# Patient Record
Sex: FEMALE | Race: WHITE | Employment: OTHER | URBAN - METROPOLITAN AREA
[De-identification: names, ages, dates, MRNs, and addresses within clinical notes are randomized per-mention and may not be internally consistent; named-entity substitution may affect disease eponyms.]

---

## 2017-01-09 ENCOUNTER — HOSPITAL ENCOUNTER (OUTPATIENT)
Dept: PHYSICAL THERAPY | Age: 77
Discharge: HOME | End: 2017-01-09
Admitting: ORTHOPAEDIC SURGERY

## 2017-01-09 ENCOUNTER — TELEPHONE (OUTPATIENT)
Dept: ENDOCRINOLOGY | Age: 77
End: 2017-01-09

## 2017-01-11 DIAGNOSIS — F32.89 DEPRESSIVE DISORDER, NOT ELSEWHERE CLASSIFIED: ICD-10-CM

## 2017-01-11 DIAGNOSIS — M51.26 LUMBAR HERNIATED DISC: ICD-10-CM

## 2017-01-11 RX ORDER — DULOXETIN HYDROCHLORIDE 60 MG/1
60 CAPSULE, DELAYED RELEASE ORAL DAILY
Qty: 90 CAPSULE | Refills: 2 | Status: SHIPPED | OUTPATIENT
Start: 2017-01-11 | End: 2017-11-29 | Stop reason: SDUPTHER

## 2017-03-28 ENCOUNTER — OFFICE VISIT (OUTPATIENT)
Dept: ORTHOPEDIC SURGERY | Age: 77
End: 2017-03-28

## 2017-03-28 VITALS
SYSTOLIC BLOOD PRESSURE: 139 MMHG | DIASTOLIC BLOOD PRESSURE: 82 MMHG | WEIGHT: 175 LBS | HEIGHT: 60 IN | HEART RATE: 86 BPM | BODY MASS INDEX: 34.36 KG/M2

## 2017-03-28 DIAGNOSIS — M17.32 POST-TRAUMATIC OSTEOARTHRITIS OF LEFT KNEE: Primary | ICD-10-CM

## 2017-03-28 DIAGNOSIS — M25.562 LEFT KNEE PAIN, UNSPECIFIED CHRONICITY: ICD-10-CM

## 2017-03-28 PROCEDURE — 1123F ACP DISCUSS/DSCN MKR DOCD: CPT | Performed by: ORTHOPAEDIC SURGERY

## 2017-03-28 PROCEDURE — 1090F PRES/ABSN URINE INCON ASSESS: CPT | Performed by: ORTHOPAEDIC SURGERY

## 2017-03-28 PROCEDURE — 4040F PNEUMOC VAC/ADMIN/RCVD: CPT | Performed by: ORTHOPAEDIC SURGERY

## 2017-03-28 PROCEDURE — 73564 X-RAY EXAM KNEE 4 OR MORE: CPT | Performed by: ORTHOPAEDIC SURGERY

## 2017-03-28 PROCEDURE — 99214 OFFICE O/P EST MOD 30 MIN: CPT | Performed by: ORTHOPAEDIC SURGERY

## 2017-03-28 PROCEDURE — 1036F TOBACCO NON-USER: CPT | Performed by: ORTHOPAEDIC SURGERY

## 2017-03-28 PROCEDURE — 20610 DRAIN/INJ JOINT/BURSA W/O US: CPT | Performed by: ORTHOPAEDIC SURGERY

## 2017-03-28 PROCEDURE — G8427 DOCREV CUR MEDS BY ELIG CLIN: HCPCS | Performed by: ORTHOPAEDIC SURGERY

## 2017-03-28 PROCEDURE — G8400 PT W/DXA NO RESULTS DOC: HCPCS | Performed by: ORTHOPAEDIC SURGERY

## 2017-03-28 PROCEDURE — G8417 CALC BMI ABV UP PARAM F/U: HCPCS | Performed by: ORTHOPAEDIC SURGERY

## 2017-03-28 PROCEDURE — G8484 FLU IMMUNIZE NO ADMIN: HCPCS | Performed by: ORTHOPAEDIC SURGERY

## 2017-03-28 ASSESSMENT — ENCOUNTER SYMPTOMS: BACK PAIN: 1

## 2017-04-24 ENCOUNTER — PATIENT MESSAGE (OUTPATIENT)
Dept: FAMILY MEDICINE CLINIC | Age: 77
End: 2017-04-24

## 2017-04-25 ENCOUNTER — TELEPHONE (OUTPATIENT)
Dept: FAMILY MEDICINE CLINIC | Age: 77
End: 2017-04-25

## 2017-04-25 ENCOUNTER — OFFICE VISIT (OUTPATIENT)
Dept: ORTHOPEDIC SURGERY | Age: 77
End: 2017-04-25

## 2017-04-25 VITALS
HEART RATE: 82 BPM | BODY MASS INDEX: 34.36 KG/M2 | SYSTOLIC BLOOD PRESSURE: 118 MMHG | DIASTOLIC BLOOD PRESSURE: 78 MMHG | WEIGHT: 175 LBS | HEIGHT: 60 IN

## 2017-04-25 DIAGNOSIS — M12.811 ROTATOR CUFF ARTHROPATHY, RIGHT: Primary | ICD-10-CM

## 2017-04-25 DIAGNOSIS — R73.02 GLUCOSE INTOLERANCE (IMPAIRED GLUCOSE TOLERANCE): ICD-10-CM

## 2017-04-25 DIAGNOSIS — I10 BENIGN ESSENTIAL HYPERTENSION: Primary | ICD-10-CM

## 2017-04-25 DIAGNOSIS — M17.0 PRIMARY OSTEOARTHRITIS OF BOTH KNEES: ICD-10-CM

## 2017-04-25 DIAGNOSIS — E78.01 ESSENTIAL FAMILIAL HYPERCHOLESTEROLEMIA: ICD-10-CM

## 2017-04-25 PROCEDURE — 1036F TOBACCO NON-USER: CPT | Performed by: ORTHOPAEDIC SURGERY

## 2017-04-25 PROCEDURE — 4040F PNEUMOC VAC/ADMIN/RCVD: CPT | Performed by: ORTHOPAEDIC SURGERY

## 2017-04-25 PROCEDURE — G8427 DOCREV CUR MEDS BY ELIG CLIN: HCPCS | Performed by: ORTHOPAEDIC SURGERY

## 2017-04-25 PROCEDURE — 1123F ACP DISCUSS/DSCN MKR DOCD: CPT | Performed by: ORTHOPAEDIC SURGERY

## 2017-04-25 PROCEDURE — 1090F PRES/ABSN URINE INCON ASSESS: CPT | Performed by: ORTHOPAEDIC SURGERY

## 2017-04-25 PROCEDURE — G8400 PT W/DXA NO RESULTS DOC: HCPCS | Performed by: ORTHOPAEDIC SURGERY

## 2017-04-25 PROCEDURE — G8417 CALC BMI ABV UP PARAM F/U: HCPCS | Performed by: ORTHOPAEDIC SURGERY

## 2017-04-25 PROCEDURE — 99213 OFFICE O/P EST LOW 20 MIN: CPT | Performed by: ORTHOPAEDIC SURGERY

## 2017-04-25 ASSESSMENT — ENCOUNTER SYMPTOMS: BACK PAIN: 1

## 2017-05-03 ENCOUNTER — OFFICE VISIT (OUTPATIENT)
Dept: FAMILY MEDICINE CLINIC | Age: 77
End: 2017-05-03

## 2017-05-03 VITALS
HEIGHT: 59 IN | WEIGHT: 171 LBS | DIASTOLIC BLOOD PRESSURE: 84 MMHG | OXYGEN SATURATION: 98 % | SYSTOLIC BLOOD PRESSURE: 133 MMHG | RESPIRATION RATE: 16 BRPM | TEMPERATURE: 97.8 F | BODY MASS INDEX: 34.47 KG/M2 | HEART RATE: 96 BPM

## 2017-05-03 DIAGNOSIS — E78.01 ESSENTIAL FAMILIAL HYPERCHOLESTEROLEMIA: ICD-10-CM

## 2017-05-03 DIAGNOSIS — F32.89 DEPRESSIVE DISORDER, NOT ELSEWHERE CLASSIFIED: ICD-10-CM

## 2017-05-03 DIAGNOSIS — I10 BENIGN ESSENTIAL HYPERTENSION: Primary | ICD-10-CM

## 2017-05-03 DIAGNOSIS — M48.061 LUMBAR STENOSIS: ICD-10-CM

## 2017-05-03 PROCEDURE — 99214 OFFICE O/P EST MOD 30 MIN: CPT | Performed by: FAMILY MEDICINE

## 2017-05-03 PROCEDURE — 1036F TOBACCO NON-USER: CPT | Performed by: FAMILY MEDICINE

## 2017-05-03 PROCEDURE — 1123F ACP DISCUSS/DSCN MKR DOCD: CPT | Performed by: FAMILY MEDICINE

## 2017-05-03 PROCEDURE — G8427 DOCREV CUR MEDS BY ELIG CLIN: HCPCS | Performed by: FAMILY MEDICINE

## 2017-05-03 PROCEDURE — G8417 CALC BMI ABV UP PARAM F/U: HCPCS | Performed by: FAMILY MEDICINE

## 2017-05-03 PROCEDURE — 1090F PRES/ABSN URINE INCON ASSESS: CPT | Performed by: FAMILY MEDICINE

## 2017-05-03 PROCEDURE — 4040F PNEUMOC VAC/ADMIN/RCVD: CPT | Performed by: FAMILY MEDICINE

## 2017-05-03 PROCEDURE — G8400 PT W/DXA NO RESULTS DOC: HCPCS | Performed by: FAMILY MEDICINE

## 2017-05-03 PROCEDURE — G0444 DEPRESSION SCREEN ANNUAL: HCPCS | Performed by: FAMILY MEDICINE

## 2017-05-03 RX ORDER — BUPROPION HYDROCHLORIDE 150 MG/1
150 TABLET ORAL EVERY MORNING
Qty: 90 TABLET | Refills: 3 | Status: SHIPPED | OUTPATIENT
Start: 2017-05-03 | End: 2017-05-19

## 2017-05-03 RX ORDER — BUPROPION HYDROCHLORIDE 300 MG/1
300 TABLET ORAL EVERY MORNING
Qty: 90 TABLET | Refills: 3 | Status: SHIPPED | OUTPATIENT
Start: 2017-05-03 | End: 2018-08-27 | Stop reason: SDUPTHER

## 2017-05-03 RX ORDER — TRIAMTERENE AND HYDROCHLOROTHIAZIDE 37.5; 25 MG/1; MG/1
1-2 TABLET ORAL DAILY
COMMUNITY
End: 2017-09-12 | Stop reason: SDUPTHER

## 2017-05-03 RX ORDER — AMLODIPINE BESYLATE 10 MG/1
10 TABLET ORAL DAILY
Qty: 90 TABLET | Refills: 3 | Status: SHIPPED | OUTPATIENT
Start: 2017-05-03 | End: 2017-08-02 | Stop reason: SDUPTHER

## 2017-05-03 RX ORDER — LOSARTAN POTASSIUM 50 MG/1
50 TABLET ORAL DAILY
Qty: 90 TABLET | Refills: 3 | Status: SHIPPED | OUTPATIENT
Start: 2017-05-03 | End: 2017-07-17 | Stop reason: SDUPTHER

## 2017-05-03 ASSESSMENT — PATIENT HEALTH QUESTIONNAIRE - PHQ9
SUM OF ALL RESPONSES TO PHQ9 QUESTIONS 1 & 2: 2
2. FEELING DOWN, DEPRESSED OR HOPELESS: 2
8. MOVING OR SPEAKING SO SLOWLY THAT OTHER PEOPLE COULD HAVE NOTICED. OR THE OPPOSITE, BEING SO FIGETY OR RESTLESS THAT YOU HAVE BEEN MOVING AROUND A LOT MORE THAN USUAL: 0
SUM OF ALL RESPONSES TO PHQ QUESTIONS 1-9: 4
7. TROUBLE CONCENTRATING ON THINGS, SUCH AS READING THE NEWSPAPER OR WATCHING TELEVISION: 0
9. THOUGHTS THAT YOU WOULD BE BETTER OFF DEAD, OR OF HURTING YOURSELF: 0
5. POOR APPETITE OR OVEREATING: 0
10. IF YOU CHECKED OFF ANY PROBLEMS, HOW DIFFICULT HAVE THESE PROBLEMS MADE IT FOR YOU TO DO YOUR WORK, TAKE CARE OF THINGS AT HOME, OR GET ALONG WITH OTHER PEOPLE: 1
1. LITTLE INTEREST OR PLEASURE IN DOING THINGS: 0
3. TROUBLE FALLING OR STAYING ASLEEP: 1
4. FEELING TIRED OR HAVING LITTLE ENERGY: 1
6. FEELING BAD ABOUT YOURSELF - OR THAT YOU ARE A FAILURE OR HAVE LET YOURSELF OR YOUR FAMILY DOWN: 0

## 2017-05-11 ENCOUNTER — OFFICE VISIT (OUTPATIENT)
Dept: ORTHOPEDIC SURGERY | Age: 77
End: 2017-05-11

## 2017-05-11 VITALS
BODY MASS INDEX: 33.38 KG/M2 | SYSTOLIC BLOOD PRESSURE: 127 MMHG | DIASTOLIC BLOOD PRESSURE: 76 MMHG | HEIGHT: 60 IN | WEIGHT: 170 LBS

## 2017-05-11 DIAGNOSIS — G57.01 PIRIFORMIS SYNDROME, RIGHT: ICD-10-CM

## 2017-05-11 DIAGNOSIS — M53.3 SACROILIAC JOINT DYSFUNCTION OF RIGHT SIDE: Primary | ICD-10-CM

## 2017-05-11 DIAGNOSIS — M54.50 PAIN OF LUMBAR SPINE: ICD-10-CM

## 2017-05-11 DIAGNOSIS — M47.816 SPONDYLOSIS OF LUMBAR REGION WITHOUT MYELOPATHY OR RADICULOPATHY: ICD-10-CM

## 2017-05-11 PROCEDURE — 99214 OFFICE O/P EST MOD 30 MIN: CPT | Performed by: PHYSICAL MEDICINE & REHABILITATION

## 2017-05-11 PROCEDURE — G8417 CALC BMI ABV UP PARAM F/U: HCPCS | Performed by: PHYSICAL MEDICINE & REHABILITATION

## 2017-05-11 PROCEDURE — 1036F TOBACCO NON-USER: CPT | Performed by: PHYSICAL MEDICINE & REHABILITATION

## 2017-05-11 PROCEDURE — 1090F PRES/ABSN URINE INCON ASSESS: CPT | Performed by: PHYSICAL MEDICINE & REHABILITATION

## 2017-05-11 PROCEDURE — 4040F PNEUMOC VAC/ADMIN/RCVD: CPT | Performed by: PHYSICAL MEDICINE & REHABILITATION

## 2017-05-11 PROCEDURE — G8427 DOCREV CUR MEDS BY ELIG CLIN: HCPCS | Performed by: PHYSICAL MEDICINE & REHABILITATION

## 2017-05-11 PROCEDURE — 72100 X-RAY EXAM L-S SPINE 2/3 VWS: CPT | Performed by: PHYSICAL MEDICINE & REHABILITATION

## 2017-07-05 RX ORDER — ROSUVASTATIN CALCIUM 20 MG/1
20 TABLET, COATED ORAL NIGHTLY
Qty: 90 TABLET | Refills: 0 | Status: SHIPPED | OUTPATIENT
Start: 2017-07-05 | End: 2017-10-23 | Stop reason: SDUPTHER

## 2017-07-17 ENCOUNTER — OFFICE VISIT (OUTPATIENT)
Dept: FAMILY MEDICINE CLINIC | Age: 77
End: 2017-07-17

## 2017-07-17 ENCOUNTER — PATIENT MESSAGE (OUTPATIENT)
Dept: FAMILY MEDICINE CLINIC | Age: 77
End: 2017-07-17

## 2017-07-17 VITALS
TEMPERATURE: 97.8 F | OXYGEN SATURATION: 98 % | SYSTOLIC BLOOD PRESSURE: 128 MMHG | HEIGHT: 59 IN | BODY MASS INDEX: 34.27 KG/M2 | DIASTOLIC BLOOD PRESSURE: 68 MMHG | HEART RATE: 68 BPM | RESPIRATION RATE: 16 BRPM | WEIGHT: 170 LBS

## 2017-07-17 DIAGNOSIS — R61 EXCESSIVE SWEATING: ICD-10-CM

## 2017-07-17 DIAGNOSIS — Z15.89 HETEROZYGOUS MTHFR MUTATION C677T: ICD-10-CM

## 2017-07-17 DIAGNOSIS — I10 BENIGN ESSENTIAL HYPERTENSION: ICD-10-CM

## 2017-07-17 DIAGNOSIS — R73.02 GLUCOSE INTOLERANCE (IMPAIRED GLUCOSE TOLERANCE): ICD-10-CM

## 2017-07-17 DIAGNOSIS — Z01.818 PREOP EXAMINATION: Primary | ICD-10-CM

## 2017-07-17 DIAGNOSIS — H26.9 CATARACT: ICD-10-CM

## 2017-07-17 DIAGNOSIS — R00.2 PALPITATION: ICD-10-CM

## 2017-07-17 DIAGNOSIS — R00.2 PALPITATIONS: Primary | ICD-10-CM

## 2017-07-17 LAB
A/G RATIO: 1.9 (ref 1.1–2.2)
ALBUMIN SERPL-MCNC: 4.9 G/DL (ref 3.4–5)
ALP BLD-CCNC: 63 U/L (ref 40–129)
ALT SERPL-CCNC: 23 U/L (ref 10–40)
ANION GAP SERPL CALCULATED.3IONS-SCNC: 14 MMOL/L (ref 3–16)
AST SERPL-CCNC: 22 U/L (ref 15–37)
BASOPHILS ABSOLUTE: 0.1 K/UL (ref 0–0.2)
BASOPHILS RELATIVE PERCENT: 0.5 %
BILIRUB SERPL-MCNC: 0.3 MG/DL (ref 0–1)
BUN BLDV-MCNC: 30 MG/DL (ref 7–20)
CALCIUM SERPL-MCNC: 10.5 MG/DL (ref 8.3–10.6)
CHLORIDE BLD-SCNC: 100 MMOL/L (ref 99–110)
CO2: 29 MMOL/L (ref 21–32)
CREAT SERPL-MCNC: 0.8 MG/DL (ref 0.6–1.2)
EOSINOPHILS ABSOLUTE: 0.2 K/UL (ref 0–0.6)
EOSINOPHILS RELATIVE PERCENT: 1.6 %
GFR AFRICAN AMERICAN: >60
GFR NON-AFRICAN AMERICAN: >60
GLOBULIN: 2.6 G/DL
GLUCOSE BLD-MCNC: 96 MG/DL (ref 70–99)
HCT VFR BLD CALC: 40.9 % (ref 36–48)
HEMOGLOBIN: 13.2 G/DL (ref 12–16)
LYMPHOCYTES ABSOLUTE: 1.7 K/UL (ref 1–5.1)
LYMPHOCYTES RELATIVE PERCENT: 14 %
MCH RBC QN AUTO: 29.4 PG (ref 26–34)
MCHC RBC AUTO-ENTMCNC: 32.2 G/DL (ref 31–36)
MCV RBC AUTO: 91 FL (ref 80–100)
MONOCYTES ABSOLUTE: 1.1 K/UL (ref 0–1.3)
MONOCYTES RELATIVE PERCENT: 8.9 %
NEUTROPHILS ABSOLUTE: 9.2 K/UL (ref 1.7–7.7)
NEUTROPHILS RELATIVE PERCENT: 75 %
PDW BLD-RTO: 14 % (ref 12.4–15.4)
PLATELET # BLD: 269 K/UL (ref 135–450)
PMV BLD AUTO: 10.5 FL (ref 5–10.5)
POTASSIUM SERPL-SCNC: 4.5 MMOL/L (ref 3.5–5.1)
RBC # BLD: 4.5 M/UL (ref 4–5.2)
SODIUM BLD-SCNC: 143 MMOL/L (ref 136–145)
TOTAL PROTEIN: 7.5 G/DL (ref 6.4–8.2)
WBC # BLD: 12.2 K/UL (ref 4–11)

## 2017-07-17 PROCEDURE — 1036F TOBACCO NON-USER: CPT | Performed by: FAMILY MEDICINE

## 2017-07-17 PROCEDURE — 93000 ELECTROCARDIOGRAM COMPLETE: CPT | Performed by: FAMILY MEDICINE

## 2017-07-17 PROCEDURE — G8427 DOCREV CUR MEDS BY ELIG CLIN: HCPCS | Performed by: FAMILY MEDICINE

## 2017-07-17 PROCEDURE — 1090F PRES/ABSN URINE INCON ASSESS: CPT | Performed by: FAMILY MEDICINE

## 2017-07-17 PROCEDURE — G8417 CALC BMI ABV UP PARAM F/U: HCPCS | Performed by: FAMILY MEDICINE

## 2017-07-17 PROCEDURE — G8400 PT W/DXA NO RESULTS DOC: HCPCS | Performed by: FAMILY MEDICINE

## 2017-07-17 PROCEDURE — 99215 OFFICE O/P EST HI 40 MIN: CPT | Performed by: FAMILY MEDICINE

## 2017-07-17 PROCEDURE — 1123F ACP DISCUSS/DSCN MKR DOCD: CPT | Performed by: FAMILY MEDICINE

## 2017-07-17 PROCEDURE — 4040F PNEUMOC VAC/ADMIN/RCVD: CPT | Performed by: FAMILY MEDICINE

## 2017-07-17 RX ORDER — LOSARTAN POTASSIUM 50 MG/1
50 TABLET ORAL DAILY
Qty: 90 TABLET | Refills: 3 | Status: SHIPPED | OUTPATIENT
Start: 2017-07-17 | End: 2017-11-08

## 2017-07-20 ENCOUNTER — TELEPHONE (OUTPATIENT)
Dept: FAMILY MEDICINE CLINIC | Age: 77
End: 2017-07-20

## 2017-07-20 DIAGNOSIS — R61 SWEATING INCREASE: Primary | ICD-10-CM

## 2017-07-20 DIAGNOSIS — D72.829 LEUKOCYTOSIS, UNSPECIFIED TYPE: ICD-10-CM

## 2017-07-24 ENCOUNTER — HOSPITAL ENCOUNTER (OUTPATIENT)
Dept: NON INVASIVE DIAGNOSTICS | Age: 77
Discharge: HOME | End: 2017-07-24
Attending: FAMILY MEDICINE | Admitting: FAMILY MEDICINE

## 2017-07-25 ENCOUNTER — TELEPHONE (OUTPATIENT)
Dept: CARDIOLOGY CLINIC | Age: 77
End: 2017-07-25

## 2017-07-25 LAB
ACQUISITION DURATION: NORMAL S
AVERAGE HEART RATE: 77 BPM
EKG DIAGNOSIS: NORMAL
FASTEST SUPRAVENTRICULAR RATE: 82 BPM
HOLTER MAX HEART RATE: 126 BPM
HOOKUP DATE: NORMAL
HOOKUP TIME: NORMAL
LONGEST SUPRAVENTRICULAR RATE: 82 BPM
Lab: NORMAL
MAX HEART RATE TIME/DATE: NORMAL
MIN HEART RATE TIME/DATE: NORMAL
MIN HEART RATE: 59 BPM
NUMBER OF FASTEST SUPRAVENTRICULAR BEATS: 3
NUMBER OF LONGEST SUPRAVENTRICULAR BEATS: 3
NUMBER OF QRS COMPLEXES: NORMAL
NUMBER OF SUPRAVENTRICULAR BEATS IN RUNS: 3
NUMBER OF SUPRAVENTRICULAR COUPLETS: 3
NUMBER OF SUPRAVENTRICULAR ECTOPICS: 66
NUMBER OF SUPRAVENTRICULAR ISOLATED BEATS: 57
NUMBER OF SUPRAVENTRICULAR RUNS: 1
NUMBER OF VENTRICULAR BEATS IN RUNS: 0
NUMBER OF VENTRICULAR BIGEMINAL CYCLES: 0
NUMBER OF VENTRICULAR COUPLETS: 0
NUMBER OF VENTRICULAR ECTOPICS: 110
NUMBER OF VENTRICULAR ISOLATED BEATS: 110
NUMBER OF VENTRICULAR RUNS: 0

## 2017-07-27 ENCOUNTER — HOSPITAL ENCOUNTER (OUTPATIENT)
Dept: OTHER | Age: 77
Discharge: HOME | End: 2017-07-27
Attending: FAMILY MEDICINE | Admitting: FAMILY MEDICINE

## 2017-07-27 DIAGNOSIS — R61 SWEATING INCREASE: ICD-10-CM

## 2017-07-27 DIAGNOSIS — D72.829 LEUKOCYTOSIS, UNSPECIFIED TYPE: ICD-10-CM

## 2017-08-02 DIAGNOSIS — I10 BENIGN ESSENTIAL HYPERTENSION: ICD-10-CM

## 2017-08-02 RX ORDER — AMLODIPINE BESYLATE 10 MG/1
10 TABLET ORAL DAILY
Qty: 90 TABLET | Refills: 3 | Status: SHIPPED | OUTPATIENT
Start: 2017-08-02 | End: 2018-07-27 | Stop reason: SDUPTHER

## 2017-09-08 ENCOUNTER — HOSPITAL ENCOUNTER (OUTPATIENT)
Dept: MAMMOGRAPHY | Age: 77
Discharge: HOME | End: 2017-09-08
Attending: OBSTETRICS & GYNECOLOGY | Admitting: OBSTETRICS & GYNECOLOGY

## 2017-09-08 DIAGNOSIS — Z12.31 VISIT FOR SCREENING MAMMOGRAM: ICD-10-CM

## 2017-10-04 ENCOUNTER — OFFICE VISIT (OUTPATIENT)
Dept: ORTHOPEDIC SURGERY | Age: 77
End: 2017-10-04

## 2017-10-04 VITALS
RESPIRATION RATE: 16 BRPM | HEIGHT: 59 IN | WEIGHT: 170 LBS | SYSTOLIC BLOOD PRESSURE: 136 MMHG | DIASTOLIC BLOOD PRESSURE: 79 MMHG | BODY MASS INDEX: 34.27 KG/M2 | HEART RATE: 94 BPM

## 2017-10-04 DIAGNOSIS — M19.042 PRIMARY OSTEOARTHRITIS OF LEFT HAND: Primary | ICD-10-CM

## 2017-10-04 DIAGNOSIS — M79.645 THUMB PAIN, LEFT: ICD-10-CM

## 2017-10-04 PROCEDURE — G8427 DOCREV CUR MEDS BY ELIG CLIN: HCPCS | Performed by: ORTHOPAEDIC SURGERY

## 2017-10-04 PROCEDURE — G8484 FLU IMMUNIZE NO ADMIN: HCPCS | Performed by: ORTHOPAEDIC SURGERY

## 2017-10-04 PROCEDURE — 99214 OFFICE O/P EST MOD 30 MIN: CPT | Performed by: ORTHOPAEDIC SURGERY

## 2017-10-04 PROCEDURE — G8417 CALC BMI ABV UP PARAM F/U: HCPCS | Performed by: ORTHOPAEDIC SURGERY

## 2017-10-04 PROCEDURE — 1090F PRES/ABSN URINE INCON ASSESS: CPT | Performed by: ORTHOPAEDIC SURGERY

## 2017-10-04 PROCEDURE — 1036F TOBACCO NON-USER: CPT | Performed by: ORTHOPAEDIC SURGERY

## 2017-10-04 PROCEDURE — 4040F PNEUMOC VAC/ADMIN/RCVD: CPT | Performed by: ORTHOPAEDIC SURGERY

## 2017-10-04 PROCEDURE — 73140 X-RAY EXAM OF FINGER(S): CPT | Performed by: ORTHOPAEDIC SURGERY

## 2017-10-04 NOTE — Clinical Note
Dear  Selene Lawrence MD,  Thank you very much for your referral or Ms. Lucy Garnica to me for evaluation and treatment of her Hand & Wrist condition. I appreciate your confidence in me and thank you for allowing me the opportunity to care for your patients. If I can be of any further assistance to you on this or any other patient, please do not hesitate to contact me. Sincerely,  Td Myles.  Portillo Catherine MD

## 2017-10-04 NOTE — MR AVS SNAPSHOT
becoming more physically active will help lower your risk of developing or worsening diseases associated with obesity. Learn more at: [employer]             Medications and Orders      Your Current Medications Are              triamterene-hydrochlorothiazide (MAXZIDE-25) 37.5-25 MG per tablet Take 1-2 tablets by mouth daily    amLODIPine (NORVASC) 10 MG tablet Take 1 tablet by mouth daily    Omeprazole-Sodium Bicarbonate (ZEGERID PO) Take by mouth    losartan (COZAAR) 50 MG tablet Take 1 tablet by mouth daily    rosuvastatin (CRESTOR) 20 MG tablet Take 1 tablet by mouth nightly    FOLIC ACID PO Take by mouth    CYANOCOBALAMIN PO Take by mouth    Pyridoxine HCl (B-6 PO) Take by mouth    buPROPion (WELLBUTRIN XL) 300 MG extended release tablet Take 1 tablet by mouth every morning    DULoxetine (CYMBALTA) 60 MG extended release capsule Take 1 capsule by mouth daily Due for follow up appt. ibuprofen (ADVIL;MOTRIN) 800 MG tablet Take 1 tablet by mouth 2 times daily as needed for Pain    Probiotic Product (PROBIOTIC DAILY PO) Take  by mouth. Alpha-D-Galactosidase (BEANO PO) Take  by mouth. L-Arginine POWD Take 4.5 g by mouth 2 times daily. Allergies              Ace Inhibitors     Taztia Xt [Diltiazem Hcl]       We Ordered/Performed the following           OTS Breg CMC Thumb Guard     Comments:    Patient was prescribed a Breg CMC Thumb Guard Brace. The left thumb will require stabilization / immobilization from this semi-rigid / rigid orthosis to improve their function. The orthosis will assist in protecting the affected area, provide functional support and facilitate healing. The patient was educated and fit by a healthcare professional with expert knowledge and specialization in brace application while under the direct supervision of the physician.   Verbal and written instructions for the use of and application of this item were provided. They were instructed to contact the office immediately should the brace result in increased pain, decreased sensation, increased swelling or worsening of the condition.     XR FINGER LEFT (MIN 2 VIEWS)     Comments:    2V Lt Thumb    Room 4         Result Summary for XR FINGER LEFT (MIN 2 VIEWS)      Result Information     Status          Final result (Exam End: 10/4/2017  2:30 PM)           10/4/2017  2:30 PM      Narrative & Impression           Radiology result is complete; follow up with provider / physician office for radiology results                       Additional Information        Basic Information     Date Of Birth Sex Race Ethnicity Preferred Language    1940 Female White Non-/Non  English      Problem List as of 10/4/2017  Date Reviewed: 7/17/2017                Glucose intolerance (impaired glucose tolerance)    Acute hearing loss of right ear    Trochanteric bursitis of right hip    Lumbar spondylosis    Lumbar stenosis    Renal angiolipoma    AVN (avascular necrosis of bone) (HCC)    Elevated lipoprotein(a)    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 ([facility] [address]    Gene mutation    IBS (irritable bowel syndrome)    Depressive disorder, not elsewhere classified    Osteopenia    Cervical stenosis of spine    Benign essential hypertension    Colon polyps    Essential familial hypercholesterolemia      Immunizations as of 10/4/2017     Name Date    Influenza Vaccine, unspecified formulation 11/2/2015    Influenza Whole 9/13/2010    Influenza, High Dose 10/19/2016, 9/24/2012    Pneumococcal 13-valent Conjugate ([id number]) 7/12/2016    Pneumococcal Polysaccharide ([id number]) 10/21/2008    Td 5/22/2009    Tdap (Boostrix, Adacel) 9/19/2015    Zoster 8/26/2007      Preventive Care        Date Due    Yearly Flu Vaccine (1) 9/1/2017    Cholesterol Screening 4/27/2022    Tetanus Combination Vaccine (2 - Td) 9/19/2025            [facility]

## 2017-10-04 NOTE — PROGRESS NOTES
not similarly effected bilaterally. The base of the hand & wrist are not tender to palpation bilaterally  Muscular strength is clinically appropriate bilaterally. Radiographic Evaluation:  Radiographs are reviewed  today (2 views of the left Thumb). They demonstrate no evidence of an acute fracture. There is marked osteoarthritis of the MCP Joint with marked joint narrowing and osteophyte formation, & 0 degrees of angular deformity. There is not evidence of other injury or bony fracture. 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 Plan:    I have discussed with Ms. Marcus Garza the various treatment options for treatment of digital arthritis of the MCP Joint. We discussed the conservative options of symptomatic treatment, use of NSAIDS, & activity adjustments. We also touched on the use of occasional immobilization as needed for symptomatic control. I also described further more advanced treatments including the intermittent use of either injected or oral steroids and surgical treatment options. She has elected to proceed conservativly, voicing an understanding of the other options available to her. I have explained the complications, limitations, expectations, alternatives, & risks of her chosen treatment. I also explained the likelihood of permanent joint enlargement and limited motion. She understood our discussion and was comfortable with her decision; she was provided with appropriate expectations. I have asked her to schedule a follow-up appointment with me at any time in the future she feels the need for further treatment for her symptoms. She is welcome to call for an appointment sooner if she has any additional concerns or questions.

## 2017-10-23 ENCOUNTER — OFFICE VISIT (OUTPATIENT)
Dept: FAMILY MEDICINE CLINIC | Age: 77
End: 2017-10-23

## 2017-10-23 VITALS
SYSTOLIC BLOOD PRESSURE: 128 MMHG | TEMPERATURE: 97.9 F | HEART RATE: 88 BPM | OXYGEN SATURATION: 96 % | RESPIRATION RATE: 16 BRPM | DIASTOLIC BLOOD PRESSURE: 71 MMHG

## 2017-10-23 DIAGNOSIS — J20.9 ACUTE BRONCHITIS, UNSPECIFIED ORGANISM: Primary | ICD-10-CM

## 2017-10-23 PROCEDURE — G8417 CALC BMI ABV UP PARAM F/U: HCPCS | Performed by: FAMILY MEDICINE

## 2017-10-23 PROCEDURE — 99213 OFFICE O/P EST LOW 20 MIN: CPT | Performed by: FAMILY MEDICINE

## 2017-10-23 PROCEDURE — 1090F PRES/ABSN URINE INCON ASSESS: CPT | Performed by: FAMILY MEDICINE

## 2017-10-23 PROCEDURE — G8484 FLU IMMUNIZE NO ADMIN: HCPCS | Performed by: FAMILY MEDICINE

## 2017-10-23 PROCEDURE — 1123F ACP DISCUSS/DSCN MKR DOCD: CPT | Performed by: FAMILY MEDICINE

## 2017-10-23 PROCEDURE — G8427 DOCREV CUR MEDS BY ELIG CLIN: HCPCS | Performed by: FAMILY MEDICINE

## 2017-10-23 PROCEDURE — G8400 PT W/DXA NO RESULTS DOC: HCPCS | Performed by: FAMILY MEDICINE

## 2017-10-23 PROCEDURE — 4040F PNEUMOC VAC/ADMIN/RCVD: CPT | Performed by: FAMILY MEDICINE

## 2017-10-23 PROCEDURE — 1036F TOBACCO NON-USER: CPT | Performed by: FAMILY MEDICINE

## 2017-10-23 RX ORDER — AZITHROMYCIN 250 MG/1
TABLET, FILM COATED ORAL
Qty: 1 PACKET | Refills: 0 | Status: SHIPPED | OUTPATIENT
Start: 2017-10-23 | End: 2017-10-31 | Stop reason: ALTCHOICE

## 2017-10-23 RX ORDER — ROSUVASTATIN CALCIUM 20 MG/1
20 TABLET, COATED ORAL NIGHTLY
Qty: 90 TABLET | Refills: 1 | Status: SHIPPED | OUTPATIENT
Start: 2017-10-23 | End: 2018-07-27 | Stop reason: SDUPTHER

## 2017-10-23 NOTE — PROGRESS NOTES
Subjective:      Patient ID: Ange Barrios is a 68 y.o. female. HPI  Orville Singer is here for evaluation for cough. Orville Singer complains of a cough x one week. She is coughing up green to brown phlegm. Mild nasal congestion with clear nasal discharge. No ST, ear pain, sinus pressure. No nausea, vomiting, diarrhea, fever. Mild malaise. Rx: Sudafed does not help. Delsym did not helps    Outpatient Prescriptions Marked as Taking for the 10/23/17 encounter (Office Visit) with James Ariza MD   Medication Sig Dispense Refill    triamterene-hydrochlorothiazide (MAXZIDE-25) 37.5-25 MG per tablet Take 1-2 tablets by mouth daily 180 tablet 1    amLODIPine (NORVASC) 10 MG tablet Take 1 tablet by mouth daily 90 tablet 3    Omeprazole-Sodium Bicarbonate (ZEGERID PO) Take by mouth      losartan (COZAAR) 50 MG tablet Take 1 tablet by mouth daily 90 tablet 3    rosuvastatin (CRESTOR) 20 MG tablet Take 1 tablet by mouth nightly 90 tablet 0    FOLIC ACID PO Take by mouth      CYANOCOBALAMIN PO Take by mouth      Pyridoxine HCl (B-6 PO) Take by mouth      buPROPion (WELLBUTRIN XL) 300 MG extended release tablet Take 1 tablet by mouth every morning 90 tablet 3    DULoxetine (CYMBALTA) 60 MG extended release capsule Take 1 capsule by mouth daily Due for follow up appt. 90 capsule 2    ibuprofen (ADVIL;MOTRIN) 800 MG tablet Take 1 tablet by mouth 2 times daily as needed for Pain 60 tablet 5    Probiotic Product (PROBIOTIC DAILY PO) Take  by mouth.  Alpha-D-Galactosidase (BEANO PO) Take  by mouth.  L-Arginine POWD Take 4.5 g by mouth 2 times daily.         Patient Active Problem List   Diagnosis    Depressive disorder, not elsewhere classified    Osteopenia    Cervical stenosis of spine    Benign essential hypertension    Colon polyps    Essential familial hypercholesterolemia    IBS (irritable bowel syndrome)    AVN (avascular necrosis of bone) (HCC)    Elevated lipoprotein(a)    Heterozygous

## 2017-10-30 ENCOUNTER — PATIENT MESSAGE (OUTPATIENT)
Dept: FAMILY MEDICINE CLINIC | Age: 77
End: 2017-10-30

## 2017-10-30 RX ORDER — CEFUROXIME AXETIL 500 MG/1
500 TABLET ORAL 2 TIMES DAILY
Qty: 20 TABLET | Refills: 0 | Status: SHIPPED | OUTPATIENT
Start: 2017-10-30 | End: 2017-10-31 | Stop reason: ALTCHOICE

## 2017-10-31 ENCOUNTER — OFFICE VISIT (OUTPATIENT)
Dept: ENDOCRINOLOGY | Age: 77
End: 2017-10-31

## 2017-10-31 VITALS
HEART RATE: 97 BPM | HEIGHT: 60 IN | TEMPERATURE: 97.8 F | BODY MASS INDEX: 33.85 KG/M2 | SYSTOLIC BLOOD PRESSURE: 126 MMHG | RESPIRATION RATE: 18 BRPM | OXYGEN SATURATION: 93 % | DIASTOLIC BLOOD PRESSURE: 86 MMHG | WEIGHT: 172.4 LBS

## 2017-10-31 DIAGNOSIS — M85.80 OSTEOPENIA, UNSPECIFIED LOCATION: ICD-10-CM

## 2017-10-31 DIAGNOSIS — E78.01 ESSENTIAL FAMILIAL HYPERCHOLESTEROLEMIA: ICD-10-CM

## 2017-10-31 DIAGNOSIS — M87.00 AVN (AVASCULAR NECROSIS OF BONE) (HCC): Primary | ICD-10-CM

## 2017-10-31 PROCEDURE — G8400 PT W/DXA NO RESULTS DOC: HCPCS | Performed by: INTERNAL MEDICINE

## 2017-10-31 PROCEDURE — 1123F ACP DISCUSS/DSCN MKR DOCD: CPT | Performed by: INTERNAL MEDICINE

## 2017-10-31 PROCEDURE — G8417 CALC BMI ABV UP PARAM F/U: HCPCS | Performed by: INTERNAL MEDICINE

## 2017-10-31 PROCEDURE — 1036F TOBACCO NON-USER: CPT | Performed by: INTERNAL MEDICINE

## 2017-10-31 PROCEDURE — G8484 FLU IMMUNIZE NO ADMIN: HCPCS | Performed by: INTERNAL MEDICINE

## 2017-10-31 PROCEDURE — 4040F PNEUMOC VAC/ADMIN/RCVD: CPT | Performed by: INTERNAL MEDICINE

## 2017-10-31 PROCEDURE — 99214 OFFICE O/P EST MOD 30 MIN: CPT | Performed by: INTERNAL MEDICINE

## 2017-10-31 PROCEDURE — G8427 DOCREV CUR MEDS BY ELIG CLIN: HCPCS | Performed by: INTERNAL MEDICINE

## 2017-10-31 PROCEDURE — 1090F PRES/ABSN URINE INCON ASSESS: CPT | Performed by: INTERNAL MEDICINE

## 2017-10-31 ASSESSMENT — ENCOUNTER SYMPTOMS
DOUBLE VISION: 0
PHOTOPHOBIA: 0
BACK PAIN: 0
ORTHOPNEA: 0
HEMOPTYSIS: 0
COUGH: 0
BLURRED VISION: 0

## 2017-10-31 NOTE — PROGRESS NOTES
136/79        Past Medical History:   Diagnosis Date    Acute esophagitis     h/o    Benign essential hypertension     Cervical stenosis of spine     Colon polyps     Depressive disorder, not elsewhere classified     Essential familial hypercholesterolemia     Fatigue     Fracture of cuboid, right ankle, closed 5/2013    Lumbar herniated disc     Lumbar radiculopathy 6/9/2015    Osteopenia      Past Surgical History:   Procedure Laterality Date    APPENDECTOMY      BREAST SURGERY      reduction    HYSTERECTOMY      JOINT REPLACEMENT Left 4/2014    hip    KNEE SURGERY      left    LAMINECTOMY      decompressive more than 2 lumbar segments    TONSILLECTOMY       Family History   Problem Relation Age of Onset    Other Mother      lung disease    Heart Disease Father      History   Smoking Status    Never Smoker   Smokeless Tobacco    Never Used      History   Alcohol Use No       HPI    Mirlande aGvino is a 68 y.o. female who is here for management of AVN. PCP  Isaak Cloud MD     Previously saw Dr. Devin Perez and Dayami Bishop. Patient has a PMH of Pre-DM, HTN, HLP, AVN    AVN: Was originally diagnosed in 2014, because of left groin pain, ddx with AVN of left hip, s/p left hip replacement April 2014. Currently patient is on L-Arginine 4.5 grams BID     Hyperlipdemia. Patient knows about this problem since \"for the past 10 years. \" Patient is currently on crestor 20 mg daily. Hypercalcemia: calcium has been high to high normal.   No History of kidney stones:  History of BMD evaluation: Osteopenia on DEXA scan in 2011. No History of fractures:    Ca: 500 mg BID    Supplemental Vitamin D: Dose not known. Review of Systems   Constitutional: Positive for malaise/fatigue. Negative for chills, diaphoresis, fever and weight loss. Eyes: Negative for blurred vision, double vision and photophobia. Respiratory: Negative for cough and hemoptysis.     Cardiovascular: Negative for chest

## 2017-11-02 ENCOUNTER — OFFICE VISIT (OUTPATIENT)
Dept: FAMILY MEDICINE CLINIC | Age: 77
End: 2017-11-02

## 2017-11-02 VITALS
RESPIRATION RATE: 16 BRPM | DIASTOLIC BLOOD PRESSURE: 79 MMHG | TEMPERATURE: 97.2 F | OXYGEN SATURATION: 92 % | SYSTOLIC BLOOD PRESSURE: 147 MMHG | HEART RATE: 85 BPM

## 2017-11-02 DIAGNOSIS — J40 BRONCHITIS: Primary | ICD-10-CM

## 2017-11-02 PROCEDURE — 1036F TOBACCO NON-USER: CPT | Performed by: FAMILY MEDICINE

## 2017-11-02 PROCEDURE — 1123F ACP DISCUSS/DSCN MKR DOCD: CPT | Performed by: FAMILY MEDICINE

## 2017-11-02 PROCEDURE — G8400 PT W/DXA NO RESULTS DOC: HCPCS | Performed by: FAMILY MEDICINE

## 2017-11-02 PROCEDURE — 99213 OFFICE O/P EST LOW 20 MIN: CPT | Performed by: FAMILY MEDICINE

## 2017-11-02 PROCEDURE — G8427 DOCREV CUR MEDS BY ELIG CLIN: HCPCS | Performed by: FAMILY MEDICINE

## 2017-11-02 PROCEDURE — 1090F PRES/ABSN URINE INCON ASSESS: CPT | Performed by: FAMILY MEDICINE

## 2017-11-02 PROCEDURE — G8484 FLU IMMUNIZE NO ADMIN: HCPCS | Performed by: FAMILY MEDICINE

## 2017-11-02 PROCEDURE — G8417 CALC BMI ABV UP PARAM F/U: HCPCS | Performed by: FAMILY MEDICINE

## 2017-11-02 PROCEDURE — 4040F PNEUMOC VAC/ADMIN/RCVD: CPT | Performed by: FAMILY MEDICINE

## 2017-11-02 RX ORDER — AMOXICILLIN AND CLAVULANATE POTASSIUM 875; 125 MG/1; MG/1
1 TABLET, FILM COATED ORAL 2 TIMES DAILY
Qty: 20 TABLET | Refills: 0 | Status: SHIPPED | OUTPATIENT
Start: 2017-11-02 | End: 2017-11-12

## 2017-11-02 RX ORDER — GUAIFENESIN AND CODEINE PHOSPHATE 100; 10 MG/5ML; MG/5ML
5-10 SOLUTION ORAL 4 TIMES DAILY PRN
Qty: 118 ML | Refills: 0 | Status: SHIPPED | OUTPATIENT
Start: 2017-11-02 | End: 2017-11-09

## 2017-11-02 NOTE — PROGRESS NOTES
Subjective:      Patient ID: Alicia Leon is a 68 y.o. female. HPI  Jasper Winchester is here for evaluation for cough. She was treated on 10/23 for bronchitis and was treated with Zithromycin. The cough improved and the chest congestion resolved but continues to cough. The cough is productive brown mucous a few times a day. No hemoptysis. .  No fever, chest pain, dyspnea, ST, sinus congestion, PND, malaise. No nausea, vomiting, diarrhea   RX; Delsym is not helping. Outpatient Prescriptions Marked as Taking for the 11/2/17 encounter (Office Visit) with Vin Gaines MD   Medication Sig Dispense Refill    rosuvastatin (CRESTOR) 20 MG tablet Take 1 tablet by mouth nightly 90 tablet 1    triamterene-hydrochlorothiazide (MAXZIDE-25) 37.5-25 MG per tablet Take 1-2 tablets by mouth daily 180 tablet 1    amLODIPine (NORVASC) 10 MG tablet Take 1 tablet by mouth daily 90 tablet 3    Omeprazole-Sodium Bicarbonate (ZEGERID PO) Take by mouth      losartan (COZAAR) 50 MG tablet Take 1 tablet by mouth daily 90 tablet 3    FOLIC ACID PO Take by mouth      CYANOCOBALAMIN PO Take by mouth      Pyridoxine HCl (B-6 PO) Take by mouth      buPROPion (WELLBUTRIN XL) 300 MG extended release tablet Take 1 tablet by mouth every morning 90 tablet 3    DULoxetine (CYMBALTA) 60 MG extended release capsule Take 1 capsule by mouth daily Due for follow up appt. 90 capsule 2    ibuprofen (ADVIL;MOTRIN) 800 MG tablet Take 1 tablet by mouth 2 times daily as needed for Pain 60 tablet 5    Probiotic Product (PROBIOTIC DAILY PO) Take  by mouth.  Alpha-D-Galactosidase (BEANO PO) Take  by mouth.  L-Arginine POWD Take 4.5 g by mouth 2 times daily.         Patient Active Problem List   Diagnosis    Depressive disorder, not elsewhere classified    Osteopenia    Cervical stenosis of spine    Benign essential hypertension    Colon polyps    Essential familial hypercholesterolemia    IBS (irritable bowel syndrome)    AVN

## 2017-11-08 ENCOUNTER — PATIENT MESSAGE (OUTPATIENT)
Dept: FAMILY MEDICINE CLINIC | Age: 77
End: 2017-11-08

## 2017-11-08 RX ORDER — BISOPROLOL FUMARATE 5 MG/1
5 TABLET ORAL DAILY
Qty: 30 TABLET | Refills: 5 | Status: SHIPPED | OUTPATIENT
Start: 2017-11-08 | End: 2017-12-14 | Stop reason: SDUPTHER

## 2017-11-08 NOTE — TELEPHONE ENCOUNTER
From: [employer]  To: Dee Munson MD  Sent: 11/8/2017 2:40 PM EST  Subject: Non-Urgent Medical Question    Dr. Shannan Carter, my cough is no better and maybe a bit worse. I went to Dr. Jordan Christopher today. He suggested I stop Losartin for a month and he feels the cough will diminish. Is there any other blood pressure medicine you want to replace the Losartin? I already take Amlodopine and Triamterene. Please let me know.   Lety Pérez

## 2017-11-29 DIAGNOSIS — M51.26 LUMBAR HERNIATED DISC: ICD-10-CM

## 2017-11-29 RX ORDER — DULOXETIN HYDROCHLORIDE 60 MG/1
60 CAPSULE, DELAYED RELEASE ORAL DAILY
Qty: 90 CAPSULE | Refills: 2 | Status: SHIPPED | OUTPATIENT
Start: 2017-11-29 | End: 2018-07-27 | Stop reason: SDUPTHER

## 2017-12-14 ENCOUNTER — OFFICE VISIT (OUTPATIENT)
Dept: FAMILY MEDICINE CLINIC | Age: 77
End: 2017-12-14

## 2017-12-14 VITALS — SYSTOLIC BLOOD PRESSURE: 132 MMHG | OXYGEN SATURATION: 100 % | DIASTOLIC BLOOD PRESSURE: 78 MMHG | HEART RATE: 57 BPM

## 2017-12-14 DIAGNOSIS — I10 BENIGN ESSENTIAL HYPERTENSION: Primary | ICD-10-CM

## 2017-12-14 DIAGNOSIS — F33.42 RECURRENT MAJOR DEPRESSIVE DISORDER, IN FULL REMISSION (HCC): ICD-10-CM

## 2017-12-14 DIAGNOSIS — E78.01 ESSENTIAL FAMILIAL HYPERCHOLESTEROLEMIA: ICD-10-CM

## 2017-12-14 DIAGNOSIS — E28.39 ESTROGEN DEFICIENCY: ICD-10-CM

## 2017-12-14 DIAGNOSIS — F33.42 RECURRENT MAJOR DEPRESSIVE EPISODES, IN FULL REMISSION (HCC): ICD-10-CM

## 2017-12-14 DIAGNOSIS — M87.00 AVN (AVASCULAR NECROSIS OF BONE) (HCC): ICD-10-CM

## 2017-12-14 PROCEDURE — 99214 OFFICE O/P EST MOD 30 MIN: CPT | Performed by: FAMILY MEDICINE

## 2017-12-14 PROCEDURE — G8400 PT W/DXA NO RESULTS DOC: HCPCS | Performed by: FAMILY MEDICINE

## 2017-12-14 PROCEDURE — 4040F PNEUMOC VAC/ADMIN/RCVD: CPT | Performed by: FAMILY MEDICINE

## 2017-12-14 PROCEDURE — G8427 DOCREV CUR MEDS BY ELIG CLIN: HCPCS | Performed by: FAMILY MEDICINE

## 2017-12-14 PROCEDURE — 1090F PRES/ABSN URINE INCON ASSESS: CPT | Performed by: FAMILY MEDICINE

## 2017-12-14 PROCEDURE — G8417 CALC BMI ABV UP PARAM F/U: HCPCS | Performed by: FAMILY MEDICINE

## 2017-12-14 PROCEDURE — 1036F TOBACCO NON-USER: CPT | Performed by: FAMILY MEDICINE

## 2017-12-14 PROCEDURE — G8484 FLU IMMUNIZE NO ADMIN: HCPCS | Performed by: FAMILY MEDICINE

## 2017-12-14 PROCEDURE — 1123F ACP DISCUSS/DSCN MKR DOCD: CPT | Performed by: FAMILY MEDICINE

## 2017-12-14 RX ORDER — BISOPROLOL FUMARATE 5 MG/1
5 TABLET ORAL DAILY
Qty: 90 TABLET | Refills: 1 | Status: SHIPPED | OUTPATIENT
Start: 2017-12-14 | End: 2018-05-30 | Stop reason: SDUPTHER

## 2017-12-14 RX ORDER — IBUPROFEN 800 MG/1
800 TABLET ORAL 2 TIMES DAILY PRN
Qty: 180 TABLET | Refills: 5 | Status: SHIPPED | OUTPATIENT
Start: 2017-12-14 | End: 2020-06-08

## 2017-12-14 NOTE — PROGRESS NOTES
Subjective:      Patient ID: Inocente Jack is a 68 y.o. female. HPI  Brad Leonard is here for follow up on HTN, hyperlipidemia, cough, pain and depression. The cough has resolved with stopping Losartan. Pain is manageable with current regimens. She takes NSAIDS when the hips become unbearable, eg if at the mall. Effective. She thinks Cymbalta helps. Has felt frustrated at times but not depressed. Treatment Adherence:   Medication compliance:  compliant most of the time  Diet compliance:  compliant most of the time  Weight trend: stable  Current exercise: no regular exercise and does do some exercise in her chair at home. Does do her back exercise. Barriers: back pain and knee pain  Had Synvisc in the left knee; is going to have right as well.  helps     Hypertension:  Home blood pressure monitoring: No. Patient denies chest pain, blurred vision, peripheral edema,  palpitations and dry cough. Mild BRITO is stable. Antihypertensive medication side effects: no medication side effects noted. Use of agents associated with hypertension: NSAIDS. Sodium (mmol/L)   Date Value   07/17/2017 143    BUN (mg/dL)   Date Value   07/17/2017 30 (H)    Glucose (mg/dL)   Date Value   07/17/2017 96   02/06/2012 93      Potassium (mmol/L)   Date Value   07/17/2017 4.5    CREATININE (mg/dL)   Date Value   07/17/2017 0.8         Hyperlipidemia:  No new myalgias or GI upset on rosuvastatin (Crestor).      Lab Results   Component Value Date    CHOL 182 04/27/2017    CHOL 119 04/27/2017    TRIG 135 04/27/2017    HDL 63 04/27/2017    LDLCALC 92 04/27/2017    LDLDIRECT 118 (H) 10/25/2016     Lab Results   Component Value Date    ALT 23 07/17/2017    AST 22 07/17/2017           Outpatient Prescriptions Marked as Taking for the 12/14/17 encounter (Office Visit) with Checo Brunner MD   Medication Sig Dispense Refill    DULoxetine (CYMBALTA) 60 MG extended release capsule Take 1 capsule

## 2018-03-22 RX ORDER — TRIAMTERENE AND HYDROCHLOROTHIAZIDE 37.5; 25 MG/1; MG/1
TABLET ORAL
Qty: 180 TABLET | Refills: 1 | Status: SHIPPED | OUTPATIENT
Start: 2018-03-22 | End: 2019-01-11 | Stop reason: SINTOL

## 2018-04-02 ENCOUNTER — HOSPITAL ENCOUNTER (OUTPATIENT)
Dept: GENERAL RADIOLOGY | Age: 78
Discharge: HOME | End: 2018-04-02
Attending: OBSTETRICS & GYNECOLOGY | Admitting: OBSTETRICS & GYNECOLOGY

## 2018-04-02 DIAGNOSIS — E28.39 OTHER PRIMARY OVARIAN FAILURE: ICD-10-CM

## 2018-04-02 DIAGNOSIS — E28.39 ESTROGEN DEFICIENCY: ICD-10-CM

## 2018-04-20 ENCOUNTER — TELEPHONE (OUTPATIENT)
Dept: FAMILY MEDICINE CLINIC | Age: 78
End: 2018-04-20

## 2018-05-02 ENCOUNTER — HOSPITAL ENCOUNTER (OUTPATIENT)
Dept: MRI IMAGING | Age: 78
Discharge: HOME | End: 2018-05-02
Attending: NURSE PRACTITIONER | Admitting: NURSE PRACTITIONER

## 2018-05-02 DIAGNOSIS — M54.16 RADICULOPATHY OF LUMBAR REGION: ICD-10-CM

## 2018-05-02 DIAGNOSIS — M54.16 LUMBAR RADICULOPATHY: ICD-10-CM

## 2018-05-30 DIAGNOSIS — I10 BENIGN ESSENTIAL HYPERTENSION: ICD-10-CM

## 2018-05-30 RX ORDER — BISOPROLOL FUMARATE 5 MG/1
5 TABLET ORAL DAILY
Qty: 90 TABLET | Refills: 1 | Status: SHIPPED | OUTPATIENT
Start: 2018-05-30 | End: 2018-06-05 | Stop reason: SDUPTHER

## 2018-06-05 ENCOUNTER — OFFICE VISIT (OUTPATIENT)
Dept: FAMILY MEDICINE CLINIC | Age: 78
End: 2018-06-05

## 2018-06-05 VITALS
SYSTOLIC BLOOD PRESSURE: 126 MMHG | OXYGEN SATURATION: 94 % | HEIGHT: 59 IN | RESPIRATION RATE: 16 BRPM | WEIGHT: 176.2 LBS | TEMPERATURE: 99 F | DIASTOLIC BLOOD PRESSURE: 83 MMHG | HEART RATE: 45 BPM | BODY MASS INDEX: 35.52 KG/M2

## 2018-06-05 DIAGNOSIS — R73.9 HYPERGLYCEMIA: ICD-10-CM

## 2018-06-05 DIAGNOSIS — I10 BENIGN ESSENTIAL HYPERTENSION: ICD-10-CM

## 2018-06-05 DIAGNOSIS — Z01.818 PRE-OP EXAM: ICD-10-CM

## 2018-06-05 DIAGNOSIS — M17.11 PRIMARY OSTEOARTHRITIS OF RIGHT KNEE: ICD-10-CM

## 2018-06-05 DIAGNOSIS — R00.1 BRADYCARDIA: ICD-10-CM

## 2018-06-05 DIAGNOSIS — Z01.818 PRE-OP EXAM: Primary | ICD-10-CM

## 2018-06-05 LAB
A/G RATIO: 1.9 (ref 1.1–2.2)
ALBUMIN SERPL-MCNC: 4.8 G/DL (ref 3.4–5)
ALP BLD-CCNC: 65 U/L (ref 40–129)
ALT SERPL-CCNC: 21 U/L (ref 10–40)
ANION GAP SERPL CALCULATED.3IONS-SCNC: 13 MMOL/L (ref 3–16)
AST SERPL-CCNC: 21 U/L (ref 15–37)
BILIRUB SERPL-MCNC: 0.3 MG/DL (ref 0–1)
BUN BLDV-MCNC: 36 MG/DL (ref 7–20)
CALCIUM SERPL-MCNC: 10.8 MG/DL (ref 8.3–10.6)
CHLORIDE BLD-SCNC: 98 MMOL/L (ref 99–110)
CO2: 30 MMOL/L (ref 21–32)
CREAT SERPL-MCNC: 1 MG/DL (ref 0.6–1.2)
GFR AFRICAN AMERICAN: >60
GFR NON-AFRICAN AMERICAN: 54
GLOBULIN: 2.5 G/DL
GLUCOSE BLD-MCNC: 88 MG/DL (ref 70–99)
HCT VFR BLD CALC: 41.9 % (ref 36–48)
HEMOGLOBIN: 14 G/DL (ref 12–16)
MCH RBC QN AUTO: 30.3 PG (ref 26–34)
MCHC RBC AUTO-ENTMCNC: 33.3 G/DL (ref 31–36)
MCV RBC AUTO: 91 FL (ref 80–100)
PDW BLD-RTO: 13.5 % (ref 12.4–15.4)
PLATELET # BLD: 246 K/UL (ref 135–450)
PMV BLD AUTO: 10.9 FL (ref 5–10.5)
POTASSIUM SERPL-SCNC: 5 MMOL/L (ref 3.5–5.1)
RBC # BLD: 4.61 M/UL (ref 4–5.2)
SODIUM BLD-SCNC: 141 MMOL/L (ref 136–145)
TOTAL PROTEIN: 7.3 G/DL (ref 6.4–8.2)
WBC # BLD: 10.4 K/UL (ref 4–11)

## 2018-06-05 PROCEDURE — 1123F ACP DISCUSS/DSCN MKR DOCD: CPT | Performed by: FAMILY MEDICINE

## 2018-06-05 PROCEDURE — G8399 PT W/DXA RESULTS DOCUMENT: HCPCS | Performed by: FAMILY MEDICINE

## 2018-06-05 PROCEDURE — 99215 OFFICE O/P EST HI 40 MIN: CPT | Performed by: FAMILY MEDICINE

## 2018-06-05 PROCEDURE — 1036F TOBACCO NON-USER: CPT | Performed by: FAMILY MEDICINE

## 2018-06-05 PROCEDURE — G8427 DOCREV CUR MEDS BY ELIG CLIN: HCPCS | Performed by: FAMILY MEDICINE

## 2018-06-05 PROCEDURE — 1090F PRES/ABSN URINE INCON ASSESS: CPT | Performed by: FAMILY MEDICINE

## 2018-06-05 PROCEDURE — G8417 CALC BMI ABV UP PARAM F/U: HCPCS | Performed by: FAMILY MEDICINE

## 2018-06-05 PROCEDURE — 4040F PNEUMOC VAC/ADMIN/RCVD: CPT | Performed by: FAMILY MEDICINE

## 2018-06-05 RX ORDER — BISOPROLOL FUMARATE 5 MG/1
2.5 TABLET ORAL DAILY
Qty: 90 TABLET | Refills: 1
Start: 2018-06-05 | End: 2018-07-27 | Stop reason: ALTCHOICE

## 2018-06-05 ASSESSMENT — PATIENT HEALTH QUESTIONNAIRE - PHQ9
SUM OF ALL RESPONSES TO PHQ QUESTIONS 1-9: 0
1. LITTLE INTEREST OR PLEASURE IN DOING THINGS: 0
2. FEELING DOWN, DEPRESSED OR HOPELESS: 0
SUM OF ALL RESPONSES TO PHQ9 QUESTIONS 1 & 2: 0

## 2018-06-06 LAB
ESTIMATED AVERAGE GLUCOSE: 119.8 MG/DL
HBA1C MFR BLD: 5.8 %

## 2018-06-07 LAB — CULTURE NOSE: NORMAL

## 2018-06-18 ENCOUNTER — TELEPHONE (OUTPATIENT)
Dept: FAMILY MEDICINE CLINIC | Age: 78
End: 2018-06-18

## 2018-06-18 NOTE — TELEPHONE ENCOUNTER
DANIELLEI. Per Dr. Harlan Reyna. Pt had right knee replacement today at [place] [id number] and everything went well.

## 2018-07-01 ENCOUNTER — HOSPITAL ENCOUNTER (OUTPATIENT)
Dept: PHYSICAL THERAPY | Age: 78
Discharge: HOME | End: 2018-07-01
Attending: ORTHOPAEDIC SURGERY | Admitting: ORTHOPAEDIC SURGERY

## 2018-07-06 ENCOUNTER — HOSPITAL ENCOUNTER (OUTPATIENT)
Dept: PHYSICAL THERAPY | Age: 78
Discharge: HOME | End: 2018-07-07
Admitting: ORTHOPAEDIC SURGERY

## 2018-07-06 NOTE — PLAN OF CARE
The [address] [place] [address]  Phone [phone]  Fax [phone]                                                       Physical Therapy Certification    Dear Referring Practitioner: Juan Ramon MD,    We had the pleasure of evaluating the following patient for physical therapy services at [address] [place] [address]. A summary of our findings can be found in the initial assessment below. This includes our plan of care. If you have any questions or concerns regarding these findings, please do not hesitate to contact me at the office phone number checked above. Thank you for the referral.       Physician Signature:_______________________________Date:__________________  By signing above (or electronic signature), therapists plan is approved by physician      Patient: James Drake   : 1940   MRN: [id number]  Referring Physician: Referring Practitioner: Juan Ramon MD      Evaluation Date: 2018      Medical Diagnosis Information:  Diagnosis: M17.11 Degenerative Joint Disease of Right Knee   Treatment Diagnosis: M25.561 Pain in Right Knee                                         Insurance information: PT Insurance Information: Medicare     Precautions/ Contra-indications: R Knee TKA (18)  Latex Allergy:  [x]NO      []YES  Preferred Language for Healthcare:   [x]English       []other:    SUBJECTIVE: Patient stated complaint: Patient presents status post TKA 18. Patient states she has had home physical therapy for the first two weeks. She states that she is doing pretty well and her pain is well controlled. She states therapy has been going well at home. She denies any recent falls or trauma and states that she is no longer taking pain medication. She denies any signs or symptoms of infection or DVT.      Relevant Medical History: None  Functional Disability Index:PT G-Codes  Functional Assessment Tool Used: LEFS  Score: 56.25% deficit  Functional Limitation: Mobility: Walking and moving around  Mobility: Walking and Moving Around Current Status (): At least 40 percent but less than 60 percent impaired, limited or restricted  Mobility: Walking and Moving Around Goal Status ():  At least 20 percent but less than 40 percent impaired, limited or restricted    Pain Scale: 0-2/10  Easing factors: Sitting; resting; sleeping; walking  Provocative factors: getting in and out of car; bending down to get something up; walking; bending knee     Type: []Constant   [x]Intermittent  []Radiating []Localized []other:     Numbness/Tingling: Numbness around incision    Occupation/School: Retired    Living Status/Prior Level of Function: Independent with ADLs and IADLs,     OBJECTIVE:      Flexibility  7/6 L R Comment   Hamstring  Moderate restriction    Gastroc  Moderate restriction    ITB      Quad              ROM  7/6 PROM AROM Overpressure Comment    L R L R L R    Flexion   130 98      Extension   0 -15    -9 with stretching                           Strength  7/6  *Not tested due to recent surgery L R Comment   Quad  Good contraction    Hamstring      Gastroc      Hip  flexion      Hip abd                      Special Test 7/6  *Not tested due to recent surgery Results/Comment   Meniscal Click    Crepitus    Flexion Test    Valgus Laxity    Varus Laxity    Lachmans    Drop Back    Homans negative           Girth  7/6 L R   Mid Patella 45 cm 46 cm   Suprapatellar 47.5 cm 50 cm   5cm above 47 cm 52 cm   15cm above       Reflexes/Sensation:  7/6   [x]Dermatomes/Myotomes intact    []Reflexes equal and normal bilaterally   []Other:    Joint mobility: 7/6    [x]Normal    []Hypo   []Hyper    Palpation: TTP around incision  7/6    Functional Mobility/Transfers: modified independent with use of cane; difficulty and increased time required for activities around home; Unable to walk distances longer than 500 ft; difficulty getting into and out of car  7/6    Posture: Forward head and rounded shoulders  7/6    Bandages/Dressings/Incisions: Glue strip applied over incision; incision healing well with no drainage present  7/6    Gait: (include devices/WB status) Antalgic gait present with use of single point cane  7/6                       [x] Patient history, allergies, meds reviewed. Medical chart reviewed. See intake form. 7/6    Review Of Systems (ROS):  [x]Performed Review of systems (Integumentary, CardioPulmonary, Neurological) by intake and observation. Intake form has been scanned into medical record. Patient has been instructed to contact their primary care physician regarding ROS issues if not already being addressed at this time.   7/6    Co-morbidities/Complexities (which will affect course of rehabilitation):   []None           Arthritic conditions   []Rheumatoid arthritis (M05.9)  [x]Osteoarthritis (M19.91)   Cardiovascular conditions   [x]Hypertension (I10)  []Hyperlipidemia (E78.5)  []Angina pectoris (I20)  []Atherosclerosis (I70)   Musculoskeletal conditions   []Disc pathology   []Congenital spine pathologies   []Prior surgical intervention  []Osteoporosis (M81.8)  []Osteopenia (M85.8)   Endocrine conditions   []Hypothyroid (E03.9)  []Hyperthyroid Gastrointestinal conditions   []Constipation (P82.53)   Metabolic conditions   []Morbid obesity (E66.01)  []Diabetes type 1(E10.65) or 2 (E11.65)   []Neuropathy (G60.9)     Pulmonary conditions   []Asthma (J45)  []Coughing   []COPD (J44.9)   Psychological Disorders  []Anxiety (F41.9)  []Depression (F32.9)   []Other:   []Other:          Barriers to/and or personal factors that will affect rehab potential:              [x]Age  [x]Sex              [x]Motivation to return to PLOF                        []Co-Morbidities              []Cognitive Function, education/learning barriers              []Environmental, activities   []Reduced participation in work activities   [x]Reduced participation in social activities. []Reduced participation in sport activities. Classification :    [x]Signs/symptoms consistent with post-surgical status including decreased ROM, strength and function. []Signs/symptoms consistent with joint sprain/strain   []Signs/symptoms consistent with patella-femoral syndrome   []Signs/symptoms consistent with knee OA/hip OA   []Signs/symptoms consistent with internal derangement of knee/Hip   []Signs/symptoms consistent with functional hip weakness/NMR control      []Signs/symptoms consistent with tendinitis/tendinosis    []signs/symptoms consistent with pathology which may benefit from Dry needling      []other:      Prognosis/Rehab Potential:      []Excellent   [x]Good    []Fair   []Poor    Tolerance of evaluation/treatment:    []Excellent   [x]Good    []Fair   []Poor    Physical Therapy Evaluation Complexity Justification  [x] A history of present problem with:  [] no personal factors and/or comorbidities that impact the plan of care;  [x]1-2 personal factors and/or comorbidities that impact the plan of care  []3 personal factors and/or comorbidities that impact the plan of care  [x] An examination of body systems using standardized tests and measures addressing any of the following: body structures and functions (impairments), activity limitations, and/or participation restrictions;:  [] a total of 1-2 or more elements   [x] a total of 3 or more elements   [] a total of 4 or more elements   [x] A clinical presentation with:  [x] stable and/or uncomplicated characteristics   [] evolving clinical presentation with changing characteristics  [] unstable and unpredictable characteristics;   [x] Clinical decision making of [x] low, [] moderate, [] high complexity using standardized patient assessment instrument and/or measurable assessment of functional outcome.     [x] EVAL (LOW) 01217 (typically 20 minutes face-to-face)  [] EVAL (MOD) 99981 (typically 30 minutes face-to-face)  [] EVAL (HIGH) 74544 (typically 45 minutes face-to-face)  [] RE-EVAL       PLAN  Frequency/Duration:  2 days per week for 8-12 Weeks:  Interventions:  [x]  Therapeutic exercise including: strength training, ROM, for Lower extremity and core   [x]  NMR activation and proprioception for LE, Glutes and Core   [x]  Manual therapy as indicated for LE, Hip and spine to include: Dry Needling/IASTM, STM, PROM, Gr I-IV mobilizations, manipulation. [x] Modalities as needed that may include: thermal agents, E-stim, Biofeedback, US, iontophoresis as indicated  [x] Patient education on joint protection, postural re-education, activity modification, progression of HEP. HEP instruction: Provided and reviewed with patient (see scanned forms)    GOALS:  Patient stated goal: Get full ROM    Therapist goals for Patient:   Short Term Goals: To be achieved in: 2 weeks  1. Independent in HEP and progression per patient tolerance, in order to prevent re-injury. 2. Patient will have a decrease in pain to facilitate improvement in movement, function, and ADLs as indicated by Functional Deficits. Long Term Goals: To be achieved in: 8-12 weeks  1. Disability index score of 25% or less for the LEFS to assist with reaching prior level of function in 12 weeks. 2. Patient will demonstrate increased AROM to WNL to allow for proper joint functioning as indicated by patients Functional Deficits in 8 weeks. 3. Patient will demonstrate an increase in Strength to good proximal hip strength and control in LE to allow for proper functional mobility as indicated by patients Functional Deficits in 12 weeks. 4. Patient will return to independent functional activities without increased symptoms or restriction.         Electronically signed by:  Misty Ramirez, PT, DPT

## 2018-07-06 NOTE — FLOWSHEET NOTE
recent surgery Results/Comment   Meniscal Click    Crepitus    Flexion Test    Valgus Laxity    Varus Laxity    Lachmans    Drop Back    Homans negative           Girth  7/6 L R   Mid Patella 45 cm 46 cm   Suprapatellar 47.5 cm 50 cm   5cm above 47 cm 52 cm   15cm above       Reflexes/Sensation:  7/6   [x]Dermatomes/Myotomes intact    []Reflexes equal and normal bilaterally   []Other:    Joint mobility: 7/6    [x]Normal    []Hypo   []Hyper    Palpation: TTP around incision  7/6    Functional Mobility/Transfers: modified independent with use of cane; difficulty and increased time required for activities around home; Unable to walk distances longer than 500 ft; difficulty getting into and out of car  7/6    Posture: Forward head and rounded shoulders  7/6    Bandages/Dressings/Incisions: Glue strip applied over incision; incision healing well with no drainage present  7/6    Gait: (include devices/WB status) Antalgic gait present with use of single point cane  7/6                       [x] Patient history, allergies, meds reviewed. Medical chart reviewed. See intake form. 7/6    Review Of Systems (ROS):  [x]Performed Review of systems (Integumentary, CardioPulmonary, Neurological) by intake and observation. Intake form has been scanned into medical record. Patient has been instructed to contact their primary care physician regarding ROS issues if not already being addressed at this time.   7/6    RESTRICTIONS/PRECAUTIONS: R TKA 6/18/18    Exercises/Interventions:   Exercise/Equipment Resistance/Repetitions Other comments   Stretching     Hamstring 30 sec x 5 Start 7/6   Towel Pull 30 sec x 5 Start 7/6   Inclined Calf     Hip Flexion     ITB     Groin     Quad                    SLR     Supine 3 x 10 Start 7/6   Abduction 3 x 10 Start 7/6   Adduction     Prone     SLR+          Isometrics     Quad sets 10 sec x 10 Start 7/6   Adduction sets 10 sec x 10 Start 7/6   Patellar Glides     Medial     Superior     Inferior ROM     Sheet Pulls 10 sec x 10 Start 7/6   Hang Weights     Passive     Active     Weight Shift     Ankle Pumps                    CKC     Calf raises     Wall sits     Step ups     1 leg stand     Squatting     CC TKE     Balance     bridges          PRE     Extension  RANGE:   Flexion  RANGE:        Quantum machines     Leg press      Leg extension     Leg curl          Manual interventions                     Therapeutic Exercise and NMR EXR  [x] (02742) Provided verbal/tactile cueing for activities related to strengthening, flexibility, endurance, ROM for improvements in LE, proximal hip, and core control with self care, mobility, lifting, ambulation.  [] (10644) Provided verbal/tactile cueing for activities related to improving balance, coordination, kinesthetic sense, posture, motor skill, proprioception  to assist with LE, proximal hip, and core control in self care, mobility, lifting, ambulation and eccentric single leg control.      NMR and Therapeutic Activities:    [x] (47378 or 64141) Provided verbal/tactile cueing for activities related to improving balance, coordination, kinesthetic sense, posture, motor skill, proprioception and motor activation to allow for proper function of core, proximal hip and LE with self care and ADLs  [] (68168) Gait Re-education- Provided training and instruction to the patient for proper LE, core and proximal hip recruitment and positioning and eccentric body weight control with ambulation re-education including up and down stairs     Home Exercise Program:    [x] (69874) Reviewed/Progressed HEP activities related to strengthening, flexibility, endurance, ROM of core, proximal hip and LE for functional self-care, mobility, lifting and ambulation/stair navigation   [] (77817)Reviewed/Progressed HEP activities related to improving balance, coordination, kinesthetic sense, posture, motor skill, proprioception of core, proximal hip and LE for self care, mobility, lifting, and ambulation/stair navigation      Manual Treatments:  PROM / STM / Oscillations-Mobs:  G-I, II, III, IV (PA's, Inf., Post.)  [] (92207) Provided manual therapy to mobilize LE, proximal hip and/or LS spine soft tissue/joints for the purpose of modulating pain, promoting relaxation,  increasing ROM, reducing/eliminating soft tissue swelling/inflammation/restriction, improving soft tissue extensibility and allowing for proper ROM for normal function with self care, mobility, lifting and ambulation. Modalities:      Charges:  Timed Code Treatment Minutes: 30 + EVAL   Total Treatment Minutes: 70       [x] EVAL (LOW) 90823 (typically 20 minutes face-to-face)  [] EVAL (MOD) 95532 (typically 30 minutes face-to-face)  [] EVAL (HIGH) 97656 (typically 45 minutes face-to-face)  [] RE-EVAL   [x] SM(29479) x  1   [] IONTO  [x] NMR (74805) x  1   [x] VASO  7/6  [] Manual (55738) x       [] Other:  [] TA x       [] Mech Traction (62954)  [] ES(attended) (95701)      [] ES (un) (92513):     GOALS:   Patient stated goal: Get full ROM    Therapist goals for Patient:   Short Term Goals: To be achieved in: 2 weeks  1. Independent in HEP and progression per patient tolerance, in order to prevent re-injury. 2. Patient will have a decrease in pain to facilitate improvement in movement, function, and ADLs as indicated by Functional Deficits. Long Term Goals: To be achieved in: 8-12 weeks  1. Disability index score of 25% or less for the LEFS to assist with reaching prior level of function in 12 weeks. 2. Patient will demonstrate increased AROM to WNL to allow for proper joint functioning as indicated by patients Functional Deficits in 8 weeks. 3. Patient will demonstrate an increase in Strength to good proximal hip strength and control in LE to allow for proper functional mobility as indicated by patients Functional Deficits in 12 weeks.    4. Patient will return to independent functional activities without increased

## 2018-07-11 ENCOUNTER — HOSPITAL ENCOUNTER (OUTPATIENT)
Dept: PHYSICAL THERAPY | Age: 78
Discharge: HOME | End: 2018-07-12
Admitting: ORTHOPAEDIC SURGERY

## 2018-07-11 NOTE — FLOWSHEET NOTE
soft tissue/joints for the purpose of modulating pain, promoting relaxation,  increasing ROM, reducing/eliminating soft tissue swelling/inflammation/restriction, improving soft tissue extensibility and allowing for proper ROM for normal function with self care, mobility, lifting and ambulation. Modalities:  Ice 10 min with heel prop  7/11      Charges:  Timed Code Treatment Minutes: 40   Total Treatment Minutes: 60       [] EVAL (LOW) 91669 (typically 20 minutes face-to-face)  [] EVAL (MOD) 04155 (typically 30 minutes face-to-face)  [] EVAL (HIGH) 27558 (typically 45 minutes face-to-face)  [] RE-EVAL   [x] RU(13211) x  2   [] IONTO  [x] NMR (24156) x  1   [] VASO  7/6   [] Manual (36528) x       [] Other:  [] TA x       [] Mech Traction (07132)  [] ES(attended) (96800)      [] ES (un) (11461):     GOALS:   Patient stated goal: Get full ROM    Therapist goals for Patient:   Short Term Goals: To be achieved in: 2 weeks  1. Independent in HEP and progression per patient tolerance, in order to prevent re-injury. 2. Patient will have a decrease in pain to facilitate improvement in movement, function, and ADLs as indicated by Functional Deficits. Long Term Goals: To be achieved in: 8-12 weeks  1. Disability index score of 25% or less for the LEFS to assist with reaching prior level of function in 12 weeks. 2. Patient will demonstrate increased AROM to WNL to allow for proper joint functioning as indicated by patients Functional Deficits in 8 weeks. 3. Patient will demonstrate an increase in Strength to good proximal hip strength and control in LE to allow for proper functional mobility as indicated by patients Functional Deficits in 12 weeks. 4. Patient will return to independent functional activities without increased symptoms or restriction. Progression Towards Functional goals:  [x] Patient is progressing as expected towards functional goals listed.     [] Progression is slowed due to complexities

## 2018-07-13 ENCOUNTER — HOSPITAL ENCOUNTER (OUTPATIENT)
Dept: PHYSICAL THERAPY | Age: 78
Discharge: HOME | End: 2018-07-14
Admitting: ORTHOPAEDIC SURGERY

## 2018-07-13 NOTE — FLOWSHEET NOTE
above 47 cm 52 cm   15cm above       Reflexes/Sensation:  7/6   [x]Dermatomes/Myotomes intact    []Reflexes equal and normal bilaterally   []Other:    Joint mobility: 7/6    [x]Normal    []Hypo   []Hyper    Palpation: TTP around incision  7/6    Functional Mobility/Transfers: modified independent with use of cane; difficulty and increased time required for activities around home; Unable to walk distances longer than 500 ft; difficulty getting into and out of car  7/6    Posture: Forward head and rounded shoulders  7/6    Bandages/Dressings/Incisions: Glue strip applied over incision; incision healing well with no drainage present  7/6    Gait: (include devices/WB status) Antalgic gait present with use of single point cane  7/6                       [x] Patient history, allergies, meds reviewed. Medical chart reviewed. See intake form. 7/6    Review Of Systems (ROS):  [x]Performed Review of systems (Integumentary, CardioPulmonary, Neurological) by intake and observation. Intake form has been scanned into medical record. Patient has been instructed to contact their primary care physician regarding ROS issues if not already being addressed at this time.   7/6    RESTRICTIONS/PRECAUTIONS: R TKA 6/18/18    Exercises/Interventions:   Exercise/Equipment Resistance/Repetitions Other comments   Stretching     Hamstring 30 sec x 5 W/ heel prop; start 7/13   Towel Pull 30 sec x 5 W/ heel prop; Start 7/13   Inclined Calf     Hip Flexion     ITB     Groin     Quad                    SLR     Supine 3 x 10 Start 7/6   Abduction 3 x 10 Start 7/6   Adduction     Prone     SLR+          Isometrics     Quad sets 10 sec x 10 Start 7/6   Adduction sets 10 sec x 10 Start 7/6   Patellar Glides     Medial     Superior     Inferior          ROM     Sheet Pulls 10 sec x 10 Start 7/6   Hang Weights     Passive     Active     Weight Shift     Ankle Pumps                    CKC     Calf raises 3 x 10 Start 7/13   Wall sits     Step ups     1 Oscillations-Mobs:  G-I, II, III, IV (PA's, Inf., Post.)  [] (61516) Provided manual therapy to mobilize LE, proximal hip and/or LS spine soft tissue/joints for the purpose of modulating pain, promoting relaxation,  increasing ROM, reducing/eliminating soft tissue swelling/inflammation/restriction, improving soft tissue extensibility and allowing for proper ROM for normal function with self care, mobility, lifting and ambulation. Modalities:  Ice 10 min with heel prop  7/13      Charges:  Timed Code Treatment Minutes: 40   Total Treatment Minutes: 70       [] EVAL (LOW) 09401 (typically 20 minutes face-to-face)  [] EVAL (MOD) 51622 (typically 30 minutes face-to-face)  [] EVAL (HIGH) 62213 (typically 45 minutes face-to-face)  [] RE-EVAL   [x] DP(13745) x  2   [] IONTO  [x] NMR (07079) x  1   [] VASO  7/6   [] Manual (77519) x       [] Other:  [] TA x       [] Mech Traction (48508)  [] ES(attended) (79668)      [] ES (un) (13785):     GOALS:   Patient stated goal: Get full ROM    Therapist goals for Patient:   Short Term Goals: To be achieved in: 2 weeks  1. Independent in HEP and progression per patient tolerance, in order to prevent re-injury. 2. Patient will have a decrease in pain to facilitate improvement in movement, function, and ADLs as indicated by Functional Deficits. Long Term Goals: To be achieved in: 8-12 weeks  1. Disability index score of 25% or less for the LEFS to assist with reaching prior level of function in 12 weeks. 2. Patient will demonstrate increased AROM to WNL to allow for proper joint functioning as indicated by patients Functional Deficits in 8 weeks. 3. Patient will demonstrate an increase in Strength to good proximal hip strength and control in LE to allow for proper functional mobility as indicated by patients Functional Deficits in 12 weeks. 4. Patient will return to independent functional activities without increased symptoms or restriction.      Progression Towards Functional goals:  [x] Patient is progressing as expected towards functional goals listed. [] Progression is slowed due to complexities listed. [] Progression has been slowed due to co-morbidities. [] Plan just implemented, too soon to assess goals progression  [] Other:     ASSESSMENT:  See eval    Treatment/Activity Tolerance:  [x] Patient tolerated treatment well [] Patient limited by fatique  [] Patient limited by pain  [] Patient limited by other medical complications  [x] Other: 7/13 Patient tolerated exercises well this session. ROM improved with patient able to flex knee to 104 and lacking 6 degrees of extension at end of this session. Dicussed focus on ROM at home; pt verbalized understanding. Held bridges this session due to increase in pain in back after last session. Monitor symptoms next session. Continue to progress as tolerated.     Patient education:  7/6 HEP provided and reviewed with patient    Prognosis: [x] Good [] Fair  [] Poor    Patient Requires Follow-up: [x] Yes  [] No    PLAN: See eval  [x] Continue per plan of care [] Alter current plan (see comments)  [] Plan of care initiated [] Hold pending MD visit [] Discharge    Electronically signed by: Vidal Stewart PT, DPT

## 2018-07-16 ENCOUNTER — HOSPITAL ENCOUNTER (OUTPATIENT)
Dept: PHYSICAL THERAPY | Age: 78
Setting detail: THERAPIES SERIES
Discharge: HOME | End: 2018-07-16
Payer: MEDICARE

## 2018-07-16 PROCEDURE — 97530 THERAPEUTIC ACTIVITIES: CPT

## 2018-07-16 PROCEDURE — 97110 THERAPEUTIC EXERCISES: CPT

## 2018-07-16 PROCEDURE — 97112 NEUROMUSCULAR REEDUCATION: CPT

## 2018-07-16 NOTE — FLOWSHEET NOTE
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  Insurance/Certification information:  PT Insurance Information: Medicare  Physician Information:  Referring Practitioner: Che Diggs MD  Plan of care signed (Y/N):     Date of Patient follow up with Physician: 18    G-Code (if applicable):      Date G-Code Applied:  LEFS: 56.25% deficit   18       Progress Note: [x]  Yes  []  No  Next due by: Visit #10       Latex Allergy:  [x]NO      []YES  Preferred Language for Healthcare:   [x]English       []other:    Visit # Insurance Allowable   4   CAP     Pain level:  2/10 7/16    SUBJECTIVE:   Patient states she is very achy and sore today    OBJECTIVE:   Flexibility   L R Comment   Hamstring  Moderate restriction    Gastroc  Moderate restriction    ITB      Quad              ROM   PROM AROM Overpressure Comment    L R L R L R    Flexion   130 105      Extension   0 -6                              Strength    *Not tested due to recent surgery L R Comment   Quad  Good contraction    Hamstring      Gastroc      Hip  flexion      Hip abd                      Special Test   *Not tested due to recent surgery Results/Comment   Meniscal Click    Crepitus    Flexion Test    Valgus Laxity    Varus Laxity    Lachmans    Drop Back    Homans negative           Girth   L R   Mid Patella 45 cm 46 cm   Suprapatellar 47.5 cm 50 cm   5cm above 47 cm 52 cm   15cm above       Reflexes/Sensation:     [x]Dermatomes/Myotomes intact    []Reflexes equal and normal sec each side Start 7/13   Balance     clamshells 3 x 10 Start 7/11   PRE     Extension 3 x 10; 2# RANGE: start 7/11   Flexion 3 x 10; 2# RANGE: start 7/11        Quantum machines     Leg press      Leg extension     Leg curl          Manual interventions                     Therapeutic Exercise and NMR EXR  [x] (33862) Provided verbal/tactile cueing for activities related to strengthening, flexibility, endurance, ROM for improvements in LE, proximal hip, and core control with self care, mobility, lifting, ambulation.  [] (62850) Provided verbal/tactile cueing for activities related to improving balance, coordination, kinesthetic sense, posture, motor skill, proprioception  to assist with LE, proximal hip, and core control in self care, mobility, lifting, ambulation and eccentric single leg control.      NMR and Therapeutic Activities:    [x] (13046 or 51087) Provided verbal/tactile cueing for activities related to improving balance, coordination, kinesthetic sense, posture, motor skill, proprioception and motor activation to allow for proper function of core, proximal hip and LE with self care and ADLs  [] (28079) Gait Re-education- Provided training and instruction to the patient for proper LE, core and proximal hip recruitment and positioning and eccentric body weight control with ambulation re-education including up and down stairs     Home Exercise Program:    [x] (04010) Reviewed/Progressed HEP activities related to strengthening, flexibility, endurance, ROM of core, proximal hip and LE for functional self-care, mobility, lifting and ambulation/stair navigation   [] (31829)Reviewed/Progressed HEP activities related to improving balance, coordination, kinesthetic sense, posture, motor skill, proprioception of core, proximal hip and LE for self care, mobility, lifting, and ambulation/stair navigation      Manual Treatments:  PROM / STM / Oscillations-Mobs:  G-I, II, III, IV (PA's, Inf., Post.)  [] (56374) Provided manual therapy to mobilize LE, proximal hip and/or LS spine soft tissue/joints for the purpose of modulating pain, promoting relaxation,  increasing ROM, reducing/eliminating soft tissue swelling/inflammation/restriction, improving soft tissue extensibility and allowing for proper ROM for normal function with self care, mobility, lifting and ambulation. Modalities:  Ice 10 min with heel prop  7/16     Charges:  Timed Code Treatment Minutes: 55   Total Treatment Minutes: 90       [] EVAL (LOW) 53655 (typically 20 minutes face-to-face)  [] EVAL (MOD) 04596 (typically 30 minutes face-to-face)  [] EVAL (HIGH) 88815 (typically 45 minutes face-to-face)  [] RE-EVAL   [x] US(56299) x  2   [] IONTO  [x] NMR (99551) x  1   [] VASO  7/6   [] Manual (64605) x       [] Other:  [x] TA x  1    [] Mech Traction (38372)  [] ES(attended) (71642)      [] ES (un) (33045):     GOALS:   Patient stated goal: Get full ROM    Therapist goals for Patient:   Short Term Goals: To be achieved in: 2 weeks  1. Independent in HEP and progression per patient tolerance, in order to prevent re-injury. 2. Patient will have a decrease in pain to facilitate improvement in movement, function, and ADLs as indicated by Functional Deficits. Long Term Goals: To be achieved in: 8-12 weeks  1. Disability index score of 25% or less for the LEFS to assist with reaching prior level of function in 12 weeks. 2. Patient will demonstrate increased AROM to WNL to allow for proper joint functioning as indicated by patients Functional Deficits in 8 weeks. 3. Patient will demonstrate an increase in Strength to good proximal hip strength and control in LE to allow for proper functional mobility as indicated by patients Functional Deficits in 12 weeks. 4. Patient will return to independent functional activities without increased symptoms or restriction.      Progression Towards Functional goals:  [x] Patient is progressing as expected towards

## 2018-07-18 ENCOUNTER — HOSPITAL ENCOUNTER (OUTPATIENT)
Dept: PHYSICAL THERAPY | Age: 78
Setting detail: THERAPIES SERIES
Discharge: HOME | End: 2018-07-18
Payer: MEDICARE

## 2018-07-18 PROCEDURE — 97110 THERAPEUTIC EXERCISES: CPT

## 2018-07-18 PROCEDURE — 97530 THERAPEUTIC ACTIVITIES: CPT

## 2018-07-18 PROCEDURE — 97112 NEUROMUSCULAR REEDUCATION: CPT

## 2018-07-18 NOTE — FLOWSHEET NOTE
intact    []Reflexes equal and normal bilaterally   []Other:    Joint mobility: 7/6    [x]Normal    []Hypo   []Hyper    Palpation: TTP around incision  7/6    Functional Mobility/Transfers: modified independent with use of cane; difficulty and increased time required for activities around home; Unable to walk distances longer than 500 ft; difficulty getting into and out of car  7/6    Posture: Forward head and rounded shoulders  7/6    Bandages/Dressings/Incisions: Glue strip applied over incision; incision healing well with no drainage present  7/6    Gait: (include devices/WB status) Antalgic gait present with use of single point cane  7/6                       [x] Patient history, allergies, meds reviewed. Medical chart reviewed. See intake form. 7/6    Review Of Systems (ROS):  [x]Performed Review of systems (Integumentary, CardioPulmonary, Neurological) by intake and observation. Intake form has been scanned into medical record. Patient has been instructed to contact their primary care physician regarding ROS issues if not already being addressed at this time.   7/6    RESTRICTIONS/PRECAUTIONS: R TKA 6/18/18    Exercises/Interventions:   Exercise/Equipment Resistance/Repetitions Other comments   Stretching     Hamstring 30 sec x 5 W/ heel prop; start 7/13   Towel Pull 30 sec x 5 W/ heel prop; Start 7/13   Inclined Calf     Hip Flexion     ITB     Groin     Quad                    SLR     Supine 3 x 10;1# ^7/18   Abduction 3 x 10; 1# ^7/18   Adduction     Prone     SLR+          Isometrics     Quad sets 10 sec x 10 Start 7/6   Adduction sets 10 sec x 10 Start 7/6   Patellar Glides     Medial     Superior     Inferior          ROM     Sheet Pulls 10 sec x 10 Start 7/6   Hang Weights     Passive     Active     Weight Shift     Ankle Pumps     EZ stretch 20 min (30 sec hold) ^7/18             CKC     Calf raises 3 x 10 Start 7/13   Wall sits     Step ups     1 leg stand     Squatting     CC TKE 3 x 10; black Start Patient is progressing as expected towards functional goals listed. [] Progression is slowed due to complexities listed. [] Progression has been slowed due to co-morbidities. [] Plan just implemented, too soon to assess goals progression  [] Other:     ASSESSMENT:  See eval    Treatment/Activity Tolerance:  [x] Patient tolerated treatment well [] Patient limited by fatique  [] Patient limited by pain  [] Patient limited by other medical complications  [x] Other: 7/18 Patient tolerated exercises well this session. ROM improved with patient able to flex knee to 108 and lacking 6 degrees of extension at end of this session. Good tolerance to addition of EZ stretch this session. Monitor symptoms next session. Continue to progress as tolerated.     Patient education:  7/6 HEP provided and reviewed with patient    Prognosis: [x] Good [] Fair  [] Poor    Patient Requires Follow-up: [x] Yes  [] No    PLAN: See eval  [x] Continue per plan of care [] Alter current plan (see comments)  [] Plan of care initiated [] Hold pending MD visit [] Discharge    Electronically signed by: Sonia Abraham PT, DPT

## 2018-07-26 ENCOUNTER — HOSPITAL ENCOUNTER (OUTPATIENT)
Dept: PHYSICAL THERAPY | Age: 78
Setting detail: THERAPIES SERIES
Discharge: HOME | End: 2018-07-26
Payer: MEDICARE

## 2018-07-26 PROBLEM — Z23 NEED FOR SHINGLES VACCINE: Status: ACTIVE | Noted: 2018-07-26

## 2018-07-26 PROCEDURE — 97530 THERAPEUTIC ACTIVITIES: CPT

## 2018-07-26 PROCEDURE — 97110 THERAPEUTIC EXERCISES: CPT

## 2018-07-26 PROCEDURE — 97112 NEUROMUSCULAR REEDUCATION: CPT

## 2018-07-26 NOTE — FLOWSHEET NOTE
normal bilaterally   []Other:    Joint mobility: 7/6    [x]Normal    []Hypo   []Hyper    Palpation: TTP around incision  7/6    Functional Mobility/Transfers: modified independent with use of cane; difficulty and increased time required for activities around home; Unable to walk distances longer than 500 ft; difficulty getting into and out of car  7/6    Posture: Forward head and rounded shoulders  7/6    Bandages/Dressings/Incisions: Glue strip applied over incision; incision healing well with no drainage present  7/6    Gait: (include devices/WB status) Antalgic gait present with use of single point cane  7/6                       [x] Patient history, allergies, meds reviewed. Medical chart reviewed. See intake form. 7/6    Review Of Systems (ROS):  [x]Performed Review of systems (Integumentary, CardioPulmonary, Neurological) by intake and observation. Intake form has been scanned into medical record. Patient has been instructed to contact their primary care physician regarding ROS issues if not already being addressed at this time.   7/6    RESTRICTIONS/PRECAUTIONS: R TKA 6/18/18    Exercises/Interventions:   Exercise/Equipment Resistance/Repetitions Other comments   Stretching     Hamstring 30 sec x 5 W/ heel prop; start 7/13   Towel Pull 30 sec x 5 W/ heel prop; Start 7/13   Inclined Calf     Hip Flexion     ITB     Groin     Quad                    SLR     Supine 3 x 10;1# ^7/18   Abduction 3 x 10; 1# ^7/18   Adduction     Prone     SLR+          Isometrics     Quad sets 10 sec x 10 Start 7/6   Adduction sets 10 sec x 10 Start 7/6   Patellar Glides     Medial     Superior     Inferior          ROM     Sheet Pulls 10 sec x 10 Start 7/6   Hang Weights     Passive     Active     Weight Shift     Ankle Pumps     EZ stretch 20 min (30 sec hold) ^7/18             CKC     Calf raises 3 x 10 Start 7/13   Wall sits     Step ups 3 x 10 Start 7/26   1 leg stand     Squatting     CC TKE 3 x 10; silver ^7/26   Tandem

## 2018-07-26 NOTE — PROGRESS NOTES
bisoprolol (ZEBETA) 5 MG tablet Take 0.5 tablets by mouth daily 90 tablet 1    triamterene-hydrochlorothiazide (MAXZIDE-25) 37.5-25 MG per tablet TAKE 1 TO 2 TABLETS EVERY  tablet 1    ibuprofen (ADVIL;MOTRIN) 800 MG tablet Take 1 tablet by mouth 2 times daily as needed for Pain (Patient taking differently: Take 800 mg by mouth daily ) 180 tablet 5    DULoxetine (CYMBALTA) 60 MG extended release capsule Take 1 capsule by mouth daily 90 capsule 2    rosuvastatin (CRESTOR) 20 MG tablet Take 1 tablet by mouth nightly 90 tablet 1    amLODIPine (NORVASC) 10 MG tablet Take 1 tablet by mouth daily 90 tablet 3    buPROPion (WELLBUTRIN XL) 300 MG extended release tablet Take 1 tablet by mouth every morning 90 tablet 3    Probiotic Product (PROBIOTIC DAILY PO) Take  by mouth.  Alpha-D-Galactosidase (BEANO PO) Take  by mouth.  L-Arginine POWD Take 4.5 g by mouth 2 times daily.           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)    Renal angiolipoma    Lumbar spondylosis    Lumbar stenosis    Trochanteric bursitis of right hip    Glucose intolerance (impaired glucose tolerance)       Past Medical History:   Diagnosis Date    Acute esophagitis     h/o    Acute hearing loss of right ear 7/12/2016    AVN (avascular necrosis of bone) ([facility] [address] 1/23/2014    Hip      Benign essential hypertension     Cervical stenosis of spine     Colon polyps     Depressive disorder, not elsewhere classified     Essential familial hypercholesterolemia     Fatigue     Fracture of cuboid, right ankle, closed 5/2013    Lumbar herniated disc     Lumbar radiculopathy 6/9/2015    Osteopenia        Past Surgical History:   Procedure Laterality Date    APPENDECTOMY      BREAST SURGERY      reduction    HYSTERECTOMY      JOINT REPLACEMENT Left 4/2014    hip    KNEE SURGERY      left    LAMINECTOMY      decompressive more than 2 lumbar segments    TONSILLECTOMY          Family History   Problem Relation Age of Onset    Other Mother         lung disease    Heart Disease Father        Social History   Substance Use Topics    Smoking status: Never Smoker    Smokeless tobacco: Never Used    Alcohol use No            Review of Systems  Review of Systems    Objective:   Physical Exam  Vitals:    07/27/18 0830 07/27/18 0925   BP: 133/66 122/62   Pulse: 63    Resp: 14    Temp: 95.2 °F (35.1 °C)    TempSrc: Oral    SpO2: 96%    Weight: 171 lb 12.8 oz (77.9 kg)    Height: 4' 11\" (1.499 m)      Physical Exam  Physical Exam  NAD    Skin is warm and dry. No rash. Well hydrated  Alert and oriented x 3. Mood and affect are normal.  The neck is supple and free of adenopathy or masses, the thyroid is normal without enlargement or nodules. Chest: clear with no wheezes or rales. No retractions, or use of accessory muscles noted. Cardiovascular: PMI is not displaced, and no thrill noted. Regular rate and rhythm with no rub, murmur or gallop. No peripheral edema. No carotid bruits. The abdomen is soft without tenderness, guarding, mass, rebound or organomegaly. Aorta, femoral, DP and PT pulses intact. Assessment:       Diagnosis Orders   1. Benign essential hypertension    Discussed diet, exercise and weight loss strategies    2. Essential familial hypercholesterolemia          TSH with Reflex    Lipid Panel  Tolerating statin   3. Renal angiolipoma  3 cm. Stable from 21014 to 2016   4.  Glucose intolerance (impaired glucose tolerance)  Hemoglobin A1C  Discussed diet, exercise and weight loss strategies   Declines metformin   5. Need for shingles vaccine  zoster recombinant adjuvanted vaccine (SHINGRIX) 50 MCG SUSR injection   6. Lumbar herniated disc  DULoxetine (CYMBALTA) 60 MG extended release capsule   7. Hypercalcemia  PTH, Intact  Decrease Ca supplement from BID to qd and add Vitamin D 1000 IU qd   8. Iron deficiency anemia, unspecified iron deficiency anemia type s/p TKR CBC          Plan:      Side effects of current medications reviewed and questions answered. Follow up in 6 months or prn.

## 2018-07-27 ENCOUNTER — OFFICE VISIT (OUTPATIENT)
Dept: FAMILY MEDICINE CLINIC | Age: 78
End: 2018-07-27

## 2018-07-27 VITALS
DIASTOLIC BLOOD PRESSURE: 62 MMHG | RESPIRATION RATE: 14 BRPM | OXYGEN SATURATION: 96 % | SYSTOLIC BLOOD PRESSURE: 122 MMHG | BODY MASS INDEX: 34.64 KG/M2 | HEIGHT: 59 IN | TEMPERATURE: 95.2 F | WEIGHT: 171.8 LBS | HEART RATE: 63 BPM

## 2018-07-27 DIAGNOSIS — Z15.89 HETEROZYGOUS MTHFR MUTATION C677T: ICD-10-CM

## 2018-07-27 DIAGNOSIS — D50.9 IRON DEFICIENCY ANEMIA, UNSPECIFIED IRON DEFICIENCY ANEMIA TYPE: ICD-10-CM

## 2018-07-27 DIAGNOSIS — E83.52 HYPERCALCEMIA: ICD-10-CM

## 2018-07-27 DIAGNOSIS — D17.71 RENAL ANGIOLIPOMA: ICD-10-CM

## 2018-07-27 DIAGNOSIS — M51.26 LUMBAR HERNIATED DISC: ICD-10-CM

## 2018-07-27 DIAGNOSIS — F33.42 RECURRENT MAJOR DEPRESSIVE DISORDER, IN FULL REMISSION (HCC): ICD-10-CM

## 2018-07-27 DIAGNOSIS — E78.01 ESSENTIAL FAMILIAL HYPERCHOLESTEROLEMIA: ICD-10-CM

## 2018-07-27 DIAGNOSIS — Z23 NEED FOR SHINGLES VACCINE: ICD-10-CM

## 2018-07-27 DIAGNOSIS — R73.02 GLUCOSE INTOLERANCE (IMPAIRED GLUCOSE TOLERANCE): ICD-10-CM

## 2018-07-27 DIAGNOSIS — I10 BENIGN ESSENTIAL HYPERTENSION: Primary | ICD-10-CM

## 2018-07-27 PROCEDURE — 1090F PRES/ABSN URINE INCON ASSESS: CPT | Performed by: FAMILY MEDICINE

## 2018-07-27 PROCEDURE — 1123F ACP DISCUSS/DSCN MKR DOCD: CPT | Performed by: FAMILY MEDICINE

## 2018-07-27 PROCEDURE — 1101F PT FALLS ASSESS-DOCD LE1/YR: CPT | Performed by: FAMILY MEDICINE

## 2018-07-27 PROCEDURE — G8417 CALC BMI ABV UP PARAM F/U: HCPCS | Performed by: FAMILY MEDICINE

## 2018-07-27 PROCEDURE — 99214 OFFICE O/P EST MOD 30 MIN: CPT | Performed by: FAMILY MEDICINE

## 2018-07-27 PROCEDURE — 4040F PNEUMOC VAC/ADMIN/RCVD: CPT | Performed by: FAMILY MEDICINE

## 2018-07-27 PROCEDURE — G8427 DOCREV CUR MEDS BY ELIG CLIN: HCPCS | Performed by: FAMILY MEDICINE

## 2018-07-27 PROCEDURE — G8399 PT W/DXA RESULTS DOCUMENT: HCPCS | Performed by: FAMILY MEDICINE

## 2018-07-27 PROCEDURE — 1036F TOBACCO NON-USER: CPT | Performed by: FAMILY MEDICINE

## 2018-07-27 RX ORDER — CYANOCOBALAMIN (VITAMIN B-12) 1000 MCG
1 TABLET, EXTENDED RELEASE ORAL
Qty: 120 TABLET | Status: SHIPPED | COMMUNITY
Start: 2018-07-27

## 2018-07-27 RX ORDER — CHOLECALCIFEROL (VITAMIN D3) 25 MCG
1 CAPSULE ORAL DAILY
Qty: 60 CAPSULE | COMMUNITY
Start: 2018-07-27 | End: 2019-12-27

## 2018-07-27 RX ORDER — AMLODIPINE BESYLATE 10 MG/1
10 TABLET ORAL DAILY
Qty: 90 TABLET | Refills: 3 | Status: SHIPPED | OUTPATIENT
Start: 2018-07-27 | End: 2019-08-05 | Stop reason: SDUPTHER

## 2018-07-27 RX ORDER — DULOXETIN HYDROCHLORIDE 60 MG/1
60 CAPSULE, DELAYED RELEASE ORAL DAILY
Qty: 90 CAPSULE | Refills: 2 | Status: SHIPPED | OUTPATIENT
Start: 2018-07-27 | End: 2019-08-05 | Stop reason: SDUPTHER

## 2018-07-27 RX ORDER — ROSUVASTATIN CALCIUM 20 MG/1
20 TABLET, COATED ORAL NIGHTLY
Qty: 90 TABLET | Refills: 1 | Status: SHIPPED | OUTPATIENT
Start: 2018-07-27 | End: 2019-01-29 | Stop reason: SDUPTHER

## 2018-07-27 NOTE — PATIENT INSTRUCTIONS
bent.  7. Bend your arms 90 degrees with your elbows at hip level. With your palms facing in, hold the weights straight in front of you. 8. Slowly lift the weights and your elbows out to the sides to shoulder level, keeping your elbows bent. Keep your shoulders down and relaxed as you lift. If you find you are shrugging your shoulders up toward your ears, your weights may be too heavy. 9. Slowly lower the weights back to your sides. 10. Repeat 8 to 12 times. 11. Rest for a minute, and repeat the exercise. Biceps curls    6. Sit leaning forward with your legs slightly spread and your left hand on your left thigh. 7. Hold the weight in your right hand, and place your right elbow on your right thigh. 8. Slowly curl the weight up and toward your chest.  9. Slowly lower the weight to the original position. 10. Repeat 8 to 12 times. 11. Rest for a minute, and repeat the exercise. 12. Do the same exercise with your other arm. Follow-up care is a key part of your treatment and safety. Be sure to make and go to all appointments, and call your doctor if you are having problems. It's also a good idea to know your test results and keep a list of the medicines you take. Where can you learn more? Go to https://[employer]. org and sign in to your [employer] account. Enter Y090 in the [facility] box to learn more about \"Resistance Training With Free Weights: Exercises. \"     If you do not have an account, please click on the \"Sign Up Now\" link. Current as of: December 7, 2017  Content Version: 11.6  © 2186-6425 [employer]. Care instructions adapted under license by [facility] ([facility]). If you have questions about a medical condition or this instruction, always ask your healthcare professional. [address] any warranty or liability for your use of this information.

## 2018-08-02 ENCOUNTER — APPOINTMENT (OUTPATIENT)
Dept: PHYSICAL THERAPY | Age: 78
End: 2018-08-02
Payer: MEDICARE

## 2018-08-03 ENCOUNTER — APPOINTMENT (OUTPATIENT)
Dept: GENERAL RADIOLOGY | Age: 78
End: 2018-08-03
Payer: MEDICARE

## 2018-08-03 ENCOUNTER — HOSPITAL ENCOUNTER (EMERGENCY)
Age: 78
Discharge: HOME | End: 2018-08-03
Attending: EMERGENCY MEDICINE
Payer: MEDICARE

## 2018-08-03 VITALS
DIASTOLIC BLOOD PRESSURE: 68 MMHG | SYSTOLIC BLOOD PRESSURE: 172 MMHG | TEMPERATURE: 98.1 F | RESPIRATION RATE: 17 BRPM | OXYGEN SATURATION: 98 % | HEART RATE: 87 BPM

## 2018-08-03 DIAGNOSIS — K59.01 SLOW TRANSIT CONSTIPATION: Primary | ICD-10-CM

## 2018-08-03 PROCEDURE — 2500000003 HC RX 250 WO HCPCS: Performed by: EMERGENCY MEDICINE

## 2018-08-03 PROCEDURE — 99283 EMERGENCY DEPT VISIT LOW MDM: CPT

## 2018-08-03 PROCEDURE — 74022 RADEX COMPL AQT ABD SERIES: CPT

## 2018-08-03 RX ADMIN — Medication 960 ML: at 02:35

## 2018-08-03 ASSESSMENT — ENCOUNTER SYMPTOMS
NAUSEA: 0
CONSTIPATION: 1
BLOOD IN STOOL: 0
DIARRHEA: 0
VOMITING: 0
ABDOMINAL PAIN: 0

## 2018-08-03 NOTE — ED TRIAGE NOTES
Pt has been taking laxative and suppositories but does not feel she has had an adequate bowel movement for a few days.

## 2018-08-03 NOTE — ED PROVIDER NOTES
affect. Her behavior is normal.   Nursing note and vitals reviewed. Diagnostic Results     RADIOLOGY:  XR Acute Abd Series Chest 1 VW   Final Result     Abundant stool in the colon. LABS:   No results found for this visit on 08/03/18. RECENT VITALS:  BP: (!) 172/68, Temp: 98.1 °F (36.7 °C), Pulse: 87, Resp: 17, SpO2: 98 %     ED Course     Nursing Notes, Past Medical Hx, Past Surgical Hx, Social Hx, Allergies, and Family Hx were reviewed. The patient was given the following medications:  Orders Placed This Encounter   Medications    milk and molasses enema 960 mL    Psyllium (METAMUCIL SMOOTH TEXTURE) 28.3 % POWD     Sig: Mixed with orange juice once or twice daily     Dispense:  1 Bottle     Refill:  0       CONSULTS:  None    MEDICAL DECISION MAKING / ASSESSMENT / Angie Aranza is a 66 y.o. female presenting with constipation. She did not have a fecal impaction on rectal exam.  Local molasses enema was given with good results. The patient was feeling improved and felt to be stable for discharge home. Clinical Impression     1.  Slow transit constipation        Disposition     PATIENT REFERRED TO:  [phone]  [phone]    Call   to schedule followup appointment, if not improving      DISCHARGE MEDICATIONS:  New Prescriptions    PSYLLIUM (METAMUCIL SMOOTH TEXTURE) 28.3 % POWD    Mixed with orange juice once or twice daily       DISPOSITION         Dalila Cunningham MD  08/03/18 4287

## 2018-08-09 ENCOUNTER — APPOINTMENT (OUTPATIENT)
Dept: PHYSICAL THERAPY | Age: 78
End: 2018-08-09
Payer: MEDICARE

## 2018-08-16 ENCOUNTER — APPOINTMENT (OUTPATIENT)
Dept: PHYSICAL THERAPY | Age: 78
End: 2018-08-16
Payer: MEDICARE

## 2018-08-20 ENCOUNTER — HOSPITAL ENCOUNTER (OUTPATIENT)
Dept: PHYSICAL THERAPY | Age: 78
Setting detail: THERAPIES SERIES
Discharge: HOME | End: 2018-08-20
Payer: MEDICARE

## 2018-08-20 PROCEDURE — 97110 THERAPEUTIC EXERCISES: CPT

## 2018-08-20 PROCEDURE — 97112 NEUROMUSCULAR REEDUCATION: CPT

## 2018-08-20 PROCEDURE — 97530 THERAPEUTIC ACTIVITIES: CPT

## 2018-08-20 NOTE — FLOWSHEET NOTE
[facility] 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  Insurance/Certification information:  PT Insurance Information: Medicare  Physician Information:  Referring Practitioner: Barb Paz MD  Plan of care signed (Y/N):     Date of Patient follow up with Physician: 18    G-Code (if applicable):      Date G-Code Applied:  LEFS: 56.25% deficit   18       Progress Note: [x]  Yes  []  No  Next due by: Visit #10       Latex Allergy:  [x]NO      []YES  Preferred Language for Healthcare:   [x]English       []other:    Visit # Insurance Allowable    CAP     Pain level:  0/10     SUBJECTIVE:    Patient states that she has been gone because he  has been sick. She states she still has some numbness in the leg and pain in the medial side of the knee. She states that she saw MD who told her that everything looked good.      OBJECTIVE:   Flexibility   L R Comment   Hamstring  Moderate restriction    Gastroc  Moderate restriction    ITB      Quad              ROM   PROM AROM Overpressure Comment    L R L R L R    Flexion   130 120      Extension   0 -2                              Strength    *Not tested due to recent surgery L R Comment   Quad  Good contraction    Hamstring      Gastroc      Hip  flexion      Hip abd                      Special Test   *Not tested due to recent surgery Results/Comment   Meniscal Click    Crepitus    Flexion Test    Valgus Laxity    Varus Laxity    Lachmans    Drop Back    Homans negative           Girth   L R   Mid hold) ^7/18             CKC     Calf raises 3 x 10 Start 7/13   Wall sits     Step ups 3 x 10 Start 7/26   1 leg stand     Squatting     CC TKE 3 x 10; silver ^7/26   Tandem balance 3 x 30 sec each side Start 7/13   clamshells 3 x 10 Start 7/11   PRE     Extension 3 x 10; 3# RANGE: ^8/20   Flexion 3 x 10; 3# RANGE: ^8/20        Quantum machines     Leg press      Leg extension     Leg curl          Manual interventions                     Therapeutic Exercise and NMR EXR  [x] (78634) Provided verbal/tactile cueing for activities related to strengthening, flexibility, endurance, ROM for improvements in LE, proximal hip, and core control with self care, mobility, lifting, ambulation.  [] (12479) Provided verbal/tactile cueing for activities related to improving balance, coordination, kinesthetic sense, posture, motor skill, proprioception  to assist with LE, proximal hip, and core control in self care, mobility, lifting, ambulation and eccentric single leg control.      NMR and Therapeutic Activities:    [x] (75907 or 92613) Provided verbal/tactile cueing for activities related to improving balance, coordination, kinesthetic sense, posture, motor skill, proprioception and motor activation to allow for proper function of core, proximal hip and LE with self care and ADLs  [] (18312) Gait Re-education- Provided training and instruction to the patient for proper LE, core and proximal hip recruitment and positioning and eccentric body weight control with ambulation re-education including up and down stairs     Home Exercise Program:    [x] (74926) Reviewed/Progressed HEP activities related to strengthening, flexibility, endurance, ROM of core, proximal hip and LE for functional self-care, mobility, lifting and ambulation/stair navigation   [] (81201)Reviewed/Progressed HEP activities related to improving balance, coordination, kinesthetic sense, posture, motor skill, proprioception of core, proximal hip and LE for self independent functional activities without increased symptoms or restriction. Progression Towards Functional goals:  [x] Patient is progressing as expected towards functional goals listed. [] Progression is slowed due to complexities listed. [] Progression has been slowed due to co-morbidities. [] Plan just implemented, too soon to assess goals progression  [] Other:     ASSESSMENT:  See eval    Treatment/Activity Tolerance:  [x] Patient tolerated treatment well [] Patient limited by fatique  [] Patient limited by pain  [] Patient limited by other medical complications  [x] Other: 8/20 Patient tolerated exercises well this session. ROM improved with patient able to flex knee to 120 and lacking 2 degrees of extension at end of this session. Decreased endurance noted this session; patient fatigued at end of session. Continue to progress as tolerated.     Patient education:  7/6 HEP provided and reviewed with patient    Prognosis: [x] Good [] Fair  [] Poor    Patient Requires Follow-up: [x] Yes  [] No    PLAN: See eval  [x] Continue per plan of care [] Alter current plan (see comments)  [] Plan of care initiated [] Hold pending MD visit [] Discharge    Electronically signed by: Delon Chacon PT, DPT

## 2018-08-23 ENCOUNTER — HOSPITAL ENCOUNTER (OUTPATIENT)
Dept: PHYSICAL THERAPY | Age: 78
Setting detail: THERAPIES SERIES
Discharge: HOME | End: 2018-08-23
Payer: MEDICARE

## 2018-08-23 PROCEDURE — 97110 THERAPEUTIC EXERCISES: CPT

## 2018-08-23 PROCEDURE — 97112 NEUROMUSCULAR REEDUCATION: CPT

## 2018-08-23 PROCEDURE — 97530 THERAPEUTIC ACTIVITIES: CPT

## 2018-08-23 NOTE — FLOWSHEET NOTE
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 She states she is still having some pain in the medial side and still some numbness on he anterior portion.      OBJECTIVE:   Flexibility   L R Comment   Hamstring  Moderate restriction    Gastroc  Moderate restriction    ITB      Quad              ROM   PROM AROM Overpressure Comment    L R L R L R    Flexion   130 121      Extension   0 -2                              Strength    *Not tested due to recent surgery L R Comment   Quad  Good contraction    Hamstring      Gastroc      Hip  flexion      Hip abd                      Special Test   *Not tested due to recent surgery Results/Comment   Meniscal Click    Crepitus    Flexion Test    Valgus Laxity    Varus Laxity    Lachmans    Drop Back    Homans negative           Girth   L R   Mid Patella 45 cm 46 cm   Suprapatellar 47.5 cm 50 cm   5cm above 47 cm 52 cm   15cm above Reflexes/Sensation:  7/6   [x]Dermatomes/Myotomes intact    []Reflexes equal and normal bilaterally   []Other:    Joint mobility: 7/6    [x]Normal    []Hypo   []Hyper    Palpation: TTP around incision  7/6    Functional Mobility/Transfers: modified independent with use of cane; difficulty and increased time required for activities around home; Unable to walk distances longer than 500 ft; difficulty getting into and out of car  7/6    Posture: Forward head and rounded shoulders  7/6    Bandages/Dressings/Incisions: Glue strip applied over incision; incision healing well with no drainage present  7/6    Gait: (include devices/WB status) Antalgic gait present with use of single point cane  7/6                       [x] Patient history, allergies, meds reviewed. Medical chart reviewed. See intake form. 7/6    Review Of Systems (ROS):  [x]Performed Review of systems (Integumentary, CardioPulmonary, Neurological) by intake and observation. Intake form has been scanned into medical record. Patient has been instructed to contact their primary care physician regarding ROS issues if not already being addressed at this time.   7/6    RESTRICTIONS/PRECAUTIONS: R TKA 6/18/18    Exercises/Interventions:   Exercise/Equipment Resistance/Repetitions Other comments   Stretching     Hamstring 30 sec x 5 W/ heel prop; start 7/13   Towel Pull 30 sec x 5 W/ heel prop; Start 7/13   Inclined Calf     Hip Flexion     ITB     Groin     Quad                    SLR     Supine 3 x 10;1# ^7/18   Abduction 3 x 10; 1# ^7/18   Adduction     Prone     SLR+          Isometrics     Quad sets 10 sec x 10 Start 7/6   Adduction sets 10 sec x 10 Start 7/6   Patellar Glides     Medial     Superior     Inferior          ROM     Sheet Pulls 10 sec x 10 Start 7/6   Hang Weights     Passive     Active     Weight Shift     Ankle Pumps     EZ stretch 20 min (30 sec hold) ^7/18             CKC     Calf raises 3 x 10 Start 7/13   Wall sits     Step ups 3 x 10

## 2018-08-27 ENCOUNTER — HOSPITAL ENCOUNTER (OUTPATIENT)
Dept: PHYSICAL THERAPY | Age: 78
Setting detail: THERAPIES SERIES
Discharge: HOME | End: 2018-08-27
Payer: MEDICARE

## 2018-08-27 ENCOUNTER — PATIENT MESSAGE (OUTPATIENT)
Dept: FAMILY MEDICINE CLINIC | Age: 78
End: 2018-08-27

## 2018-08-27 DIAGNOSIS — F32.89 DEPRESSIVE DISORDER, NOT ELSEWHERE CLASSIFIED: ICD-10-CM

## 2018-08-27 DIAGNOSIS — F32.A DEPRESSIVE DISORDER: ICD-10-CM

## 2018-08-27 PROCEDURE — 97530 THERAPEUTIC ACTIVITIES: CPT

## 2018-08-27 PROCEDURE — 97112 NEUROMUSCULAR REEDUCATION: CPT

## 2018-08-27 PROCEDURE — 97110 THERAPEUTIC EXERCISES: CPT

## 2018-08-27 RX ORDER — BUPROPION HYDROCHLORIDE 300 MG/1
300 TABLET ORAL EVERY MORNING
Qty: 90 TABLET | Refills: 3 | Status: SHIPPED | OUTPATIENT
Start: 2018-08-27 | End: 2019-09-18 | Stop reason: SDUPTHER

## 2018-08-27 RX ORDER — BUPROPION HYDROCHLORIDE 300 MG/1
300 TABLET ORAL EVERY MORNING
Qty: 90 TABLET | Refills: 3 | Status: CANCELLED | OUTPATIENT
Start: 2018-08-27 | End: 2019-08-27

## 2018-08-27 NOTE — FLOWSHEET NOTE
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 She has no new complaints today.      OBJECTIVE:   Flexibility   L R Comment   Hamstring  Moderate restriction    Gastroc  Moderate restriction    ITB      Quad              ROM   PROM AROM Overpressure Comment    L R L R L R    Flexion   130 121      Extension   0 -2                              Strength    *Not tested due to recent surgery L R Comment   Quad  Good contraction    Hamstring      Gastroc      Hip  flexion      Hip abd                      Special Test   *Not tested due to recent surgery Results/Comment   Meniscal Click    Crepitus    Flexion Test    Valgus Laxity    Varus Laxity    Lachmans    Drop Back    Homans negative           Girth   L R   Mid Patella 45 cm 46 cm   Suprapatellar 47.5 cm 50 cm   5cm above 47 cm 52 cm   15cm above       Reflexes/Sensation:     [x]Dermatomes/Myotomes intact    []Reflexes equal and normal bilaterally   []Other:    Joint mobility: 7/6    [x]Normal    []Hypo   []Hyper    Palpation: TTP around incision  7/6    Functional Mobility/Transfers: modified independent with use of cane; difficulty and increased time required for activities around home; Unable to walk distances longer than 500 ft; difficulty getting into and out of car  7/6    Posture: Forward head and rounded shoulders  7/6    Bandages/Dressings/Incisions:  incision well healed  8/27    Gait: (include devices/WB status) Gait WNL  8/27                       [x] Patient history, allergies, meds reviewed. Medical chart reviewed. See intake form. 7/6    Review Of Systems (ROS):  [x]Performed Review of systems (Integumentary, CardioPulmonary, Neurological) by intake and observation. Intake form has been scanned into medical record. Patient has been instructed to contact their primary care physician regarding ROS issues if not already being addressed at this time.   7/6    RESTRICTIONS/PRECAUTIONS: R TKA 6/18/18    Exercises/Interventions:   Exercise/Equipment Resistance/Repetitions Other comments   Stretching     Hamstring 30 sec x 5 W/ heel prop; start 7/13   Towel Pull 30 sec x 5 W/ heel prop; Start 7/13   Inclined Calf     Hip Flexion     ITB     Groin     Quad                    SLR     Supine 3 x 10;1.5# ^8/27   Abduction 3 x 10; 1.5# ^8/27   Adduction     Prone     SLR+          Isometrics     Quad sets 10 sec x 10 Start 7/6   Adduction sets 10 sec x 10 Start 7/6   Patellar Glides     Medial     Superior     Inferior          ROM     Sheet Pulls 10 sec x 10 Start 7/6   Hang Weights     Passive     Active     Weight Shift     Ankle Pumps     EZ stretch 20 min (30 sec hold) ^7/18             CKC     Calf raises 3 x 10 Start 7/13   Wall sits     Step ups 3 x 10 Start 7/26   1 leg stand     Squatting     Tandem balance 3 x 30 sec each side Start 7/13   clamshells 3 x 10 Start 7/11   PRE     Extension 3 x 10; 5# RANGE: ^8/27 co-morbidities. [] Plan just implemented, too soon to assess goals progression  [] Other:     ASSESSMENT:  See eval    Treatment/Activity Tolerance:  [x] Patient tolerated treatment well [] Patient limited by fatique  [] Patient limited by pain  [] Patient limited by other medical complications  [x] Other: 8/27 Good tolerance to exercises this session. ROM progressing well from 0 to 120. Discussed continuing for next month with strength training program 1x/weel. Patient verbalized understanding. Continue to progress as tolerated.      Patient education:  7/6 HEP provided and reviewed with patient    Prognosis: [x] Good [] Fair  [] Poor    Patient Requires Follow-up: [x] Yes  [] No    PLAN: See eval  [x] Continue per plan of care [] Alter current plan (see comments)  [] Plan of care initiated [] Hold pending MD visit [] Discharge    Electronically signed by: Julia Canela PT, DPT

## 2018-08-30 ENCOUNTER — APPOINTMENT (OUTPATIENT)
Dept: PHYSICAL THERAPY | Age: 78
End: 2018-08-30
Payer: MEDICARE

## 2018-09-07 ENCOUNTER — OFFICE VISIT (OUTPATIENT)
Dept: FAMILY MEDICINE CLINIC | Age: 78
End: 2018-09-07

## 2018-09-07 VITALS
WEIGHT: 167 LBS | DIASTOLIC BLOOD PRESSURE: 71 MMHG | RESPIRATION RATE: 12 BRPM | BODY MASS INDEX: 33.73 KG/M2 | SYSTOLIC BLOOD PRESSURE: 112 MMHG | OXYGEN SATURATION: 94 % | HEART RATE: 78 BPM | TEMPERATURE: 98 F

## 2018-09-07 DIAGNOSIS — S16.1XXA STRAIN OF NECK MUSCLE, INITIAL ENCOUNTER: Primary | ICD-10-CM

## 2018-09-07 PROCEDURE — G8427 DOCREV CUR MEDS BY ELIG CLIN: HCPCS | Performed by: FAMILY MEDICINE

## 2018-09-07 PROCEDURE — 1090F PRES/ABSN URINE INCON ASSESS: CPT | Performed by: FAMILY MEDICINE

## 2018-09-07 PROCEDURE — G8417 CALC BMI ABV UP PARAM F/U: HCPCS | Performed by: FAMILY MEDICINE

## 2018-09-07 PROCEDURE — 1123F ACP DISCUSS/DSCN MKR DOCD: CPT | Performed by: FAMILY MEDICINE

## 2018-09-07 PROCEDURE — 99213 OFFICE O/P EST LOW 20 MIN: CPT | Performed by: FAMILY MEDICINE

## 2018-09-07 PROCEDURE — 1036F TOBACCO NON-USER: CPT | Performed by: FAMILY MEDICINE

## 2018-09-07 PROCEDURE — 1101F PT FALLS ASSESS-DOCD LE1/YR: CPT | Performed by: FAMILY MEDICINE

## 2018-09-07 PROCEDURE — G8399 PT W/DXA RESULTS DOCUMENT: HCPCS | Performed by: FAMILY MEDICINE

## 2018-09-07 PROCEDURE — 4040F PNEUMOC VAC/ADMIN/RCVD: CPT | Performed by: FAMILY MEDICINE

## 2018-09-07 RX ORDER — METHYLPREDNISOLONE 4 MG/1
TABLET ORAL
Qty: 1 KIT | Refills: 0 | Status: SHIPPED | OUTPATIENT
Start: 2018-09-07 | End: 2019-01-09 | Stop reason: ALTCHOICE

## 2018-09-07 RX ORDER — BACLOFEN 10 MG/1
10 TABLET ORAL 3 TIMES DAILY
Qty: 90 TABLET | Refills: 2 | Status: SHIPPED | OUTPATIENT
Start: 2018-09-07 | End: 2019-12-27

## 2018-09-07 NOTE — PROGRESS NOTES
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)    Renal angiolipoma    Lumbar spondylosis    Lumbar stenosis    Trochanteric bursitis of right hip    Glucose intolerance (impaired glucose tolerance)    Need for shingles vaccine    Hypercalcemia       Past Medical History:   Diagnosis Date    Acute esophagitis     h/o    Acute hearing loss of right ear 7/12/2016    AVN [facility]avascular necrosis of bone) ([facility] 1/23/2014    Hip      Benign essential hypertension     Cervical stenosis of spine     Colon polyps     Depressive disorder, not elsewhere classified     Essential familial hypercholesterolemia     Fatigue     Fracture of cuboid, right ankle, closed 5/2013    Lumbar herniated disc     Lumbar radiculopathy 6/9/2015    Osteopenia        Past Surgical History:   Procedure Laterality Date    APPENDECTOMY      BREAST SURGERY      reduction    HYSTERECTOMY      JOINT REPLACEMENT Left 4/2014    hip    KNEE SURGERY      left    LAMINECTOMY      decompressive more than 2 lumbar segments    TONSILLECTOMY          Family History   Problem Relation Age of Onset    Other Mother         lung disease    Heart Disease Father        Social History   Substance Use Topics    Smoking status: Never Smoker    Smokeless tobacco: Never Used    Alcohol use No            Review of Systems  Review of Systems    Objective:   Physical Exam  Vitals:    09/07/18 1640   BP: 112/71   Pulse: 78   Resp: 12   Temp: 98 °F (36.7 °C)   TempSrc: Oral   SpO2: 94%   Weight: 167 lb (75.8 kg)       Physical Exam   Constitutional: She is oriented to person, place, and time. She appears well-developed and well-nourished. No distress. Neck: Trachea normal. Muscular tenderness present. No spinous process tenderness present. Decreased range of motion present. No edema present.  No thyroid

## 2018-09-11 ENCOUNTER — HOSPITAL ENCOUNTER (OUTPATIENT)
Dept: MAMMOGRAPHY | Age: 78
Discharge: HOME | End: 2018-09-11
Payer: MEDICARE

## 2018-09-11 DIAGNOSIS — Z12.31 VISIT FOR SCREENING MAMMOGRAM: ICD-10-CM

## 2018-09-11 PROCEDURE — 77067 SCR MAMMO BI INCL CAD: CPT

## 2018-09-12 ENCOUNTER — PATIENT MESSAGE (OUTPATIENT)
Dept: FAMILY MEDICINE CLINIC | Age: 78
End: 2018-09-12

## 2018-09-12 DIAGNOSIS — M54.2 CERVICALGIA: Primary | ICD-10-CM

## 2018-09-24 ENCOUNTER — HOSPITAL ENCOUNTER (OUTPATIENT)
Age: 78
Discharge: HOME | End: 2018-09-24
Payer: MEDICARE

## 2018-09-24 ENCOUNTER — HOSPITAL ENCOUNTER (OUTPATIENT)
Dept: GENERAL RADIOLOGY | Age: 78
Discharge: HOME | End: 2018-09-24
Payer: MEDICARE

## 2018-09-24 DIAGNOSIS — M54.2 CERVICALGIA: ICD-10-CM

## 2018-09-24 PROCEDURE — 72040 X-RAY EXAM NECK SPINE 2-3 VW: CPT

## 2019-01-09 DIAGNOSIS — E83.52 HYPERCALCEMIA: Primary | ICD-10-CM

## 2019-01-09 DIAGNOSIS — R73.02 GLUCOSE INTOLERANCE (IMPAIRED GLUCOSE TOLERANCE): ICD-10-CM

## 2019-01-09 DIAGNOSIS — D50.9 IRON DEFICIENCY ANEMIA, UNSPECIFIED IRON DEFICIENCY ANEMIA TYPE: ICD-10-CM

## 2019-01-09 DIAGNOSIS — E83.52 HYPERCALCEMIA: ICD-10-CM

## 2019-01-09 DIAGNOSIS — E78.01 ESSENTIAL FAMILIAL HYPERCHOLESTEROLEMIA: ICD-10-CM

## 2019-01-09 LAB
A/G RATIO: 1.7 (ref 1.1–2.2)
ALBUMIN SERPL-MCNC: 4.8 G/DL (ref 3.4–5)
ALP BLD-CCNC: 78 U/L (ref 40–129)
ALT SERPL-CCNC: 22 U/L (ref 10–40)
ANION GAP SERPL CALCULATED.3IONS-SCNC: 16 MMOL/L (ref 3–16)
AST SERPL-CCNC: 20 U/L (ref 15–37)
BILIRUB SERPL-MCNC: 0.4 MG/DL (ref 0–1)
BUN BLDV-MCNC: 31 MG/DL (ref 7–20)
CALCIUM SERPL-MCNC: 11.2 MG/DL (ref 8.3–10.6)
CHLORIDE BLD-SCNC: 99 MMOL/L (ref 99–110)
CHOLESTEROL, TOTAL: 164 MG/DL (ref 0–199)
CO2: 32 MMOL/L (ref 21–32)
CREAT SERPL-MCNC: 0.9 MG/DL (ref 0.6–1.2)
GFR AFRICAN AMERICAN: >60
GFR NON-AFRICAN AMERICAN: >60
GLOBULIN: 2.8 G/DL
GLUCOSE BLD-MCNC: 83 MG/DL (ref 70–99)
HCT VFR BLD CALC: 42.9 % (ref 36–48)
HDLC SERPL-MCNC: 61 MG/DL (ref 40–60)
HEMOGLOBIN: 13.8 G/DL (ref 12–16)
LDL CHOLESTEROL CALCULATED: 83 MG/DL
MCH RBC QN AUTO: 28.3 PG (ref 26–34)
MCHC RBC AUTO-ENTMCNC: 32.2 G/DL (ref 31–36)
MCV RBC AUTO: 88 FL (ref 80–100)
PARATHYROID HORMONE INTACT: 43.6 PG/ML (ref 14–72)
PDW BLD-RTO: 14.6 % (ref 12.4–15.4)
PLATELET # BLD: 253 K/UL (ref 135–450)
PMV BLD AUTO: 10.1 FL (ref 5–10.5)
POTASSIUM SERPL-SCNC: 4.5 MMOL/L (ref 3.5–5.1)
RBC # BLD: 4.87 M/UL (ref 4–5.2)
SODIUM BLD-SCNC: 147 MMOL/L (ref 136–145)
TOTAL PROTEIN: 7.6 G/DL (ref 6.4–8.2)
TRIGL SERPL-MCNC: 99 MG/DL (ref 0–150)
TSH REFLEX: 3.37 UIU/ML (ref 0.27–4.2)
VLDLC SERPL CALC-MCNC: 20 MG/DL
WBC # BLD: 9.1 K/UL (ref 4–11)

## 2019-01-10 DIAGNOSIS — E83.52 HYPERCALCEMIA: ICD-10-CM

## 2019-01-10 LAB
ESTIMATED AVERAGE GLUCOSE: 131.2 MG/DL
HBA1C MFR BLD: 6.2 %
VITAMIN D 25-HYDROXY: 50.5 NG/ML

## 2019-01-11 DIAGNOSIS — E83.52 HYPERCALCEMIA: ICD-10-CM

## 2019-01-11 DIAGNOSIS — I10 ESSENTIAL HYPERTENSION: Primary | ICD-10-CM

## 2019-01-11 RX ORDER — FUROSEMIDE 20 MG/1
20 TABLET ORAL DAILY
Qty: 90 TABLET | Refills: 1 | Status: SHIPPED | OUTPATIENT
Start: 2019-01-11 | End: 2019-07-22 | Stop reason: SDUPTHER

## 2019-01-16 ENCOUNTER — OFFICE VISIT (OUTPATIENT)
Dept: PSYCHOLOGY | Age: 79
End: 2019-01-16
Payer: MEDICARE

## 2019-01-16 DIAGNOSIS — F43.23 ADJUSTMENT DISORDER WITH MIXED ANXIETY AND DEPRESSED MOOD: Primary | ICD-10-CM

## 2019-01-16 PROCEDURE — 90791 PSYCH DIAGNOSTIC EVALUATION: CPT | Performed by: PSYCHOLOGIST

## 2019-01-16 ASSESSMENT — PATIENT HEALTH QUESTIONNAIRE - PHQ9
8. MOVING OR SPEAKING SO SLOWLY THAT OTHER PEOPLE COULD HAVE NOTICED. OR THE OPPOSITE, BEING SO FIGETY OR RESTLESS THAT YOU HAVE BEEN MOVING AROUND A LOT MORE THAN USUAL: 0
9. THOUGHTS THAT YOU WOULD BE BETTER OFF DEAD, OR OF HURTING YOURSELF: 0
SUM OF ALL RESPONSES TO PHQ QUESTIONS 1-9: 3
4. FEELING TIRED OR HAVING LITTLE ENERGY: 1
6. FEELING BAD ABOUT YOURSELF - OR THAT YOU ARE A FAILURE OR HAVE LET YOURSELF OR YOUR FAMILY DOWN: 0
3. TROUBLE FALLING OR STAYING ASLEEP: 0
7. TROUBLE CONCENTRATING ON THINGS, SUCH AS READING THE NEWSPAPER OR WATCHING TELEVISION: 0
2. FEELING DOWN, DEPRESSED OR HOPELESS: 1
SUM OF ALL RESPONSES TO PHQ QUESTIONS 1-9: 3
5. POOR APPETITE OR OVEREATING: 0
SUM OF ALL RESPONSES TO PHQ9 QUESTIONS 1 & 2: 2
1. LITTLE INTEREST OR PLEASURE IN DOING THINGS: 1

## 2019-01-16 ASSESSMENT — ANXIETY QUESTIONNAIRES
3. WORRYING TOO MUCH ABOUT DIFFERENT THINGS: 1-SEVERAL DAYS
7. FEELING AFRAID AS IF SOMETHING AWFUL MIGHT HAPPEN: 0-NOT AT ALL SURE
4. TROUBLE RELAXING: 1-SEVERAL DAYS
GAD7 TOTAL SCORE: 4
5. BEING SO RESTLESS THAT IT IS HARD TO SIT STILL: 0-NOT AT ALL SURE
1. FEELING NERVOUS, ANXIOUS, OR ON EDGE: 1-SEVERAL DAYS
2. NOT BEING ABLE TO STOP OR CONTROL WORRYING: 0-NOT AT ALL SURE
6. BECOMING EASILY ANNOYED OR IRRITABLE: 1-SEVERAL DAYS

## 2019-01-20 ENCOUNTER — PATIENT MESSAGE (OUTPATIENT)
Dept: FAMILY MEDICINE CLINIC | Age: 79
End: 2019-01-20

## 2019-01-21 RX ORDER — BENZONATATE 200 MG/1
200 CAPSULE ORAL 3 TIMES DAILY PRN
Qty: 30 CAPSULE | Refills: 1 | Status: SHIPPED | OUTPATIENT
Start: 2019-01-21 | End: 2019-01-31

## 2019-01-24 ENCOUNTER — PATIENT MESSAGE (OUTPATIENT)
Dept: FAMILY MEDICINE CLINIC | Age: 79
End: 2019-01-24

## 2019-01-25 ENCOUNTER — OFFICE VISIT (OUTPATIENT)
Dept: FAMILY MEDICINE CLINIC | Age: 79
End: 2019-01-25
Payer: MEDICARE

## 2019-01-25 VITALS
HEART RATE: 85 BPM | TEMPERATURE: 96.5 F | SYSTOLIC BLOOD PRESSURE: 139 MMHG | DIASTOLIC BLOOD PRESSURE: 87 MMHG | RESPIRATION RATE: 12 BRPM | OXYGEN SATURATION: 98 %

## 2019-01-25 DIAGNOSIS — J06.9 VIRAL URI: Primary | ICD-10-CM

## 2019-01-25 PROCEDURE — G8484 FLU IMMUNIZE NO ADMIN: HCPCS | Performed by: FAMILY MEDICINE

## 2019-01-25 PROCEDURE — 99213 OFFICE O/P EST LOW 20 MIN: CPT | Performed by: FAMILY MEDICINE

## 2019-01-25 PROCEDURE — 1036F TOBACCO NON-USER: CPT | Performed by: FAMILY MEDICINE

## 2019-01-25 PROCEDURE — G8427 DOCREV CUR MEDS BY ELIG CLIN: HCPCS | Performed by: FAMILY MEDICINE

## 2019-01-25 PROCEDURE — 4040F PNEUMOC VAC/ADMIN/RCVD: CPT | Performed by: FAMILY MEDICINE

## 2019-01-25 PROCEDURE — 1101F PT FALLS ASSESS-DOCD LE1/YR: CPT | Performed by: FAMILY MEDICINE

## 2019-01-25 PROCEDURE — G8417 CALC BMI ABV UP PARAM F/U: HCPCS | Performed by: FAMILY MEDICINE

## 2019-01-25 PROCEDURE — G8399 PT W/DXA RESULTS DOCUMENT: HCPCS | Performed by: FAMILY MEDICINE

## 2019-01-25 PROCEDURE — 1123F ACP DISCUSS/DSCN MKR DOCD: CPT | Performed by: FAMILY MEDICINE

## 2019-01-25 PROCEDURE — 1090F PRES/ABSN URINE INCON ASSESS: CPT | Performed by: FAMILY MEDICINE

## 2019-01-25 RX ORDER — GUAIFENESIN AND CODEINE PHOSPHATE 100; 10 MG/5ML; MG/5ML
5-10 SOLUTION ORAL 4 TIMES DAILY PRN
Qty: 118 ML | Refills: 0 | Status: SHIPPED | OUTPATIENT
Start: 2019-01-25 | End: 2019-02-01

## 2019-01-25 RX ORDER — PANTOPRAZOLE SODIUM 40 MG/1
40 TABLET, DELAYED RELEASE ORAL DAILY
Qty: 90 TABLET | Refills: 3 | Status: SHIPPED | OUTPATIENT
Start: 2019-01-25 | End: 2019-12-27 | Stop reason: SDUPTHER

## 2019-01-25 RX ORDER — PANTOPRAZOLE SODIUM 40 MG/1
40 GRANULE, DELAYED RELEASE ORAL
COMMUNITY
End: 2019-01-25

## 2019-01-25 RX ORDER — AZITHROMYCIN 250 MG/1
TABLET, FILM COATED ORAL
Qty: 1 PACKET | Refills: 0 | Status: SHIPPED | OUTPATIENT
Start: 2019-01-25 | End: 2019-02-04

## 2019-01-29 DIAGNOSIS — E78.01 ESSENTIAL FAMILIAL HYPERCHOLESTEROLEMIA: ICD-10-CM

## 2019-01-29 RX ORDER — ROSUVASTATIN CALCIUM 20 MG/1
TABLET, COATED ORAL
Qty: 90 TABLET | Refills: 1 | Status: SHIPPED | OUTPATIENT
Start: 2019-01-29 | End: 2019-10-30 | Stop reason: SDUPTHER

## 2019-01-30 ENCOUNTER — OFFICE VISIT (OUTPATIENT)
Dept: PSYCHOLOGY | Age: 79
End: 2019-01-30
Payer: COMMERCIAL

## 2019-01-30 DIAGNOSIS — F43.23 ADJUSTMENT DISORDER WITH MIXED ANXIETY AND DEPRESSED MOOD: Primary | ICD-10-CM

## 2019-01-30 PROCEDURE — 90832 PSYTX W PT 30 MINUTES: CPT | Performed by: PSYCHOLOGIST

## 2019-01-30 ASSESSMENT — PATIENT HEALTH QUESTIONNAIRE - PHQ9
8. MOVING OR SPEAKING SO SLOWLY THAT OTHER PEOPLE COULD HAVE NOTICED. OR THE OPPOSITE, BEING SO FIGETY OR RESTLESS THAT YOU HAVE BEEN MOVING AROUND A LOT MORE THAN USUAL: 0
2. FEELING DOWN, DEPRESSED OR HOPELESS: 1
SUM OF ALL RESPONSES TO PHQ QUESTIONS 1-9: 4
SUM OF ALL RESPONSES TO PHQ QUESTIONS 1-9: 4
7. TROUBLE CONCENTRATING ON THINGS, SUCH AS READING THE NEWSPAPER OR WATCHING TELEVISION: 0
SUM OF ALL RESPONSES TO PHQ9 QUESTIONS 1 & 2: 2
4. FEELING TIRED OR HAVING LITTLE ENERGY: 1
6. FEELING BAD ABOUT YOURSELF - OR THAT YOU ARE A FAILURE OR HAVE LET YOURSELF OR YOUR FAMILY DOWN: 0
1. LITTLE INTEREST OR PLEASURE IN DOING THINGS: 1
9. THOUGHTS THAT YOU WOULD BE BETTER OFF DEAD, OR OF HURTING YOURSELF: 0
5. POOR APPETITE OR OVEREATING: 0
3. TROUBLE FALLING OR STAYING ASLEEP: 1

## 2019-01-30 ASSESSMENT — ANXIETY QUESTIONNAIRES
6. BECOMING EASILY ANNOYED OR IRRITABLE: 2-OVER HALF THE DAYS
5. BEING SO RESTLESS THAT IT IS HARD TO SIT STILL: 0-NOT AT ALL SURE
4. TROUBLE RELAXING: 0-NOT AT ALL SURE
2. NOT BEING ABLE TO STOP OR CONTROL WORRYING: 0-NOT AT ALL SURE
1. FEELING NERVOUS, ANXIOUS, OR ON EDGE: 1-SEVERAL DAYS
7. FEELING AFRAID AS IF SOMETHING AWFUL MIGHT HAPPEN: 1-SEVERAL DAYS
GAD7 TOTAL SCORE: 5
3. WORRYING TOO MUCH ABOUT DIFFERENT THINGS: 1-SEVERAL DAYS

## 2019-04-04 ENCOUNTER — OFFICE VISIT (OUTPATIENT)
Dept: PSYCHOLOGY | Age: 79
End: 2019-04-04
Payer: MEDICARE

## 2019-04-04 DIAGNOSIS — F43.23 ADJUSTMENT DISORDER WITH MIXED ANXIETY AND DEPRESSED MOOD: Primary | ICD-10-CM

## 2019-04-04 PROCEDURE — 90832 PSYTX W PT 30 MINUTES: CPT | Performed by: PSYCHOLOGIST

## 2019-04-04 ASSESSMENT — ANXIETY QUESTIONNAIRES
GAD7 TOTAL SCORE: 4
4. TROUBLE RELAXING: 0-NOT AT ALL SURE
7. FEELING AFRAID AS IF SOMETHING AWFUL MIGHT HAPPEN: 1-SEVERAL DAYS
3. WORRYING TOO MUCH ABOUT DIFFERENT THINGS: 0-NOT AT ALL SURE
2. NOT BEING ABLE TO STOP OR CONTROL WORRYING: 1-SEVERAL DAYS
1. FEELING NERVOUS, ANXIOUS, OR ON EDGE: 0-NOT AT ALL SURE
6. BECOMING EASILY ANNOYED OR IRRITABLE: 2-OVER HALF THE DAYS
5. BEING SO RESTLESS THAT IT IS HARD TO SIT STILL: 0-NOT AT ALL SURE

## 2019-04-04 ASSESSMENT — PATIENT HEALTH QUESTIONNAIRE - PHQ9
4. FEELING TIRED OR HAVING LITTLE ENERGY: 0
5. POOR APPETITE OR OVEREATING: 1
7. TROUBLE CONCENTRATING ON THINGS, SUCH AS READING THE NEWSPAPER OR WATCHING TELEVISION: 0
1. LITTLE INTEREST OR PLEASURE IN DOING THINGS: 1
2. FEELING DOWN, DEPRESSED OR HOPELESS: 1
SUM OF ALL RESPONSES TO PHQ9 QUESTIONS 1 & 2: 2
3. TROUBLE FALLING OR STAYING ASLEEP: 0
SUM OF ALL RESPONSES TO PHQ QUESTIONS 1-9: 3
9. THOUGHTS THAT YOU WOULD BE BETTER OFF DEAD, OR OF HURTING YOURSELF: 0
6. FEELING BAD ABOUT YOURSELF - OR THAT YOU ARE A FAILURE OR HAVE LET YOURSELF OR YOUR FAMILY DOWN: 0
8. MOVING OR SPEAKING SO SLOWLY THAT OTHER PEOPLE COULD HAVE NOTICED. OR THE OPPOSITE, BEING SO FIGETY OR RESTLESS THAT YOU HAVE BEEN MOVING AROUND A LOT MORE THAN USUAL: 0
SUM OF ALL RESPONSES TO PHQ QUESTIONS 1-9: 3

## 2019-04-04 NOTE — Clinical Note
AMANDEEP she has decided to transition to mental health treatment in the community. She can always return to see me at any time.

## 2019-04-04 NOTE — PROGRESS NOTES
Behavioral Health Consultation  Chely Jones Psy.D. Psychologist  4/4/2019  1:28 PM      Time spent with Patient: 30 minutes  This is patient's third [facility] appointment. Reason for Consult:  stress  Referring Provider: MD Darius Yip 150  [address] [facility], [address] [place] [address]      S:  Pt reported that she's doing well. Enjoyed her recent trip to [place]. Spent time with close friends. 's hearing aid went out when they got there, which affected him throughout the trip. Pt has a couple local close friends, sees them every week or two. She has friends from [facility] but has never felt like she could fully break into those social circles. Frustrated when she is around new people who do not actively engage with her. Ended a relationship with a close friend who made her angry. Dislikes the aging process. The unknown makes her feel scared. Having trouble getting herself to feel motivated to take care of daily tasks. Does not feel that 30 minute visits are long enough. Would prefer to switch back to traditional mental health treatment.       O:  MSE:  Appearance: good hygiene and appropriate attire  Attitude: cooperative, friendly and mild distress  Consciousness: alert  Orientation: oriented to person, place, time, general circumstance  Memory: recent and remote memory intact  Attention/Concentration: intact during session  Psychomotor Activity: normal  Eye Contact: normal  Speech: normal rate and volume, well-articulated  Mood: mildly dyshporic and anxious   Affect: congruent with thought content and mood  Perception: within normal limits  Thought Content: within normal limits   Thought Process: logical, goal-directed, coherent  Insight: Improving  Judgment: intact  Morbid ideation: no  Suicide Assessment: no suicidal ideation      History:    Medications:   Current Outpatient Medications   Medication Sig Dispense Refill    rosuvastatin (CRESTOR) 20 MG tablet TAKE 1 TABLET EVERY NIGHT 90 tablet 1    methylcellulose (CITRUCEL) oral powder Take 2 g by mouth daily Take by mouth daily.  pantoprazole (PROTONIX) 40 MG tablet Take 1 tablet by mouth daily 90 tablet 3    furosemide (LASIX) 20 MG tablet Take 1 tablet by mouth daily 90 tablet 1    baclofen (LIORESAL) 10 MG tablet Take 1 tablet by mouth 3 times daily 90 tablet 2    buPROPion (WELLBUTRIN XL) 300 MG extended release tablet Take 1 tablet by mouth every morning 90 tablet 3    Psyllium (METAMUCIL SMOOTH TEXTURE) 28.3 % POWD Mixed with orange juice once or twice daily 1 Bottle 0    amLODIPine (NORVASC) 10 MG tablet Take 1 tablet by mouth daily 90 tablet 3    DULoxetine (CYMBALTA) 60 MG extended release capsule Take 1 capsule by mouth daily 90 capsule 2    calcium citrate-vitamin D (CALCIUM CITRATE + D3) 315-250 MG-UNIT TABS per tablet Take 1 tablet by mouth daily (with breakfast) 120 tablet     Cholecalciferol (VITAMIN D-3) 1000 units CAPS Take 1 capsule by mouth daily 60 capsule     ibuprofen (ADVIL;MOTRIN) 800 MG tablet Take 1 tablet by mouth 2 times daily as needed for Pain (Patient taking differently: Take 800 mg by mouth daily ) 180 tablet 5    Omeprazole-Sodium Bicarbonate (ZEGERID PO) Take by mouth      Probiotic Product (PROBIOTIC DAILY PO) Take  by mouth.  Alpha-D-Galactosidase (BEANO PO) Take  by mouth.  L-Arginine POWD Take 4.5 g by mouth 2 times daily. No current facility-administered medications for this visit.         Social History:   Social History     Socioeconomic History    Marital status:      Spouse name: Not on file    Number of children: Not on file    Years of education: Not on file    Highest education level: Not on file   Occupational History    Not on file   Social Needs    Financial resource strain: Not on file    Food insecurity:     Worry: Not on file     Inability: Not on file    Transportation needs:     Medical: Not on file     Non-medical: Not on file Tobacco Use    Smoking status: Never Smoker    Smokeless tobacco: Never Used   Substance and Sexual Activity    Alcohol use: No    Drug use: No    Sexual activity: Not on file   Lifestyle    Physical activity:     Days per week: Not on file     Minutes per session: Not on file    Stress: Not on file   Relationships    Social connections:     Talks on phone: Not on file     Gets together: Not on file     Attends Gnosticist service: Not on file     Active member of club or organization: Not on file     Attends meetings of clubs or organizations: Not on file     Relationship status: Not on file    Intimate partner violence:     Fear of current or ex partner: Not on file     Emotionally abused: Not on file     Physically abused: Not on file     Forced sexual activity: Not on file   Other Topics Concern    Not on file   Social History Narrative    Not on file       TOBACCO:   reports that she has never smoked. She has never used smokeless tobacco.  ETOH:   reports that she does not drink alcohol. Family History:   Family History   Problem Relation Age of Onset    Other Mother         lung disease    Heart Disease Father        A:  Patient's distress remains present. Discussed her social situation and her sense of feeling disconnected from the community here. Explored her discomfort with the aging process. Engaged in behavioral goal setting to help her start her day earlier so she will feel more motivated to accomplish tasks. No safety concerns at this time. Validated patient's desire to transition to traditional mental health treatment. Provided her with resources for therapists in the community. She is aware that she can return to see this writer at any time. DEBBIE 7 SCORE 4/4/2019 1/30/2019 1/16/2019   DEBBIE-7 Total Score 4 5 4     Interpretation of DEBBIE-7 score: 5-9 = mild anxiety, 10-14 = moderate anxiety, 15+ = severe anxiety.  Recommend referral to behavioral health for scores 10 or greater. PHQ Scores 4/4/2019 1/30/2019 1/16/2019 6/5/2018 5/3/2017 4/16/2014   PHQ2 Score 2 2 2 0 2 0   PHQ9 Score 3 4 3 0 4 0     Interpretation of Total Score Depression Severity: 1-4 = Minimal depression, 5-9 = Mild depression, 10-14 = Moderate depression, 15-19 = Moderately severe depression, 20-27 = Severe depression    Diagnosis:    1. 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 Provided patient with therapy resources in the community. Pt Behavioral Change Plan:  Pt set the following goals:  1. Consider contacting [facility] ([email or web address]), [facility] ([email or web address]) and/or Joey Spence, who is a geriatric care manager, at [phone]  2. Consider contacting [facility] for assistance with downsizing ([employer]/)  3. Contact [facility] to schedule an assessment to learn about additional resources for support in your home  4. Start a hug-a-day routine  5.  Aim to get out of the house earlier in the day for appointments, errands, meetings, a workout, etc to jump start your day    Pt has chosen to transition to mental health treatment in the community. She is aware that she can return for follow-up with this writer at any time.

## 2019-04-30 PROBLEM — Z23 NEED FOR SHINGLES VACCINE: Status: RESOLVED | Noted: 2018-07-26 | Resolved: 2019-04-30

## 2019-05-01 ENCOUNTER — OFFICE VISIT (OUTPATIENT)
Dept: FAMILY MEDICINE CLINIC | Age: 79
End: 2019-05-01
Payer: MEDICARE

## 2019-05-01 ENCOUNTER — TELEPHONE (OUTPATIENT)
Dept: FAMILY MEDICINE CLINIC | Age: 79
End: 2019-05-01

## 2019-05-01 VITALS
RESPIRATION RATE: 12 BRPM | HEIGHT: 59 IN | BODY MASS INDEX: 33.18 KG/M2 | SYSTOLIC BLOOD PRESSURE: 124 MMHG | WEIGHT: 164.6 LBS | OXYGEN SATURATION: 97 % | TEMPERATURE: 96.2 F | HEART RATE: 69 BPM | DIASTOLIC BLOOD PRESSURE: 67 MMHG

## 2019-05-01 DIAGNOSIS — E78.01 ESSENTIAL FAMILIAL HYPERCHOLESTEROLEMIA: ICD-10-CM

## 2019-05-01 DIAGNOSIS — R73.02 GLUCOSE INTOLERANCE (IMPAIRED GLUCOSE TOLERANCE): ICD-10-CM

## 2019-05-01 DIAGNOSIS — I10 BENIGN ESSENTIAL HYPERTENSION: ICD-10-CM

## 2019-05-01 DIAGNOSIS — F43.22 ADJUSTMENT DISORDER WITH ANXIETY: ICD-10-CM

## 2019-05-01 DIAGNOSIS — M17.12 PRIMARY OSTEOARTHRITIS OF LEFT KNEE: ICD-10-CM

## 2019-05-01 DIAGNOSIS — Z01.818 PREOP EXAMINATION: Primary | ICD-10-CM

## 2019-05-01 PROCEDURE — G8427 DOCREV CUR MEDS BY ELIG CLIN: HCPCS | Performed by: FAMILY MEDICINE

## 2019-05-01 PROCEDURE — 1036F TOBACCO NON-USER: CPT | Performed by: FAMILY MEDICINE

## 2019-05-01 PROCEDURE — G8417 CALC BMI ABV UP PARAM F/U: HCPCS | Performed by: FAMILY MEDICINE

## 2019-05-01 PROCEDURE — G8399 PT W/DXA RESULTS DOCUMENT: HCPCS | Performed by: FAMILY MEDICINE

## 2019-05-01 PROCEDURE — 1090F PRES/ABSN URINE INCON ASSESS: CPT | Performed by: FAMILY MEDICINE

## 2019-05-01 PROCEDURE — 1123F ACP DISCUSS/DSCN MKR DOCD: CPT | Performed by: FAMILY MEDICINE

## 2019-05-01 PROCEDURE — 99215 OFFICE O/P EST HI 40 MIN: CPT | Performed by: FAMILY MEDICINE

## 2019-05-01 PROCEDURE — 4040F PNEUMOC VAC/ADMIN/RCVD: CPT | Performed by: FAMILY MEDICINE

## 2019-05-01 RX ORDER — LORAZEPAM 0.5 MG/1
0.5 TABLET ORAL EVERY 8 HOURS PRN
Qty: 10 TABLET | Refills: 0 | Status: SHIPPED | OUTPATIENT
Start: 2019-05-01 | End: 2019-05-31

## 2019-05-01 NOTE — PROGRESS NOTES
Preoperative Consultation      Angelita Smallkeeper  YOB: 1940    Date of Service:  5/1/2019    There were no vitals filed for this visit. Wt Readings from Last 2 Encounters:   05/01/19 164 lb 9.6 oz (74.7 kg)   09/07/18 167 lb (75.8 kg)     BP Readings from Last 3 Encounters:   05/01/19 124/67   01/25/19 139/87   09/07/18 112/71        Chief Complaint   Patient presents with    Pre-op Exam     patient is having left knee replacement on 5-7-2019 at [facility] by Dr. Emmanuelle Hutchinson. Fax # [phone]  No EKG needed. Allergies   Allergen Reactions    Ace Inhibitors     Losartan Other (See Comments)     cough    [place] Likes [Diltiazem Hcl]      Outpatient Medications Marked as Taking for the 5/1/19 encounter (Office Visit) with Naye Bolanos MD   Medication Sig Dispense Refill    Mirabegron (MYRBETRIQ PO) Take by mouth      rosuvastatin (CRESTOR) 20 MG tablet TAKE 1 TABLET EVERY NIGHT 90 tablet 1    methylcellulose (CITRUCEL) oral powder Take 2 g by mouth daily Take by mouth daily.  pantoprazole (PROTONIX) 40 MG tablet Take 1 tablet by mouth daily 90 tablet 3    furosemide (LASIX) 20 MG tablet Take 1 tablet by mouth daily 90 tablet 1    buPROPion (WELLBUTRIN XL) 300 MG extended release tablet Take 1 tablet by mouth every morning 90 tablet 3    amLODIPine (NORVASC) 10 MG tablet Take 1 tablet by mouth daily 90 tablet 3    DULoxetine (CYMBALTA) 60 MG extended release capsule Take 1 capsule by mouth daily 90 capsule 2    calcium citrate-vitamin D (CALCIUM CITRATE + D3) 315-250 MG-UNIT TABS per tablet Take 1 tablet by mouth daily (with breakfast) 120 tablet     Cholecalciferol (VITAMIN D-3) 1000 units CAPS Take 1 capsule by mouth daily 60 capsule     Probiotic Product (PROBIOTIC DAILY PO) Take  by mouth.  Alpha-D-Galactosidase (BEANO PO) Take  by mouth.          This patient presents to the office today for a preoperative consultation at the request of surgeon, Dr. Nicole Huang, who plans on performing left total knee replacement on May 7 at the [facility]    Planned anesthesia: General   Known anesthesia problems: None   Bleeding risk: No recent or remote history of abnormal bleeding  Personal or FH of DVT/PE: No    Patient objection to receiving blood products: No    Patient Active Problem List   Diagnosis    Recurrent major depressive disorder, in full remission ([address]    Osteopenia    Cervical stenosis of spine    Benign essential hypertension    Colon polyps    Essential familial hypercholesterolemia    IBS (irritable bowel syndrome)    Elevated lipoprotein(a)    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 ([address]    Renal angiolipoma    Lumbar spondylosis    Lumbar stenosis    Trochanteric bursitis of right hip    Glucose intolerance (impaired glucose tolerance)    Hypercalcemia       Past Medical History:   Diagnosis Date    Acute esophagitis     h/o    Acute hearing loss of right ear 7/12/2016    AVN (avascular necrosis of bone) ([facility] 1/23/2014    Hip      Benign essential hypertension     Cervical stenosis of spine     Colon polyps     Depressive disorder, not elsewhere classified     Essential familial hypercholesterolemia     Fatigue     Fracture of cuboid, right ankle, closed 5/2013    Lumbar herniated disc     Lumbar radiculopathy 6/9/2015    Osteopenia      Past Surgical History:   Procedure Laterality Date    APPENDECTOMY      BREAST SURGERY      reduction    HYSTERECTOMY      JOINT REPLACEMENT Left 4/2014    hip    KNEE SURGERY      left    LAMINECTOMY      decompressive more than 2 lumbar segments    TONSILLECTOMY       Family History   Problem Relation Age of Onset    Other Mother         lung disease    Heart Disease Father      Social History     Socioeconomic History    Marital status:      Spouse name: Not on file    Number of children: Not on file    Years of education: Not on file    Highest education level: Not on file Occupational History    Not on file   Social Needs    Financial resource strain: Not on file    Food insecurity:     Worry: Not on file     Inability: Not on file    Transportation needs:     Medical: Not on file     Non-medical: Not on file   Tobacco Use    Smoking status: Never Smoker    Smokeless tobacco: Never Used   Substance and Sexual Activity    Alcohol use: No    Drug use: No    Sexual activity: Not on file   Lifestyle    Physical activity:     Days per week: Not on file     Minutes per session: Not on file    Stress: Not on file   Relationships    Social connections:     Talks on phone: Not on file     Gets together: Not on file     Attends Pentecostal service: Not on file     Active member of club or organization: Not on file     Attends meetings of clubs or organizations: Not on file     Relationship status: Not on file    Intimate partner violence:     Fear of current or ex partner: Not on file     Emotionally abused: Not on file     Physically abused: Not on file     Forced sexual activity: Not on file   Other Topics Concern    Not on file   Social History Narrative    Not on file       Review of Systems  A comprehensive review of systems was negative except for: stable mild pedal edema   Anxiety with  with recent health issues. Intermittent. No trouble sleeping. No panic attacks. Physical Exam   Constitutional: She is oriented to person, place, and time. She appears well-developed and well-nourished. No distress. HENT:   Head: Normocephalic and atraumatic. Mouth/Throat: Uvula is midline, oropharynx is clear and moist and mucous membranes are normal.   Eyes: Conjunctivae and EOM are normal. Pupils are equal, round, and reactive to light. Neck: Trachea normal and normal range of motion. Neck supple. No JVD present. Carotid bruit is not present. No mass and no thyromegaly present.    Cardiovascular: Normal rate, regular rhythm, normal heart sounds and intact distal pulses. Exam reveals no gallop and no friction rub. No murmur heard. Pulmonary/Chest: Effort normal and breath sounds normal. No respiratory distress. She has no wheezes. She has no rales. Abdominal: Soft. Normal aorta and bowel sounds are normal. She exhibits no distension and no mass. There is no hepatosplenomegaly. No tenderness. Musculoskeletal: She exhibits trace edema and no tenderness. Neurological: She is alert and oriented to person, place, and time. She has normal strength. No cranial nerve deficit Coordination and gait normal.   Skin: Skin is warm and dry. No rash noted. No erythema. Psychiatric: She has a normal mood and affect. Her behavior is normal.     EKG Interpretation:  12/20/18  Result Narrative                                [address]                                                                                                                                                                    Test Date:    2018-12-20 13:28:38  Giovanni Cockayne WHITMAN            Department:   [facility]  Patient ID:   [id number]                 XJXX:           Kayla Richmond Technician:     JEFF:          [id number]               Requested By: Moses Nicolas.   Order Number: [id number]                [facility] MD:   Maximilian Brunson MD                                   Measurements  Neerajjanet Mcarthur  HZYH:         34                       P:            33  GH:           174                      NTV:          -65  QRSD:         108                      T:            92  QJ:           789                                      QTc:          433                                                                 Interpretive Statements  Sinus arrhythmia  incomplete rbbb  lvh by voltage  Electronically Signed On 12- 13:39:15 EST by Maximilian Brunson MD   Other Result Information   Interface, Incoming Cardiant Results - 2018  1:39 PM [facility]                                               Test Date:    2018 13:28:38  Pat Name:     Mary Jane Real            Department:   [facility]  Patient ID:   [id number]                 Room:           Gender:       Female                   Technician:     :                         Requested By: Katiana Foote Order Number: [id number]                Reading MD:   Kami Woo MD                                   Measurements  Intervals                              Axis            Rate:         73                       P:            33  MS:           138                      QRS:          -56  QRSD:         108                      T:            92  QT:           393                                      QTc:          433                                                                 Interpretive Statements  Sinus arrhythmia  incomplete rbbb  lvh by voltage          Lab Review   LABS 19  Reviewed on Care Everywhere - normal BMP, CBC, MRSA swab  Lab Results   Component Value Date     2019    K 4.5 2019    CL 99 2019    CO2 32 2019    BUN 31 2019    CREATININE 0.9 2019    GLUCOSE 83 2019    GLUCOSE 93 2012    CALCIUM 11.2 2019     Lab Results   Component Value Date    WBC 9.1 2019    HGB 13.8 2019    HCT 42.9 2019    MCV 88.0 2019     2019     Lab Results   Component Value Date    CHOL 164 2019    TRIG 99 2019    HDL 61 2019    HDL 58 2011    LDLDIRECT 118 10/25/2016      Lab Results   Component Value Date    LABA1C 6.2 2019     Lab Results   Component Value Date    .2 2019           Assessment:       78 y.o. patient with planned surgery as above.     Known risk factors for perioperative complications: Hypertension  Current medications which may produce withdrawal symptoms if withheld perioperatively: none      Plan:     1. Preoperative workup as follows: ECG, hemoglobin, hematocrit, electrolytes, creatinine  2. Change in medication regimen before surgery: Take Protonix, Amlodipine and Cymbalta on morning of surgery with sip of water, and hold all other medications until after surgery  3. Prophylaxis for cardiac events with perioperative beta-blockers: Not indicated  ACC/AHA indications for pre-operative beta-blocker use:    · Vascular surgery with history of postitive stress test  · Intermediate or high risk surgery with history of CAD   · Intermediate or high risk surgery with multiple clinical predictors of CAD- 2 of the following: history of compensated or prior heart failure, history of cerebrovascular disease, DM, or renal insufficiency    Routine administration of higher-dose, long-acting metoprolol in beta-blocker-naïve patients on the day of surgery, and in the absence of dose titration is associated with an overall increase in mortality. Beta-blockers should be started days to weeks prior to surgery and titrated to pulse < 70.  4. Deep vein thrombosis prophylaxis: regimen to be chosen by surgical team  5.  No contraindications to planned surgery

## 2019-05-21 ENCOUNTER — NURSE TRIAGE (OUTPATIENT)
Dept: OTHER | Facility: CLINIC | Age: 79
End: 2019-05-21

## 2019-05-21 NOTE — TELEPHONE ENCOUNTER
Reason for Disposition   Major surgery in the past month    Protocols used: BREATHING DIFFICULTY-ADULT-OH    Caller states that she has felt sob for the past 3 days. She had knee replacement surgery 2 weeks ago. She currently is walking into her surgeons office for scheduled appointment. Informed caller to discuss issues with surgeon. Provided recommendation that she should follow up in the ED.

## 2019-05-29 ENCOUNTER — HOSPITAL ENCOUNTER (OUTPATIENT)
Dept: PHYSICAL THERAPY | Age: 79
Setting detail: THERAPIES SERIES
Discharge: HOME | End: 2019-05-29
Payer: MEDICARE

## 2019-05-29 PROCEDURE — 97110 THERAPEUTIC EXERCISES: CPT

## 2019-05-29 PROCEDURE — 97162 PT EVAL MOD COMPLEX 30 MIN: CPT

## 2019-05-29 PROCEDURE — 97112 NEUROMUSCULAR REEDUCATION: CPT

## 2019-05-29 NOTE — FLOWSHEET NOTE
Leg press      Leg extension     Leg curl          Manual interventions                     Therapeutic Exercise and NMR EXR  [x] (22725) Provided verbal/tactile cueing for activities related to strengthening, flexibility, endurance, ROM for improvements in LE, proximal hip, and core control with self care, mobility, lifting, ambulation. [x] (83426) Provided verbal/tactile cueing for activities related to improving balance, coordination, kinesthetic sense, posture, motor skill, proprioception  to assist with LE, proximal hip, and core control in self care, mobility, lifting, ambulation and eccentric single leg control.      NMR and Therapeutic Activities:    [] (97634 or 48178) Provided verbal/tactile cueing for activities related to improving balance, coordination, kinesthetic sense, posture, motor skill, proprioception and motor activation to allow for proper function of core, proximal hip and LE with self care and ADLs  [] (63611) Gait Re-education- Provided training and instruction to the patient for proper LE, core and proximal hip recruitment and positioning and eccentric body weight control with ambulation re-education including up and down stairs     Home Exercise Program:    [x] (91721) Reviewed/Progressed HEP activities related to strengthening, flexibility, endurance, ROM of core, proximal hip and LE for functional self-care, mobility, lifting and ambulation/stair navigation   [] (88156)Reviewed/Progressed HEP activities related to improving balance, coordination, kinesthetic sense, posture, motor skill, proprioception of core, proximal hip and LE for self care, mobility, lifting, and ambulation/stair navigation      Manual Treatments:  PROM / STM / Oscillations-Mobs:  G-I, II, III, IV (PA's, Inf., Post.)  [] (49471) Provided manual therapy to mobilize LE, proximal hip and/or LS spine soft tissue/joints for the purpose of modulating pain, promoting relaxation,  increasing ROM, reducing/eliminating soft tissue swelling/inflammation/restriction, improving soft tissue extensibility and allowing for proper ROM for normal function with self care, mobility, lifting and ambulation. Modalities:   CP  12 min  5/29    Charges:  Timed Code Treatment Minutes: 30 + EVAL   Total Treatment Minutes: 70       [] EVAL (LOW) 47903 (typically 20 minutes face-to-face)  [x] EVAL (MOD) 44385 (typically 30 minutes face-to-face)  [] EVAL (HIGH) 72478 (typically 45 minutes face-to-face)  [] RE-EVAL   [x] UC(62564) x  1   [] IONTO  [x] NMR (32499) x  1   [] VASO  [] Manual (87228) x       [] Other:  [] TA x       [] Mech Traction (23976)  [] ES(attended) (43382)      [] ES (un) (23049):     GOALS:  Patient stated goal: Reduce pain; Gain full mobility    Therapist goals for Patient:   Short Term Goals: To be achieved in: 2 weeks  1. Independent in HEP and progression per patient tolerance, in order to prevent re-injury. 2. Patient will have a decrease in pain to facilitate improvement in movement, function, and ADLs as indicated by Functional Deficits. Long Term Goals: To be achieved in: 8 weeks  1. Disability index score of 15% or less for the LEFS to assist with reaching prior level of function. 2. Patient will demonstrate increased AROM to [facility] to allow for proper joint functioning as indicated by patients Functional Deficits. 3. Patient will demonstrate an increase in Strength to good proximal hip strength and control in LE to allow for proper functional mobility as indicated by patients Functional Deficits. 4. Patient will return to Independent functional activities without increased symptoms or restriction. Progression Towards Functional goals:  [] Patient is progressing as expected towards functional goals listed. [] Progression is slowed due to complexities listed. [] Progression has been slowed due to co-morbidities.   [x] Plan just implemented, too soon to assess goals progression  [] Other:     ASSESSMENT: See eval    Treatment/Activity Tolerance:  [x] Patient tolerated treatment well [] Patient limited by fatique  [] Patient limited by pain  [] Patient limited by other medical complications  [] Other:     Patient education:  5/29 HEP provided and reviewed with patient    Prognosis: [x] Good [] Fair  [] Poor     Patient Requires Follow-up: [x] Yes  [] No    PLAN: 1-2x week for 8 weeks (5/29/19-7/29/19)  [] Continue per plan of care [] Alter current plan (see comments)  [x] Plan of care initiated [] Hold pending MD visit [] Discharge    Electronically signed by: Deja Mosquera PT, DPT    *If patient does not return for further follow ups after this date. Please consider this as the patients discharge from physical therapy.

## 2019-05-29 NOTE — PLAN OF CARE
The [address] [place] [address]  Phone [phone]  Fax [phone]                                                       Physical Therapy Certification    Dear Referring Practitioner: Florence Michele MD,    We had the pleasure of evaluating the following patient for physical therapy services at [address] [place] [address]. A summary of our findings can be found in the initial assessment below. This includes our plan of care. If you have any questions or concerns regarding these findings, please do not hesitate to contact me at the office phone number checked above. Thank you for the referral.       Physician Signature:_______________________________Date:__________________  By signing above (or electronic signature), therapists plan is approved by physician      Patient: Irving Soliman   : 1940   MRN: [id number]  Referring Physician: Referring Practitioner: Florence Michele MD      Evaluation Date: 2019      Medical Diagnosis Information:  Diagnosis: M17.12 (ICD-10-CM) - Unilateral primary osteoarthritis, left knee   Treatment Diagnosis: M25.562 Pain in Left Knee                                         Insurance information: PT Insurance Information: Medicare     Precautions/ Contra-indications: L TKA 19  Latex Allergy:  [x]NO      []YES  Preferred Language for Healthcare:   [x]English       []other:    SUBJECTIVE: Patient stated complaint: Patient is a 78year old female who presents to the clinic status-post L TKA 19. Patient states that she is doing pretty well. She states that her pain is well controlled and she hasn't been taking pain medication for the past three weeks. She states she had home PT for two weeks following the surgery.       Relevant Medical History:R TKA  Functional Disability Index: WOMAC: 39.6% deficit    Pain Scale: 1-3/10  Easing factors: Resting; icing; medication  Provocative factors: Standing on leg; ascending/descending stairs    Type: [x]Constant   []Intermittent  []Radiating []Localized []other:     Numbness/Tingling: Denies    Occupation/School: Retired    Living Status/Prior Level of Function: Independent with ADLs and IADLs    OBJECTIVE:      Flexibility  5/29 L R Comment   Hamstring Moderate restriction     Gastroc Moderate restriction     ITB      Quad              ROM  5/29 PROM AROM Overpressure Comment    L R L R L R    Flexion   95 125      Extension   -10 0                              Strength  5/29  *Not tested due to recent surgery L R Comment   Quad Good contraction     Hamstring      Gastroc      Hip  flexion      Hip abd                      Special Test  5/29  *Not tested due to recent surgery Results/Comment   Meniscal Click    Crepitus    Flexion Test    Valgus Laxity    Varus Laxity    Lachmans    Drop Back    Homans negative           Girth  5/29 L R   Mid Patella 45 cm 39.5 cm   Suprapatellar 47.5 cm 42 cm   5cm above 48 cm 45 cm   15cm above       Reflexes/Sensation: 5/29   [x]Dermatomes/Myotomes intact    []Reflexes equal and normal bilaterally   []Other:    Joint mobility: 5/29 *Not tested due to recent surgery   []Normal    []Hypo   []Hyper    Palpation: TTP over incisions  5/29     Functional Mobility/Transfers: Modified independent with use of unilateral cane; unable to stand for longer than 15 minutes without an increase in pain; pain with ascending/descending stairs; unable to walk farther than 200 feet secondary to recent surgery  5/29    Posture: Forward head and rounded shoulders  5/29    Bandages/Dressings/Incisions: Incisions healing well  5/29    Gait: (include devices/WB status) Slight antalgic gait present with use of unilateral cane  5/29                       [x] Patient history, allergies, meds reviewed. Medical chart reviewed. See intake form.  5/29    Review Of Systems (ROS):  [x]Performed Review of systems (Integumentary, CardioPulmonary, Neurological) by intake and observation. Intake form has been scanned into medical record. Patient has been instructed to contact their primary care physician regarding ROS issues if not already being addressed at this time. 5/29    Co-morbidities/Complexities (which will affect course of rehabilitation):   []None           Arthritic conditions   []Rheumatoid arthritis (M05.9)  []Osteoarthritis (M19.91)   Cardiovascular conditions   [x]Hypertension (I10)  []Hyperlipidemia (E78.5)  []Angina pectoris (I20)  []Atherosclerosis (I70)   Musculoskeletal conditions   []Disc pathology   []Congenital spine pathologies   [x]Prior surgical intervention  []Osteoporosis (M81.8)  []Osteopenia (M85.8)   Endocrine conditions   []Hypothyroid (E03.9)  []Hyperthyroid Gastrointestinal conditions   []Constipation (Z08.13)   Metabolic conditions   []Morbid obesity (E66.01)  []Diabetes type 1(E10.65) or 2 (E11.65)   []Neuropathy (G60.9)     Pulmonary conditions   []Asthma (J45)  []Coughing   []COPD (J44.9)   Psychological Disorders  [x]Anxiety (F41.9)  [x]Depression (F32.9)   []Other:   []Other:          Barriers to/and or personal factors that will affect rehab potential:              [x]Age  [x]Sex              [x]Motivation                      []Co-Morbidities              []Cognitive Function, education/learning barriers              []Environmental, home barriers              []profession/work barriers  [x]past PT experience  []other:  Justification:    Falls Risk Assessment (30 days):   [x] Falls Risk assessed and no intervention required.   [] Falls Risk assessed and Patient requires intervention due to being higher risk   TUG score (>12s at risk):     [] Falls education provided, including       G-Codes:   WOMAC: 39.6% deficit    ASSESSMENT:   Functional Impairments:     []Noted lumbar/proximal hip/LE hypomobility   [x]Decreased LE functional ROM   [x]Decreased core/proximal hip strength and neuromuscular control   [x]Decreased LE functional strength   [x]Reduced balance/proprioceptive control   []other:      Functional Activity Limitations (from functional questionnaire and intake)   [x]Reduced ability to tolerate prolonged functional positions   [x]Reduced ability or difficulty with changes of positions or transfers between positions   [x]Reduced ability to maintain good posture and demonstrate good body mechanics with sitting, bending, and lifting   [x]Reduced ability to sleep   [x] Reduced ability or tolerance with driving and/or computer work   [x]Reduced ability to perform lifting, carrying tasks   [x]Reduced ability to squat   [x]Reduced ability to forward bend   [x]Reduced ability to ambulate prolonged functional periods/distances/surfaces   [x]Reduced ability to ascend/descend stairs   [x]Reduced ability to run, hop or jump   []other:     Participation Restrictions   [x]Reduced participation in self care activities   [x]Reduced participation in home management activities   [x]Reduced participation in work activities   [x]Reduced participation in social activities. [x]Reduced participation in sport activities. Classification :    [x]Signs/symptoms consistent with post-surgical status including decreased ROM, strength and function.    []Signs/symptoms consistent with joint sprain/strain   []Signs/symptoms consistent with patella-femoral syndrome   []Signs/symptoms consistent with knee OA/hip OA   []Signs/symptoms consistent with internal derangement of knee/Hip   []Signs/symptoms consistent with functional hip weakness/NMR control      []Signs/symptoms consistent with tendinitis/tendinosis    []signs/symptoms consistent with pathology which may benefit from Dry needling      []other:      Prognosis/Rehab Potential:      []Excellent   [x]Good    []Fair   []Poor    Tolerance of evaluation/treatment: []Excellent   [x]Good    []Fair   []Poor    Physical Therapy Evaluation Complexity Justification  [x] A history of present problem with:  [] no personal factors and/or comorbidities that impact the plan of care;  [x]1-2 personal factors and/or comorbidities that impact the plan of care  []3 personal factors and/or comorbidities that impact the plan of care  [x] An examination of body systems using standardized tests and measures addressing any of the following: body structures and functions (impairments), activity limitations, and/or participation restrictions;:  [] a total of 1-2 or more elements   [] a total of 3 or more elements   [] a total of 4 or more elements   [x] A clinical presentation with:  [] stable and/or uncomplicated characteristics   [x] evolving clinical presentation with changing characteristics  [] unstable and unpredictable characteristics;   [x] Clinical decision making of [] low, [x] moderate, [] high complexity using standardized patient assessment instrument and/or measurable assessment of functional outcome. [x] EVAL (LOW) 78213 (typically 20 minutes face-to-face)  [] EVAL (MOD) 60046 (typically 30 minutes face-to-face)  [] EVAL (HIGH) 88875 (typically 45 minutes face-to-face)  [] RE-EVAL       PLAN  Frequency/Duration: 1-2 days per week for 8 Weeks:  Interventions:  [x]  Therapeutic exercise including: strength training, ROM, for Lower extremity and core   [x]  NMR activation and proprioception for LE, Glutes and Core   [x]  Manual therapy as indicated for LE, Hip and spine to include: Dry Needling/IASTM, STM, PROM, Gr I-IV mobilizations, manipulation. [x] Modalities as needed that may include: thermal agents, E-stim, Biofeedback, US, iontophoresis as indicated  [x] Patient education on joint protection, postural re-education, activity modification, progression of HEP.     HEP instruction: Provided and reviewed with patient (see scanned forms)    GOALS:  Patient stated goal: Reduce pain; Gain full mobility    Therapist goals for Patient:   Short Term Goals: To be achieved in: 2 weeks  1. Independent in HEP and progression per patient tolerance, in order to prevent re-injury. 2. Patient will have a decrease in pain to facilitate improvement in movement, function, and ADLs as indicated by Functional Deficits. Long Term Goals: To be achieved in: 8 weeks  1. Disability index score of 15% or less for the LEFS to assist with reaching prior level of function. 2. Patient will demonstrate increased AROM to [facility] to allow for proper joint functioning as indicated by patients Functional Deficits. 3. Patient will demonstrate an increase in Strength to good proximal hip strength and control in LE to allow for proper functional mobility as indicated by patients Functional Deficits. 4. Patient will return to Independent functional activities without increased symptoms or restriction.         Electronically signed by:  Barry Irwin, PT, DPT

## 2019-06-03 ENCOUNTER — HOSPITAL ENCOUNTER (OUTPATIENT)
Dept: PHYSICAL THERAPY | Age: 79
Setting detail: THERAPIES SERIES
Discharge: HOME | End: 2019-06-03
Payer: MEDICARE

## 2019-06-03 PROCEDURE — 97112 NEUROMUSCULAR REEDUCATION: CPT

## 2019-06-03 PROCEDURE — 97110 THERAPEUTIC EXERCISES: CPT

## 2019-06-03 PROCEDURE — 97530 THERAPEUTIC ACTIVITIES: CPT

## 2019-06-03 NOTE — FLOWSHEET NOTE
The [facility] and [facility], [address] [place] [address] Drive [address] [facility], [address] [place] [address]  Phone: [phone]   Fax:     [phone]    Physical Therapy Daily Treatment Note  Date:  6/3/2019    Patient Name:  Paris Tobin    :  1940  MRN: [id number]  Restrictions/Precautions:    Medical/Treatment Diagnosis Information:  Diagnosis: M17.12 (ICD-10-CM) - Unilateral primary osteoarthritis, left knee  Treatment Diagnosis: M25.562 Pain in Left Knee  Insurance/Certification information:  PT Insurance Information: Medicare  Physician Information:  Referring Practitioner: Jesenia Bray MD  Plan of care signed (Y/N):     Date of Patient follow up with Physician:     G-Code (if applicable):      Date G-Code Applied: WOMAC: 39.6% deficit  19    WOMAC: 39.6% deficit    Progress Note: [x]  Yes  []  No  Next due by: Visit #10       Latex Allergy:  [x]NO      []YES  Preferred Language for Healthcare:   [x]English       []other:    Visit # Insurance Allowable   2  6/3 MEDICARE CAP     Pain level:  1-2/10 6/3    SUBJECTIVE:  6/3 Patient states she feels about the same. She states that she feels achey and has tightness.     OBJECTIVE:   Flexibility   L R Comment   Hamstring Moderate restriction     Gastroc Moderate restriction     ITB      Quad              ROM   PROM AROM Overpressure Comment    L R L R L R    Flexion   95 125      Extension   -10 0                              Strength    *Not tested due to recent surgery L R Comment   Quad Good contraction     Hamstring      Gastroc      Hip  flexion      Hip abd                      Special Test    *Not tested due to recent surgery Results/Comment   Meniscal Click    Crepitus    Flexion Test    Valgus Laxity    Varus Laxity    Lachmans    Drop Back    Homans negative           Girth   L R   Mid Patella 45 cm 39.5 cm   Suprapatellar 47.5 cm 42 cm   5cm above 48 cm 45 cm   15cm above Reflexes/Sensation: 5/29   [x]Dermatomes/Myotomes intact    []Reflexes equal and normal bilaterally   []Other:    Joint mobility: 5/29 *Not tested due to recent surgery   []Normal    []Hypo   []Hyper    Palpation: TTP over incisions  5/29     Functional Mobility/Transfers: Modified independent with use of unilateral cane; unable to stand for longer than 15 minutes without an increase in pain; pain with ascending/descending stairs; unable to walk farther than 200 feet secondary to recent surgery  5/29    Posture: Forward head and rounded shoulders  5/29    Bandages/Dressings/Incisions: Incisions healing well  5/29    Gait: (include devices/WB status) Slight antalgic gait present with use of unilateral cane  5/29                       [x] Patient history, allergies, meds reviewed. Medical chart reviewed. See intake form. 5/29    Review Of Systems (ROS):  [x]Performed Review of systems (Integumentary, CardioPulmonary, Neurological) by intake and observation. Intake form has been scanned into medical record. Patient has been instructed to contact their primary care physician regarding ROS issues if not already being addressed at this time.   5/29    RESTRICTIONS/PRECAUTIONS:L TKA 5/7/19    Exercises/Interventions:   Exercise/Equipment Resistance/Repetitions Other comments   Stretching     Hamstring 30 sec x 5 Start 5/29   Towel Pull 30 sec x 5 Start 5/29   Inclined Calf     Hip Flexion     ITB     Groin     Quad                    SLR     Supine 3 x 10 Start 5/29   Abduction 3 x 10 Start 5/29   Adduction     Prone     SLR+          Isometrics     Quad sets 10 sec x 10 Start 5/29   Adduction isometrics 10 sec x 10 Start 6/3   Patellar Glides     Medial     Superior     Inferior          ROM     Sheet Pulls     Hang Weights     Passive     Active     Weight Shift     Ankle Pumps     AAROM seated EOB flexion/extension 10 sec x 10 each Start 5/29   ERMI 10 sec x 10 Start 6/3        CKC     Calf raises     Wall sits Step ups     1 leg stand     Squatting     CC TKE     Balance (tandem) 30 sec x 3 each Stat 6/3   bridges          PRE     Extension 3 x 10 RANGE: start 6/3   Flexion 3 x 10 RANGE: start 6/3        Quantum machines     Leg press      Leg extension     Leg curl          Manual interventions                     Therapeutic Exercise and NMR EXR  [x] (37612) Provided verbal/tactile cueing for activities related to strengthening, flexibility, endurance, ROM for improvements in LE, proximal hip, and core control with self care, mobility, lifting, ambulation. [x] (19140) Provided verbal/tactile cueing for activities related to improving balance, coordination, kinesthetic sense, posture, motor skill, proprioception  to assist with LE, proximal hip, and core control in self care, mobility, lifting, ambulation and eccentric single leg control.      NMR and Therapeutic Activities:    [x] (96756 or 44245) Provided verbal/tactile cueing for activities related to improving balance, coordination, kinesthetic sense, posture, motor skill, proprioception and motor activation to allow for proper function of core, proximal hip and LE with self care and ADLs  [] (08108) Gait Re-education- Provided training and instruction to the patient for proper LE, core and proximal hip recruitment and positioning and eccentric body weight control with ambulation re-education including up and down stairs     Home Exercise Program:    [x] (57540) Reviewed/Progressed HEP activities related to strengthening, flexibility, endurance, ROM of core, proximal hip and LE for functional self-care, mobility, lifting and ambulation/stair navigation   [] (98940)Reviewed/Progressed HEP activities related to improving balance, coordination, kinesthetic sense, posture, motor skill, proprioception of core, proximal hip and LE for self care, mobility, lifting, and ambulation/stair navigation      Manual Treatments:  PROM / STM / Oscillations-Mobs:  G-I, II, III, IV (PA's, Inf., Post.)  [] (60027) Provided manual therapy to mobilize LE, proximal hip and/or LS spine soft tissue/joints for the purpose of modulating pain, promoting relaxation,  increasing ROM, reducing/eliminating soft tissue swelling/inflammation/restriction, improving soft tissue extensibility and allowing for proper ROM for normal function with self care, mobility, lifting and ambulation. Modalities: Ice to go  6/3    Charges:  Timed Code Treatment Minutes: 45   Total Treatment Minutes: 60       [] EVAL (LOW) 67739 (typically 20 minutes face-to-face)  [] EVAL (MOD) 87802 (typically 30 minutes face-to-face)  [] EVAL (HIGH) 37590 (typically 45 minutes face-to-face)  [] RE-EVAL   [x] UG(96308) x  1   [] IONTO  [x] NMR (39007) x  1   [] VASO  [] Manual (45778) x       [] Other:  [x] TA x  1    [] Mech Traction (25576)  [] ES(attended) (94155)      [] ES (un) (93697):     GOALS:  Patient stated goal: Reduce pain; Gain full mobility    Therapist goals for Patient:   Short Term Goals: To be achieved in: 2 weeks  1. Independent in HEP and progression per patient tolerance, in order to prevent re-injury. 2. Patient will have a decrease in pain to facilitate improvement in movement, function, and ADLs as indicated by Functional Deficits. Long Term Goals: To be achieved in: 8 weeks  1. Disability index score of 15% or less for the LEFS to assist with reaching prior level of function. 2. Patient will demonstrate increased AROM to [facility] to allow for proper joint functioning as indicated by patients Functional Deficits. 3. Patient will demonstrate an increase in Strength to good proximal hip strength and control in LE to allow for proper functional mobility as indicated by patients Functional Deficits. 4. Patient will return to Independent functional activities without increased symptoms or restriction.      Progression Towards Functional goals:  [] Patient is progressing as expected towards functional goals listed. [] Progression is slowed due to complexities listed. [] Progression has been slowed due to co-morbidities. [x] Plan just implemented, too soon to assess goals progression  [] Other:     ASSESSMENT:  See eval    Treatment/Activity Tolerance:  [x] Patient tolerated treatment well [] Patient limited by fatique  [] Patient limited by pain  [] Patient limited by other medical complications  [x] Other: 6/3 Patient tolerated treatment well this session. Good tolerance to addition of ERMI, LAQ, hamstring curls, and tandem balance. Continue to progress as tolerated. Patient education:  5/29 HEP provided and reviewed with patient    Prognosis: [x] Good [] Fair  [] Poor     Patient Requires Follow-up: [x] Yes  [] No    PLAN: 1-2x week for 8 weeks (5/29/19-7/29/19)  [x] Continue per plan of care [] Alter current plan (see comments)  [] Plan of care initiated [] Hold pending MD visit [] Discharge    Electronically signed by: Junior Roman PT, DPT    *If patient does not return for further follow ups after this date. Please consider this as the patients discharge from physical therapy.

## 2019-06-10 ENCOUNTER — HOSPITAL ENCOUNTER (OUTPATIENT)
Dept: PHYSICAL THERAPY | Age: 79
Setting detail: THERAPIES SERIES
Discharge: HOME | End: 2019-06-10
Payer: MEDICARE

## 2019-06-10 PROCEDURE — 97530 THERAPEUTIC ACTIVITIES: CPT

## 2019-06-10 PROCEDURE — 97110 THERAPEUTIC EXERCISES: CPT

## 2019-06-10 PROCEDURE — 97112 NEUROMUSCULAR REEDUCATION: CPT

## 2019-06-10 NOTE — FLOWSHEET NOTE
The [address] [place] [address],[address], [address], [address] [place] [address]  Phone: [phone]   Fax:     [phone]    Physical Therapy Daily Treatment Note  Date:  6/10/2019    Patient Name:  Mary Lou Cueto    :  1940  MRN: [id number]  Restrictions/Precautions:    Medical/Treatment Diagnosis Information:  Diagnosis: M17.12 (ICD-10-CM) - Unilateral primary osteoarthritis, left knee  Treatment Diagnosis: M25.562 Pain in Left Knee  Insurance/Certification information:  PT Insurance Information: Medicare  Physician Information:  Referring Practitioner: Dina Fortune MD  Plan of care signed (Y/N):     Date of Patient follow up with Physician:     G-Code (if applicable):      Date G-Code Applied: WOMAC: 39.6% deficit  19    WOMAC: 39.6% deficit    Progress Note: [x]  Yes  []  No  Next due by: Visit #10       Latex Allergy:  [x]NO      []YES  Preferred Language for Healthcare:   [x]English       []other:    Visit # Insurance Allowable   3  6/10 MEDICARE CAP      Pain level:  2/10 6/10    SUBJECTIVE:  6/10 Patient states that when she was trying to kneel down she bent her knee too far and has had pain in the back of her leg down into her calf that has not subsided.      OBJECTIVE:   Flexibility   L R Comment   Hamstring Moderate restriction     Gastroc Moderate restriction     ITB      Quad              ROM  6/10 PROM AROM Overpressure Comment    L R L R L R    Flexion   102 125      Extension   -10 0                              Strength    *Not tested due to recent surgery L R Comment   Quad Good contraction     Hamstring      Gastroc      Hip  flexion      Hip abd                      Special Test    *Not tested due to recent surgery Results/Comment   Meniscal Click    Crepitus    Flexion Test    Valgus Laxity    Varus Laxity    Lachmans    Drop Back    Homans negative           Girth   L R   Mid Patella 45 cm 39.5 cm Suprapatellar 47.5 cm 42 cm   5cm above 48 cm 45 cm   15cm above       Reflexes/Sensation: 5/29   [x]Dermatomes/Myotomes intact    []Reflexes equal and normal bilaterally   []Other:    Joint mobility: 5/29 *Not tested due to recent surgery   []Normal    []Hypo   []Hyper    Palpation: TTP over incisions  5/29     Functional Mobility/Transfers: Modified independent with use of unilateral cane; unable to stand for longer than 15 minutes without an increase in pain; pain with ascending/descending stairs; unable to walk farther than 200 feet secondary to recent surgery  5/29    Posture: Forward head and rounded shoulders  5/29    Bandages/Dressings/Incisions: Incisions healing well  5/29    Gait: (include devices/WB status) Slight antalgic gait present with use of unilateral cane  5/29                       [x] Patient history, allergies, meds reviewed. Medical chart reviewed. See intake form. 5/29    Review Of Systems (ROS):  [x]Performed Review of systems (Integumentary, CardioPulmonary, Neurological) by intake and observation. Intake form has been scanned into medical record. Patient has been instructed to contact their primary care physician regarding ROS issues if not already being addressed at this time.   5/29    RESTRICTIONS/PRECAUTIONS:L TKA 5/7/19    Exercises/Interventions:   Exercise/Equipment Resistance/Repetitions Other comments   Stretching     Hamstring 30 sec x 5 Start 5/29   Towel Pull 30 sec x 5 Start 5/29   Inclined Calf     Hip Flexion     ITB     Groin     Quad                    SLR     Supine 3 x 10 Start 5/29   Abduction 3 x 10 Start 5/29   AdductionProne          SLR+          Isometrics     Quad sets 10 sec x 10 Start 5/29   Adduction isometrics 10 sec x 10 Start 6/3   Patellar Glides     Medial     Superior     Inferior          ROM     Sheet Pulls     Hang Weights     Passive     Active     Weight Shift     Ankle Pumps     AAROM seated EOB flexion/extension 10 sec x 10 each Start 5/29   ERMI 10 sec x 10 Start 6/3        CKC     Calf raises     Wall sits     Step ups     1 leg stand     Squatting     Balance board 30 sec x 3 each Start 6/10   CC TKE     Balance (tandem) 30 sec x 3 each Stat 6/3   bridges     clamshells 3 x 10 Start 6/10   PRE     Extension 3 x 10 RANGE: start 6/3   Flexion 3 x 10 RANGE: start 6/3        Quantum machines     Leg press      Leg extension     Leg curl          Manual interventions                     Therapeutic Exercise and NMR EXR  [x] (35124) Provided verbal/tactile cueing for activities related to strengthening, flexibility, endurance, ROM for improvements in LE, proximal hip, and core control with self care, mobility, lifting, ambulation. [x] (71770) Provided verbal/tactile cueing for activities related to improving balance, coordination, kinesthetic sense, posture, motor skill, proprioception  to assist with LE, proximal hip, and core control in self care, mobility, lifting, ambulation and eccentric single leg control.      NMR and Therapeutic Activities:    [x] (54364 or 53658) Provided verbal/tactile cueing for activities related to improving balance, coordination, kinesthetic sense, posture, motor skill, proprioception and motor activation to allow for proper function of core, proximal hip and LE with self care and ADLs  [] (86975) Gait Re-education- Provided training and instruction to the patient for proper LE, core and proximal hip recruitment and positioning and eccentric body weight control with ambulation re-education including up and down stairs     Home Exercise Program:    [x] (68586) Reviewed/Progressed HEP activities related to strengthening, flexibility, endurance, ROM of core, proximal hip and LE for functional self-care, mobility, lifting and ambulation/stair navigation   [] (40359)Reviewed/Progressed HEP activities related to improving balance, coordination, kinesthetic sense, posture, motor skill, proprioception of core, proximal hip and LE for self care, mobility, lifting, and ambulation/stair navigation      Manual Treatments:  PROM / STM / Oscillations-Mobs:  G-I, II, III, IV (PA's, Inf., Post.)  [] (39564) Provided manual therapy to mobilize LE, proximal hip and/or LS spine soft tissue/joints for the purpose of modulating pain, promoting relaxation,  increasing ROM, reducing/eliminating soft tissue swelling/inflammation/restriction, improving soft tissue extensibility and allowing for proper ROM for normal function with self care, mobility, lifting and ambulation. Modalities:   Ice  12 min  6/10    Charges:  Timed Code Treatment Minutes: 45   Total Treatment Minutes: 70       [] EVAL (LOW) 77243 (typically 20 minutes face-to-face)  [] EVAL (MOD) 01816 (typically 30 minutes face-to-face)  [] EVAL (HIGH) 37861 (typically 45 minutes face-to-face)  [] RE-EVAL   [x] PB(57561) x  1   [] IONTO  [x] NMR (35137) x  1   [] VASO  [] Manual (50435) x       [] Other:  [x] TA x  1    [] Mech Traction (59373)  [] ES(attended) (01553)      [] ES (un) (50174):     GOALS:  Patient stated goal: Reduce pain; Gain full mobility    Therapist goals for Patient:   Short Term Goals: To be achieved in: 2 weeks  1. Independent in HEP and progression per patient tolerance, in order to prevent re-injury. 2. Patient will have a decrease in pain to facilitate improvement in movement, function, and ADLs as indicated by Functional Deficits. Long Term Goals: To be achieved in: 8 weeks  1. Disability index score of 15% or less for the LEFS to assist with reaching prior level of function. 2. Patient will demonstrate increased AROM to [facility] to allow for proper joint functioning as indicated by patients Functional Deficits. 3. Patient will demonstrate an increase in Strength to good proximal hip strength and control in LE to allow for proper functional mobility as indicated by patients Functional Deficits.    4. Patient will return to Independent functional activities without

## 2019-06-12 ENCOUNTER — APPOINTMENT (OUTPATIENT)
Dept: PHYSICAL THERAPY | Age: 79
End: 2019-06-12
Payer: MEDICARE

## 2019-06-17 ENCOUNTER — HOSPITAL ENCOUNTER (OUTPATIENT)
Dept: PHYSICAL THERAPY | Age: 79
Setting detail: THERAPIES SERIES
Discharge: HOME | End: 2019-06-17
Payer: MEDICARE

## 2019-06-17 PROCEDURE — 97530 THERAPEUTIC ACTIVITIES: CPT

## 2019-06-17 PROCEDURE — 97110 THERAPEUTIC EXERCISES: CPT

## 2019-06-17 PROCEDURE — 97112 NEUROMUSCULAR REEDUCATION: CPT

## 2019-06-17 NOTE — FLOWSHEET NOTE
The [address] [facility] and [address] [facility], [address] [place] [address] Drive [address] [facility], [address] [place] [address]  Phone: [phone]   Fax:     [phone]    Physical Therapy Daily Treatment Note  Date:  2019    Patient Name:  Paris Tobin    :  1940  MRN: [id number]  Restrictions/Precautions:    Medical/Treatment Diagnosis Information:  Diagnosis: M17.12 (ICD-10-CM) - Unilateral primary osteoarthritis, left knee  Treatment Diagnosis: M25.562 Pain in Left Knee  Insurance/Certification information:  PT Insurance Information: Medicare  Physician Information:  Referring Practitioner: Jesenia Bray MD  Plan of care signed (Y/N):     Date of Patient follow up with Physician:     G-Code (if applicable):      Date G-Code Applied: WOMAC: 39.6% deficit  19    WOMAC: 39.6% deficit    Progress Note: [x]  Yes  []  No  Next due by: Visit #10       Latex Allergy:  [x]NO      []YES  Preferred Language for Healthcare:   [x]English       []other:    Visit # Insurance Allowable   4   MEDICARE CAP      Pain level:  0/10     SUBJECTIVE:   Patient states that she is doing okay. She states that she has no pain right now but she does when she gets in and out of the car. She states that she feels like her leg is weak.       OBJECTIVE:   Flexibility   L R Comment   Hamstring Moderate restriction     Gastroc Moderate restriction     ITB      Quad              ROM  6/10 PROM AROM Overpressure Comment    L R L R L R    Flexion   102 125      Extension   -10 0                              Strength    *Not tested due to recent surgery L R Comment   Quad Good contraction     Hamstring      Gastroc      Hip  flexion      Hip abd                      Special Test    *Not tested due to recent surgery Results/Comment   Meniscal Click    Crepitus    Flexion Test    Valgus Laxity    Varus Laxity    Lachmans    Drop Back    Homans negative           Girth   L R   Mid Patella 45 ERMI 10 sec x 10 Start 6/3        CKC     Calf raises     Wall sits     Step ups     1 leg stand     Squatting     Balance board 30 sec x 3 each Start 6/10   CC TKE     Balance (tandem) 30 sec x 3 each Stat 6/3   bridges     clamshells 3 x 10 Start 6/10   PRE     Extension 3 x 10 RANGE: start 6/3   Flexion 3 x 10 RANGE: start 6/3        Quantum machines     Leg press      Leg extension     Leg curl          Manual interventions                     Therapeutic Exercise and NMR EXR  [x] (83151) Provided verbal/tactile cueing for activities related to strengthening, flexibility, endurance, ROM for improvements in LE, proximal hip, and core control with self care, mobility, lifting, ambulation. [x] (51143) Provided verbal/tactile cueing for activities related to improving balance, coordination, kinesthetic sense, posture, motor skill, proprioception  to assist with LE, proximal hip, and core control in self care, mobility, lifting, ambulation and eccentric single leg control.      NMR and Therapeutic Activities:    [x] (82569 or 95409) Provided verbal/tactile cueing for activities related to improving balance, coordination, kinesthetic sense, posture, motor skill, proprioception and motor activation to allow for proper function of core, proximal hip and LE with self care and ADLs  [] (56141) Gait Re-education- Provided training and instruction to the patient for proper LE, core and proximal hip recruitment and positioning and eccentric body weight control with ambulation re-education including up and down stairs     Home Exercise Program:    [x] (61228) Reviewed/Progressed HEP activities related to strengthening, flexibility, endurance, ROM of core, proximal hip and LE for functional self-care, mobility, lifting and ambulation/stair navigation   [] (25910)Reviewed/Progressed HEP activities related to improving balance, coordination, kinesthetic sense, posture, motor skill, proprioception of core, proximal hip and LE for self care, mobility, lifting, and ambulation/stair navigation      Manual Treatments:  PROM / STM / Oscillations-Mobs:  G-I, II, III, IV (PA's, Inf., Post.)  [] (26024) Provided manual therapy to mobilize LE, proximal hip and/or LS spine soft tissue/joints for the purpose of modulating pain, promoting relaxation,  increasing ROM, reducing/eliminating soft tissue swelling/inflammation/restriction, improving soft tissue extensibility and allowing for proper ROM for normal function with self care, mobility, lifting and ambulation. Modalities:   Ice  12 min  6/17    Charges:  Timed Code Treatment Minutes: 55   Total Treatment Minutes: 90       [] EVAL (LOW) 44074 (typically 20 minutes face-to-face)  [] EVAL (MOD) 13634 (typically 30 minutes face-to-face)  [] EVAL (HIGH) 69824 (typically 45 minutes face-to-face)  [] RE-EVAL   [x] FO(28667) x  2   [] IONTO  [x] NMR (62575) x  1   [] VASO  [] Manual (84677) x       [] Other:  [x] TA x  1    [] Mech Traction (75603)  [] ES(attended) (37736)      [] ES (un) (77592):     GOALS:  Patient stated goal: Reduce pain; Gain full mobility    Therapist goals for Patient:   Short Term Goals: To be achieved in: 2 weeks  1. Independent in HEP and progression per patient tolerance, in order to prevent re-injury. 2. Patient will have a decrease in pain to facilitate improvement in movement, function, and ADLs as indicated by Functional Deficits. Long Term Goals: To be achieved in: 8 weeks  1. Disability index score of 15% or less for the LEFS to assist with reaching prior level of function. 2. Patient will demonstrate increased AROM to [facility] to allow for proper joint functioning as indicated by patients Functional Deficits. 3. Patient will demonstrate an increase in Strength to good proximal hip strength and control in LE to allow for proper functional mobility as indicated by patients Functional Deficits.    4. Patient will return to Independent functional activities without increased symptoms or restriction. Progression Towards Functional goals:  [] Patient is progressing as expected towards functional goals listed. [] Progression is slowed due to complexities listed. [] Progression has been slowed due to co-morbidities. [x] Plan just implemented, too soon to assess goals progression  [] Other:     ASSESSMENT:  See eval    Treatment/Activity Tolerance:  [x] Patient tolerated treatment well [] Patient limited by fatique  [] Patient limited by pain  [] Patient limited by other medical complications  [x] Other: 6/17 Patient tolerated treatment well this session. Good tolerance to current program. Fatigued at end of session. Continue to progress as tolerated. Patient education:  5/29 HEP provided and reviewed with patient    Prognosis: [x] Good [] Fair  [] Poor     Patient Requires Follow-up: [x] Yes  [] No    PLAN: 1-2x week for 8 weeks (5/29/19-7/29/19)  [x] Continue per plan of care [] Alter current plan (see comments)  [] Plan of care initiated [] Hold pending MD visit [] Discharge    Electronically signed by: Lizzie Vazquez PT, DPT    *If patient does not return for further follow ups after this date. Please consider this as the patients discharge from physical therapy.

## 2019-06-19 ENCOUNTER — HOSPITAL ENCOUNTER (OUTPATIENT)
Dept: PHYSICAL THERAPY | Age: 79
Setting detail: THERAPIES SERIES
Discharge: HOME | End: 2019-06-19
Payer: MEDICARE

## 2019-06-19 PROCEDURE — 97110 THERAPEUTIC EXERCISES: CPT

## 2019-06-19 PROCEDURE — 97112 NEUROMUSCULAR REEDUCATION: CPT

## 2019-06-19 PROCEDURE — 97530 THERAPEUTIC ACTIVITIES: CPT

## 2019-06-19 NOTE — FLOWSHEET NOTE
cm   15cm above       Reflexes/Sensation: 5/29   [x]Dermatomes/Myotomes intact    []Reflexes equal and normal bilaterally   []Other:    Joint mobility: 5/29 *Not tested due to recent surgery   []Normal    []Hypo   []Hyper    Palpation: TTP over incisions  5/29     Functional Mobility/Transfers: Modified independent with use of unilateral cane; unable to stand for longer than 15 minutes without an increase in pain; pain with ascending/descending stairs; unable to walk farther than 200 feet secondary to recent surgery  5/29    Posture: Forward head and rounded shoulders  6/17    Bandages/Dressings/Incisions: Incisions healing well  6/17    Gait: (include devices/WB status) Slight antalgic gait present; no assistive device   6/17                       [x] Patient history, allergies, meds reviewed. Medical chart reviewed. See intake form. 5/29    Review Of Systems (ROS):  [x]Performed Review of systems (Integumentary, CardioPulmonary, Neurological) by intake and observation. Intake form has been scanned into medical record. Patient has been instructed to contact their primary care physician regarding ROS issues if not already being addressed at this time.   5/29    RESTRICTIONS/PRECAUTIONS:L TKA 5/7/19    Exercises/Interventions:   Exercise/Equipment Resistance/Repetitions Other comments   Stretching     Hamstring 30 sec x 5 Start 5/29   Towel Pull 30 sec x 5 Start 5/29   Inclined Calf     Hip Flexion     ITB     Groin     Quad                    SLR     Supine 3 x 10 Start 5/29   Abduction 3 x 10 Start 5/29   AdductionProne          SLR+          Isometrics     Quad sets 10 sec x 10 Start 5/29   Adduction isometrics 10 sec x 10 Start 6/3   Patellar Glides     Medial     Superior     Inferior          ROM     Sheet Pulls     Hang Weights     Passive     Active     Weight Shift     Ankle Pumps     AAROM seated EOB flexion/extension 10 sec x 10 each Start 5/29   ERMI 10 sec x 10 Start 6/3        CKC     Calf raises 3 x 10 Start 6/19   Wall sits     Step ups     1 leg stand     Squatting     Balance board 30 sec x 3 each Start 6/10   CC TKE     Balance (tandem) 30 sec x 3 each Stat 6/3   bridges     clamshells 3 x 10 Start 6/10   PRE     Extension 3 x 10 RANGE: start 6/3   Flexion 3 x 10 RANGE: start 6/3        Quantum machines     Leg press      Leg extension     Leg curl          Manual interventions                     Therapeutic Exercise and NMR EXR  [x] (46971) Provided verbal/tactile cueing for activities related to strengthening, flexibility, endurance, ROM for improvements in LE, proximal hip, and core control with self care, mobility, lifting, ambulation. [x] (06835) Provided verbal/tactile cueing for activities related to improving balance, coordination, kinesthetic sense, posture, motor skill, proprioception  to assist with LE, proximal hip, and core control in self care, mobility, lifting, ambulation and eccentric single leg control.      NMR and Therapeutic Activities:    [x] (36057 or 15678) Provided verbal/tactile cueing for activities related to improving balance, coordination, kinesthetic sense, posture, motor skill, proprioception and motor activation to allow for proper function of core, proximal hip and LE with self care and ADLs  [] (78331) Gait Re-education- Provided training and instruction to the patient for proper LE, core and proximal hip recruitment and positioning and eccentric body weight control with ambulation re-education including up and down stairs     Home Exercise Program:    [x] (34899) Reviewed/Progressed HEP activities related to strengthening, flexibility, endurance, ROM of core, proximal hip and LE for functional self-care, mobility, lifting and ambulation/stair navigation   [] (14287)Reviewed/Progressed HEP activities related to improving balance, coordination, kinesthetic sense, posture, motor skill, proprioception of core, proximal hip and LE for self care, mobility, lifting, and Progression Towards Functional goals:  [] Patient is progressing as expected towards functional goals listed. [] Progression is slowed due to complexities listed. [] Progression has been slowed due to co-morbidities. [x] Plan just implemented, too soon to assess goals progression  [] Other:     ASSESSMENT:  See eval    Treatment/Activity Tolerance:  [x] Patient tolerated treatment well [] Patient limited by fatique  [] Patient limited by pain  [] Patient limited by other medical complications  [x] Other: 6/19 Patient tolerated treatment well this session. Good tolerance to current program. Fatigued at end of session. Able to achieve 100 degrees of flexion this session. Continue to progress as tolerated. Patient education:  5/29 HEP provided and reviewed with patient    Prognosis: [x] Good [] Fair  [] Poor     Patient Requires Follow-up: [x] Yes  [] No    PLAN: 1-2x week for 8 weeks (5/29/19-7/29/19)  [x] Continue per plan of care [] Alter current plan (see comments)  [] Plan of care initiated [] Hold pending MD visit [] Discharge    Electronically signed by: Shamika Zamora PT, DPT    *If patient does not return for further follow ups after this date. Please consider this as the patients discharge from physical therapy.

## 2019-06-24 ENCOUNTER — HOSPITAL ENCOUNTER (OUTPATIENT)
Dept: PHYSICAL THERAPY | Age: 79
Setting detail: THERAPIES SERIES
Discharge: HOME | End: 2019-06-24
Payer: MEDICARE

## 2019-06-24 NOTE — FLOWSHEET NOTE
Physical Therapy  Cancellation/No-show Note  Patient Name:  Lizett Herndon  :  1940   Date:  2019  Cancelled visits to date: 01  No-shows to date: 0    For today's appointment patient:  [x]  Cancelled  []  Rescheduled appointment  []  No-show     Reason given by patient:  [x]  Patient ill  []  Conflicting appointment  []  No transportation    []  Conflict with work  []  No reason given  []  Other:     Comments:      Electronically signed by:  Jack Schmid, PT, DPT

## 2019-06-26 ENCOUNTER — HOSPITAL ENCOUNTER (OUTPATIENT)
Dept: PHYSICAL THERAPY | Age: 79
Setting detail: THERAPIES SERIES
Discharge: HOME | End: 2019-06-26
Payer: MEDICARE

## 2019-06-26 PROCEDURE — 97110 THERAPEUTIC EXERCISES: CPT

## 2019-06-26 PROCEDURE — 97112 NEUROMUSCULAR REEDUCATION: CPT

## 2019-06-26 NOTE — FLOWSHEET NOTE
cm 45 cm   15cm above       Reflexes/Sensation: 5/29   [x]Dermatomes/Myotomes intact    []Reflexes equal and normal bilaterally   []Other:    Joint mobility: 5/29 *Not tested due to recent surgery   []Normal    []Hypo   []Hyper    Palpation: TTP over incisions  5/29     Functional Mobility/Transfers: Modified independent with use of unilateral cane; unable to stand for longer than 15 minutes without an increase in pain; pain with ascending/descending stairs; unable to walk farther than 200 feet secondary to recent surgery  5/29    Posture: Forward head and rounded shoulders  6/17    Bandages/Dressings/Incisions: Incisions healing well  6/17    Gait: (include devices/WB status) Slight antalgic gait present; no assistive device   6/17                       [x] Patient history, allergies, meds reviewed. Medical chart reviewed. See intake form. 5/29    Review Of Systems (ROS):  [x]Performed Review of systems (Integumentary, CardioPulmonary, Neurological) by intake and observation. Intake form has been scanned into medical record. Patient has been instructed to contact their primary care physician regarding ROS issues if not already being addressed at this time.   5/29    RESTRICTIONS/PRECAUTIONS:L TKA 5/7/19    Exercises/Interventions:   Exercise/Equipment Resistance/Repetitions Other comments   Stretching     Hamstring 30 sec x 5 Start 5/29   Towel Pull 30 sec x 5 Start 5/29   Inclined Calf     Hip Flexion     ITB     Groin     Quad                    SLR     Supine 3 x 10 Start 5/29   Abduction 3 x 10 Start 5/29   AdductionProne          SLR+          Isometrics     Quad sets 10 sec x 10 Start 5/29   Adduction isometrics 10 sec x 10 Start 6/3   Patellar Glides     Medial     Superior     Inferior          ROM     Sheet Pulls     Hang Weights     Passive     Active     Weight Shift     Ankle Pumps     AAROM seated EOB flexion/extension 10 sec x 10 each Start 5/29   ERMI 10 sec x 10 Start 6/3        CKC     Calf raises 3 x 10 Start 6/19   Wall sits     Step ups     1 leg stand     Squatting     Balance board 30 sec x 3 each Start 6/10   CC TKE     Balance (tandem) 30 sec x 3 each Stat 6/3   bridges     clamshells 3 x 10 Start 6/10   PRE     Extension 3 x 10 RANGE: start 6/3   Flexion 3 x 10 RANGE: start 6/3        Quantum machines     Leg press      Leg extension     Leg curl          Manual interventions                     Therapeutic Exercise and NMR EXR  [x] (26746) Provided verbal/tactile cueing for activities related to strengthening, flexibility, endurance, ROM for improvements in LE, proximal hip, and core control with self care, mobility, lifting, ambulation. [x] (07204) Provided verbal/tactile cueing for activities related to improving balance, coordination, kinesthetic sense, posture, motor skill, proprioception  to assist with LE, proximal hip, and core control in self care, mobility, lifting, ambulation and eccentric single leg control.      NMR and Therapeutic Activities:    [x] (40419 or 66954) Provided verbal/tactile cueing for activities related to improving balance, coordination, kinesthetic sense, posture, motor skill, proprioception and motor activation to allow for proper function of core, proximal hip and LE with self care and ADLs  [] (73073) Gait Re-education- Provided training and instruction to the patient for proper LE, core and proximal hip recruitment and positioning and eccentric body weight control with ambulation re-education including up and down stairs     Home Exercise Program:    [x] (98316) Reviewed/Progressed HEP activities related to strengthening, flexibility, endurance, ROM of core, proximal hip and LE for functional self-care, mobility, lifting and ambulation/stair navigation   [] (12029)Reviewed/Progressed HEP activities related to improving balance, coordination, kinesthetic sense, posture, motor skill, proprioception of core, proximal hip and LE for self care, mobility, lifting, and ambulation/stair navigation      Manual Treatments:  PROM / STM / Oscillations-Mobs:  G-I, II, III, IV (PA's, Inf., Post.)  [] (22464) Provided manual therapy to mobilize LE, proximal hip and/or LS spine soft tissue/joints for the purpose of modulating pain, promoting relaxation,  increasing ROM, reducing/eliminating soft tissue swelling/inflammation/restriction, improving soft tissue extensibility and allowing for proper ROM for normal function with self care, mobility, lifting and ambulation. Modalities:   Ice  12 min  6/26    Charges:  Timed Code Treatment Minutes: 50   Total Treatment Minutes: 65       [] EVAL (LOW) 61860 (typically 20 minutes face-to-face)  [] EVAL (MOD) 11743 (typically 30 minutes face-to-face)  [] EVAL (HIGH) 57184 (typically 45 minutes face-to-face)  [] RE-EVAL   [x] PO(72402) x  2   [] IONTO  [x] NMR (35430) x  1   [] VASO  [] Manual (29853) x       [] Other:  [] TA x       [] Mech Traction (16824)  [] ES(attended) (03023)      [] ES (un) (97897):     GOALS:  Patient stated goal: Reduce pain; Gain full mobility    Therapist goals for Patient:   Short Term Goals: To be achieved in: 2 weeks  1. Independent in HEP and progression per patient tolerance, in order to prevent re-injury. 2. Patient will have a decrease in pain to facilitate improvement in movement, function, and ADLs as indicated by Functional Deficits. Long Term Goals: To be achieved in: 8 weeks  1. Disability index score of 15% or less for the LEFS to assist with reaching prior level of function. 2. Patient will demonstrate increased AROM to [facility] to allow for proper joint functioning as indicated by patients Functional Deficits. 3. Patient will demonstrate an increase in Strength to good proximal hip strength and control in LE to allow for proper functional mobility as indicated by patients Functional Deficits. 4. Patient will return to Independent functional activities without increased symptoms or restriction. Progression Towards Functional goals:  [] Patient is progressing as expected towards functional goals listed. [] Progression is slowed due to complexities listed. [] Progression has been slowed due to co-morbidities. [x] Plan just implemented, too soon to assess goals progression  [] Other:     ASSESSMENT:  See eval    Treatment/Activity Tolerance:  [x] Patient tolerated treatment well [] Patient limited by fatique  [] Patient limited by pain  [] Patient limited by other medical complications  [x] Other: 6/26 Patient tolerated treatment well this session. Good tolerance to current program. Patient reports falling earlier this week; increased edema noted. Patella appears to be in good alignment and patient able to contract quadriceps. Discussed continuing with icing and if pain persists to contact MD. Continue to progress as tolerated. Patient education:  5/29 HEP provided and reviewed with patient    Prognosis: [x] Good [] Fair  [] Poor     Patient Requires Follow-up: [x] Yes  [] No    PLAN: 1-2x week for 8 weeks (5/29/19-7/29/19)  [x] Continue per plan of care [] Alter current plan (see comments)  [] Plan of care initiated [] Hold pending MD visit [] Discharge    Electronically signed by: Juana Pride PT, DPT    *If patient does not return for further follow ups after this date. Please consider this as the patients discharge from physical therapy.

## 2019-07-01 ENCOUNTER — HOSPITAL ENCOUNTER (OUTPATIENT)
Dept: PHYSICAL THERAPY | Age: 79
Setting detail: THERAPIES SERIES
Discharge: HOME | End: 2019-07-01
Payer: MEDICARE

## 2019-07-01 ENCOUNTER — HOSPITAL ENCOUNTER (OUTPATIENT)
Age: 79
Discharge: HOME | End: 2019-07-01
Payer: MEDICARE

## 2019-07-01 ENCOUNTER — OFFICE VISIT (OUTPATIENT)
Dept: INTERNAL MEDICINE CLINIC | Age: 79
End: 2019-07-01
Payer: MEDICARE

## 2019-07-01 ENCOUNTER — HOSPITAL ENCOUNTER (OUTPATIENT)
Dept: GENERAL RADIOLOGY | Age: 79
Discharge: HOME | End: 2019-07-01
Payer: MEDICARE

## 2019-07-01 VITALS — DIASTOLIC BLOOD PRESSURE: 82 MMHG | HEART RATE: 94 BPM | SYSTOLIC BLOOD PRESSURE: 128 MMHG | OXYGEN SATURATION: 94 %

## 2019-07-01 DIAGNOSIS — Z15.89 HETEROZYGOUS MTHFR MUTATION C677T: ICD-10-CM

## 2019-07-01 DIAGNOSIS — F33.42 RECURRENT MAJOR DEPRESSIVE DISORDER, IN FULL REMISSION (HCC): ICD-10-CM
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
## 2019-07-01 PROCEDURE — 1123F ACP DISCUSS/DSCN MKR DOCD: CPT | Performed by: NURSE PRACTITIONER

## 2019-07-01 PROCEDURE — 97112 NEUROMUSCULAR REEDUCATION: CPT

## 2019-07-01 PROCEDURE — 4040F PNEUMOC VAC/ADMIN/RCVD: CPT | Performed by: NURSE PRACTITIONER

## 2019-07-01 PROCEDURE — G8399 PT W/DXA RESULTS DOCUMENT: HCPCS | Performed by: NURSE PRACTITIONER

## 2019-07-01 PROCEDURE — 71046 X-RAY EXAM CHEST 2 VIEWS: CPT

## 2019-07-01 PROCEDURE — 97110 THERAPEUTIC EXERCISES: CPT

## 2019-07-01 PROCEDURE — 1036F TOBACCO NON-USER: CPT | Performed by: NURSE PRACTITIONER

## 2019-07-01 PROCEDURE — G8427 DOCREV CUR MEDS BY ELIG CLIN: HCPCS | Performed by: NURSE PRACTITIONER

## 2019-07-01 PROCEDURE — 1090F PRES/ABSN URINE INCON ASSESS: CPT | Performed by: NURSE PRACTITIONER

## 2019-07-01 PROCEDURE — G8417 CALC BMI ABV UP PARAM F/U: HCPCS | Performed by: NURSE PRACTITIONER

## 2019-07-01 PROCEDURE — 99214 OFFICE O/P EST MOD 30 MIN: CPT | Performed by: NURSE PRACTITIONER

## 2019-07-01 ASSESSMENT — ENCOUNTER SYMPTOMS
CHEST TIGHTNESS: 0
SORE THROAT: 0
WHEEZING: 0
BLOOD IN STOOL: 0
RHINORRHEA: 0
NAUSEA: 0
BACK PAIN: 0
VOMITING: 0
COUGH: 0
COLOR CHANGE: 0
EYE ITCHING: 0
CONSTIPATION: 0
ABDOMINAL PAIN: 0
SINUS PRESSURE: 0
DIARRHEA: 0
EYE REDNESS: 0
SHORTNESS OF BREATH: 0

## 2019-07-01 ASSESSMENT — PATIENT HEALTH QUESTIONNAIRE - PHQ9
1. LITTLE INTEREST OR PLEASURE IN DOING THINGS: 0
SUM OF ALL RESPONSES TO PHQ QUESTIONS 1-9: 0
SUM OF ALL RESPONSES TO PHQ QUESTIONS 1-9: 0
SUM OF ALL RESPONSES TO PHQ9 QUESTIONS 1 & 2: 0
2. FEELING DOWN, DEPRESSED OR HOPELESS: 0

## 2019-07-01 NOTE — FLOWSHEET NOTE
Manual Treatments:  PROM / STM / Oscillations-Mobs:  G-I, II, III, IV (PA's, Inf., Post.)  [] (70859) Provided manual therapy to mobilize LE, proximal hip and/or LS spine soft tissue/joints for the purpose of modulating pain, promoting relaxation,  increasing ROM, reducing/eliminating soft tissue swelling/inflammation/restriction, improving soft tissue extensibility and allowing for proper ROM for normal function with self care, mobility, lifting and ambulation. Modalities:   Ice  12 min  6/26    Charges:  Timed Code Treatment Minutes: 45   Total Treatment Minutes: 60       [] EVAL (LOW) 57049 (typically 20 minutes face-to-face)  [] EVAL (MOD) 80012 (typically 30 minutes face-to-face)  [] EVAL (HIGH) 28894 (typically 45 minutes face-to-face)  [] RE-EVAL   [x] QQ(37074) x  2   [] IONTO  [x] NMR (09049) x  1   [] VASO  [] Manual (04347) x       [] Other:  [] TA x       [] Mech Traction (85677)  [] ES(attended) (20982)      [] ES (un) (49797):     GOALS:  Patient stated goal: Reduce pain; Gain full mobility    Therapist goals for Patient:   Short Term Goals: To be achieved in: 2 weeks  1. Independent in HEP and progression per patient tolerance, in order to prevent re-injury. 2. Patient will have a decrease in pain to facilitate improvement in movement, function, and ADLs as indicated by Functional Deficits. Long Term Goals: To be achieved in: 8 weeks  1. Disability index score of 15% or less for the LEFS to assist with reaching prior level of function. 2. Patient will demonstrate increased AROM to [facility] to allow for proper joint functioning as indicated by patients Functional Deficits. 3. Patient will demonstrate an increase in Strength to good proximal hip strength and control in LE to allow for proper functional mobility as indicated by patients Functional Deficits. 4. Patient will return to Independent functional activities without increased symptoms or restriction.      Progression Towards

## 2019-07-03 ENCOUNTER — HOSPITAL ENCOUNTER (OUTPATIENT)
Dept: PHYSICAL THERAPY | Age: 79
Setting detail: THERAPIES SERIES
Discharge: HOME | End: 2019-07-03
Payer: MEDICARE

## 2019-07-03 PROCEDURE — 97110 THERAPEUTIC EXERCISES: CPT

## 2019-07-03 PROCEDURE — 97112 NEUROMUSCULAR REEDUCATION: CPT

## 2019-07-03 NOTE — FLOWSHEET NOTE
The [facility] and [facility], [address] [place] [address] 3360 [facility], [address] [place] [address]  Phone: [phone]   Fax:     [phone]    Physical Therapy Daily Treatment Note  Date:  7/3/2019    Patient Name:  Paul Souza    :  1940  MRN: [id number]  Restrictions/Precautions:    Medical/Treatment Diagnosis Information:  Diagnosis: M17.12 (ICD-10-CM) - Unilateral primary osteoarthritis, left knee  Treatment Diagnosis: M25.562 Pain in Left Knee  Insurance/Certification information:  PT Insurance Information: Medicare  Physician Information:  Referring Practitioner: James Dorman MD  Plan of care signed (Y/N):     Date of Patient follow up with Physician:     G-Code (if applicable):      Date G-Code Applied: WOMAC: 39.6% deficit  19    WOMAC: 39.6% deficit    Progress Note: [x]  Yes  []  No  Next due by: Visit #10       Latex Allergy:  [x]NO      []YES  Preferred Language for Healthcare:   [x]English       []other:    Visit # Insurance Allowable   8  7/3 MEDICARE CAP      Pain level:  0-310 7/3    SUBJECTIVE: 7/3 Patient states that she saw MD and had radiographs performed that were negative. Patient states that she is doing well today.       OBJECTIVE:   Flexibility   L R Comment   Hamstring Moderate restriction     Gastroc Moderate restriction     ITB      Quad              ROM  6/10 PROM AROM Overpressure Comment    L R L R L R    Flexion   102 125      Extension   -10 0                              Strength    *Not tested due to recent surgery L R Comment   Quad Good contraction     Hamstring      Gastroc      Hip  flexion      Hip abd                      Special Test    *Not tested due to recent surgery Results/Comment   Meniscal Click    Crepitus    Flexion Test    Valgus Laxity    Varus Laxity    Lachmans    Drop Back    Homans negative           Girth   L R   Mid Patella 45 cm 39.5 cm   Suprapatellar 47.5 cm 42 cm   5cm above 48 cm 45 cm   15cm above       Reflexes/Sensation: 5/29   [x]Dermatomes/Myotomes intact    []Reflexes equal and normal bilaterally   []Other:    Joint mobility: 5/29 *Not tested due to recent surgery   []Normal    []Hypo   []Hyper    Palpation: TTP over incisions  5/29     Functional Mobility/Transfers: Modified independent with use of unilateral cane; unable to stand for longer than 15 minutes without an increase in pain; pain with ascending/descending stairs; unable to walk farther than 200 feet secondary to recent surgery  5/29    Posture: Forward head and rounded shoulders  6/17    Bandages/Dressings/Incisions: Incisions healing well  6/17    Gait: (include devices/WB status) Slight antalgic gait present; no assistive device   6/17                       [x] Patient history, allergies, meds reviewed. Medical chart reviewed. See intake form. 5/29    Review Of Systems (ROS):  [x]Performed Review of systems (Integumentary, CardioPulmonary, Neurological) by intake and observation. Intake form has been scanned into medical record. Patient has been instructed to contact their primary care physician regarding ROS issues if not already being addressed at this time.   5/29    RESTRICTIONS/PRECAUTIONS:L TKA 5/7/19    Exercises/Interventions:   Exercise/Equipment Resistance/Repetitions Other comments   Stretching     Hamstring 30 sec x 5 Start 5/29   Towel Pull 30 sec x 5 Start 5/29   Inclined Calf     Hip Flexion     ITB     Groin     Quad                    SLR     Supine 3 x 10 Start 5/29   Abduction 3 x 10 Start 5/29   AdductionProne          SLR+          Isometrics     Quad sets 10 sec x 10 Start 5/29   Adduction isometrics 10 sec x 10 Start 6/3   Patellar Glides     Medial     Superior     Inferior          ROM     Sheet Pulls     Hang Weights     Passive     Active     Weight Shift     Ankle Pumps     AAROM seated EOB flexion/extension 10 sec x 10 each Start 5/29   ERMI 10 sec x 10 Start 6/3        CKC     Calf raises 3

## 2019-07-08 ENCOUNTER — HOSPITAL ENCOUNTER (OUTPATIENT)
Dept: PHYSICAL THERAPY | Age: 79
Setting detail: THERAPIES SERIES
Discharge: HOME | End: 2019-07-08
Payer: MEDICARE

## 2019-07-08 PROCEDURE — 97110 THERAPEUTIC EXERCISES: CPT

## 2019-07-08 PROCEDURE — 97112 NEUROMUSCULAR REEDUCATION: CPT

## 2019-07-08 PROCEDURE — 97530 THERAPEUTIC ACTIVITIES: CPT

## 2019-07-08 NOTE — FLOWSHEET NOTE
5/29   [x]Dermatomes/Myotomes intact    []Reflexes equal and normal bilaterally   []Other:    Joint mobility: 5/29 *Not tested due to recent surgery   []Normal    []Hypo   []Hyper    Palpation: TTP over incisions  5/29     Functional Mobility/Transfers: Modified independent with use of unilateral cane; unable to stand for longer than 15 minutes without an increase in pain; pain with ascending/descending stairs; unable to walk farther than 200 feet secondary to recent surgery  5/29    Posture: Forward head and rounded shoulders  6/17    Bandages/Dressings/Incisions: Incisions healing well  6/17    Gait: (include devices/WB status) Slight antalgic gait present; no assistive device   6/17                       [x] Patient history, allergies, meds reviewed. Medical chart reviewed. See intake form. 5/29    Review Of Systems (ROS):  [x]Performed Review of systems (Integumentary, CardioPulmonary, Neurological) by intake and observation. Intake form has been scanned into medical record. Patient has been instructed to contact their primary care physician regarding ROS issues if not already being addressed at this time.   5/29    RESTRICTIONS/PRECAUTIONS: L TKA 5/7/19    Exercises/Interventions:   Exercise/Equipment Resistance/Repetitions Other comments   Stretching     Hamstring 30 sec x 5 Start 5/29   Towel Pull 30 sec x 5 Start 5/29   Inclined Calf 30 sec x 5 Start 7/8   Hip Flexion     ITB     Groin     Quad                    SLR     Supine 3 x 10 Start 5/29   Abduction 3 x 10 Start 5/29   AdductionProne          SLR+          Isometrics     Quad sets 10 sec x 10 Start 5/29   Adduction isometrics 10 sec x 10 Start 6/3   Patellar Glides     Medial     Superior     Inferior          ROM     Sheet Pulls     Hang Weights     Passive     Active     Weight Shift     Ankle Pumps     AAROM seated EOB flexion/extension 10 sec x 10 each Start 5/29   ERMI 10 sec x 10 Start 6/3        CKC     Calf raises 3 x 10 Start 6/19   Wall sits and ambulation/stair navigation      Manual Treatments:  PROM / STM / Oscillations-Mobs:  G-I, II, III, IV (PA's, Inf., Post.)  [] (63916) Provided manual therapy to mobilize LE, proximal hip and/or LS spine soft tissue/joints for the purpose of modulating pain, promoting relaxation,  increasing ROM, reducing/eliminating soft tissue swelling/inflammation/restriction, improving soft tissue extensibility and allowing for proper ROM for normal function with self care, mobility, lifting and ambulation. Modalities:   Ice  12 min  7/8    Charges:  Timed Code Treatment Minutes: 55   Total Treatment Minutes: 67  [id number]       [] EVAL (LOW) 14244 (typically 20 minutes face-to-face)  [] EVAL (MOD) 10112 (typically 30 minutes face-to-face)  [] EVAL (HIGH) 13807 (typically 45 minutes face-to-face)  [] RE-EVAL   [x] LH(27225) x  2   [] IONTO  [x] NMR (64335) x  1   [] VASO  [] Manual (80358) x       [] Other:  [x] TA x  1    [] Mech Traction (64394)  [] ES(attended) (67649)      [] ES (un) (09109):     GOALS:  Patient stated goal: Reduce pain; Gain full mobility    Therapist goals for Patient:   Short Term Goals: To be achieved in: 2 weeks  1. Independent in HEP and progression per patient tolerance, in order to prevent re-injury. 2. Patient will have a decrease in pain to facilitate improvement in movement, function, and ADLs as indicated by Functional Deficits. Long Term Goals: To be achieved in: 8 weeks  1. Disability index score of 15% or less for the LEFS to assist with reaching prior level of function. 2. Patient will demonstrate increased AROM to [facility] to allow for proper joint functioning as indicated by patients Functional Deficits. 3. Patient will demonstrate an increase in Strength to good proximal hip strength and control in LE to allow for proper functional mobility as indicated by patients Functional Deficits.    4. Patient will return to Independent functional activities without increased symptoms or restriction. Progression Towards Functional goals:  [] Patient is progressing as expected towards functional goals listed. [] Progression is slowed due to complexities listed. [] Progression has been slowed due to co-morbidities. [x] Plan just implemented, too soon to assess goals progression  [] Other:     ASSESSMENT:  See eval    Treatment/Activity Tolerance:  [x] Patient tolerated treatment well [] Patient limited by fatique  [] Patient limited by pain  [] Patient limited by other medical complications  [x] Other: 7/8 Patient tolerated treatment well this session. Good tolerance to addition of leg press, single leg stance, and incline stretch this session. Continue to progress as tolerated. Patient education:  5/29 HEP provided and reviewed with patient    Prognosis: [x] Good [] Fair  [] Poor     Patient Requires Follow-up: [x] Yes  [] No    PLAN: 1-2x week for 8 weeks (5/29/19-7/29/19)  [x] Continue per plan of care [] Alter current plan (see comments)  [] Plan of care initiated [] Hold pending MD visit [] Discharge    Electronically signed by: Aguila Brewer PT, DPT    *If patient does not return for further follow ups after this date. Please consider this as the patients discharge from physical therapy.

## 2019-07-10 ENCOUNTER — HOSPITAL ENCOUNTER (OUTPATIENT)
Dept: PHYSICAL THERAPY | Age: 79
Setting detail: THERAPIES SERIES
Discharge: HOME | End: 2019-07-10
Payer: MEDICARE

## 2019-07-10 PROCEDURE — 97112 NEUROMUSCULAR REEDUCATION: CPT

## 2019-07-10 PROCEDURE — 97110 THERAPEUTIC EXERCISES: CPT

## 2019-07-10 PROCEDURE — 97530 THERAPEUTIC ACTIVITIES: CPT

## 2019-07-10 NOTE — PLAN OF CARE
The [address] [place] [address],[address], [address], [address] [place] [address]  Phone: [phone]   Fax:     [phone]   Physical Therapy Re-Certification Plan of Care    Dear  ,    We had the pleasure of treating the following patient for physical therapy services at [address] [place] [address]. A summary of our findings can be found in the updated assessment below. This includes our plan of care. If you have any questions or concerns regarding these findings, please do not hesitate to contact me at the office phone number checked above.   Thank you for the referral.     Physician Signature:________________________________Date:__________________  By signing above (or electronic signature), therapists plan is approved by physician      Overall Response to Treatment:   [x]Patient is responding well to treatment and improvement is noted with regards  to goals   []Patient should continue to improve in reasonable time if they continue HEP   []Patient has plateaued and is no longer responding to skilled PT intervention    []Patient is getting worse and would benefit from return to referring MD   []Patient unable to adhere to initial POC   []Other:     Date range of previous POC Visits: 19 -7/10/19    Total Visits: 10      Physical Therapy Daily Treatment Note  Date:  7/10/2019    Patient Name:  Alex Can    :  1940  MRN: [id number]  Restrictions/Precautions:    Medical/Treatment Diagnosis Information:  Diagnosis: M17.12 (ICD-10-CM) - Unilateral primary osteoarthritis, left knee  Treatment Diagnosis: M25.562 Pain in Left Knee  Insurance/Certification information:  PT Insurance Information: Medicare  Physician Information:  Referring Practitioner: Laura Skaggs MD  Plan of care signed (Y/N):     Date of Patient follow up with Physician:     G-Code (if applicable):      Date G-Code Applied: WOMAC: 6.25% deficit  7/10/19   WOMAC: mobility, lifting, ambulation and eccentric single leg control. NMR and Therapeutic Activities:    [x] (40679 or 96814) Provided verbal/tactile cueing for activities related to improving balance, coordination, kinesthetic sense, posture, motor skill, proprioception and motor activation to allow for proper function of core, proximal hip and LE with self care and ADLs  [] (57628) Gait Re-education- Provided training and instruction to the patient for proper LE, core and proximal hip recruitment and positioning and eccentric body weight control with ambulation re-education including up and down stairs     Home Exercise Program:    [x] (17827) Reviewed/Progressed HEP activities related to strengthening, flexibility, endurance, ROM of core, proximal hip and LE for functional self-care, mobility, lifting and ambulation/stair navigation   [] (68859)Reviewed/Progressed HEP activities related to improving balance, coordination, kinesthetic sense, posture, motor skill, proprioception of core, proximal hip and LE for self care, mobility, lifting, and ambulation/stair navigation      Manual Treatments:  PROM / STM / Oscillations-Mobs:  G-I, II, III, IV (PA's, Inf., Post.)  [] (42721) Provided manual therapy to mobilize LE, proximal hip and/or LS spine soft tissue/joints for the purpose of modulating pain, promoting relaxation,  increasing ROM, reducing/eliminating soft tissue swelling/inflammation/restriction, improving soft tissue extensibility and allowing for proper ROM for normal function with self care, mobility, lifting and ambulation.      Modalities:   Ice  12 min  7/8    Charges:  Timed Code Treatment Minutes: 45   Total Treatment Minutes: 03 5119-5629       [] EVAL (LOW) 37981 (typically 20 minutes face-to-face)  [] EVAL (MOD) 37111 (typically 30 minutes face-to-face)  [] EVAL (HIGH) 40801 (typically 45 minutes face-to-face)  [] RE-EVAL   [x] CA(87938) x  1   [] IONTO  [x] NMR (69284) x  1   [] VASO  [] Manual

## 2019-07-15 ENCOUNTER — HOSPITAL ENCOUNTER (OUTPATIENT)
Dept: PHYSICAL THERAPY | Age: 79
Setting detail: THERAPIES SERIES
Discharge: HOME | End: 2019-07-15
Payer: MEDICARE

## 2019-07-15 PROCEDURE — 97110 THERAPEUTIC EXERCISES: CPT

## 2019-07-15 PROCEDURE — 97112 NEUROMUSCULAR REEDUCATION: CPT

## 2019-07-17 ENCOUNTER — HOSPITAL ENCOUNTER (OUTPATIENT)
Dept: PHYSICAL THERAPY | Age: 79
Setting detail: THERAPIES SERIES
Discharge: HOME | End: 2019-07-17
Payer: MEDICARE

## 2019-07-17 PROCEDURE — 97112 NEUROMUSCULAR REEDUCATION: CPT

## 2019-07-17 PROCEDURE — 97110 THERAPEUTIC EXERCISES: CPT

## 2019-07-17 PROCEDURE — 97530 THERAPEUTIC ACTIVITIES: CPT

## 2019-07-22 ENCOUNTER — HOSPITAL ENCOUNTER (OUTPATIENT)
Dept: PHYSICAL THERAPY | Age: 79
Setting detail: THERAPIES SERIES
Discharge: HOME | End: 2019-07-22
Payer: MEDICARE

## 2019-07-22 DIAGNOSIS — I10 ESSENTIAL HYPERTENSION: ICD-10-CM

## 2019-07-22 PROCEDURE — 97530 THERAPEUTIC ACTIVITIES: CPT

## 2019-07-22 PROCEDURE — 97110 THERAPEUTIC EXERCISES: CPT

## 2019-07-22 PROCEDURE — 97112 NEUROMUSCULAR REEDUCATION: CPT

## 2019-07-22 RX ORDER — FUROSEMIDE 20 MG/1
TABLET ORAL
Qty: 90 TABLET | Refills: 0 | Status: SHIPPED | OUTPATIENT
Start: 2019-07-22 | End: 2019-12-27 | Stop reason: SDUPTHER

## 2019-07-22 NOTE — FLOWSHEET NOTE
Treatments:  PROM / STM / Oscillations-Mobs:  G-I, II, III, IV (PA's, Inf., Post.)  [] (20247) Provided manual therapy to mobilize LE, proximal hip and/or LS spine soft tissue/joints for the purpose of modulating pain, promoting relaxation,  increasing ROM, reducing/eliminating soft tissue swelling/inflammation/restriction, improving soft tissue extensibility and allowing for proper ROM for normal function with self care, mobility, lifting and ambulation. Modalities:   Ice  12 min  7/22    Charges:  Timed Code Treatment Minutes: 45   Total Treatment Minutes: 70  6565-8759       [] EVAL (LOW) 09267 (typically 20 minutes face-to-face)  [] EVAL (MOD) 97226 (typically 30 minutes face-to-face)  [] EVAL (HIGH) 39526 (typically 45 minutes face-to-face)  [] RE-EVAL   [x] MG(70744) x  1   [] IONTO  [x] NMR (37257) x  1   [] VASO  [] Manual (98598) x       [] Other:  [x] TA x  1    [] Mech Traction (04743)  [] ES(attended) (11540)      [] ES (un) (84914):     GOALS:  Patient stated goal: Reduce pain; Gain full mobility    Therapist goals for Patient:   Short Term Goals: To be achieved in: 2 weeks  1. Independent in HEP and progression per patient tolerance, in order to prevent re-injury. MET  2. Patient will have a decrease in pain to facilitate improvement in movement, function, and ADLs as indicated by Functional Deficits. MET    Long Term Goals: To be achieved in: 8 weeks  1. Disability index score of 15% or less for the LEFS to assist with reaching prior level of function. MET  2. Patient will demonstrate increased AROM to [facility] to allow for proper joint functioning as indicated by patients Functional Deficits. Progressing towards  3. Patient will demonstrate an increase in Strength to good proximal hip strength and control in LE to allow for proper functional mobility as indicated by patients Functional Deficits. Progressing towards    4.  Patient will return to Independent functional activities without increased symptoms or restriction. Progressing towards    Progression Towards Functional goals:  [x] Patient is progressing as expected towards functional goals listed. [] Progression is slowed due to complexities listed. [] Progression has been slowed due to co-morbidities. [] Plan just implemented, too soon to assess goals progression  [] Other:     ASSESSMENT:  See eval    Treatment/Activity Tolerance:  [x] Patient tolerated treatment well [] Patient limited by fatique  [] Patient limited by pain  [] Patient limited by other medical complications  [x] Other: 7/22 Patient tolerated treatment well this session. Fatigued by leg press this session. Able to achieve 114 degrees of flexion. Unable to make full revolution around bike this session. Continue to progress as tolerated. Patient education:  5/29 HEP provided and reviewed with patient    Prognosis: [x] Good [] Fair  [] Poor      Patient Requires Follow-up: [x] Yes  [] No    PLAN: 1-2x week for 4 weeks (7/10/19-8/10/19)  [x] Continue per plan of care [] Alter current plan (see comments)  [] Plan of care initiated [] Hold pending MD visit [] Discharge    Electronically signed by: Hortensia Nielsen PT, DPT    *If patient does not return for further follow ups after this date. Please consider this as the patients discharge from physical therapy.

## 2019-07-24 ENCOUNTER — HOSPITAL ENCOUNTER (OUTPATIENT)
Dept: PHYSICAL THERAPY | Age: 79
Setting detail: THERAPIES SERIES
Discharge: HOME | End: 2019-07-24
Payer: MEDICARE

## 2019-07-24 NOTE — FLOWSHEET NOTE
Physical Therapy  Cancellation/No-show Note  Patient Name:  Rishi Vizcarra  :  1940   Date:  2019  Cancelled visits to date: 02  No-shows to date: 0    For today's appointment patient:  [x]  Cancelled  []  Rescheduled appointment  []  No-show     Reason given by patient:  []  Patient ill  []  Conflicting appointment  []  No transportation    []  Conflict with work  [x]  No reason given  []  Other:     Comments:      Electronically signed by:  Audra Boo, PT, DPT

## 2019-07-29 ENCOUNTER — HOSPITAL ENCOUNTER (OUTPATIENT)
Dept: PHYSICAL THERAPY | Age: 79
Setting detail: THERAPIES SERIES
Discharge: HOME | End: 2019-07-29
Payer: MEDICARE

## 2019-07-29 PROCEDURE — 97112 NEUROMUSCULAR REEDUCATION: CPT

## 2019-07-29 PROCEDURE — 97530 THERAPEUTIC ACTIVITIES: CPT

## 2019-07-29 PROCEDURE — 97110 THERAPEUTIC EXERCISES: CPT

## 2019-07-31 ENCOUNTER — HOSPITAL ENCOUNTER (OUTPATIENT)
Dept: PHYSICAL THERAPY | Age: 79
Setting detail: THERAPIES SERIES
Discharge: HOME | End: 2019-07-31
Payer: MEDICARE
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
## 2019-07-31 PROCEDURE — 97530 THERAPEUTIC ACTIVITIES: CPT

## 2019-07-31 PROCEDURE — 97112 NEUROMUSCULAR REEDUCATION: CPT

## 2019-07-31 PROCEDURE — 97110 THERAPEUTIC EXERCISES: CPT

## 2019-07-31 NOTE — FLOWSHEET NOTE
intact    []Reflexes equal and normal bilaterally   []Other:    Joint mobility: 5/29 *Not tested due to recent surgery   []Normal    []Hypo   []Hyper    Palpation: TTP over incisions  7/10    Functional Mobility/Transfers: Independent with ADL's and self care; unable to stand for longer than 30 minutes; pain with ascending/descending stairs; unable to walk farther than 500 feet secondary to recent surgery  7/10    Posture: Forward head and rounded shoulders  6/17    Bandages/Dressings/Incisions: Incisions healing well  7/10    Gait: (include devices/WB status) Slight antalgic gait present; no assistive device   7/10                       [x] Patient history, allergies, meds reviewed. Medical chart reviewed. See intake form. 5/29    Review Of Systems (ROS):  [x]Performed Review of systems (Integumentary, CardioPulmonary, Neurological) by intake and observation. Intake form has been scanned into medical record. Patient has been instructed to contact their primary care physician regarding ROS issues if not already being addressed at this time.   5/29    RESTRICTIONS/PRECAUTIONS: L TKA 5/7/19    Exercises/Interventions:   Exercise/Equipment Resistance/Repetitions Other comments   Stretching     Hamstring 30 sec x 5 Start 5/29   Towel Pull 30 sec x 5 Start 5/29   Inclined Calf 30 sec x 5 Start 7/8   Hip Flexion     ITB     Groin     Quad                    SLR     Supine 3 x 10; 1# ^7/22   Abduction 3 x 10; 1# ^7/22   AdductionProne          SLR+          Isometrics     Quad sets 10 sec x 10 Start 5/29   Adduction isometrics 10 sec x 10 Start 6/3   Patellar Glides     Medial     Superior     Inferior          ROM     Sheet Pulls     Hang Weights     Passive     Active     Weight Shift     Ankle Pumps     AAROM seated EOB flexion/extension 10 sec x 10 each Start 5/29   ERMI 10 sec x 10 Start 6/3   Bike 5 min Start 7/15   CKC     Calf raises 3 x 10 Start 6/19   Wall sits     Step ups     1 leg stand 30 sec x 3 Start 7/8

## 2019-08-05 ENCOUNTER — HOSPITAL ENCOUNTER (OUTPATIENT)
Dept: PHYSICAL THERAPY | Age: 79
Setting detail: THERAPIES SERIES
Discharge: HOME | End: 2019-08-05
Payer: MEDICARE

## 2019-08-05 DIAGNOSIS — I10 BENIGN ESSENTIAL HYPERTENSION: ICD-10-CM

## 2019-08-05 DIAGNOSIS — M51.26 LUMBAR HERNIATED DISC: ICD-10-CM

## 2019-08-05 PROCEDURE — 97140 MANUAL THERAPY 1/> REGIONS: CPT

## 2019-08-05 PROCEDURE — 97112 NEUROMUSCULAR REEDUCATION: CPT

## 2019-08-05 PROCEDURE — 97110 THERAPEUTIC EXERCISES: CPT

## 2019-08-05 RX ORDER — AMLODIPINE BESYLATE 10 MG/1
10 TABLET ORAL DAILY
Qty: 90 TABLET | Refills: 3 | Status: SHIPPED | OUTPATIENT
Start: 2019-08-05 | End: 2019-08-09 | Stop reason: SDUPTHER

## 2019-08-05 RX ORDER — DULOXETIN HYDROCHLORIDE 60 MG/1
60 CAPSULE, DELAYED RELEASE ORAL DAILY
Qty: 90 CAPSULE | Refills: 3 | Status: SHIPPED | OUTPATIENT
Start: 2019-08-05 | End: 2019-08-09 | Stop reason: SDUPTHER

## 2019-08-05 NOTE — FLOWSHEET NOTE
Manual Treatments:  PROM / STM / Oscillations-Mobs:  G-I, II, III, IV (PA's, Inf., Post.)  [] (18072) Provided manual therapy to mobilize LE, proximal hip and/or LS spine soft tissue/joints for the purpose of modulating pain, promoting relaxation,  increasing ROM, reducing/eliminating soft tissue swelling/inflammation/restriction, improving soft tissue extensibility and allowing for proper ROM for normal function with self care, mobility, lifting and ambulation. Modalities:   Ice  12 min  7/29    Charges:  Timed Code Treatment Minutes: 55   Total Treatment Minutes: 85  3374-6379       [] EVAL (LOW) 04845 (typically 20 minutes face-to-face)  [] EVAL (MOD) 82231 (typically 30 minutes face-to-face)  [] EVAL (HIGH) 93852 (typically 45 minutes face-to-face)  [] RE-EVAL   [x] FK(99706) x  2   [] IONTO  [x] NMR (65864) x  1   [] VASO  [] Manual (47289) x       [] Other:  [x] TA x  1    [] Mech Traction (65473)  [] ES(attended) (03387)      [] ES (un) (95587):     GOALS:  Patient stated goal: Reduce pain; Gain full mobility    Therapist goals for Patient:   Short Term Goals: To be achieved in: 2 weeks  1. Independent in HEP and progression per patient tolerance, in order to prevent re-injury. MET  2. Patient will have a decrease in pain to facilitate improvement in movement, function, and ADLs as indicated by Functional Deficits. MET    Long Term Goals: To be achieved in: 8 weeks  1. Disability index score of 15% or less for the LEFS to assist with reaching prior level of function. MET  2. Patient will demonstrate increased AROM to [facility] to allow for proper joint functioning as indicated by patients Functional Deficits. Progressing towards  3. Patient will demonstrate an increase in Strength to good proximal hip strength and control in LE to allow for proper functional mobility as indicated by patients Functional Deficits. Progressing towards    4.  Patient will return to Independent functional activities without

## 2019-08-05 NOTE — TELEPHONE ENCOUNTER
Please advise    Last ov 5/1/19  Last labs 1/9/19    Medications pending to print per pt she would like to pick them up.  Thank you

## 2019-08-09 ENCOUNTER — HOSPITAL ENCOUNTER (OUTPATIENT)
Dept: PHYSICAL THERAPY | Age: 79
Setting detail: THERAPIES SERIES
Discharge: HOME | End: 2019-08-09
Payer: MEDICARE

## 2019-08-09 DIAGNOSIS — I10 BENIGN ESSENTIAL HYPERTENSION: ICD-10-CM

## 2019-08-09 DIAGNOSIS — M51.26 LUMBAR HERNIATED DISC: ICD-10-CM

## 2019-08-09 PROCEDURE — 97112 NEUROMUSCULAR REEDUCATION: CPT

## 2019-08-09 PROCEDURE — 97530 THERAPEUTIC ACTIVITIES: CPT

## 2019-08-09 PROCEDURE — 97110 THERAPEUTIC EXERCISES: CPT

## 2019-08-09 RX ORDER — DULOXETIN HYDROCHLORIDE 60 MG/1
60 CAPSULE, DELAYED RELEASE ORAL DAILY
Qty: 90 CAPSULE | Refills: 3 | Status: SHIPPED | OUTPATIENT
Start: 2019-08-09 | End: 2019-12-27 | Stop reason: SDUPTHER

## 2019-08-09 RX ORDER — AMLODIPINE BESYLATE 10 MG/1
10 TABLET ORAL DAILY
Qty: 90 TABLET | Refills: 3 | Status: SHIPPED | OUTPATIENT
Start: 2019-08-09 | End: 2019-12-27 | Stop reason: SDUPTHER

## 2019-08-09 NOTE — DISCHARGE SUMMARY
Calf 30 sec x 5 Start 7/8   Hip Flexion     ITB     Groin     Quad                    SLR     Supine 3 x 10; 2# ^8/9   Abduction 3 x 10; 2# ^8/9   AdductionProne          SLR+          Isometrics     Quad sets 10 sec x 10 Start 5/29   Adduction isometrics 10 sec x 10 Start 6/3   Patellar Glides     Medial     Superior     Inferior          ROM     Sheet Pulls     Hang Weights     Passive     Active     Weight Shift     Ankle Pumps     AAROM seated EOB flexion/extension 10 sec x 10 each Start 5/29   ERMI 10 sec x 10 Start 6/3   Bike 8 min ^8/5   CKC     Calf raises 3 x 10 Start 6/19   Wall sits     Step ups     1 leg stand 30 sec x 3 Start 7/8   Squatting     Balance board 30 sec x 3 each Start 6/10   CC TKE     Balance (tandem) 30 sec x 3 each Start 6/3   bridges     clamshells 3 x 10; blue tband ^8/5   PRE          Quantum machines     Leg press  3 x 10; 80# ^8/5   Leg extension 3 x 10; 15# Start 8/5   Leg curl 3 x 10; 25# Start 8/5        Manual interventions                     Therapeutic Exercise and NMR EXR  [x] (03211) Provided verbal/tactile cueing for activities related to strengthening, flexibility, endurance, ROM for improvements in LE, proximal hip, and core control with self care, mobility, lifting, ambulation. [x] (57519) Provided verbal/tactile cueing for activities related to improving balance, coordination, kinesthetic sense, posture, motor skill, proprioception  to assist with LE, proximal hip, and core control in self care, mobility, lifting, ambulation and eccentric single leg control.      NMR and Therapeutic Activities:    [x] (52569 or 00160) Provided verbal/tactile cueing for activities related to improving balance, coordination, kinesthetic sense, posture, motor skill, proprioception and motor activation to allow for proper function of core, proximal hip and LE with self care and ADLs  [] (14447) Gait Re-education- Provided training and instruction to the patient for proper LE, core and proximal hip recruitment and positioning and eccentric body weight control with ambulation re-education including up and down stairs     Home Exercise Program:    [x] (42196) Reviewed/Progressed HEP activities related to strengthening, flexibility, endurance, ROM of core, proximal hip and LE for functional self-care, mobility, lifting and ambulation/stair navigation   [] (81265)Reviewed/Progressed HEP activities related to improving balance, coordination, kinesthetic sense, posture, motor skill, proprioception of core, proximal hip and LE for self care, mobility, lifting, and ambulation/stair navigation      Manual Treatments:  PROM / STM / Oscillations-Mobs:  G-I, II, III, IV (PA's, Inf., Post.)  [] (27817) Provided manual therapy to mobilize LE, proximal hip and/or LS spine soft tissue/joints for the purpose of modulating pain, promoting relaxation,  increasing ROM, reducing/eliminating soft tissue swelling/inflammation/restriction, improving soft tissue extensibility and allowing for proper ROM for normal function with self care, mobility, lifting and ambulation. Modalities:   Ice  12 min  8/9    Charges:  Timed Code Treatment Minutes: 55   Total Treatment Minutes: 85  9847-1033       [] EVAL (LOW) 81668 (typically 20 minutes face-to-face)  [] EVAL (MOD) 79533 (typically 30 minutes face-to-face)  [] EVAL (HIGH) 73040 (typically 45 minutes face-to-face)  [] RE-EVAL   [x] BRANNON(85035) x  2   [] IONTO  [x] NMR (95098) x  1   [] VASO  [] Manual (76288) x       [] Other:  [x] TA x  1    [] Mech Traction (25963)  [] ES(attended) (32878)      [] ES (un) (31190):     GOALS:  Patient stated goal: Reduce pain; Gain full mobility    Therapist goals for Patient:   Short Term Goals: To be achieved in: 2 weeks  1. Independent in HEP and progression per patient tolerance, in order to prevent re-injury. MET  2.  Patient will have a decrease in pain to facilitate improvement in movement, function, and ADLs as indicated by Functional Deficits. MET    Long Term Goals: To be achieved in: 8 weeks  1. Disability index score of 15% or less for the LEFS to assist with reaching prior level of function. MET  2. Patient will demonstrate increased AROM to [facility] to allow for proper joint functioning as indicated by patients Functional Deficits. MET  3. Patient will demonstrate an increase in Strength to good proximal hip strength and control in LE to allow for proper functional mobility as indicated by patients Functional Deficits. MET  4. Patient will return to Independent functional activities without increased symptoms or restriction. MET    Progression Towards Functional goals:  [x] Patient is progressing as expected towards functional goals listed. [] Progression is slowed due to complexities listed. [] Progression has been slowed due to co-morbidities. [] Plan just implemented, too soon to assess goals progression  [] Other:     ASSESSMENT:  See eval    Treatment/Activity Tolerance:  [x] Patient tolerated treatment well [] Patient limited by fatique  [] Patient limited by pain  [] Patient limited by other medical complications  [x] Other: 8/9 Patient has met all goals and has returned to independent  ADL's and everyday activities. Patient is pleased with progress and is independent with HEP. Patient no longer requires skilled physical therapy at this time. Discharge to [facility]. Patient education:  5/29 HEP provided and reviewed with patient    Prognosis: [x] Good [] Fair  [] Poor      Patient Requires Follow-up: [] Yes  [x] No    PLAN: 1-2x week for 4 weeks (7/10/19-8/10/19)  [] Continue per plan of care [] Alter current plan (see comments)  [] Plan of care initiated [] Hold pending MD visit [x] Discharge    Electronically signed by: Carole Koyanagi PT, DPT    *If patient does not return for further follow ups after this date. Please consider this as the patients discharge from physical therapy.

## 2019-09-18 ENCOUNTER — TELEPHONE (OUTPATIENT)
Dept: FAMILY MEDICINE CLINIC | Age: 79
End: 2019-09-18

## 2019-09-18 DIAGNOSIS — F32.A DEPRESSIVE DISORDER: ICD-10-CM

## 2019-09-18 RX ORDER — BUPROPION HYDROCHLORIDE 300 MG/1
300 TABLET ORAL EVERY MORNING
Qty: 90 TABLET | Refills: 3 | Status: SHIPPED | OUTPATIENT
Start: 2019-09-18 | End: 2019-12-27 | Stop reason: SDUPTHER

## 2019-09-24 ENCOUNTER — HOSPITAL ENCOUNTER (OUTPATIENT)
Dept: MAMMOGRAPHY | Age: 79
Discharge: HOME | End: 2019-09-24
Payer: MEDICARE

## 2019-09-24 DIAGNOSIS — Z12.31 VISIT FOR SCREENING MAMMOGRAM: ICD-10-CM

## 2019-09-24 PROCEDURE — 77067 SCR MAMMO BI INCL CAD: CPT

## 2019-10-30 DIAGNOSIS — E78.01 ESSENTIAL FAMILIAL HYPERCHOLESTEROLEMIA: ICD-10-CM

## 2019-10-31 RX ORDER — ROSUVASTATIN CALCIUM 20 MG/1
20 TABLET, COATED ORAL DAILY
Qty: 90 TABLET | Refills: 1 | Status: SHIPPED | OUTPATIENT
Start: 2019-10-31 | End: 2019-12-27 | Stop reason: SDUPTHER

## 2019-12-27 ENCOUNTER — OFFICE VISIT (OUTPATIENT)
Dept: FAMILY MEDICINE CLINIC | Age: 79
End: 2019-12-27
Payer: MEDICARE

## 2019-12-27 VITALS
DIASTOLIC BLOOD PRESSURE: 84 MMHG | SYSTOLIC BLOOD PRESSURE: 132 MMHG | HEART RATE: 81 BPM | TEMPERATURE: 96.3 F | BODY MASS INDEX: 33.83 KG/M2 | RESPIRATION RATE: 12 BRPM | OXYGEN SATURATION: 97 % | WEIGHT: 167.5 LBS

## 2019-12-27 DIAGNOSIS — N32.81 OAB (OVERACTIVE BLADDER): ICD-10-CM

## 2019-12-27 DIAGNOSIS — I10 ESSENTIAL HYPERTENSION: ICD-10-CM

## 2019-12-27 DIAGNOSIS — E78.01 ESSENTIAL FAMILIAL HYPERCHOLESTEROLEMIA: ICD-10-CM

## 2019-12-27 DIAGNOSIS — F32.A DEPRESSIVE DISORDER: ICD-10-CM

## 2019-12-27 DIAGNOSIS — I10 BENIGN ESSENTIAL HYPERTENSION: Primary | ICD-10-CM

## 2019-12-27 DIAGNOSIS — R73.02 GLUCOSE INTOLERANCE (IMPAIRED GLUCOSE TOLERANCE): ICD-10-CM

## 2019-12-27 DIAGNOSIS — M51.26 LUMBAR HERNIATED DISC: ICD-10-CM

## 2019-12-27 DIAGNOSIS — M48.061 SPINAL STENOSIS OF LUMBAR REGION WITHOUT NEUROGENIC CLAUDICATION: ICD-10-CM

## 2019-12-27 PROCEDURE — 1123F ACP DISCUSS/DSCN MKR DOCD: CPT | Performed by: FAMILY MEDICINE

## 2019-12-27 PROCEDURE — 1090F PRES/ABSN URINE INCON ASSESS: CPT | Performed by: FAMILY MEDICINE

## 2019-12-27 PROCEDURE — 99214 OFFICE O/P EST MOD 30 MIN: CPT | Performed by: FAMILY MEDICINE

## 2019-12-27 PROCEDURE — G8427 DOCREV CUR MEDS BY ELIG CLIN: HCPCS | Performed by: FAMILY MEDICINE

## 2019-12-27 PROCEDURE — G8417 CALC BMI ABV UP PARAM F/U: HCPCS | Performed by: FAMILY MEDICINE

## 2019-12-27 PROCEDURE — 4040F PNEUMOC VAC/ADMIN/RCVD: CPT | Performed by: FAMILY MEDICINE

## 2019-12-27 PROCEDURE — 1036F TOBACCO NON-USER: CPT | Performed by: FAMILY MEDICINE

## 2019-12-27 PROCEDURE — G8399 PT W/DXA RESULTS DOCUMENT: HCPCS | Performed by: FAMILY MEDICINE

## 2019-12-27 PROCEDURE — G8482 FLU IMMUNIZE ORDER/ADMIN: HCPCS | Performed by: FAMILY MEDICINE

## 2019-12-27 RX ORDER — FUROSEMIDE 20 MG/1
TABLET ORAL
Qty: 90 TABLET | Refills: 3 | Status: SHIPPED | OUTPATIENT
Start: 2019-12-27 | End: 2020-06-08 | Stop reason: SDUPTHER

## 2019-12-27 RX ORDER — DULOXETIN HYDROCHLORIDE 60 MG/1
60 CAPSULE, DELAYED RELEASE ORAL DAILY
Qty: 90 CAPSULE | Refills: 3 | Status: SHIPPED | OUTPATIENT
Start: 2019-12-27 | End: 2021-03-04 | Stop reason: SDUPTHER

## 2019-12-27 RX ORDER — ROSUVASTATIN CALCIUM 20 MG/1
20 TABLET, COATED ORAL DAILY
Qty: 90 TABLET | Refills: 3 | Status: SHIPPED | OUTPATIENT
Start: 2019-12-27 | End: 2021-03-04 | Stop reason: SDUPTHER

## 2019-12-27 RX ORDER — PANTOPRAZOLE SODIUM 40 MG/1
40 TABLET, DELAYED RELEASE ORAL DAILY
Qty: 90 TABLET | Refills: 3 | Status: SHIPPED | OUTPATIENT
Start: 2019-12-27 | End: 2021-03-04 | Stop reason: SDUPTHER

## 2019-12-27 RX ORDER — AMLODIPINE BESYLATE 10 MG/1
10 TABLET ORAL DAILY
Qty: 90 TABLET | Refills: 3 | Status: SHIPPED | OUTPATIENT
Start: 2019-12-27 | End: 2021-03-04 | Stop reason: SDUPTHER

## 2019-12-27 RX ORDER — OXYBUTYNIN CHLORIDE 10 MG/1
10 TABLET, EXTENDED RELEASE ORAL DAILY
Qty: 90 TABLET | Refills: 3 | Status: SHIPPED | OUTPATIENT
Start: 2019-12-27 | End: 2020-10-27

## 2019-12-27 RX ORDER — BUPROPION HYDROCHLORIDE 300 MG/1
300 TABLET ORAL EVERY MORNING
Qty: 90 TABLET | Refills: 3 | Status: SHIPPED | OUTPATIENT
Start: 2019-12-27 | End: 2021-05-17 | Stop reason: SDUPTHER

## 2020-01-15 DIAGNOSIS — E78.01 ESSENTIAL FAMILIAL HYPERCHOLESTEROLEMIA: ICD-10-CM

## 2020-01-15 DIAGNOSIS — R73.02 GLUCOSE INTOLERANCE (IMPAIRED GLUCOSE TOLERANCE): ICD-10-CM

## 2020-01-15 DIAGNOSIS — I10 BENIGN ESSENTIAL HYPERTENSION: ICD-10-CM

## 2020-01-16 LAB
A/G RATIO: 1.6 (ref 1.1–2.2)
ALBUMIN SERPL-MCNC: 4.6 G/DL (ref 3.4–5)
ALP BLD-CCNC: 83 U/L (ref 40–129)
ALT SERPL-CCNC: 22 U/L (ref 10–40)
ANION GAP SERPL CALCULATED.3IONS-SCNC: 18 MMOL/L (ref 3–16)
AST SERPL-CCNC: 20 U/L (ref 15–37)
BILIRUB SERPL-MCNC: 0.4 MG/DL (ref 0–1)
BUN BLDV-MCNC: 23 MG/DL (ref 7–20)
CALCIUM SERPL-MCNC: 10.3 MG/DL (ref 8.3–10.6)
CHLORIDE BLD-SCNC: 99 MMOL/L (ref 99–110)
CHOLESTEROL, TOTAL: 177 MG/DL (ref 0–199)
CO2: 26 MMOL/L (ref 21–32)
CREAT SERPL-MCNC: 0.9 MG/DL (ref 0.6–1.2)
ESTIMATED AVERAGE GLUCOSE: 119.8 MG/DL
GFR AFRICAN AMERICAN: >60
GFR NON-AFRICAN AMERICAN: >60
GLOBULIN: 2.9 G/DL
GLUCOSE BLD-MCNC: 80 MG/DL (ref 70–99)
HBA1C MFR BLD: 5.8 %
HDLC SERPL-MCNC: 60 MG/DL (ref 40–60)
LDL CHOLESTEROL CALCULATED: 79 MG/DL
POTASSIUM SERPL-SCNC: 3.8 MMOL/L (ref 3.5–5.1)
SODIUM BLD-SCNC: 143 MMOL/L (ref 136–145)
TOTAL PROTEIN: 7.5 G/DL (ref 6.4–8.2)
TRIGL SERPL-MCNC: 191 MG/DL (ref 0–150)
TSH REFLEX: 3.87 UIU/ML (ref 0.27–4.2)
VLDLC SERPL CALC-MCNC: 38 MG/DL

## 2020-02-13 ENCOUNTER — HOSPITAL ENCOUNTER (OUTPATIENT)
Dept: ULTRASOUND IMAGING | Age: 80
Discharge: HOME | End: 2020-02-13
Payer: MEDICARE

## 2020-02-13 PROCEDURE — 76856 US EXAM PELVIC COMPLETE: CPT

## 2020-02-13 PROCEDURE — 76830 TRANSVAGINAL US NON-OB: CPT

## 2020-06-01 ENCOUNTER — NURSE TRIAGE (OUTPATIENT)
Dept: OTHER | Facility: CLINIC | Age: 80
End: 2020-06-01

## 2020-06-05 NOTE — PROGRESS NOTES
soft without tenderness, guarding, mass, rebound or organomegaly. Aorta, femoral, DP and PT pulses intact. Assessment:       Diagnosis Orders   1. Pedal edema  CBC Auto Differential  Increase lasix to BID prn   Low sodium diet. Support hose. Likely stasis edema  Echo to rule out cardiac etiology   2. Glucose intolerance (impaired glucose tolerance)  Hemoglobin A1C\Discussed diet, exercise and weight loss strategies    3. 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 Recurrent major depressive disorder, in full remission ([facility] [address]  asymptomatic   5. Essential familial hypercholesterolemia  Comprehensive Metabolic Panel    TSH with Reflex    Lipid Panel  Tolerating statin  LDL goal < 130   6. Diastolic dysfunction  ECHO Complete 2D W Doppler W Color   7. Sleep disturbance  Paola Summers MD, Pulmonary, [facility]   8. Benign essential hypertension  potassium chloride (KLOR-CON M) 10 MEQ extended release tablet    furosemide (LASIX) 20 MG tablet  At gaol < 130/80          Plan:      Side effects of current medications reviewed and questions answered. Follow up in 6 months or prn.

## 2020-06-08 ENCOUNTER — TELEPHONE (OUTPATIENT)
Dept: FAMILY MEDICINE CLINIC | Age: 80
End: 2020-06-08

## 2020-06-08 ENCOUNTER — OFFICE VISIT (OUTPATIENT)
Dept: FAMILY MEDICINE CLINIC | Age: 80
End: 2020-06-08
Payer: MEDICARE

## 2020-06-08 VITALS
HEIGHT: 59 IN | TEMPERATURE: 98.1 F | SYSTOLIC BLOOD PRESSURE: 128 MMHG | WEIGHT: 167 LBS | DIASTOLIC BLOOD PRESSURE: 80 MMHG | HEART RATE: 77 BPM | BODY MASS INDEX: 33.67 KG/M2 | OXYGEN SATURATION: 97 % | RESPIRATION RATE: 14 BRPM

## 2020-06-08 DIAGNOSIS — R73.02 GLUCOSE INTOLERANCE (IMPAIRED GLUCOSE TOLERANCE): ICD-10-CM

## 2020-06-08 DIAGNOSIS — E78.01 ESSENTIAL FAMILIAL HYPERCHOLESTEROLEMIA: ICD-10-CM

## 2020-06-08 DIAGNOSIS — R60.0 PEDAL EDEMA: ICD-10-CM

## 2020-06-08 LAB
A/G RATIO: 2 (ref 1.1–2.2)
ALBUMIN SERPL-MCNC: 4.5 G/DL (ref 3.4–5)
ALP BLD-CCNC: 74 U/L (ref 40–129)
ALT SERPL-CCNC: 17 U/L (ref 10–40)
ANION GAP SERPL CALCULATED.3IONS-SCNC: 10 MMOL/L (ref 3–16)
AST SERPL-CCNC: 17 U/L (ref 15–37)
BASOPHILS ABSOLUTE: 0 K/UL (ref 0–0.2)
BASOPHILS RELATIVE PERCENT: 0.5 %
BILIRUB SERPL-MCNC: 0.5 MG/DL (ref 0–1)
BUN BLDV-MCNC: 20 MG/DL (ref 7–20)
CALCIUM SERPL-MCNC: 9.4 MG/DL (ref 8.3–10.6)
CHLORIDE BLD-SCNC: 101 MMOL/L (ref 99–110)
CHOLESTEROL, TOTAL: 163 MG/DL (ref 0–199)
CO2: 30 MMOL/L (ref 21–32)
CREAT SERPL-MCNC: 0.8 MG/DL (ref 0.6–1.2)
EOSINOPHILS ABSOLUTE: 0.2 K/UL (ref 0–0.6)
EOSINOPHILS RELATIVE PERCENT: 1.8 %
GFR AFRICAN AMERICAN: >60
GFR NON-AFRICAN AMERICAN: >60
GLOBULIN: 2.3 G/DL
GLUCOSE BLD-MCNC: 98 MG/DL (ref 70–99)
HCT VFR BLD CALC: 41.6 % (ref 36–48)
HDLC SERPL-MCNC: 58 MG/DL (ref 40–60)
HEMOGLOBIN: 13.5 G/DL (ref 12–16)
LDL CHOLESTEROL CALCULATED: 83 MG/DL
LYMPHOCYTES ABSOLUTE: 1.5 K/UL (ref 1–5.1)
LYMPHOCYTES RELATIVE PERCENT: 17.7 %
MCH RBC QN AUTO: 28.8 PG (ref 26–34)
MCHC RBC AUTO-ENTMCNC: 32.4 G/DL (ref 31–36)
MCV RBC AUTO: 88.9 FL (ref 80–100)
MONOCYTES ABSOLUTE: 0.9 K/UL (ref 0–1.3)
MONOCYTES RELATIVE PERCENT: 10.4 %
NEUTROPHILS ABSOLUTE: 5.7 K/UL (ref 1.7–7.7)
NEUTROPHILS RELATIVE PERCENT: 69.6 %
PDW BLD-RTO: 14 % (ref 12.4–15.4)
PLATELET # BLD: 215 K/UL (ref 135–450)
PMV BLD AUTO: 10 FL (ref 5–10.5)
POTASSIUM SERPL-SCNC: 3.7 MMOL/L (ref 3.5–5.1)
RBC # BLD: 4.68 M/UL (ref 4–5.2)
SODIUM BLD-SCNC: 141 MMOL/L (ref 136–145)
TOTAL PROTEIN: 6.8 G/DL (ref 6.4–8.2)
TRIGL SERPL-MCNC: 108 MG/DL (ref 0–150)
TSH REFLEX: 3.68 UIU/ML (ref 0.27–4.2)
VLDLC SERPL CALC-MCNC: 22 MG/DL
WBC # BLD: 8.3 K/UL (ref 4–11)

## 2020-06-08 PROCEDURE — 4040F PNEUMOC VAC/ADMIN/RCVD: CPT | Performed by: FAMILY MEDICINE

## 2020-06-08 PROCEDURE — 1123F ACP DISCUSS/DSCN MKR DOCD: CPT | Performed by: FAMILY MEDICINE

## 2020-06-08 PROCEDURE — 1090F PRES/ABSN URINE INCON ASSESS: CPT | Performed by: FAMILY MEDICINE

## 2020-06-08 PROCEDURE — 99214 OFFICE O/P EST MOD 30 MIN: CPT | Performed by: FAMILY MEDICINE

## 2020-06-08 PROCEDURE — G8417 CALC BMI ABV UP PARAM F/U: HCPCS | Performed by: FAMILY MEDICINE

## 2020-06-08 PROCEDURE — 1036F TOBACCO NON-USER: CPT | Performed by: FAMILY MEDICINE

## 2020-06-08 PROCEDURE — G8427 DOCREV CUR MEDS BY ELIG CLIN: HCPCS | Performed by: FAMILY MEDICINE

## 2020-06-08 PROCEDURE — G8399 PT W/DXA RESULTS DOCUMENT: HCPCS | Performed by: FAMILY MEDICINE

## 2020-06-08 RX ORDER — FUROSEMIDE 20 MG/1
20-40 TABLET ORAL DAILY
Qty: 180 TABLET | Refills: 1 | Status: SHIPPED | OUTPATIENT
Start: 2020-06-08 | End: 2020-06-08 | Stop reason: SDUPTHER

## 2020-06-08 RX ORDER — POTASSIUM CHLORIDE 750 MG/1
10 TABLET, EXTENDED RELEASE ORAL 2 TIMES DAILY
Qty: 180 TABLET | Refills: 1 | Status: SHIPPED | OUTPATIENT
Start: 2020-06-08 | End: 2020-12-10

## 2020-06-08 RX ORDER — FUROSEMIDE 20 MG/1
20-40 TABLET ORAL DAILY
Qty: 180 TABLET | Refills: 1 | Status: SHIPPED | OUTPATIENT
Start: 2020-06-08 | End: 2020-10-27

## 2020-06-09 LAB
ESTIMATED AVERAGE GLUCOSE: 125.5 MG/DL
HBA1C MFR BLD: 6 %

## 2020-06-11 ENCOUNTER — PATIENT MESSAGE (OUTPATIENT)
Dept: FAMILY MEDICINE CLINIC | Age: 80
End: 2020-06-11

## 2020-07-01 ENCOUNTER — HOSPITAL ENCOUNTER (OUTPATIENT)
Dept: NON INVASIVE DIAGNOSTICS | Age: 80
Discharge: HOME | End: 2020-07-01
Payer: MEDICARE

## 2020-07-01 LAB
LV EF: 63 %
LVEF MODALITY: NORMAL

## 2020-07-01 PROCEDURE — 93306 TTE W/DOPPLER COMPLETE: CPT

## 2020-07-02 ENCOUNTER — VIRTUAL VISIT (OUTPATIENT)
Dept: PULMONOLOGY | Age: 80
End: 2020-07-02
Payer: MEDICARE

## 2020-07-02 PROCEDURE — 4040F PNEUMOC VAC/ADMIN/RCVD: CPT | Performed by: INTERNAL MEDICINE

## 2020-07-02 PROCEDURE — 99204 OFFICE O/P NEW MOD 45 MIN: CPT | Performed by: INTERNAL MEDICINE

## 2020-07-02 PROCEDURE — 1123F ACP DISCUSS/DSCN MKR DOCD: CPT | Performed by: INTERNAL MEDICINE

## 2020-07-02 PROCEDURE — 1090F PRES/ABSN URINE INCON ASSESS: CPT | Performed by: INTERNAL MEDICINE

## 2020-07-02 PROCEDURE — G8399 PT W/DXA RESULTS DOCUMENT: HCPCS | Performed by: INTERNAL MEDICINE

## 2020-07-02 PROCEDURE — G8427 DOCREV CUR MEDS BY ELIG CLIN: HCPCS | Performed by: INTERNAL MEDICINE

## 2020-07-02 ASSESSMENT — SLEEP AND FATIGUE QUESTIONNAIRES
HOW LIKELY ARE YOU TO NOD OFF OR FALL ASLEEP WHILE SITTING AND READING: 1
HOW LIKELY ARE YOU TO NOD OFF OR FALL ASLEEP IN A CAR, WHILE STOPPED FOR A FEW MINUTES IN TRAFFIC: 0
HOW LIKELY ARE YOU TO NOD OFF OR FALL ASLEEP WHILE SITTING QUIETLY AFTER LUNCH WITHOUT ALCOHOL: 0
HOW LIKELY ARE YOU TO NOD OFF OR FALL ASLEEP WHILE SITTING INACTIVE IN A PUBLIC PLACE: 0
HOW LIKELY ARE YOU TO NOD OFF OR FALL ASLEEP WHEN YOU ARE A PASSENGER IN A CAR FOR AN HOUR WITHOUT A BREAK: 0
ESS TOTAL SCORE: 3
HOW LIKELY ARE YOU TO NOD OFF OR FALL ASLEEP WHILE LYING DOWN TO REST IN THE AFTERNOON WHEN CIRCUMSTANCES PERMIT: 1
HOW LIKELY ARE YOU TO NOD OFF OR FALL ASLEEP WHILE WATCHING TV: 1
HOW LIKELY ARE YOU TO NOD OFF OR FALL ASLEEP WHILE SITTING AND TALKING TO SOMEONE: 0

## 2020-07-02 NOTE — LETTER
[facility] Sleep Medicine  [place] [address] [place] [address]  Phone: [phone]  Fax: [phone]      July 2, 2020       Patient: Andrew Lopez   MR Number: [id number]   YOB: 1940   Date of Visit: 7/2/2020     Thank you for allowing me to participate in the care of [facility]. Here is my assessment and plan. Also attached is a copy of her consult note:    ASSESSMENT:  Visit Diagnoses and Associated Orders     Hypersomnia   (New Problem)  -  Primary    needs work-up    POLYSOMNOGRAPHY (PSG) - Diagnostic Testing [07259 Custom]   - Future Order         Snoring   (New Problem)      needs work-up    POLYSOMNOGRAPHY (PSG) - Diagnostic Testing [77246 Custom]   - Future Order         Benign essential hypertension   (Stable)           GERD without esophagitis   (Stable)           Depression, unspecified depression type   (Stable)           Non morbid obesity, unspecified obesity type   (Stable)                 Plan:  Differential diagnosis includes but not limited to: AILYN, PLMD's, narcolepsy, parasomnias. Reviewed AILYN (which is the highest likelihood diagnosis): pathophysiology, diagnosis, complications and treatment. Instructed her not to drive if drowsy. Continue medications per her PCP and other physicians. Limit caffeine use after 3pm. Will do PSG to rule-out AILYN and other sleep disorders. 1 wk follow up after study to review her results. The chronic medical conditions listed are directly related to the primary diagnosis listed above. The management of the primary diagnosis affects the secondary diagnosis and vice versa. Continue meds for: HTN, GERD, and depression. Pt would medically benefit from wt loss for AILYN (diet, exercise, surgical). Orders Placed This Encounter   Procedures    POLYSOMNOGRAPHY (PSG) - Diagnostic Testing         If you have questions or concerns, please do not hesitate to call me. I look forward to following Saida Bowles along with you. Sincerely,      Mordecai Blizzard, MD    CC providers:  MD Darius Bonds [address] [place] [id number]  [facility]

## 2020-07-02 NOTE — PROGRESS NOTES
depression. Pt would medically benefit from wt loss for AILYN (diet, exercise, surgical). Orders Placed This Encounter   Procedures    POLYSOMNOGRAPHY (PSG) - Diagnostic Testing          Subjective:     Patient ID: Maria Del Carmen Barahona is a [de-identified] y.o. female. Chief Complaint   Patient presents with    Daytime Sleepiness       HPI:      Maria Del Carmen Barahona is a [de-identified] y.o. female referred by Lashonda Greene MD for a sleep evaluation. She complains of: snoring, excessive daytime sleepiness , non-restorative sleep, nocturia, AM mouth dryness, napping, drowsiness while driving and tossing and turning at night. She denies: cataplexy and hypnagogic hallucinations. Symptoms began 5 years ago, gradually worsening since that time. Hypertension, gastroesophageal reflux disease, depression, and obesity: stable on meds and followed by pt's PCP and other physicians. Previous evaluation and treatment has included- none    DOT/CDL - No  FAA/'s license -No    Previous Report(s) Reviewed: historical medical records, office notes, andreferral letter(s). Pertinent data has been documented. [place] - Total score: 3    Caffeine Intake - Pop/Soda: 2 can(s) per month    Social History     Socioeconomic History    Marital status:       Spouse name: Not on file    Number of children: Not on file    Years of education: Not on file    Highest education level: Not on file   Occupational History    Not on file   Social Needs    Financial resource strain: Not on file    Food insecurity     Worry: Not on file     Inability: Not on file    Transportation needs     Medical: Not on file     Non-medical: Not on file   Tobacco Use    Smoking status: Never Smoker    Smokeless tobacco: Never Used   Substance and Sexual Activity    Alcohol use: No    Drug use: No    Sexual activity: Not Currently     Partners: Male   Lifestyle    Physical activity     Days per week: Not on file     Minutes per session: Not on file    Stress: Not on file   Relationships    Social connections     Talks on phone: Not on file     Gets together: Not on file     Attends Hindu service: Not on file     Active member of club or organization: Not on file     Attends meetings of clubs or organizations: Not on file     Relationship status: Not on file    Intimate partner violence     Fear of current or ex partner: Not on file     Emotionally abused: Not on file     Physically abused: Not on file     Forced sexual activity: Not on file   Other Topics Concern    Not on file   Social History Narrative    Not on file        Current Outpatient Medications   Medication Sig Dispense Refill    potassium chloride (KLOR-CON M) 10 MEQ extended release tablet Take 1 tablet by mouth 2 times daily 180 tablet 1    furosemide (LASIX) 20 MG tablet Take 1-2 tablets by mouth daily 180 tablet 1    rosuvastatin (CRESTOR) 20 MG tablet Take 1 tablet by mouth daily 90 tablet 3    buPROPion (WELLBUTRIN XL) 300 MG extended release tablet Take 1 tablet by mouth every morning 90 tablet 3    DULoxetine (CYMBALTA) 60 MG extended release capsule Take 1 capsule by mouth daily 90 capsule 3    amLODIPine (NORVASC) 10 MG tablet Take 1 tablet by mouth daily 90 tablet 3    pantoprazole (PROTONIX) 40 MG tablet Take 1 tablet by mouth daily 90 tablet 3    oxybutynin (DITROPAN XL) 10 MG extended release tablet Take 1 tablet by mouth daily 90 tablet 3    methylcellulose (CITRUCEL) oral powder Take 2 g by mouth daily Take by mouth daily.  calcium citrate-vitamin D (CALCIUM CITRATE + D3) 315-250 MG-UNIT TABS per tablet Take 1 tablet by mouth daily (with breakfast) 120 tablet     Alpha-D-Galactosidase (BEANO PO) Take  by mouth. No current facility-administered medications for this visit.         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)    Renal angiolipoma    Lumbar spondylosis    Lumbar stenosis    Trochanteric bursitis of right hip    Glucose intolerance (impaired glucose tolerance)    Hypercalcemia       Past Medical History:   Diagnosis Date    Acute esophagitis     h/o    Acute hearing loss of right ear 7/12/2016    AVN (avascular necrosis of bone) ([facility] [address] 1/23/2014    Hip      Benign essential hypertension     Cervical stenosis of spine     Colon polyps     Depressive disorder, not elsewhere classified     Essential familial hypercholesterolemia     Fatigue     Fracture of cuboid, right ankle, closed 5/2013    Lumbar herniated disc     Lumbar radiculopathy 6/9/2015    Osteopenia        Past Surgical History:   Procedure Laterality Date    APPENDECTOMY      BREAST SURGERY      reduction    HYSTERECTOMY      JOINT REPLACEMENT Left 4/2014    hip    KNEE SURGERY      left    LAMINECTOMY      decompressive more than 2 lumbar segments    TONSILLECTOMY         Family History   Problem Relation Age of Onset    Other Mother         lung disease    Heart Disease Father        Review of Systems    Objective:     Vitals:  Patient reported Height and Weight Calculated BMI   Patient-Reported Vitals 7/2/2020   Patient-Reported Weight 168lbs   Patient-Reported Height 4' 11.5\"       33.4     Due to COVID-19 this was a virtual visit and physical exam was deferred.     Electronically signed by Gladys Santacruz MD on7/2/2020 at 1:52 PM

## 2020-07-15 ENCOUNTER — TELEPHONE (OUTPATIENT)
Dept: PULMONOLOGY | Age: 80
End: 2020-07-15

## 2020-07-29 PROBLEM — R41.3 MEMORY LOSS: Status: ACTIVE | Noted: 2020-07-29

## 2020-07-30 ENCOUNTER — VIRTUAL VISIT (OUTPATIENT)
Dept: FAMILY MEDICINE CLINIC | Age: 80
End: 2020-07-30

## 2020-07-30 ENCOUNTER — OFFICE VISIT (OUTPATIENT)
Dept: PRIMARY CARE CLINIC | Age: 80
End: 2020-07-30

## 2020-07-30 NOTE — PROGRESS NOTES
Component Value Date    HDL 58 06/08/2020    HDL 60 01/15/2020    HDL 61 (H) 01/09/2019     Lab Results   Component Value Date    LDLCALC 83 06/08/2020    LDLCALC 79 01/15/2020    LDLCALC 83 01/09/2019     Lab Results   Component Value Date    LABVLDL 22 06/08/2020    LABVLDL 38 01/15/2020    LABVLDL 20 01/09/2019     Lab Results   Component Value Date    CHOLHDLRATIO 2.9 04/27/2017    CHOLHDLRATIO 2.9 01/21/2016    CHOLHDLRATIO 2.9 11/13/2013     Lab Results   Component Value Date    LABA1C 6.0 06/08/2020     Lab Results   Component Value Date    .5 06/08/2020      8.5    MRI OF THE BRAIN AND INTERNAL AUDITORY CANALS:         CLINICAL INDICATION: Sudden onset of right-sided hearing loss.         TECHNIQUE: Multiplanar multi-sequential MRI of the brain and internal auditory canals with    intravenous gadolinium administration.  8.5 mL Gadavist IV.         COMPARISON: None         FINDINGS:         BRAIN:    There is mild diffuse parenchymal volume loss with compensatory dilatation of the ventricles    and sulci.  There are foci of abnormal signal within the periventricular and subcortical white    matter, on the FLAIR and T2 images, consistent with small vessel    ischemic change.         There is no evidence of an acute infarct. There is no focal intra-axial mass, mass effect or    midline shift. There are no findings to indicate hemorrhage.     No extra-axial fluid    collection is identified. There are retention cysts or polyps within the    maxillary sinuses and sphenoid sinus. INTERNAL AUDITORY CANALS:         Right: There is no mass or abnormal enhancement along the intracanalicular portions of the    seventh and eighth cranial nerves. There is no mass at the cerebellopontine angle. The inner    ear including the cochlea and semicircular canals are unremarkable    appearing. No fluid is demonstrated within the mastoid air cells.  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  This does course to the right and contact the exiting right cranial nerves VII     and VIII.              Left: There is no mass or abnormal enhancement along the intracanalicular portions of the    seventh and eighth cranial nerves. There is no mass at the cerebellopontine angle. The inner    ear including the cochlea and semicircular canals are unremarkable    appearing. No fluid is demonstrated within the mastoid air cells.              IMPRESSION:    1.  Mild volume loss and findings consistent with small vessel ischemic change involving the    deep white matter.         2.  Doloectatic basilar artery.  This does course to the right and contact the exiting right    cranial nerves VII and VIII. Otherwise unremarkable MRI of the internal auditory canals.              SIGNED BY: Yvonne Thrasher MD on 8/12/2016  1:14 PM          Review of Systems    Prior to Visit Medications    Medication Sig Taking? Authorizing Provider   potassium chloride (KLOR-CON M) 10 MEQ extended release tablet Take 1 tablet by mouth 2 times daily  Shelton Alexander MD   furosemide (LASIX) 20 MG tablet Take 1-2 tablets by mouth daily  Shelton Alexander MD   rosuvastatin (CRESTOR) 20 MG tablet Take 1 tablet by mouth daily  Shelton Alexander MD   buPROPion (WELLBUTRIN XL) 300 MG extended release tablet Take 1 tablet by mouth every morning  Shelton Alexander MD   DULoxetine (CYMBALTA) 60 MG extended release capsule Take 1 capsule by mouth daily  Shelton Alexander MD   amLODIPine (NORVASC) 10 MG tablet Take 1 tablet by mouth daily  Shelton Alexander MD   pantoprazole (PROTONIX) 40 MG tablet Take 1 tablet by mouth daily  Shelton Alexander MD   oxybutynin (DITROPAN XL) 10 MG extended release tablet Take 1 tablet by mouth daily  Shelton Alexander MD   methylcellulose (CITRUCEL) oral powder Take 2 g by mouth daily Take by mouth daily.   Historical Provider, MD   calcium citrate-vitamin D (CALCIUM CITRATE + D3) 315-250 MG-UNIT TABS per tablet Take 1 tablet by mouth Abnormal -    HENT:   [] Normocephalic, atraumatic. [] Abnormal   [] Mouth/Throat: Mucous membranes are moist.     External Ears [] Normal  [] Abnormal-     Neck: [] No visualized mass     Pulmonary/Chest: [] Respiratory effort normal.  [] No visualized signs of difficulty breathing or respiratory distress        [] Abnormal-      Musculoskeletal:   [] Normal gait with no signs of ataxia         [] Normal range of motion of neck        [] Abnormal-       Neurological:        [] No Facial Asymmetry (Cranial nerve 7 motor function) (limited exam to video visit)          [] No gaze palsy        [] Abnormal-         Skin:        [] No significant exanthematous lesions or discoloration noted on facial skin         [] Abnormal-            Psychiatric:       [] Normal Affect [] No Hallucinations        [] Abnormal-     Other pertinent observable physical exam findings-     ASSESSMENT/PLAN:  1. Memory loss      2. Glucose intolerance (impaired glucose tolerance)      3. Recurrent major depressive disorder, in full remission ([facility]        No follow-ups on file. [facility] [de-identified] y.o. female being evaluated by a Virtual Visit (video visit) encounter to address concerns as mentioned above. A caregiver was present when appropriate. Due to this being a TeleHealth encounter ([facility]), evaluation of the following organ systems was limited: Vitals/Constitutional/EENT/Resp/CV/GI//MS/Neuro/Skin/Heme-Lymph-Imm. Pursuant to the emergency declaration under the [address] [place] [address], [address] [place] [address] authority and the [employer] Act, this Virtual Visit was conducted with patient's (and/or legal guardian's) consent, to reduce the patient's risk of exposure to COVID-19 and provide necessary medical care.   The patient (and/or legal guardian) has also been advised to contact this office for worsening conditions or problems, and seek emergency medical treatment and/or call 911 if deemed necessary. Patient identification was verified at the start of the visit:     Total time spent on this encounter:     Services were provided through a video synchronous discussion virtually to substitute for in-person clinic visit. Patient and provider were located at their individual homes. --Fernandez Del Castillo MD on 7/29/2020 at 8:33 PM    An electronic signature was used to authenticate this note.

## 2020-07-30 NOTE — PATIENT INSTRUCTIONS

## 2020-08-01 LAB — SARS-COV-2, NAA: NOT DETECTED

## 2020-08-04 ENCOUNTER — HOSPITAL ENCOUNTER (OUTPATIENT)
Dept: SLEEP CENTER | Age: 80
Discharge: HOME | End: 2020-08-04
Payer: MEDICARE

## 2020-08-04 PROCEDURE — 95810 POLYSOM 6/> YRS 4/> PARAM: CPT | Performed by: INTERNAL MEDICINE

## 2020-08-04 PROCEDURE — 95810 POLYSOM 6/> YRS 4/> PARAM: CPT

## 2020-08-06 ENCOUNTER — TELEPHONE (OUTPATIENT)
Dept: PULMONOLOGY | Age: 80
End: 2020-08-06

## 2020-08-06 ENCOUNTER — TELEPHONE (OUTPATIENT)
Dept: FAMILY MEDICINE CLINIC | Age: 80
End: 2020-08-06

## 2020-08-06 NOTE — TELEPHONE ENCOUNTER
Please call pt. Dr. Kate Taylor picked up an irregular heart beat on her sleep study. I want her to have a 24 hour heart monitor.    Thanks

## 2020-08-06 NOTE — TELEPHONE ENCOUNTER
----- Message from Drerell Jurado MD sent at 8/6/2020 12:41 AM EDT -----  Regarding: possible a fib  Eva Kennedy,    So when I was reading Francoise's sleep study she looked like she was having episodes of a fib. I didn't see any history in her chart. May be worth having cardiology seeing her if you think it's appropriate.     Gary

## 2020-08-07 ENCOUNTER — TELEPHONE (OUTPATIENT)
Dept: SLEEP CENTER | Age: 80
End: 2020-08-07

## 2020-08-18 ENCOUNTER — HOSPITAL ENCOUNTER (OUTPATIENT)
Dept: NON INVASIVE DIAGNOSTICS | Age: 80
Discharge: HOME | End: 2020-08-18
Payer: MEDICARE

## 2020-08-20 ENCOUNTER — OFFICE VISIT (OUTPATIENT)
Dept: PRIMARY CARE CLINIC | Age: 80
End: 2020-08-20

## 2020-08-20 NOTE — PATIENT INSTRUCTIONS

## 2020-08-24 ENCOUNTER — TELEPHONE (OUTPATIENT)
Dept: SLEEP CENTER | Age: 80
End: 2020-08-24

## 2020-08-24 LAB
ACQUISITION DURATION: NORMAL S
AVERAGE HEART RATE: 81 BPM
EKG DIAGNOSIS: NORMAL
FASTEST SUPRAVENTRICULAR RATE: 106 BPM
HOLTER MAX HEART RATE: 132 BPM
HOOKUP DATE: NORMAL
HOOKUP TIME: NORMAL
LONGEST SUPRAVENTRICULAR RATE: 106 BPM
MAX HEART RATE TIME/DATE: NORMAL
MIN HEART RATE TIME/DATE: NORMAL
MIN HEART RATE: 52 BPM
NUMBER OF FASTEST SUPRAVENTRICULAR BEATS: 5
NUMBER OF LONGEST SUPRAVENTRICULAR BEATS: 5
NUMBER OF QRS COMPLEXES: NORMAL
NUMBER OF SUPRAVENTRICULAR BEATS IN RUNS: 8
NUMBER OF SUPRAVENTRICULAR COUPLETS: 3
NUMBER OF SUPRAVENTRICULAR ECTOPICS: 1097
NUMBER OF SUPRAVENTRICULAR ISOLATED BEATS: 1083
NUMBER OF SUPRAVENTRICULAR RUNS: 2
NUMBER OF VENTRICULAR BEATS IN RUNS: 0
NUMBER OF VENTRICULAR BIGEMINAL CYCLES: 0
NUMBER OF VENTRICULAR COUPLETS: 0
NUMBER OF VENTRICULAR ECTOPICS: 94
NUMBER OF VENTRICULAR ISOLATED BEATS: 94
NUMBER OF VENTRICULAR RUNS: 0

## 2020-08-24 NOTE — TELEPHONE ENCOUNTER
Called pt to explain we don't have covid results yet from 8/20 -still showing in process. Left vm stating we need to most likely move sleep study to wednesday 8/26 at . Asked for a phone call back.

## 2020-08-26 ENCOUNTER — HOSPITAL ENCOUNTER (OUTPATIENT)
Dept: SLEEP CENTER | Age: 80
Discharge: HOME | End: 2020-08-26
Payer: MEDICARE

## 2020-08-26 PROCEDURE — 95811 POLYSOM 6/>YRS CPAP 4/> PARM: CPT

## 2020-08-26 PROCEDURE — 95811 POLYSOM 6/>YRS CPAP 4/> PARM: CPT | Performed by: INTERNAL MEDICINE

## 2020-09-08 ENCOUNTER — TELEPHONE (OUTPATIENT)
Dept: PULMONOLOGY | Age: 80
End: 2020-09-08

## 2020-09-08 NOTE — PROGRESS NOTES
Tico Drewsh         : 1940 Jamar    Diagnosis: [x] AILYN (G47.33) [] CSA (G47.31) [] Apnea (G47.30)   Length of Need: [x] 12 Months [] 99 Months [] Other:    Machine (BETO!): [x] Respironics Dream Station      Auto [] ResMed AirSense     Auto [] Other:     []  CPAP () [x] Bilevel ()   Mode: [] Auto [] Spontaneous    Mode: [x] Auto [] Spontaneous                IPAP max 25 cmH2O  EPAP min 25vwB2F  PS min 3 cmH2O  PS max 7 cmH2O             Comfort Settings:   - Ramp Pressure: 5 cmH2O                                        - Ramp time: 15 min                                     -  Flex/EPR - 3 full time                                    - For ResMed Bilevel (TiMax-4 sec   TiMin- 0.2 sec)     Humidifier: [x] Heated ()        [x] Water chamber replacement ()/ 1 per 6 months        Mask:   [x] Nasal () /1 per 3 months [] Full Face () /1 per 3 months   [x] Patient choice -Size and fit mask [] Patient Choice - Size and fit mask   [] Dispense:  [] Dispense:    [x] Headgear () / 1 per 3 months [] Headgear () / 1 per 3 months   [x] Replacement Nasal Cushion ()/2 per month [] Interface Replacement ()/1 per month   [] Replacement Nasal Pillows ()/2 per month         Tubing: [x] Heated ()/1 per 3 months    [] Standard ()/1 per 3 months [] Other:           Filters: [x] Non-disposable ()/1 per 6 months     [x] Ultra-Fine, Disposable ()/2 per month        Miscellaneous: [] Chin Strap ()/ 1 per 6 months [] O2 bleed-in:       LPM   [] Oximetry on CPAP/Bilevel []  Other:    [x] Modem: ()         Start Order Date: 20    MEDICAL JUSTIFICATION:  I, the undersigned, certify that the above prescribed supplies are medically necessary for this patients wellbeing. In my opinion, the supplies are both reasonable and necessary in reference to accepted standards of medicalpractice in treatment of this patients condition.     [facility],

## 2020-09-10 ENCOUNTER — OFFICE VISIT (OUTPATIENT)
Dept: PRIMARY CARE CLINIC | Age: 80
End: 2020-09-10

## 2020-09-10 PROCEDURE — 99211 OFF/OP EST MAY X REQ PHY/QHP: CPT | Performed by: NURSE PRACTITIONER

## 2020-09-10 NOTE — PROGRESS NOTES
Shahram Lewis received a viral test for COVID-19. They were educated on isolation and quarantine as appropriate. For any symptoms, they were directed to seek care from their PCP, given contact information to establish with a doctor, directed to an urgent care or the emergency room.

## 2020-09-10 NOTE — PATIENT INSTRUCTIONS

## 2020-09-12 LAB — SARS-COV-2, NAA: NOT DETECTED

## 2020-10-05 ENCOUNTER — HOSPITAL ENCOUNTER (OUTPATIENT)
Dept: MAMMOGRAPHY | Age: 80
Discharge: HOME | End: 2020-10-05
Payer: MEDICARE

## 2020-10-05 PROCEDURE — 77067 SCR MAMMO BI INCL CAD: CPT

## 2020-10-26 ENCOUNTER — TELEPHONE (OUTPATIENT)
Dept: FAMILY MEDICINE CLINIC | Age: 80
End: 2020-10-26

## 2020-10-26 NOTE — TELEPHONE ENCOUNTER
Patient calling today stating she is having SOB off and on x 2-3 weeks      Denies pain in chest  Denies fever  Denies nausea  Activity does not increase SOB    Scheduled appointment for patient    She was advised if symptoms increase she is to go to the ED. She voiced understanding.

## 2020-10-27 ENCOUNTER — OFFICE VISIT (OUTPATIENT)
Dept: FAMILY MEDICINE CLINIC | Age: 80
End: 2020-10-27
Payer: MEDICARE

## 2020-10-27 VITALS
RESPIRATION RATE: 18 BRPM | BODY MASS INDEX: 36.56 KG/M2 | TEMPERATURE: 97.6 F | WEIGHT: 181 LBS | OXYGEN SATURATION: 97 % | HEART RATE: 96 BPM | DIASTOLIC BLOOD PRESSURE: 80 MMHG | SYSTOLIC BLOOD PRESSURE: 148 MMHG

## 2020-10-27 PROCEDURE — 93000 ELECTROCARDIOGRAM COMPLETE: CPT | Performed by: FAMILY MEDICINE

## 2020-10-27 PROCEDURE — 1090F PRES/ABSN URINE INCON ASSESS: CPT | Performed by: FAMILY MEDICINE

## 2020-10-27 PROCEDURE — G8399 PT W/DXA RESULTS DOCUMENT: HCPCS | Performed by: FAMILY MEDICINE

## 2020-10-27 PROCEDURE — 1036F TOBACCO NON-USER: CPT | Performed by: FAMILY MEDICINE

## 2020-10-27 PROCEDURE — 1123F ACP DISCUSS/DSCN MKR DOCD: CPT | Performed by: FAMILY MEDICINE

## 2020-10-27 PROCEDURE — G8484 FLU IMMUNIZE NO ADMIN: HCPCS | Performed by: FAMILY MEDICINE

## 2020-10-27 PROCEDURE — 4040F PNEUMOC VAC/ADMIN/RCVD: CPT | Performed by: FAMILY MEDICINE

## 2020-10-27 PROCEDURE — G8427 DOCREV CUR MEDS BY ELIG CLIN: HCPCS | Performed by: FAMILY MEDICINE

## 2020-10-27 PROCEDURE — G8417 CALC BMI ABV UP PARAM F/U: HCPCS | Performed by: FAMILY MEDICINE

## 2020-10-27 PROCEDURE — 99214 OFFICE O/P EST MOD 30 MIN: CPT | Performed by: FAMILY MEDICINE

## 2020-10-27 RX ORDER — FUROSEMIDE 20 MG/1
40 TABLET ORAL DAILY
Qty: 180 TABLET | Refills: 1
Start: 2020-10-27 | End: 2021-06-11

## 2020-10-27 RX ORDER — BUPROPION HYDROCHLORIDE 150 MG/1
150 TABLET ORAL EVERY MORNING
Qty: 30 TABLET | Refills: 5 | Status: SHIPPED | OUTPATIENT
Start: 2020-10-27 | End: 2020-12-10 | Stop reason: ALTCHOICE

## 2020-10-27 NOTE — PROGRESS NOTES
Subjective:      Patient ID: Brittany Chand [de-identified] y.o. female. The primary encounter diagnosis was Dyspnea on exertion. Diagnoses of Glucose intolerance (impaired glucose tolerance) and Benign essential hypertension were also pertinent to this visit. GERALDO Su complains of dyspnea on exertion for a few months. Gradual onset. Not getting worse. She has no cough, chest pain, palpitations, light headed or dizzy. She has pedal edema that is worse at night, gone in the am - stable for few years. She has no PND, orthopnea. She has no melena. She has gained weight with COVID. She is not doing any exercise. Has trouble walking due to back pain. Is not complaint   She is using BiPAP x 3 weeks. She feels she is sleeping better. Sleeping 5 to 7 hours. She still has some sleepiness in the afternoon. She admits to being depressed with loss of her , Aileen, moving. Is eating more carbs. Not suicidal.  Is seeing a therapist.           Patient Number     [id number]         Date of Birth       1940      Visit Number       [id number]          Age                 [de-identified] year(s)      Accession Number   [id number]          Room Number         Outpatient      Corporate ID                     Sonographer         Darien Lagos      Ordering Physician Sam Gonzalez MD                 Physician           Shelia Guzman MD     Procedure     Type of Study      TTE procedure:ECHOCARDIOGRAM COMPLETE 2D W DOPPLER W COLOR.      Procedure Date  Date: 07/01/2020 Start: 01:47 PM     Study Location: [address] [facility]  Technical Quality: Adequate visualization     Indications:Edema and Hypertension.     Additional Indications:Diastolic Function.     Patient Status: Routine     Height: 59 inches Weight: 168 pounds BSA: 1.71 m2 BMI: 33.93 kg/m2     HR: 80 bpm BP: 128/80 mmHg      Conclusions      Summary   Concentric left ventricular hypertrophy. Normal left ventricular systolic function with an estimate ejection fraction   of 60-65%. There are no regional wall motion abnormalities. Normal diastolic function. Trace mitral and tricuspid regurgitation. Estimated pulmonary artery systolic pressure is normal 23 mmHg assuming a   right atrial pressure of 3 mmHg. Signature      ------------------------------------------------------------------   Electronically signed by Bill Melvin MD (Interpreting   physician) on 07/01/2020 at 03:00 PM   ------------------------------------------------------------------      Findings      Left Ventricle   Concentric left ventricular hypertrophy. Normal left ventricular systolic function with an estimate ejection fraction   of 60-65%. There are no regional wall motion abnormalities. Normal diastolic function. Mitral Valve   There is mitral annular calcification. Trace mitral regurgitation. No evidence of mitral stenosis. Left Atrium   The left atrium appears normal in size. Aortic Valve   The aortic valve appears normal in structure and function. The aortic valve appears tricuspid. No evidence of aortic insufficiency or stenosis. Aorta   The aortic root appears normal in size. Right Ventricle   The right ventricle appears normal in size and function. RV S 16.6 cm/s. TAPSE 2.00 cm. Tricuspid Valve   The tricuspid valve appears structurally normal.   Trace tricuspid regurgitation. Estimated pulmonary artery systolic pressure is normal 23 mmHg assuming a   right atrial pressure of 3 mmHg. No evidence of tricuspid stenosis. Right Atrium   The right atrium appears normal in size. Pulmonic Valve   The pulmonic valve is not well visualized. Pericardial Effusion   No evidence of any pericardial and pleural effusion. Miscellaneous   Inferior vena cava appears normal.     M-Mode/2D Measurements (cm)      LV Diastolic Dimension: 3.9 cm LV Systolic Dimension: 7.13 cm   LV Septum Diastolic: 7.71 cm   LV PW Diastolic: 8.67 cm       AO Root Dimension: 2.7 cm                                  LA Dimension: 3.3 cm                                  LA Area: 10.8 cm2                                  LA volume/Index: 23.3 ml /14 ml/m2     Doppler Measurements                                     MV Peak E-Wave: 61.1 cm/s                                  MV Peak A-Wave: 105 cm/s                                  MV E/A Ratio: 0.58   TR Velocity:247 cm/s   TR Gradient:24.4 mmHg   Estimated RAP:3 mmHg   Estimated RVSP: 23 mmHg   E' Septal Velocity: 4.24 cm/s  MV Deceleration Time: 280 msec   E' Lateral Velocity: 5.55 cm/s   E/E' ratio: 11     Aorta      Aortic Root: 2.7 cm    Lab Results   Component Value Date     06/08/2020    K 3.7 06/08/2020     06/08/2020    CO2 30 06/08/2020    BUN 20 06/08/2020    CREATININE 0.8 06/08/2020    GLUCOSE 98 06/08/2020    CALCIUM 9.4 06/08/2020    PROT 6.8 06/08/2020    LABALBU 4.5 06/08/2020    BILITOT 0.5 06/08/2020    ALKPHOS 74 06/08/2020    AST 17 06/08/2020    ALT 17 06/08/2020    LABGLOM >60 06/08/2020    GFRAA >60 06/08/2020    AGRATIO 2.0 06/08/2020    GLOB 2.3 06/08/2020       Lab Results   Component Value Date    WBC 8.3 06/08/2020    HGB 13.5 06/08/2020    HCT 41.6 06/08/2020    MCV 88.9 06/08/2020     06/08/2020     TSH   Date Value Ref Range Status   06/08/2020 3.68 0.27 - 4.20 uIU/mL Final   01/15/2020 3.87 0.27 - 4.20 uIU/mL Final   01/09/2019 3.37 0.27 - 4.20 uIU/mL Final   04/27/2017 2.38 0.40 - 4.50 mIU/L Final   01/21/2016 1.16 0.40 - 4.50 mIU/L Final   06/24/2013 3.27 0.40 - 4.50 mIU/L Final     Lab Results   Component Value Date    CHOL 163 06/08/2020    CHOL 177 01/15/2020    CHOL 164 01/09/2019     Lab Results   Component Value Date    TRIG 108 06/08/2020    TRIG 191 (H) 01/15/2020    TRIG 99 01/09/2019     Lab Results   Component Value Date    HDL 58 06/08/2020    HDL 60 01/15/2020    HDL 61 (H) 01/09/2019     Lab Results   Component Value Date    LDLCALC 83 06/08/2020    LDLCALC 79 01/15/2020    LDLCALC 83 01/09/2019     Lab Results   Component Value Date    LABVLDL 22 06/08/2020    LABVLDL 38 01/15/2020    LABVLDL 20 01/09/2019     Lab Results   Component Value Date    CHOLHDLRATIO 2.9 04/27/2017    CHOLHDLRATIO 2.9 01/21/2016    CHOLHDLRATIO 2.9 11/13/2013     Lab Results   Component Value Date    LABA1C 6.0 06/08/2020     Lab Results   Component Value Date    .5 06/08/2020          Outpatient Medications Marked as Taking for the 10/27/20 encounter (Office Visit) with Sara Tubbs MD   Medication Sig Dispense Refill    mirabegron (MYRBETRIQ) 50 MG TB24 Take 50 mg by mouth daily      potassium chloride (KLOR-CON M) 10 MEQ extended release tablet Take 1 tablet by mouth 2 times daily 180 tablet 1    furosemide (LASIX) 20 MG tablet Take 1-2 tablets by mouth daily 180 tablet 1    rosuvastatin (CRESTOR) 20 MG tablet Take 1 tablet by mouth daily 90 tablet 3    buPROPion (WELLBUTRIN XL) 300 MG extended release tablet Take 1 tablet by mouth every morning 90 tablet 3    DULoxetine (CYMBALTA) 60 MG extended release capsule Take 1 capsule by mouth daily 90 capsule 3    amLODIPine (NORVASC) 10 MG tablet Take 1 tablet by mouth daily 90 tablet 3    pantoprazole (PROTONIX) 40 MG tablet Take 1 tablet by mouth daily 90 tablet 3    methylcellulose (CITRUCEL) oral powder Take 2 g by mouth daily Take by mouth daily.  calcium citrate-vitamin D (CALCIUM CITRATE + D3) 315-250 MG-UNIT TABS per tablet Take 1 tablet by mouth daily (with breakfast) 120 tablet     Alpha-D-Galactosidase (BEANO PO) Take  by mouth.           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 [facility]    Renal angiolipoma    Lumbar spondylosis    Lumbar stenosis    Trochanteric bursitis of right hip    Glucose intolerance (impaired glucose tolerance)    Hypercalcemia    Memory loss       Past Medical History:   Diagnosis Date    Acute esophagitis     h/o    Acute hearing loss of right ear 7/12/2016    AVN (avascular necrosis of bone) ([facility] 1/23/2014    Hip      Benign essential hypertension     Cervical stenosis of spine     Colon polyps     Depressive disorder, not elsewhere classified     Essential familial hypercholesterolemia     Fatigue     Fracture of cuboid, right ankle, closed 5/2013    Lumbar herniated disc     Lumbar radiculopathy 6/9/2015    Osteopenia        Past Surgical History:   Procedure Laterality Date    APPENDECTOMY      BREAST SURGERY      reduction    HYSTERECTOMY      JOINT REPLACEMENT Left 4/2014    hip    KNEE SURGERY      left    LAMINECTOMY      decompressive more than 2 lumbar segments    TONSILLECTOMY          Family History   Problem Relation Age of Onset    Other Mother         lung disease    Heart Disease Father        Social History     Tobacco Use    Smoking status: Never Smoker    Smokeless tobacco: Never Used   Substance Use Topics    Alcohol use: No    Drug use: No            Review of Systems  Review of Systems    Objective:   Physical Exam  Vitals:    10/27/20 0931 10/27/20 0947   BP: (!) 148/83 (!) 148/80   Pulse: 96    Resp: 18    Temp: 97.6 °F (36.4 °C)    TempSrc: Temporal    SpO2: 97%    Weight: 181 lb (82.1 kg)      Wt Readings from Last 3 Encounters:   10/27/20 181 lb (82.1 kg)   06/08/20 167 lb (75.8 kg)   12/27/19 167 lb 8 oz (76 kg)        Physical Exam  NAD  Skin is warm and dry. Well hydrated, no rash. No respiratory distress. The neck is supple and free of adenopathy or masses, the thyroid is normal without enlargement or nodules. Chest is clear, no wheezing or rales. Normal symmetric air entry throughout both lung fields. Heart regular with normal rate, no murmer or gallop   PMI is not displaced. No heave or thrill. The abdomen is soft without tenderness, guarding, mass, rebound or organomegaly. Bowel sounds are normal.  Aorta, femoral, DP and PT pulses intact. Ext 1+ pedal edema. Assessment:       Diagnosis Orders   1. Dyspnea on exertion  EKG 12 Lead    CT CARDIAC CALCIUM SCORING    CBC WITH AUTO DIFFERENTIAL  Likely weight related but rule out CAD. Weight loss strategies addressed. 2. Benign essential hypertension  Basic Metabolic Panel    furosemide (LASIX) 20 MG tablet  Not at goal < 130/80   Increase Lasix to 40 mg qd. Plan:      Side effects of current medications reviewed and questions answered. Follow up in 3 months or prn. Call or return to clinic prn if these symptoms worsen or fail to improve as anticipated.

## 2020-11-10 DIAGNOSIS — I10 BENIGN ESSENTIAL HYPERTENSION: Chronic | ICD-10-CM

## 2020-11-10 DIAGNOSIS — R06.09 DYSPNEA ON EXERTION: ICD-10-CM

## 2020-11-10 LAB
ANION GAP SERPL CALCULATED.3IONS-SCNC: 14 MMOL/L (ref 3–16)
BASOPHILS ABSOLUTE: 0 K/UL (ref 0–0.2)
BASOPHILS RELATIVE PERCENT: 0.2 %
BUN BLDV-MCNC: 32 MG/DL (ref 7–20)
CALCIUM SERPL-MCNC: 10.8 MG/DL (ref 8.3–10.6)
CHLORIDE BLD-SCNC: 97 MMOL/L (ref 99–110)
CO2: 29 MMOL/L (ref 21–32)
CREAT SERPL-MCNC: 0.9 MG/DL (ref 0.6–1.2)
EOSINOPHILS ABSOLUTE: 0 K/UL (ref 0–0.6)
EOSINOPHILS RELATIVE PERCENT: 0.1 %
GFR AFRICAN AMERICAN: >60
GFR NON-AFRICAN AMERICAN: >60
GLUCOSE BLD-MCNC: 101 MG/DL (ref 70–99)
HCT VFR BLD CALC: 42.6 % (ref 36–48)
HEMOGLOBIN: 13.6 G/DL (ref 12–16)
LYMPHOCYTES ABSOLUTE: 1.2 K/UL (ref 1–5.1)
LYMPHOCYTES RELATIVE PERCENT: 10.5 %
MCH RBC QN AUTO: 27.9 PG (ref 26–34)
MCHC RBC AUTO-ENTMCNC: 31.9 G/DL (ref 31–36)
MCV RBC AUTO: 87.4 FL (ref 80–100)
MONOCYTES ABSOLUTE: 1.3 K/UL (ref 0–1.3)
MONOCYTES RELATIVE PERCENT: 11.3 %
NEUTROPHILS ABSOLUTE: 9.2 K/UL (ref 1.7–7.7)
NEUTROPHILS RELATIVE PERCENT: 77.9 %
PDW BLD-RTO: 14.1 % (ref 12.4–15.4)
PLATELET # BLD: 363 K/UL (ref 135–450)
PMV BLD AUTO: 9.3 FL (ref 5–10.5)
POTASSIUM SERPL-SCNC: 5 MMOL/L (ref 3.5–5.1)
RBC # BLD: 4.88 M/UL (ref 4–5.2)
SODIUM BLD-SCNC: 140 MMOL/L (ref 136–145)
WBC # BLD: 11.8 K/UL (ref 4–11)

## 2020-11-12 ENCOUNTER — VIRTUAL VISIT (OUTPATIENT)
Dept: PULMONOLOGY | Age: 80
End: 2020-11-12
Payer: MEDICARE

## 2020-11-12 PROBLEM — E66.9 CLASS 2 OBESITY WITHOUT SERIOUS COMORBIDITY WITH BODY MASS INDEX (BMI) OF 36.0 TO 36.9 IN ADULT: Status: ACTIVE | Noted: 2020-11-12

## 2020-11-12 PROBLEM — G47.33 OSA (OBSTRUCTIVE SLEEP APNEA): Status: ACTIVE | Noted: 2020-11-12

## 2020-11-12 PROBLEM — E66.812 CLASS 2 OBESITY WITHOUT SERIOUS COMORBIDITY WITH BODY MASS INDEX (BMI) OF 36.0 TO 36.9 IN ADULT: Status: ACTIVE | Noted: 2020-11-12

## 2020-11-12 PROCEDURE — G8399 PT W/DXA RESULTS DOCUMENT: HCPCS | Performed by: NURSE PRACTITIONER

## 2020-11-12 PROCEDURE — 99214 OFFICE O/P EST MOD 30 MIN: CPT | Performed by: NURSE PRACTITIONER

## 2020-11-12 PROCEDURE — 4040F PNEUMOC VAC/ADMIN/RCVD: CPT | Performed by: NURSE PRACTITIONER

## 2020-11-12 PROCEDURE — G8427 DOCREV CUR MEDS BY ELIG CLIN: HCPCS | Performed by: NURSE PRACTITIONER

## 2020-11-12 PROCEDURE — 1123F ACP DISCUSS/DSCN MKR DOCD: CPT | Performed by: NURSE PRACTITIONER

## 2020-11-12 PROCEDURE — 1090F PRES/ABSN URINE INCON ASSESS: CPT | Performed by: NURSE PRACTITIONER

## 2020-11-12 ASSESSMENT — SLEEP AND FATIGUE QUESTIONNAIRES
HOW LIKELY ARE YOU TO NOD OFF OR FALL ASLEEP WHILE WATCHING TV: 3
HOW LIKELY ARE YOU TO NOD OFF OR FALL ASLEEP IN A CAR, WHILE STOPPED FOR A FEW MINUTES IN TRAFFIC: 0
HOW LIKELY ARE YOU TO NOD OFF OR FALL ASLEEP WHILE SITTING QUIETLY AFTER LUNCH WITHOUT ALCOHOL: 0
HOW LIKELY ARE YOU TO NOD OFF OR FALL ASLEEP WHILE SITTING AND READING: 3
HOW LIKELY ARE YOU TO NOD OFF OR FALL ASLEEP WHILE SITTING INACTIVE IN A PUBLIC PLACE: 0
HOW LIKELY ARE YOU TO NOD OFF OR FALL ASLEEP WHEN YOU ARE A PASSENGER IN A CAR FOR AN HOUR WITHOUT A BREAK: 0
HOW LIKELY ARE YOU TO NOD OFF OR FALL ASLEEP WHILE LYING DOWN TO REST IN THE AFTERNOON WHEN CIRCUMSTANCES PERMIT: 3
HOW LIKELY ARE YOU TO NOD OFF OR FALL ASLEEP WHILE SITTING AND TALKING TO SOMEONE: 0
ESS TOTAL SCORE: 9

## 2020-11-12 NOTE — PROGRESS NOTES
Samella Lennox MD, FAASM, [facility]  Yon Martins, MSN, RN, CNP     [address] [facility] SLEEP MEDICINE  Formerly [facility] [address] [address]  Dept: [phone]  Dept Fax: [phone]: [facility]  [address] [place] [id number] [phone]    Subjective:     Patient ID: Allan Lambert is a [de-identified] y.o. female. Chief Complaint   Patient presents with    Sleep Apnea       HPI:      Sleep Medicine Video Visit    Pursuant to the emergency declaration under the [facility], [facility] waiver authority and the [employer] Act this Telephone Visit was insisted, with patient's consent, to reduce the patient's risk of exposure to COVID-19 and provide continuity of care for an established patient. Services were provided through a synchronous discussion over a telephone and/or Video chat to substitute for in-person clinic visit, and coded as such. While patient is at home. Machine Modem/Download Info:  Compliance (hours/night): 5.6 hrs/night  Download AHI (/hour): 0.5 /HR     Average IPAP Pressure: 15 cmH2O  Average EPAP Pressure: 11 cmH2O         AUTO BIPAP - Settings (Ambrosio)  IPAP Max: 25 cmH2O  EPAP Min: 11 cmH2O  Pressure Support Min: 3  Pressure Support Max: 7             Comfort Settings  Humidity Level (0-8): 3  Flex/EPR (0-3): 3       [place] - Total score: 9    Follow-up :     Last Visit : July 2020    PSG done on 8/4/2020 with AHI 9.9 low O2 90% no time less then 90% consistent with mild obstructive sleep apnea    Titration study done on 8/26/2020      Patient reports the listed chronic Co-morbidities: HTN, Obesity    are well controlled and stable at this time.      Subjective Health Changes: None      Over Night Oximetry: [] Yes  [] No  [x] NA [] WNL   Using O2: [] Yes  [] No  [x] NA   Patient is compliant with the machine  [x] Yes  [] No   Feeling rested when using the machine   [x] Yes  [] No     Pressure is comfortable with inspiration and expiration  [x] Yes  [] No     Noticed changes in pressure   [] Yes  [] No  [] NA   Mask is fitting well  [x] Yes  [] No   Noting Mask Air Leak  [] Yes  [x] No   Having painful Aerophagia  [] Yes  [x] No   Nocturia   1  per night. Having  HA upon waking  [] Yes  [x] No   Dry mouth upon waking   Dry Nose  Dry Eyes  [] Yes  [x] No   Congestion upon waking   [] Yes  [x] No    Nose Bleeds  [] Yes  [x] No   Using Sleep Aides    [x] NA   Understands how to change humidification and/or tubing temperature for comfort while at home  [x] Yes  [] No     Difficulties falling asleep  [] Yes  [x] No   Difficulties staying asleep  [] Yes  [x] No   Approximate time to bed  11:30pm-1am   Approximate wake time  5am   Taking Naps  occasional   If taking naps usual length  60 minutes   If taking naps using the machine  [] Yes  [x] No  [] NA [] With and With out    Drowsy when driving  [] Yes  [x] No     Does patient carry a DOT/CDL  [] Yes  [x] No     Does patient carry FAA/Pilots License   [] Yes  [x] No      Any concerns noted with the machine at this time  [] Yes  [x] No        Diagnosis Orders   1. AILYN (obstructive sleep apnea)     2. Benign essential hypertension     3. Class 2 obesity without serious comorbidity with body mass index (BMI) of 36.0 to 36.9 in adult, unspecified obesity type         The chronic medical conditions listed are directly related to the primary diagnosis listed above. The management of the primary diagnosis affects the secondary diagnosis and vice versa. Assessment/Plan:     Benign essential hypertension  Chronic- Stable. Cont meds per PCP and other physicians. AILYN (obstructive sleep apnea)  New with treatment     Reviewed compliance download with pt. Supplies and parts as needed for her machine. These are medically necessary.   Continue medications per her PCP and other physicians. Limit caffeine use after 3pm.  Encouraged her to work on weight loss through diet and exercise. Diagnoses of Benign essential hypertension and AILYN (obstructive sleep apnea) were pertinent to this visit. The chronic medical conditions listed are directly related to the primary diagnosis listed above. The management of the primary diagnosis affects the secondary diagnosis and vice versa. Class 2 obesity without serious comorbidity with body mass index (BMI) of 36.0 to 36.9 in adult  Chronic-Stable. Encouraged her to work on weight loss through diet and exercise. The primary encounter diagnosis was AILYN (obstructive sleep apnea). Diagnoses of Benign essential hypertension and Class 2 obesity without serious comorbidity with body mass index (BMI) of 36.0 to 36.9 in adult, unspecified obesity type were also pertinent to this visit. The chronic medical conditions listed are directly related to the primary diagnosis listed above. The management of the primary diagnosis affects the secondary diagnosis and vice versa. - Educated patient and reviewed compliance download with pt.    -Supplies and parts as needed for her machine, these are medically necessary.    - Patient using [employer] for supplies  -Continue medications per her PCP and other physicians.   -Limit caffeine use after 3pm.    -Encouraged her to work on weight loss through diet and exercise. -  Patient able to access video feed. Visit completed via video chat communications. 25 min spent with patient.   - Education on napping with the machine   -F/U: 3 month. No orders of the defined types were placed in this encounter. No orders of the defined types were placed in this encounter. No orders of the defined types were placed in this encounter.       Slime Thrasher, MSN, RN, CNP

## 2020-11-12 NOTE — ASSESSMENT & PLAN NOTE
New with treatment     Reviewed compliance download with pt. Supplies and parts as needed for her machine. These are medically necessary. Continue medications per her PCP and other physicians. Limit caffeine use after 3pm.  Encouraged her to work on weight loss through diet and exercise. Diagnoses of Benign essential hypertension and AILYN (obstructive sleep apnea) were pertinent to this visit. The chronic medical conditions listed are directly related to the primary diagnosis listed above. The management of the primary diagnosis affects the secondary diagnosis and vice versa.

## 2020-11-18 ENCOUNTER — PATIENT MESSAGE (OUTPATIENT)
Dept: FAMILY MEDICINE CLINIC | Age: 80
End: 2020-11-18

## 2020-11-18 NOTE — TELEPHONE ENCOUNTER
From: Aly Sotelo  To: Osbaldo Robert MD  Sent: 11/18/2020 12:16 PM EST  Subject: Test Results Question    Dr. Tiffanie Schmidt, last week on your order I had blood work done at the [facility] facility on [address] in [place]. I have not received the results back from you. Did you get them?   Kate Bliss

## 2020-12-10 ENCOUNTER — VIRTUAL VISIT (OUTPATIENT)
Dept: FAMILY MEDICINE CLINIC | Age: 80
End: 2020-12-10
Payer: MEDICARE

## 2020-12-10 PROCEDURE — G8427 DOCREV CUR MEDS BY ELIG CLIN: HCPCS | Performed by: FAMILY MEDICINE

## 2020-12-10 PROCEDURE — 1123F ACP DISCUSS/DSCN MKR DOCD: CPT | Performed by: FAMILY MEDICINE

## 2020-12-10 PROCEDURE — 99214 OFFICE O/P EST MOD 30 MIN: CPT | Performed by: FAMILY MEDICINE

## 2020-12-10 PROCEDURE — 4040F PNEUMOC VAC/ADMIN/RCVD: CPT | Performed by: FAMILY MEDICINE

## 2020-12-10 PROCEDURE — G8399 PT W/DXA RESULTS DOCUMENT: HCPCS | Performed by: FAMILY MEDICINE

## 2020-12-10 PROCEDURE — 1090F PRES/ABSN URINE INCON ASSESS: CPT | Performed by: FAMILY MEDICINE

## 2020-12-10 RX ORDER — BUSPIRONE HYDROCHLORIDE 10 MG/1
5-20 TABLET ORAL 2 TIMES DAILY PRN
Qty: 60 TABLET | Refills: 2 | Status: SHIPPED | OUTPATIENT
Start: 2020-12-10 | End: 2021-06-09

## 2020-12-10 NOTE — LETTER
[address],[facility] 100  Phone: [phone]  Fax: [phone]    Ricky Pickens MD        December 10, 2020     Patient: Annemarie Cuenca   YOB: 1940   Date of Visit: 12/10/2020       To Whom It May Concern: It is my medical opinion that Irwin Purvis requires a disability parking placard for the following reasons:  She has limited walking ability due to an arthritic condition. Duration of need: 5 years    If you have any questions or concerns, please don't hesitate to call.     Sincerely,        Ricky Pickens MD

## 2020-12-11 ENCOUNTER — TELEPHONE (OUTPATIENT)
Dept: FAMILY MEDICINE CLINIC | Age: 80
End: 2020-12-11

## 2020-12-11 NOTE — TELEPHONE ENCOUNTER
Mailed letter from Dr. Tiffanie Schmidt requesting a handicap placard for the patient to patient's home address.

## 2021-01-27 ENCOUNTER — HOSPITAL ENCOUNTER (OUTPATIENT)
Dept: CT IMAGING | Age: 81
Discharge: HOME | End: 2021-01-27
Payer: MEDICARE

## 2021-01-27 DIAGNOSIS — R06.09 DYSPNEA ON EXERTION: ICD-10-CM

## 2021-01-27 PROCEDURE — 75571 CT HRT W/O DYE W/CA TEST: CPT

## 2021-02-17 ENCOUNTER — VIRTUAL VISIT (OUTPATIENT)
Dept: PULMONOLOGY | Age: 81
End: 2021-02-17
Payer: MEDICARE

## 2021-02-17 DIAGNOSIS — I10 BENIGN ESSENTIAL HYPERTENSION: Chronic | ICD-10-CM

## 2021-02-17 DIAGNOSIS — G47.33 OSA (OBSTRUCTIVE SLEEP APNEA): Primary | ICD-10-CM

## 2021-02-17 DIAGNOSIS — E66.09 CLASS 2 OBESITY DUE TO EXCESS CALORIES WITHOUT SERIOUS COMORBIDITY WITH BODY MASS INDEX (BMI) OF 36.0 TO 36.9 IN ADULT: ICD-10-CM

## 2021-02-17 PROCEDURE — G8427 DOCREV CUR MEDS BY ELIG CLIN: HCPCS | Performed by: NURSE PRACTITIONER

## 2021-02-17 PROCEDURE — G8399 PT W/DXA RESULTS DOCUMENT: HCPCS | Performed by: NURSE PRACTITIONER

## 2021-02-17 PROCEDURE — 1090F PRES/ABSN URINE INCON ASSESS: CPT | Performed by: NURSE PRACTITIONER

## 2021-02-17 PROCEDURE — 4040F PNEUMOC VAC/ADMIN/RCVD: CPT | Performed by: NURSE PRACTITIONER

## 2021-02-17 PROCEDURE — 99214 OFFICE O/P EST MOD 30 MIN: CPT | Performed by: NURSE PRACTITIONER

## 2021-02-17 PROCEDURE — 1123F ACP DISCUSS/DSCN MKR DOCD: CPT | Performed by: NURSE PRACTITIONER

## 2021-02-17 ASSESSMENT — SLEEP AND FATIGUE QUESTIONNAIRES
HOW LIKELY ARE YOU TO NOD OFF OR FALL ASLEEP WHILE LYING DOWN TO REST IN THE AFTERNOON WHEN CIRCUMSTANCES PERMIT: 0
HOW LIKELY ARE YOU TO NOD OFF OR FALL ASLEEP WHEN YOU ARE A PASSENGER IN A CAR FOR AN HOUR WITHOUT A BREAK: 0
HOW LIKELY ARE YOU TO NOD OFF OR FALL ASLEEP WHILE SITTING AND READING: 0
HOW LIKELY ARE YOU TO NOD OFF OR FALL ASLEEP WHILE SITTING AND TALKING TO SOMEONE: 0
HOW LIKELY ARE YOU TO NOD OFF OR FALL ASLEEP WHILE WATCHING TV: 0

## 2021-02-17 NOTE — PROGRESS NOTES
Nalini Mu         : 1940    Diagnosis: [x] AILYN (G47.33) [] CSA (G47.31) [] Apnea (G47.30)   Length of Need: [x] 12 Months [] 99 Months [] Other:    Machine (BETO!): [x] Respironics Dream Station      Auto [] ResMed AirSense     Auto [] Other:     []  CPAP () [] Bilevel ()   Mode: [] Auto [] Spontaneous    Mode: [] Auto [] Spontaneous                            Comfort Settings:   - Ramp Pressure:  cmH2O                                        - Ramp time: 15 min                                     -  Flex/EPR - 3 full time                                    - For ResMed Bilevel (TiMax-4 sec   TiMin- 0.2 sec)     Humidifier: [x] Heated ()        [x] Water chamber replacement ()/ 1 per 6 months        Mask:   [x] Nasal () /1 per 3 months [] Full Face () /1 per 3 months   [x] Patient choice -Size and fit mask [] Patient Choice - Size and fit mask   [] Dispense:  [] Dispense:    [x] Headgear () / 1 per 3 months [] Headgear () / 1 per 3 months   [x] Replacement Nasal Cushion ()/2 per month [] Interface Replacement ()/1 per month   [] Replacement Nasal Pillows ()/2 per month         Tubing: [x] Heated ()/1 per 3 months    [] Standard ()/1 per 3 months [] Other:           Filters: [x] Non-disposable ()/1 per 6 months     [x] Ultra-Fine, Disposable ()/2 per month        Miscellaneous: [] Chin Strap ()/ 1 per 6 months [] O2 bleed-in:       LPM   [] Oximetry on CPAP/Bilevel []  Other:    [x] Modem: ()         Start Order Date: 21    MEDICAL JUSTIFICATION:  I, the undersigned, certify that the above prescribed supplies are medically necessary for this patients wellbeing. In my opinion, the supplies are both reasonable and necessary in reference to accepted standards of medicalpractice in treatment of this patients condition.     DOREEN Fuller - CNP      NPI: [id number]       Order Signed Date: 21    Electronically signed by DOREEN Fuller CNP on 2021 at 12:03 PM    Nalini Leonard  1940  [address]  [phone] (home)   [phone] (mobile)      Insurance Info (confirm with patient if correct):  Payor/Plan Subscr  Sex Relation Sub.  Ins. ID Effective Group Num

## 2021-02-17 NOTE — PROGRESS NOTES
Kaity Santizo MD, FAASM, [facility]  Melissa Pride, MSN, RN, CNP     [address] [facility] SLEEP MEDICINE  [place] [address] [address] [address]  Dept: [phone]  Dept Fax: [phone]: [facility]  [address] [place] [address] [phone]    Subjective:     Patient ID: Daun Spurling is a 80 y.o. female. Chief Complaint   Patient presents with    Sleep Apnea       HPI:      Sleep Medicine Video and Telephone Visit    Pursuant to the emergency declaration under the [facility], [facility] waiver authority and the [facility] Appropriations Act this Telephone Visit was insisted, with patient's consent, to reduce the patient's risk of exposure to COVID-19 and provide continuity of care for an established patient. Services were provided through a synchronous discussion over a telephone and/or Video chat to substitute for in-person clinic visit, and coded as such. While patient is at home.       Machine Modem/Download Info:  Compliance (hours/night): 5.6 hrs/night  Download AHI (/hour): 0.7 /HR     Average IPAP Pressure: 15.7 cmH2O  Average EPAP Pressure: 11 cmH2O         AUTO BIPAP - Settings (Ambrosio)  IPAP Max: 25 cmH2O  EPAP Min: 11 cmH2O  Pressure Support Min: 3  Pressure Support Max: 7             Comfort Settings  Humidity Level (0-8): 3  Flex/EPR (0-3): 2 PAP Mask  Mask Type: Nasal mask  Clinically Relevant Leak: No     [place] - Total score: 0    Follow-up :     Last Visit : November 2020      Subjective Health Changes: None       Over Night Oximetry: [] Yes  [] No  [x] NA [] WNL   Using O2: [] Yes  [] No  [x] NA   Patient is compliant with the machine  [x] Yes  [] No [] Per patient   Feeling rested when using the machine   [x] Yes  [] No Pressure is comfortable with inspiration and expiration  [x] Yes  [] No ([x] NA [] Feeling of suffocation [] Feeling like not enough air [] To much pressure )     Noticed changes in pressure   [] Yes  [] No  [x] NA   Mask is fitting well  [x] Yes  [] No   Noting Mask Air Leak  [] Yes  [x] No   Having painful Aerophagia  [] Yes  [x] No   Nocturia   2-3  per night. Having  HA upon waking  [] Yes  [x] No   Dry mouth upon waking   Dry Nose  Dry Eyes  [x] Yes  [] No   Congestion upon waking   [] Yes  [x] No    Nose Bleeds  [] Yes  [x] No   Using Sleep Aides  Melatonin when Anxious    [] Per our office [] Per another provider  [] NA   Understands how to change humidification and/or tubing temperature for comfort while at home  [x] Yes  [] No     Difficulties falling asleep  [] Yes  [x] No   Difficulties staying asleep  [] Yes  [x] No   Approximate time to bed  11pm-12:30am   Approximate wake time  8-9am   Taking Naps  no   If taking naps usual length    [x] NA   If taking naps using the machine  [] Yes  [] No  [x] NA [] With and With out    Drowsy when driving  [] Yes  [x] No     Does patient carry a DOT/CDL  [] Yes  [x] No     Does patient carry FAA/[employer] License   [] Yes  [x] No      Any concerns noted with the machine at this time  [] Yes  [x] No             Assessment/Plan:     Class 2 obesity without serious comorbidity with body mass index (BMI) of 36.0 to 36.9 in adult  Chronic-Stable. Encouraged her to work on weight loss through diet and exercise. Benign essential hypertension  Chronic- Stable. Cont meds per PCP and other physicians. AILYN (obstructive sleep apnea)  Reviewed compliance download with pt. Supplies and parts as needed for his machine. These are medically necessary. Continue medications per his PCP and other physicians. Limit caffeine use after 3pm.    The chronic medical conditions listed are directly related to the primary diagnosis listed above.

## 2021-03-08 ENCOUNTER — PATIENT MESSAGE (OUTPATIENT)
Dept: FAMILY MEDICINE CLINIC | Age: 81
End: 2021-03-08

## 2021-03-08 NOTE — TELEPHONE ENCOUNTER
From: Dre Cook  To: Mary Armstrong MD  Sent: 3/8/2021 9:42 AM EST  Subject: Prescription Question    Last week I messaged I need refills for the following scripts. I did not hear back from you or [facility] that they got   the renewal scripts. I need renewals for Pantaprozole 40 mg   Amlodipine 10 mg   Duloxetine 60 mg   Rouvatatin 20 mg    Please renew these today or let me know if you cannot.   I deal with Evita Gross - [phone]  Thank you, Mitzy Leonard

## 2021-03-08 NOTE — TELEPHONE ENCOUNTER
From: Morris Toscano  To: Cassandra Anaya MD  Sent: 3/8/2021 12:01 PM EST  Subject: Prescription Question    Thank you. also asked for Duloxetine 60 mg. Please send that in also.   Thank you, Ludivina Perez

## 2021-03-21 ENCOUNTER — PATIENT MESSAGE (OUTPATIENT)
Dept: FAMILY MEDICINE CLINIC | Age: 81
End: 2021-03-21

## 2021-03-22 DIAGNOSIS — E78.01 ESSENTIAL FAMILIAL HYPERCHOLESTEROLEMIA: ICD-10-CM

## 2021-03-22 DIAGNOSIS — R41.3 MEMORY LOSS: ICD-10-CM

## 2021-03-22 DIAGNOSIS — I10 BENIGN ESSENTIAL HYPERTENSION: Chronic | ICD-10-CM

## 2021-03-22 DIAGNOSIS — G47.33 OSA (OBSTRUCTIVE SLEEP APNEA): Primary | ICD-10-CM

## 2021-03-22 DIAGNOSIS — E66.09 CLASS 2 OBESITY DUE TO EXCESS CALORIES WITHOUT SERIOUS COMORBIDITY WITH BODY MASS INDEX (BMI) OF 36.0 TO 36.9 IN ADULT: ICD-10-CM
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
## 2021-03-24 ENCOUNTER — TELEPHONE (OUTPATIENT)
Dept: FAMILY MEDICINE CLINIC | Age: 81
End: 2021-03-24

## 2021-03-25 NOTE — TELEPHONE ENCOUNTER
The first time was May 2020, then   Dec 2020 and then March 21. Do you still want me to get a monitor? Abigail All        Which doctor at [address] [place] [address] [address]?   Abigail All      Please advise

## 2021-04-01 ENCOUNTER — OFFICE VISIT (OUTPATIENT)
Dept: PRIMARY CARE CLINIC | Age: 81
End: 2021-04-01
Payer: MEDICARE

## 2021-04-01 DIAGNOSIS — Z01.818 PREOP EXAMINATION: Primary | ICD-10-CM

## 2021-04-01 LAB — SARS-COV-2: NOT DETECTED

## 2021-04-01 PROCEDURE — G8417 CALC BMI ABV UP PARAM F/U: HCPCS | Performed by: NURSE PRACTITIONER

## 2021-04-01 PROCEDURE — 99211 OFF/OP EST MAY X REQ PHY/QHP: CPT | Performed by: NURSE PRACTITIONER

## 2021-04-01 PROCEDURE — G8428 CUR MEDS NOT DOCUMENT: HCPCS | Performed by: NURSE PRACTITIONER

## 2021-04-06 ENCOUNTER — HOSPITAL ENCOUNTER (OUTPATIENT)
Dept: SLEEP CENTER | Age: 81
Discharge: HOME | End: 2021-04-06
Payer: MEDICARE

## 2021-04-06 DIAGNOSIS — G47.33 OSA (OBSTRUCTIVE SLEEP APNEA): ICD-10-CM

## 2021-04-06 DIAGNOSIS — E66.09 CLASS 2 OBESITY DUE TO EXCESS CALORIES WITHOUT SERIOUS COMORBIDITY WITH BODY MASS INDEX (BMI) OF 36.0 TO 36.9 IN ADULT: ICD-10-CM

## 2021-04-06 DIAGNOSIS — I10 BENIGN ESSENTIAL HYPERTENSION: Chronic | ICD-10-CM

## 2021-04-06 DIAGNOSIS — R41.3 MEMORY LOSS: ICD-10-CM

## 2021-04-06 DIAGNOSIS — E78.01 ESSENTIAL FAMILIAL HYPERCHOLESTEROLEMIA: ICD-10-CM

## 2021-04-06 PROCEDURE — 95811 POLYSOM 6/>YRS CPAP 4/> PARM: CPT | Performed by: INTERNAL MEDICINE

## 2021-04-06 PROCEDURE — 95811 POLYSOM 6/>YRS CPAP 4/> PARM: CPT

## 2021-04-08 ENCOUNTER — TELEPHONE (OUTPATIENT)
Dept: PULMONOLOGY | Age: 81
End: 2021-04-08

## 2021-04-27 ENCOUNTER — HOSPITAL ENCOUNTER (OUTPATIENT)
Dept: MRI IMAGING | Age: 81
Discharge: HOME | End: 2021-04-27
Payer: MEDICARE

## 2021-04-27 DIAGNOSIS — G47.33 OSA (OBSTRUCTIVE SLEEP APNEA): ICD-10-CM

## 2021-04-27 DIAGNOSIS — R40.20 LOSS OF CONSCIOUSNESS (HCC): ICD-10-CM

## 2021-04-27 PROCEDURE — 70551 MRI BRAIN STEM W/O DYE: CPT

## 2021-06-09 ENCOUNTER — VIRTUAL VISIT (OUTPATIENT)
Dept: PULMONOLOGY | Age: 81
End: 2021-06-09
Payer: MEDICARE

## 2021-06-09 DIAGNOSIS — G47.33 OSA (OBSTRUCTIVE SLEEP APNEA): Primary | ICD-10-CM

## 2021-06-09 DIAGNOSIS — I10 BENIGN ESSENTIAL HYPERTENSION: Chronic | ICD-10-CM

## 2021-06-09 DIAGNOSIS — E66.09 CLASS 2 OBESITY DUE TO EXCESS CALORIES WITHOUT SERIOUS COMORBIDITY WITH BODY MASS INDEX (BMI) OF 36.0 TO 36.9 IN ADULT: ICD-10-CM

## 2021-06-09 PROCEDURE — 1090F PRES/ABSN URINE INCON ASSESS: CPT | Performed by: NURSE PRACTITIONER

## 2021-06-09 PROCEDURE — 1123F ACP DISCUSS/DSCN MKR DOCD: CPT | Performed by: NURSE PRACTITIONER

## 2021-06-09 PROCEDURE — 99213 OFFICE O/P EST LOW 20 MIN: CPT | Performed by: NURSE PRACTITIONER

## 2021-06-09 PROCEDURE — 4040F PNEUMOC VAC/ADMIN/RCVD: CPT | Performed by: NURSE PRACTITIONER

## 2021-06-09 PROCEDURE — G8427 DOCREV CUR MEDS BY ELIG CLIN: HCPCS | Performed by: NURSE PRACTITIONER

## 2021-06-09 PROCEDURE — G8399 PT W/DXA RESULTS DOCUMENT: HCPCS | Performed by: NURSE PRACTITIONER

## 2021-06-09 ASSESSMENT — SLEEP AND FATIGUE QUESTIONNAIRES
ESS TOTAL SCORE: 7
HOW LIKELY ARE YOU TO NOD OFF OR FALL ASLEEP WHEN YOU ARE A PASSENGER IN A CAR FOR AN HOUR WITHOUT A BREAK: 0
HOW LIKELY ARE YOU TO NOD OFF OR FALL ASLEEP WHILE SITTING INACTIVE IN A PUBLIC PLACE: 0
HOW LIKELY ARE YOU TO NOD OFF OR FALL ASLEEP WHILE SITTING AND TALKING TO SOMEONE: 0
HOW LIKELY ARE YOU TO NOD OFF OR FALL ASLEEP WHILE LYING DOWN TO REST IN THE AFTERNOON WHEN CIRCUMSTANCES PERMIT: 3
HOW LIKELY ARE YOU TO NOD OFF OR FALL ASLEEP IN A CAR, WHILE STOPPED FOR A FEW MINUTES IN TRAFFIC: 0
HOW LIKELY ARE YOU TO NOD OFF OR FALL ASLEEP WHILE SITTING AND READING: 1
HOW LIKELY ARE YOU TO NOD OFF OR FALL ASLEEP WHILE WATCHING TV: 1
HOW LIKELY ARE YOU TO NOD OFF OR FALL ASLEEP WHILE SITTING QUIETLY AFTER LUNCH WITHOUT ALCOHOL: 2

## 2021-06-09 NOTE — PROGRESS NOTES
Alycia Brar MD, FAASM, [facility]  Julián Bolaños, MSN, RN, CNP     [address] [facility] SLEEP MEDICINE  [place] [address] [address] [address]  Dept: [phone]  Dept Fax: [phone]  Loc: [phone]    Subjective:     Patient ID: Courtney Patricio is a 80 y.o. female. Chief Complaint   Patient presents with    Sleep Apnea       HPI:      Sleep Medicine Video Visit    Pursuant to the emergency declaration under the [address] [place] [address], [facility] waiver authority and the [employer] Act this Telephone Visit was insisted, with patient's consent, to reduce the patient's risk of exposure to COVID-19 and provide continuity of care for an established patient. Services were provided through a synchronous discussion over a telephone and/or Video chat to substitute for in-person clinic visit, and coded as such. While patient is at home.     Machine Modem/Download Info:  Compliance (hours/night): 6.4 hrs/night  Download AHI (/hour): 1 /HR     Average IPAP Pressure: 19.4 cmH2O  Average EPAP Pressure: 13 cmH2O         AUTO BIPAP - Settings (Ambrosio)  IPAP Max: 25 cmH2O  EPAP Min: 13 cmH2O  Pressure Support Min: 5  Pressure Support Max: 8             Comfort Settings  Humidity Level (0-8): 3  Flex/EPR (0-3): 2 PAP Mask  Mask Type: Nasal mask  Clinically Relevant Leak: Yes     [place] - Total score: 7    Follow-up :     Last Visit : February 2021    Titration 4/6/2021      Subjective Health Changes: None      Over Night Oximetry: [] Yes  [] No  [x] NA [] WNL   Using O2: [] Yes  [] No  [x] NA   Patient is compliant with the machine  [x] Yes  [] No [] Per patient   Feeling rested when using the machine   [x] Yes  [] No     Pressure is comfortable with inspiration and expiration  [x] Yes  [] No   ([x] NA   [] Feeling of suffocation  [] Feeling like not enough air    [] To much pressure)     Noticed changes in pressure  [] NA  [] Yes    [x] No     Mask is fitting well  [x] Yes  [] No   Noting Mask Air Leak  [] Yes  [x] No   Having painful Aerophagia  [] Yes  [x] No   Nocturia   1  per night. Having  HA upon waking  [] Yes  [x] No   Dry mouth upon waking   Dry Nose  Dry Eyes  [x] Yes  [] No   Congestion upon waking   [] Yes  [x] No    Nose Bleeds  [] Yes  [x] No   Using Sleep Aides  [x] NA  [] OTC  [] Per our office   [] Per another provider   Understands how to change humidification and/or tubing temperature for comfort while at home  [x] Yes  [] No     Difficulties falling asleep  [] Yes  [x] No   Difficulties staying asleep  [] Yes  [x] No   Approximate time to bed  11:30pm-12am   Approximate wake time  8-8:30am   Taking Naps  no   If taking naps usual length  [x] NA   If taking naps using the machine [x] NA  [] Yes    [] No    [] With and With out    Drowsy when driving  [] Yes  [x] No     Does patient carry a DOT/CDL  [] Yes  [x] No     Does patient carry FAA/[employer]ots License   [] Yes  [x] No      Any concerns noted with the machine at this time  [] Yes  [x] No       Assessment/Plan:   1. AILYN (obstructive sleep apnea)  Assessment & Plan:  After downloading data and reviewing  Reviewed compliance download with pt. Supplies and parts as needed for his machine. These are medically necessary. Continue medications per his PCP and other physicians. Limit caffeine use after 3pm.    The chronic medical conditions listed are directly related to the primary diagnosis listed above. The management of the primary diagnosis affects the secondary diagnosis and vice versa    Patient is complaint encouraged to maintain compliance to aide on controlling other stated healthcare concerns. 2. Class 2 obesity due to excess calories without serious comorbidity with body mass index (BMI) of 36.0 to 36.9 in adult  Assessment & Plan:  Patient encouraged to work on maintaining a healthy weight per height.   Achievable with diet restriction/modifications and exercise (may consult primary care if unsure of any restrictions or concerns). Weight management directly correlates to risk of control and maintenance of Obstructive Sleep Apnea. 3. Benign essential hypertension  Assessment & Plan:  Chronic- stable. After speaking with patient:    Agree with current plan, and would agree to continue this plan per prescribing and managing physician. - After pulling data and reviewing it   - Reviewed compliance download with patient    -Medically necessary supplies and parts as needed for her machine.   - Continue medications per his primary care provider and other physicians.   - Encouraged to limit caffeine use after 3pm.    - Encouraged her to work on weight loss through diet and exercise  - Educated not to drive when feeling sleepy   - Patient using [address] [place] [address]  Machine function   Compliance  Follow up  After speaking to the patient, patient is currently stable. We will continue with the current machine settings  Continuing with [employer]     The chronic medical conditions listed are directly related to the primary diagnosis listed above. The management of the primary diagnosis affects the secondary diagnosis and vice versa.     - Will follow up in off in 12 months    Electronically signed by  French Lyn, MSN, RN, CNP on 6/9/2021 at 10:56 AM

## 2021-06-11 DIAGNOSIS — I10 BENIGN ESSENTIAL HYPERTENSION: Chronic | ICD-10-CM

## 2021-06-11 RX ORDER — FUROSEMIDE 20 MG/1
TABLET ORAL
Qty: 180 TABLET | Refills: 0 | Status: SHIPPED | OUTPATIENT
Start: 2021-06-11 | End: 2021-09-22 | Stop reason: SDUPTHER

## 2021-06-11 RX ORDER — OXYBUTYNIN CHLORIDE 10 MG/1
TABLET, EXTENDED RELEASE ORAL
Qty: 90 TABLET | Refills: 2 | Status: SHIPPED | OUTPATIENT
Start: 2021-06-11 | End: 2021-09-22 | Stop reason: SDUPTHER

## 2021-07-13 ENCOUNTER — TELEPHONE (OUTPATIENT)
Dept: FAMILY MEDICINE CLINIC | Age: 81
End: 2021-07-13

## 2021-07-13 PROBLEM — E83.52 HYPERCALCEMIA: Status: RESOLVED | Noted: 2018-07-27 | Resolved: 2021-07-13

## 2021-07-14 ENCOUNTER — OFFICE VISIT (OUTPATIENT)
Dept: FAMILY MEDICINE CLINIC | Age: 81
End: 2021-07-14
Payer: MEDICARE

## 2021-07-14 VITALS
BODY MASS INDEX: 35.92 KG/M2 | DIASTOLIC BLOOD PRESSURE: 72 MMHG | TEMPERATURE: 97.4 F | HEIGHT: 59 IN | SYSTOLIC BLOOD PRESSURE: 136 MMHG | OXYGEN SATURATION: 96 % | HEART RATE: 91 BPM | WEIGHT: 178.2 LBS | RESPIRATION RATE: 18 BRPM

## 2021-07-14 DIAGNOSIS — E78.01 ESSENTIAL FAMILIAL HYPERCHOLESTEROLEMIA: ICD-10-CM

## 2021-07-14 DIAGNOSIS — G47.33 OSA (OBSTRUCTIVE SLEEP APNEA): ICD-10-CM

## 2021-07-14 DIAGNOSIS — R41.3 MEMORY LOSS: ICD-10-CM

## 2021-07-14 DIAGNOSIS — I10 BENIGN ESSENTIAL HYPERTENSION: Primary | Chronic | ICD-10-CM

## 2021-07-14 DIAGNOSIS — E66.01 SEVERE OBESITY (BMI 35.0-35.9 WITH COMORBIDITY) (HCC): ICD-10-CM

## 2021-07-14 DIAGNOSIS — F33.42 RECURRENT MAJOR DEPRESSIVE DISORDER, IN FULL REMISSION (HCC): ICD-10-CM

## 2021-07-14 DIAGNOSIS — R73.02 GLUCOSE INTOLERANCE (IMPAIRED GLUCOSE TOLERANCE): ICD-10-CM

## 2021-07-14 DIAGNOSIS — E72.12 METHYLENETETRAHYDROFOLATE REDUCTASE DEFICIENCY (HCC): ICD-10-CM

## 2021-07-14 DIAGNOSIS — R60.0 PEDAL EDEMA: ICD-10-CM

## 2021-07-14 PROCEDURE — 1090F PRES/ABSN URINE INCON ASSESS: CPT | Performed by: FAMILY MEDICINE

## 2021-07-14 PROCEDURE — G8417 CALC BMI ABV UP PARAM F/U: HCPCS | Performed by: FAMILY MEDICINE

## 2021-07-14 PROCEDURE — 1036F TOBACCO NON-USER: CPT | Performed by: FAMILY MEDICINE

## 2021-07-14 PROCEDURE — G8399 PT W/DXA RESULTS DOCUMENT: HCPCS | Performed by: FAMILY MEDICINE

## 2021-07-14 PROCEDURE — 4040F PNEUMOC VAC/ADMIN/RCVD: CPT | Performed by: FAMILY MEDICINE

## 2021-07-14 PROCEDURE — G8427 DOCREV CUR MEDS BY ELIG CLIN: HCPCS | Performed by: FAMILY MEDICINE

## 2021-07-14 PROCEDURE — 1123F ACP DISCUSS/DSCN MKR DOCD: CPT | Performed by: FAMILY MEDICINE

## 2021-07-14 PROCEDURE — 99214 OFFICE O/P EST MOD 30 MIN: CPT | Performed by: FAMILY MEDICINE

## 2021-07-14 RX ORDER — DUPILUMAB 300 MG/2ML
INJECTION, SOLUTION SUBCUTANEOUS
COMMUNITY
End: 2022-07-18 | Stop reason: ALTCHOICE

## 2021-07-14 SDOH — ECONOMIC STABILITY: FOOD INSECURITY: WITHIN THE PAST 12 MONTHS, THE FOOD YOU BOUGHT JUST DIDN'T LAST AND YOU DIDN'T HAVE MONEY TO GET MORE.: NEVER TRUE

## 2021-07-14 SDOH — ECONOMIC STABILITY: FOOD INSECURITY: WITHIN THE PAST 12 MONTHS, YOU WORRIED THAT YOUR FOOD WOULD RUN OUT BEFORE YOU GOT MONEY TO BUY MORE.: NEVER TRUE

## 2021-07-14 ASSESSMENT — SOCIAL DETERMINANTS OF HEALTH (SDOH): HOW HARD IS IT FOR YOU TO PAY FOR THE VERY BASICS LIKE FOOD, HOUSING, MEDICAL CARE, AND HEATING?: NOT HARD AT ALL
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
## 2021-07-19 DIAGNOSIS — R73.02 GLUCOSE INTOLERANCE (IMPAIRED GLUCOSE TOLERANCE): ICD-10-CM

## 2021-07-19 DIAGNOSIS — R41.3 MEMORY LOSS: ICD-10-CM

## 2021-07-19 DIAGNOSIS — I10 BENIGN ESSENTIAL HYPERTENSION: Chronic | ICD-10-CM

## 2021-07-19 DIAGNOSIS — E78.01 ESSENTIAL FAMILIAL HYPERCHOLESTEROLEMIA: ICD-10-CM

## 2021-07-19 LAB
A/G RATIO: 1.7 (ref 1.1–2.2)
ALBUMIN SERPL-MCNC: 4.6 G/DL (ref 3.4–5)
ALP BLD-CCNC: 81 U/L (ref 40–129)
ALT SERPL-CCNC: 17 U/L (ref 10–40)
ANION GAP SERPL CALCULATED.3IONS-SCNC: 12 MMOL/L (ref 3–16)
AST SERPL-CCNC: 17 U/L (ref 15–37)
BILIRUB SERPL-MCNC: 0.4 MG/DL (ref 0–1)
BUN BLDV-MCNC: 20 MG/DL (ref 7–20)
CALCIUM SERPL-MCNC: 10.1 MG/DL (ref 8.3–10.6)
CHLORIDE BLD-SCNC: 100 MMOL/L (ref 99–110)
CHOLESTEROL, TOTAL: 162 MG/DL (ref 0–199)
CO2: 31 MMOL/L (ref 21–32)
CREAT SERPL-MCNC: 0.9 MG/DL (ref 0.6–1.2)
GFR AFRICAN AMERICAN: >60
GFR NON-AFRICAN AMERICAN: >60
GLOBULIN: 2.7 G/DL
GLUCOSE BLD-MCNC: 94 MG/DL (ref 70–99)
HCT VFR BLD CALC: 40.4 % (ref 36–48)
HDLC SERPL-MCNC: 56 MG/DL (ref 40–60)
HEMOGLOBIN: 13.3 G/DL (ref 12–16)
LDL CHOLESTEROL CALCULATED: 81 MG/DL
MCH RBC QN AUTO: 28.9 PG (ref 26–34)
MCHC RBC AUTO-ENTMCNC: 32.9 G/DL (ref 31–36)
MCV RBC AUTO: 87.8 FL (ref 80–100)
PDW BLD-RTO: 14.6 % (ref 12.4–15.4)
PLATELET # BLD: 231 K/UL (ref 135–450)
PMV BLD AUTO: 10.2 FL (ref 5–10.5)
POTASSIUM SERPL-SCNC: 3.8 MMOL/L (ref 3.5–5.1)
RBC # BLD: 4.59 M/UL (ref 4–5.2)
SODIUM BLD-SCNC: 143 MMOL/L (ref 136–145)
TOTAL PROTEIN: 7.3 G/DL (ref 6.4–8.2)
TRIGL SERPL-MCNC: 126 MG/DL (ref 0–150)
TSH REFLEX: 2.64 UIU/ML (ref 0.27–4.2)
VITAMIN B-12: 616 PG/ML (ref 211–911)
VLDLC SERPL CALC-MCNC: 25 MG/DL
WBC # BLD: 10.5 K/UL (ref 4–11)

## 2021-07-20 LAB
ESTIMATED AVERAGE GLUCOSE: 128.4 MG/DL
HBA1C MFR BLD: 6.1 %

## 2021-09-15 ASSESSMENT — LIFESTYLE VARIABLES
HAVE YOU OR SOMEONE ELSE BEEN INJURED AS A RESULT OF YOUR DRINKING: NO
HOW OFTEN DURING THE LAST YEAR HAVE YOU FAILED TO DO WHAT WAS NORMALLY EXPECTED FROM YOU BECAUSE OF DRINKING: NEVER
HOW MANY STANDARD DRINKS CONTAINING ALCOHOL DO YOU HAVE ON A TYPICAL DAY: 0
HAS A RELATIVE, FRIEND, DOCTOR, OR ANOTHER HEALTH PROFESSIONAL EXPRESSED CONCERN ABOUT YOUR DRINKING OR SUGGESTED YOU CUT DOWN: NO
HOW OFTEN DURING THE LAST YEAR HAVE YOU NEEDED AN ALCOHOLIC DRINK FIRST THING IN THE MORNING TO GET YOURSELF GOING AFTER A NIGHT OF HEAVY DRINKING: NEVER
HOW OFTEN DURING THE LAST YEAR HAVE YOU FOUND THAT YOU WERE NOT ABLE TO STOP DRINKING ONCE YOU HAD STARTED: 0
HOW OFTEN DO YOU HAVE A DRINK CONTAINING ALCOHOL: 1
AUDIT-C TOTAL SCORE: 0
HOW OFTEN DURING THE LAST YEAR HAVE YOU HAD A FEELING OF GUILT OR REMORSE AFTER DRINKING: 0
HOW OFTEN DO YOU HAVE A DRINK CONTAINING ALCOHOL: MONTHLY OR LESS
HOW OFTEN DURING THE LAST YEAR HAVE YOU BEEN UNABLE TO REMEMBER WHAT HAPPENED THE NIGHT BEFORE BECAUSE YOU HAD BEEN DRINKING: NEVER
HOW OFTEN DURING THE LAST YEAR HAVE YOU FOUND THAT YOU WERE NOT ABLE TO STOP DRINKING ONCE YOU HAD STARTED: NEVER
HOW MANY STANDARD DRINKS CONTAINING ALCOHOL DO YOU HAVE ON A TYPICAL DAY: ONE OR TWO
HOW OFTEN DURING THE LAST YEAR HAVE YOU NEEDED AN ALCOHOLIC DRINK FIRST THING IN THE MORNING TO GET YOURSELF GOING AFTER A NIGHT OF HEAVY DRINKING: 0
HOW OFTEN DURING THE LAST YEAR HAVE YOU FAILED TO DO WHAT WAS NORMALLY EXPECTED FROM YOU BECAUSE OF DRINKING: 0
AUDIT TOTAL SCORE: 0
HOW OFTEN DURING THE LAST YEAR HAVE YOU HAD A FEELING OF GUILT OR REMORSE AFTER DRINKING: NEVER
HAS A RELATIVE, FRIEND, DOCTOR, OR ANOTHER HEALTH PROFESSIONAL EXPRESSED CONCERN ABOUT YOUR DRINKING OR SUGGESTED YOU CUT DOWN: 0
HAVE YOU OR SOMEONE ELSE BEEN INJURED AS A RESULT OF YOUR DRINKING: 0
HOW OFTEN DO YOU HAVE SIX OR MORE DRINKS ON ONE OCCASION: 0
AUDIT TOTAL SCORE: 1
HOW OFTEN DURING THE LAST YEAR HAVE YOU BEEN UNABLE TO REMEMBER WHAT HAPPENED THE NIGHT BEFORE BECAUSE YOU HAD BEEN DRINKING: 0
HOW OFTEN DO YOU HAVE SIX OR MORE DRINKS ON ONE OCCASION: NEVER
AUDIT-C TOTAL SCORE: 1

## 2021-09-15 ASSESSMENT — PATIENT HEALTH QUESTIONNAIRE - PHQ9
SUM OF ALL RESPONSES TO PHQ QUESTIONS 1-9: 2
SUM OF ALL RESPONSES TO PHQ9 QUESTIONS 1 & 2: 2
SUM OF ALL RESPONSES TO PHQ QUESTIONS 1-9: 2
1. LITTLE INTEREST OR PLEASURE IN DOING THINGS: 1
2. FEELING DOWN, DEPRESSED OR HOPELESS: 1
SUM OF ALL RESPONSES TO PHQ QUESTIONS 1-9: 2

## 2021-09-22 ENCOUNTER — OFFICE VISIT (OUTPATIENT)
Dept: FAMILY MEDICINE CLINIC | Age: 81
End: 2021-09-22
Payer: MEDICARE

## 2021-09-22 VITALS
RESPIRATION RATE: 16 BRPM | WEIGHT: 178.6 LBS | SYSTOLIC BLOOD PRESSURE: 154 MMHG | DIASTOLIC BLOOD PRESSURE: 90 MMHG | HEIGHT: 59 IN | BODY MASS INDEX: 36 KG/M2 | HEART RATE: 96 BPM | TEMPERATURE: 97.1 F | OXYGEN SATURATION: 93 %

## 2021-09-22 DIAGNOSIS — R07.9 CHEST PAIN, UNSPECIFIED TYPE: ICD-10-CM

## 2021-09-22 DIAGNOSIS — M48.061 SPINAL STENOSIS OF LUMBAR REGION WITHOUT NEUROGENIC CLAUDICATION: ICD-10-CM

## 2021-09-22 DIAGNOSIS — I10 BENIGN ESSENTIAL HYPERTENSION: ICD-10-CM

## 2021-09-22 DIAGNOSIS — F33.42 RECURRENT MAJOR DEPRESSIVE DISORDER, IN FULL REMISSION (HCC): ICD-10-CM

## 2021-09-22 DIAGNOSIS — E78.01 ESSENTIAL FAMILIAL HYPERCHOLESTEROLEMIA: ICD-10-CM

## 2021-09-22 DIAGNOSIS — Z00.00 ROUTINE GENERAL MEDICAL EXAMINATION AT A HEALTH CARE FACILITY: Primary | ICD-10-CM

## 2021-09-22 DIAGNOSIS — Z23 NEED FOR INFLUENZA VACCINATION: ICD-10-CM

## 2021-09-22 PROCEDURE — 90694 VACC AIIV4 NO PRSRV 0.5ML IM: CPT | Performed by: FAMILY MEDICINE

## 2021-09-22 PROCEDURE — 93000 ELECTROCARDIOGRAM COMPLETE: CPT | Performed by: FAMILY MEDICINE

## 2021-09-22 PROCEDURE — 4040F PNEUMOC VAC/ADMIN/RCVD: CPT | Performed by: FAMILY MEDICINE

## 2021-09-22 PROCEDURE — G8399 PT W/DXA RESULTS DOCUMENT: HCPCS | Performed by: FAMILY MEDICINE

## 2021-09-22 PROCEDURE — 1123F ACP DISCUSS/DSCN MKR DOCD: CPT | Performed by: FAMILY MEDICINE

## 2021-09-22 PROCEDURE — 99214 OFFICE O/P EST MOD 30 MIN: CPT | Performed by: FAMILY MEDICINE

## 2021-09-22 PROCEDURE — 1036F TOBACCO NON-USER: CPT | Performed by: FAMILY MEDICINE

## 2021-09-22 PROCEDURE — G0438 PPPS, INITIAL VISIT: HCPCS | Performed by: FAMILY MEDICINE

## 2021-09-22 PROCEDURE — G8417 CALC BMI ABV UP PARAM F/U: HCPCS | Performed by: FAMILY MEDICINE

## 2021-09-22 PROCEDURE — 1090F PRES/ABSN URINE INCON ASSESS: CPT | Performed by: FAMILY MEDICINE

## 2021-09-22 PROCEDURE — G0008 ADMIN INFLUENZA VIRUS VAC: HCPCS | Performed by: FAMILY MEDICINE

## 2021-09-22 PROCEDURE — G8427 DOCREV CUR MEDS BY ELIG CLIN: HCPCS | Performed by: FAMILY MEDICINE

## 2021-09-22 RX ORDER — POLYETHYLENE GLYCOL 3350 17 G/17G
17 POWDER, FOR SOLUTION ORAL DAILY
COMMUNITY

## 2021-09-22 RX ORDER — PANTOPRAZOLE SODIUM 40 MG/1
40 TABLET, DELAYED RELEASE ORAL DAILY
Qty: 90 TABLET | Refills: 2 | Status: SHIPPED | OUTPATIENT
Start: 2021-09-22 | End: 2021-11-16 | Stop reason: SDUPTHER

## 2021-09-22 RX ORDER — OXYBUTYNIN CHLORIDE 10 MG/1
10 TABLET, EXTENDED RELEASE ORAL DAILY
Qty: 90 TABLET | Refills: 1 | Status: SHIPPED | OUTPATIENT
Start: 2021-09-22 | End: 2022-05-04

## 2021-09-22 RX ORDER — FLUOXETINE HYDROCHLORIDE 20 MG/1
20 CAPSULE ORAL DAILY
Qty: 30 CAPSULE | Refills: 2 | Status: SHIPPED | OUTPATIENT
Start: 2021-09-22 | End: 2021-11-16

## 2021-09-22 RX ORDER — FUROSEMIDE 20 MG/1
TABLET ORAL
Qty: 180 TABLET | Refills: 1 | Status: SHIPPED | OUTPATIENT
Start: 2021-09-22 | End: 2022-02-07

## 2021-09-22 RX ORDER — ROSUVASTATIN CALCIUM 20 MG/1
20 TABLET, COATED ORAL DAILY
Qty: 90 TABLET | Refills: 2 | Status: SHIPPED | OUTPATIENT
Start: 2021-09-22 | End: 2021-12-14 | Stop reason: SDUPTHER

## 2021-09-22 NOTE — PATIENT INSTRUCTIONS
are high in fat, salt, sugar, or calories, limit how often you eat them. Eat smaller servings, or look for healthy substitutes. Do watch what you eat  Many people eat more than their bodies need. Part of staying at a healthy weight means learning how much food you really need from day to day and not eating more than that. Even with healthy foods, eating too much can make you gain weight. Having a well-balanced diet means that you eat enough, but not too much, and that your food gives you the nutrients you need to stay healthy. So listen to your body. Eat when you're hungry. Stop when you feel satisfied. It's a good idea to have healthy snacks ready for when you get hungry. Keep healthy snacks with you at work, in your car, and at home. If you have a healthy snack easily available, you'll be less likely to pick a candy bar or bag of chips from a vending machine instead. Some healthy snacks you might want to keep on hand are fruit, low-fat yogurt, string cheese, low-fat microwave popcorn, raisins and other dried fruit, nuts, whole wheat crackers, pretzels, carrots, celery sticks, and broccoli. Do some physical activity  A big part of reaching and staying at a healthy weight is being active. When you're active, you burn calories. This makes it easier to reach and stay at a healthy weight. When you're active on a regular basis, your body burns more calories, even when you're at rest. Being active helps you lose fat and build lean muscle. Try to be active for at least 1 hour every day. This may sound like a lot, but it's okay to be active in smaller blocks of time that add up to 1 hour a day. Any activity that makes your heart beat faster and keeps it there for a while counts. A brisk walk, run, or swim will get your heart beating faster. So will climbing stairs, shooting baskets, or cycling. Even some household chores like vacuuming and mowing the lawn will get your heart rate up.   Pick activities that you enjoyones that make your heart beat faster, your muscles stronger, and your muscles and joints more flexible. If you find more than one thing you like doing, do them all. You don't have to do the same thing every day. Don't diet  Diets don't work. Diets are temporary. Because you give up so much when you diet, you may be hungry and think about food all the time. And after you stop dieting, you also may overeat to make up for what you missed. Most people who diet end up gaining back the pounds they lostand more. Remember that healthy bodies come in lots of shapes and sizes. Everyone can get healthier by eating better and being more active. Where can you learn more? Go to https://[employer]. org and sign in to your [employer] account. Enter 172 [id number] in the [employer] box to learn more about \"Learning About Healthy Weight. \"     If you do not have an account, please click on the \"Sign Up Now\" link. Current as of: March 17, 2021               Content Version: 12.9  © 0366-2173 Healthwise, Incorporated. Care instructions adapted under license by [facility] ([facility]). If you have questions about a medical condition or this instruction, always ask your healthcare professional. [address] any warranty or liability for your use of this information. Personalized Preventive Plan for Reinaldo Marroquin - 9/22/2021  Medicare offers a range of preventive health benefits. Some of the tests and screenings are paid in full while other may be subject to a deductible, co-insurance, and/or copay. Some of these benefits include a comprehensive review of your medical history including lifestyle, illnesses that may run in your family, and various assessments and screenings as appropriate. After reviewing your medical record and screening and assessments performed today your provider may have ordered immunizations, labs, imaging, and/or referrals for you.   A list of these orders (if applicable) as well as your Preventive Care list are included within your After Visit Summary for your review. Other Preventive Recommendations:    · A preventive eye exam performed by an eye specialist is recommended every 1-2 years to screen for glaucoma; cataracts, macular degeneration, and other eye disorders. · A preventive dental visit is recommended every 6 months. · Try to get at least 150 minutes of exercise per week or 10,000 steps per day on a pedometer . · Order or download the FREE \"Exercise & Physical Activity: Your Everyday Guide\" from [employer] on [employer]. Call 7-616.655.7904 or search [employer] on A[employer] online. · You need 5075-9491 mg of calcium and 0386-1943 IU of vitamin D per day. It is possible to meet your calcium requirement with diet alone, but a vitamin D supplement is usually necessary to meet this goal.  · When exposed to the sun, use a sunscreen that protects against both UVA and UVB radiation with an SPF of 30 or greater. Reapply every 2 to 3 hours or after sweating, drying off with a towel, or swimming. · Always wear a seat belt when traveling in a car. Always wear a helmet when riding a bicycle or motorcycle.

## 2021-09-22 NOTE — PROGRESS NOTES
Medicare Annual Wellness Visit  Name: Charla Jacobsen Date: 2021   MRN: [id number] Sex: Female   Age: 80 y.o. Ethnicity: Non- / Non    : 1940 Race: White (non-)      [place] is here for Medicare AWV    Screenings for behavioral, psychosocial and functional/safety risks, and cognitive dysfunction are all negative except as indicated below. These results, as well as other patient data from the [address] form, are documented in Flowsheets linked to this Encounter. Cramps: wake her up. Legs, hands. Drinks plenty of water. Pedal edema:  Eats out all the time. Gone in the AM, worse once up. Does not add sodium to her diet. She is not taking NSAIDS. No PND, orthopnea. Dyspnea;  Gets out of breath quickly for the past 6 to 8 months. Gets winded just walking to a store, associated with tightness in the chest.   Goes away in a few minutes once she stops. Has to sit frequently. No palpitations, lightheadedness. Depression: tearful all the time. Cries very easily. Sleeps well, compliant with CPAP. Not suicidal. Appetite is fine. Wellbutrin worked for many years but stopped working. Is seeing a therapist.   Low energy. Not suicidal.  Feels lonely often. Has good friends and is active in her [employer] but has lonely moments since her  . Sons are both involved but don't give her much time. She has been on antidepressants since .         Additional Indications:Diastolic Function.     Patient Status: Routine     Height: 59 inches Weight: 168 pounds BSA: 1.71 m2 BMI: 33.93 kg/m2     HR: 80 bpm BP: 128/80 mmHg      Conclusions      Summary   Concentric left ventricular hypertrophy. Normal left ventricular systolic function with an estimate ejection fraction   of 60-65%. There are no regional wall motion abnormalities. Normal diastolic function. Trace mitral and tricuspid regurgitation.    Estimated pulmonary artery systolic pressure is normal 23 mmHg assuming a   right atrial pressure of 3 mmHg.       Signature      ------------------------------------------------------------------   Electronically signed by Marylou Love MD (Interpreting   physician) on 07/01/2020 at 03:00 PM   ------------------------------------------------------------------      Lab Results   Component Value Date     07/19/2021    K 3.8 07/19/2021     07/19/2021    CO2 31 07/19/2021    BUN 20 07/19/2021    CREATININE 0.9 07/19/2021    GLUCOSE 94 07/19/2021    GLUCOSE 93 02/06/2012    CALCIUM 10.1 07/19/2021       Lab Results   Component Value Date    WBC 10.5 07/19/2021    HGB 13.3 07/19/2021    HCT 40.4 07/19/2021    MCV 87.8 07/19/2021     07/19/2021      TSH   Date Value Ref Range Status   07/19/2021 2.64 0.27 - 4.20 uIU/mL Final   06/08/2020 3.68 0.27 - 4.20 uIU/mL Final   01/15/2020 3.87 0.27 - 4.20 uIU/mL Final   04/27/2017 2.38 0.40 - 4.50 mIU/L Final   01/21/2016 1.16 0.40 - 4.50 mIU/L Final   06/24/2013 3.27 0.40 - 4.50 mIU/L Final       Allergies   Allergen Reactions    Ace Inhibitors      cough    Latanoprost Other (See Comments)    Losartan Other (See Comments)     cough    Mammie Sorrel [Diltiazem Hcl]      Outpatient Medications Marked as Taking for the 9/22/21 encounter (Office Visit) with Minerva Aviles MD   Medication Sig Dispense Refill    polyethylene glycol (GLYCOLAX) 17 GM/SCOOP powder Take 17 g by mouth daily      Cholecalciferol (D3 PO) Take by mouth      verapamil (CALAN SR) 120 MG extended release tablet Take 1 tablet by mouth daily 90 tablet 1    Dupilumab (DUPIXENT) 300 MG/2ML SOPN Inject into the skin      furosemide (LASIX) 20 MG tablet TAKE ONE TO TWO TABLETS BY MOUTH EVERY  tablet 0    oxybutynin (DITROPAN-XL) 10 MG extended release tablet TAKE ONE TABLET BY MOUTH DAILY 90 tablet 2    rosuvastatin (CRESTOR) 20 MG tablet Take 1 tablet by mouth daily 90 tablet 2    DULoxetine (CYMBALTA) 60 MG extended release capsule Take 1 capsule by mouth daily 90 capsule 2    amLODIPine (NORVASC) 10 MG tablet Take 1 tablet by mouth daily 90 tablet 2    pantoprazole (PROTONIX) 40 MG tablet Take 1 tablet by mouth daily 90 tablet 2    methylcellulose (CITRUCEL) oral powder Take 2 g by mouth daily Take by mouth daily.  calcium citrate-vitamin D (CALCIUM CITRATE + D3) 315-250 MG-UNIT TABS per tablet Take 1 tablet by mouth daily (with breakfast) 120 tablet     Alpha-D-Galactosidase (BEANO PO) Take  by mouth.           Past Medical History:   Diagnosis Date    Acute esophagitis     h/o    Acute hearing loss of right ear 7/12/2016    AVN (avascular necrosis of bone) ([facility] [address] 1/23/2014    Hip      Benign essential hypertension     Cervical stenosis of spine     Colon polyps     Depressive disorder, not elsewhere classified     Essential familial hypercholesterolemia     Fatigue     Fracture of cuboid, right ankle, closed 5/2013    Hypercalcemia 7/27/2018    Lumbar herniated disc     Lumbar radiculopathy 6/9/2015    Osteopenia        Past Surgical History:   Procedure Laterality Date    APPENDECTOMY      BREAST SURGERY      reduction    HYSTERECTOMY      JOINT REPLACEMENT Left 4/2014    hip    KNEE SURGERY      left    LAMINECTOMY      decompressive more than 2 lumbar segments    TONSILLECTOMY         Family History   Problem Relation Age of Onset    Other Mother         lung disease    Heart Disease Father        CareTeam (Including outside providers/suppliers regularly involved in providing care):   Patient Care Team:  Zeferino Low MD as PCP - Mariely Mac MD as PCP - [facility] Empaneled Provider  DOREEN Stockton - CNP as Nurse Practitioner (Nurse Practitioner)    Wt Readings from Last 3 Encounters:   09/22/21 178 lb 9.6 oz (81 kg)   07/14/21 178 lb 3.2 oz (80.8 kg)   10/27/20 181 lb (82.1 kg)     Vitals:    09/22/21 1002   BP: (!) 154/90   Pulse: 96   Resp: 16   Temp: 97.1 °F (36.2 °C)   TempSrc: Temporal   SpO2: 93%   Weight: 178 lb 9.6 oz (81 kg)   Height: 4' 11\" (1.499 m)     Body mass index is 36.07 kg/m². Based upon direct observation of the patient, evaluation of cognition reveals recent and remote memory intact. General Appearance: alert and oriented to person, place and time, well developed and well- nourished, in no acute distress  Skin: warm and dry, no rash or erythema  Head: normocephalic and atraumatic  Eyes: pupils equal, round, and reactive to light, extraocular eye movements intact, conjunctivae normal  ENT: tympanic membrane, external ear and ear canal normal bilaterally, nose without deformity, nasal mucosa and turbinates normal without polyps  Neck: supple and non-tender without mass, no thyromegaly or thyroid nodules, no cervical lymphadenopathy  Pulmonary/Chest: clear to auscultation bilaterally- no wheezes, rales or rhonchi, normal air movement, no respiratory distress  Cardiovascular: normal rate, regular rhythm, normal S1 and S2, no murmurs, rubs, clicks, or gallops, distal pulses intact, no carotid bruits  Abdomen: soft, non-tender, non-distended, normal bowel sounds, no masses or organomegaly  Extremities: no cyanosis, clubbing. 1+ bilateral pitting edema. Musculoskeletal: normal range of motion, no joint swelling, deformity or tenderness  Neurologic: reflexes normal and symmetric, no cranial nerve deficit, gait, coordination and speech normal  Mood and affect are mildly depressed. No agitation or psychomotor retardation. No pressured speech, grandiosity or tangential thoughts. Insight and judgement are intact. Not suicidal.     Patient's complete Health Risk Assessment and screening values have been reviewed and are found in Flowsheets. The following problems were reviewed today and where indicated follow up appointments were made and/or referrals ordered.     Positive Risk Factor Screenings with Interventions:          General Health and ACP:  General  In general, how would you say your health is?: Good  In the past 7 days, have you experienced any of the following? New or Increased Pain, New or Increased Fatigue, Loneliness, Social Isolation, Stress or Anger?: (!) New or Increased Pain, Loneliness  Do you get the social and emotional support that you need?: Yes  Do you have a Living Will?: Yes  Advance Directives     Power of  Living Will ACP-Advance Directive ACP-Power of     Not on File Not on File Not on File Not on File      General Health Risk Interventions:  · see HPI.       Health Habits/Nutrition:  Health Habits/Nutrition  Do you exercise for at least 20 minutes 2-3 times per week?: (!) No  Have you lost any weight without trying in the past 3 months?: No  Do you eat only one meal per day?: No  Have you seen the dentist within the past year?: Appointment is scheduled  Body mass index: (!) 36.07  Health Habits/Nutrition Interventions:  · Inadequate physical activity:  she agrees to look in to chair exercise     Safety:  Safety  Do you have working smoke detectors?: Yes  Have all throw rugs been removed or fastened?: (!) No  Do you have non-slip mats or surfaces in all bathtubs/showers?: Yes  Do all of your stairways have a railing or banister?: Yes  Are your doorways, halls and stairs free of clutter?: Yes  Do you always fasten your seatbelt when you are in a car?: Yes  Safety Interventions:  · Home safety tips provided     Personalized Preventive Plan   Current Health Maintenance Status  Immunization History   Administered Date(s) Administered    COVID-19, Pfizer, PF, 30mcg/0.3mL 01/24/2021, 02/14/2021    Influenza Vaccine, unspecified formulation 11/02/2015    Influenza Virus Vaccine 10/01/2018    Influenza Whole 09/13/2010    Influenza, High Dose (Fluzone 65 yrs and older) 09/24/2012, 10/19/2016, 10/19/2019    Pneumococcal Conjugate 13-valent ([id number]) 07/12/2016    Pneumococcal Polysaccharide ([id number]) 10/21/2008    Pneumococcal Vaccine 10/01/2019    Td, unspecified formulation 05/22/2009    Tdap (Boostrix, Adacel) 09/19/2015    Zoster Live (Zostavax) 08/26/2007    Zoster Recombinant (Shingrix) 09/27/2019, 12/04/2019        Health Maintenance   Topic Date Due    Annual Wellness Visit (AWV)  Never done    Flu vaccine (1) 09/01/2021    Lipid screen  07/19/2022    Potassium monitoring  07/19/2022    Creatinine monitoring  07/19/2022    DTaP/Tdap/Td vaccine (2 - Td or Tdap) 09/19/2025    DEXA (modify frequency per FRAX score)  04/02/2028    Shingles Vaccine  Completed    Pneumococcal 65+ years Vaccine  Completed    COVID-19 Vaccine  Completed    Hepatitis A vaccine  Aged Out    Hepatitis B vaccine  Aged Out    Hib vaccine  Aged Out    Meningococcal (ACWY) vaccine  Aged Out     Recommendations for Gem Due: see orders and patient instructions/AVS.  . Recommended screening schedule for the next 5-10 years is provided to the patient in written form: see Patient Sami Dupree was seen today for medicare awv. EKG no acute changes. Diagnoses and all orders for this visit:    Benign essential hypertension  -     verapamil (CALAN SR) 120 MG extended release tablet; Take 1 tablet by mouth daily  -     furosemide (LASIX) 20 MG tablet; TAKE ONE TO TWO TABLETS BY MOUTH EVERY DAY  -     EKG 12 Lead  Goal < 130/80. Stop amlodipine due to edema and start Calan. Recurrent major depressive disorder, in full remission ([facility] [address]  -     FLUoxetine (PROZAC) 20 MG capsule; Take 1 capsule by mouth daily  Wean off Cymbalta and start Prozac. Weaning recommendation printed. Continue counseling. Essential familial hypercholesterolemia  -     rosuvastatin (CRESTOR) 20 MG tablet;  Take 1 tablet by mouth daily  LDL at goal < 130  Tolerating statin    Routine general medical examination at a health care facility    Need for influenza vaccination  -     Lakeisha Griffin ADJUVANTED, 65 YRS =, IM, PF, PREFILL SYR, 0.5ML (FLUAD)    Chest pain, unspecified type  -     Cardiac Stress Test - w/Pharm; Future  Rule out cardiac/angina. To ER if persistent or rest pain. Cannot do GXT due to spinal stenosis/activity limitations. Spinal stenosis of lumbar region without neurogenic claudication  -     Cardiac Stress Test - w/Pharm; Future    Other orders  -     oxybutynin (DITROPAN-XL) 10 MG extended release tablet; Take 1 tablet by mouth daily  -     pantoprazole (PROTONIX) 40 MG tablet; Take 1 tablet by mouth daily      Side effects of current medications reviewed and questions answered. Evisit in 3 weeks, in office in 4 to 6 weeks or prn. Advance Care Planning   Advanced Care Planning: Discussed the patients choices for care and treatment in case of a health event that adversely affects decision-making abilities. Also discussed the patients long-term treatment options. Reviewed with the patient the [address] [place] [address] of [address] [place] [address] Declaration forms  Reviewed the process of designating a competent adult as an Agent (or -in-fact) that could take make health care decisions for the patient if incompetent. Patient was asked to complete the declaration forms, either acknowledge the forms by a public notary or an eligible witness and provide a signed copy to the practice office. Time spent (minutes): 5  Will provide a copy    Obesity Counseling: Assessed behavioral health risks and factors affecting choice of behavior. Suggested weight control approaches, including dietary changes behavioral modification and follow up plan. Provided educational and support documentation. Time spent (minutes): 5  Cardiovascular Disease Risk Counseling: Assessed the patient's risk to develop cardiovascular disease and reviewed main risk factors.    Reviewed steps to reduce disease risk including:   · Quitting tobacco use, reducing amount smoked, or not starting the habit  · Making healthy food choices  · Being physically active and gradualy increasing activity levels   · Reduce weight and determine a healthy BMI goal  · Monitor blood pressure and treat if higher than 140/90 mmHg  · Maintain blood total cholesterol levels under 5 mmol/l or 190 mg/dl  · Maintain LDL cholesterol levels under 3.0 mmol/l or 115 mg/dl   · Control blood glucose levels  · Consider taking aspirin (75 mg daily), once blood pressure is controlled   Provided a follow up plan.   Time spent (minutes): 5

## 2021-09-28 ENCOUNTER — HOSPITAL ENCOUNTER (OUTPATIENT)
Dept: NON INVASIVE DIAGNOSTICS | Age: 81
Discharge: HOME | End: 2021-09-28
Payer: MEDICARE

## 2021-09-28 DIAGNOSIS — R07.9 CHEST PAIN, UNSPECIFIED TYPE: ICD-10-CM

## 2021-09-28 DIAGNOSIS — M48.061 SPINAL STENOSIS OF LUMBAR REGION WITHOUT NEUROGENIC CLAUDICATION: ICD-10-CM

## 2021-09-28 LAB
LV EF: 77 %
LVEF MODALITY: NORMAL

## 2021-09-28 PROCEDURE — 2580000003 HC RX 258: Performed by: FAMILY MEDICINE

## 2021-09-28 PROCEDURE — 93017 CV STRESS TEST TRACING ONLY: CPT

## 2021-09-28 PROCEDURE — 78452 HT MUSCLE IMAGE SPECT MULT: CPT

## 2021-09-28 PROCEDURE — 3430000000 HC RX DIAGNOSTIC RADIOPHARMACEUTICAL: Performed by: FAMILY MEDICINE

## 2021-09-28 PROCEDURE — A9502 TC99M TETROFOSMIN: HCPCS | Performed by: FAMILY MEDICINE

## 2021-09-28 PROCEDURE — 6360000002 HC RX W HCPCS: Performed by: FAMILY MEDICINE

## 2021-09-28 RX ORDER — SODIUM CHLORIDE 0.9 % (FLUSH) 0.9 %
10 SYRINGE (ML) INJECTION PRN
Status: DISCONTINUED | OUTPATIENT
Start: 2021-09-28 | End: 2021-09-29 | Stop reason: HOSPADM

## 2021-09-28 RX ADMIN — TETROFOSMIN 10 MILLICURIE: 1.38 INJECTION, POWDER, LYOPHILIZED, FOR SOLUTION INTRAVENOUS at 13:42

## 2021-09-28 RX ADMIN — Medication 10 ML: at 13:33

## 2021-09-28 RX ADMIN — REGADENOSON 0.4 MG: 0.08 INJECTION, SOLUTION INTRAVENOUS at 14:39

## 2021-09-28 RX ADMIN — Medication 10 ML: at 14:39

## 2021-09-28 RX ADMIN — TETROFOSMIN 30 MILLICURIE: 1.38 INJECTION, POWDER, LYOPHILIZED, FOR SOLUTION INTRAVENOUS at 14:39

## 2021-10-06 ENCOUNTER — HOSPITAL ENCOUNTER (OUTPATIENT)
Dept: MAMMOGRAPHY | Age: 81
Discharge: HOME | End: 2021-10-06
Payer: MEDICARE

## 2021-10-06 VITALS — HEIGHT: 59 IN | WEIGHT: 178 LBS | BODY MASS INDEX: 35.88 KG/M2

## 2021-10-06 DIAGNOSIS — Z12.31 VISIT FOR SCREENING MAMMOGRAM: ICD-10-CM

## 2021-10-06 PROCEDURE — 77067 SCR MAMMO BI INCL CAD: CPT

## 2021-11-03 ENCOUNTER — OFFICE VISIT (OUTPATIENT)
Dept: CARDIOLOGY CLINIC | Age: 81
End: 2021-11-03
Payer: MEDICARE

## 2021-11-03 VITALS
SYSTOLIC BLOOD PRESSURE: 160 MMHG | HEART RATE: 76 BPM | WEIGHT: 174 LBS | DIASTOLIC BLOOD PRESSURE: 84 MMHG | BODY MASS INDEX: 35.14 KG/M2

## 2021-11-03 DIAGNOSIS — I10 ESSENTIAL HYPERTENSION: ICD-10-CM

## 2021-11-03 DIAGNOSIS — I45.2 RBBB WITH LEFT ANTERIOR FASCICULAR BLOCK: ICD-10-CM

## 2021-11-03 DIAGNOSIS — R06.02 SOB (SHORTNESS OF BREATH): Primary | ICD-10-CM

## 2021-11-03 PROCEDURE — 4040F PNEUMOC VAC/ADMIN/RCVD: CPT | Performed by: INTERNAL MEDICINE

## 2021-11-03 PROCEDURE — G8484 FLU IMMUNIZE NO ADMIN: HCPCS | Performed by: INTERNAL MEDICINE

## 2021-11-03 PROCEDURE — 1036F TOBACCO NON-USER: CPT | Performed by: INTERNAL MEDICINE

## 2021-11-03 PROCEDURE — 1090F PRES/ABSN URINE INCON ASSESS: CPT | Performed by: INTERNAL MEDICINE

## 2021-11-03 PROCEDURE — 1123F ACP DISCUSS/DSCN MKR DOCD: CPT | Performed by: INTERNAL MEDICINE

## 2021-11-03 PROCEDURE — G8417 CALC BMI ABV UP PARAM F/U: HCPCS | Performed by: INTERNAL MEDICINE

## 2021-11-03 PROCEDURE — 99204 OFFICE O/P NEW MOD 45 MIN: CPT | Performed by: INTERNAL MEDICINE

## 2021-11-03 PROCEDURE — G8399 PT W/DXA RESULTS DOCUMENT: HCPCS | Performed by: INTERNAL MEDICINE

## 2021-11-03 PROCEDURE — G8427 DOCREV CUR MEDS BY ELIG CLIN: HCPCS | Performed by: INTERNAL MEDICINE

## 2021-11-03 PROCEDURE — 94060 EVALUATION OF WHEEZING: CPT | Performed by: INTERNAL MEDICINE

## 2021-11-03 ASSESSMENT — ENCOUNTER SYMPTOMS
CHEST TIGHTNESS: 0
COUGH: 0
SHORTNESS OF BREATH: 1
CHOKING: 0

## 2021-11-03 NOTE — PROGRESS NOTES
Subjective:      Patient ID: Lance Corado is a 80 y.o. female. HPI  Referred for HTN/sob. On CPAP and MD mentioned cardiac evaluation. On CPAP for yr. SOB for 6 month. Not getting better. BP med changed 2 months ago for swelling. Edema resolved after stopping Norvasc. No pnd. No orthopnea. No tachycardia/syncope. No chest pain. Not real active for some time. No exercise. Wt down intentionally about 4-5 lbs 2 wks. BRITO and gets sob walking from car, in mall. Out of proportion to level of conditioning. Non smoker. No asthma. No seasonal allergies. No wheezing. Past Medical History:   Diagnosis Date    Acute esophagitis     h/o    Acute hearing loss of right ear 7/12/2016    AVN (avascular necrosis of bone) ([facility] [address] 1/23/2014    Hip      Benign essential hypertension     Cervical stenosis of spine     Colon polyps     Depressive disorder, not elsewhere classified     Essential familial hypercholesterolemia     Fatigue     Fracture of cuboid, right ankle, closed 5/2013    Hypercalcemia 7/27/2018    Lumbar herniated disc     Lumbar radiculopathy 6/9/2015    Osteopenia      Past Surgical History:   Procedure Laterality Date    APPENDECTOMY      BREAST BIOPSY      BREAST SURGERY      reduction    HYSTERECTOMY      JOINT REPLACEMENT Left 4/2014    hip    KNEE SURGERY      left    LAMINECTOMY      decompressive more than 2 lumbar segments    TONSILLECTOMY       Social History     Socioeconomic History    Marital status:       Spouse name: Not on file    Number of children: Not on file    Years of education: Not on file    Highest education level: Not on file   Occupational History    Not on file   Tobacco Use    Smoking status: Never Smoker    Smokeless tobacco: Never Used   Substance and Sexual Activity    Alcohol use: No    Drug use: No    Sexual activity: Not Currently     Partners: Male   Other Topics Concern    Not on file   Social History Narrative    Not on file     Social Determinants of Health     Financial Resource Strain: Low Risk     Difficulty of Paying Living Expenses: Not hard at all   Food Insecurity: No Food Insecurity    Worried About Running Out of Food in the Last Year: Never true    Audra of Food in the Last Year: Never true   Transportation Needs:     Lack of Transportation (Medical):  Lack of Transportation (Non-Medical):    Physical Activity:     Days of Exercise per Week:     Minutes of Exercise per Session:    Stress:     Feeling of Stress :    Social Connections:     Frequency of Communication with Friends and Family:     Frequency of Social Gatherings with Friends and Family:     Attends [employer] Services:     Active Member of Clubs or Organizations:     Attends Club or Organization Meetings:     Marital Status:    Intimate Partner Violence:     Fear of Current or Ex-Partner:     Emotionally Abused:     Physically Abused:     Sexually Abused:       reviewed        Review of Systems   Constitutional: Negative for activity change, appetite change and fatigue. Respiratory: Positive for shortness of breath. Negative for cough, choking and chest tightness. Cardiovascular: Negative for chest pain, palpitations and leg swelling. Denies PND or orthopnea. No tachycardia or syncope. Neurological: Negative for dizziness, syncope and light-headedness. Psychiatric/Behavioral: Negative for agitation, behavioral problems and confusion. All other systems reviewed and are negative. Objective:   Physical Exam  Constitutional:       General: She is not in acute distress. Appearance: Normal appearance. She is well-developed. HENT:      Head: Normocephalic and atraumatic. Right Ear: External ear normal.      Left Ear: External ear normal.   Neck:      Vascular: No JVD. Cardiovascular:      Rate and Rhythm: Normal rate and regular rhythm. Heart sounds: Normal heart sounds. No murmur heard. No gallop. Pulmonary:      Effort: Pulmonary effort is normal. No respiratory distress. Breath sounds: Normal breath sounds. No wheezing or rales. Abdominal:      General: Bowel sounds are normal.      Palpations: Abdomen is soft. Tenderness: There is no abdominal tenderness. Musculoskeletal:         General: Normal range of motion. Cervical back: Normal range of motion. Skin:     General: Skin is warm and dry. Neurological:      General: No focal deficit present. Mental Status: She is alert and oriented to person, place, and time. Psychiatric:         Mood and Affect: Mood normal.         Behavior: Behavior normal.         Assessment:       Diagnosis Orders   1. SOB (shortness of breath)     2. RBBB with left anterior fascicular block     3. Essential hypertension             Plan:      BRITO in very inactive. Recent EKG shows NSR,PACs, RBBB, LAFB, NSSTTW changes. Reviewed previous records and testing including myoview 9/21 and echo 7/20. Will have follow up echo. Will have PFTs. Follow up after.         Juanis Wong MD

## 2021-11-14 NOTE — PROGRESS NOTES
Subjective:      Patient ID: Naz Jain 80 y.o. female. The primary encounter diagnosis was Recurrent major depressive disorder, in full remission ([address] Diagnoses of Glucose intolerance (impaired glucose tolerance), Essential familial hypercholesterolemia, and Spinal stenosis of lumbar region without neurogenic claudication were also pertinent to this visit. HPI    Depression: worsened by death of spouse, loneliness. Has good support group. In September was weaned off Cymbalta and Prozac was started. She is seeing a counselor. The Prozac is about as effective as Cymbalta. Still feels depressed. No trouble sleeping. Compliant with CPAP. Eats too much. Not suicidal.  Tolerating prozac. No diarrhea, sweating, myalgia. She saw a new sleep specialist, Dr. Lulu Kaiser. He ordered Modafanil - she has not started it yet. Dyspnea:  X 9 mos. myoview stress test normal.  Saw Dr. Valente Shea. He ordered PFTs and repeat echocardiogram.   Is not testing BP at home. Patient denies any exertional chest pain,  palpitations, syncope, orthopnea, edema or paroxysmal nocturnal dyspnea. Her dyspnea is unchanged. Lumbar spinal stenosis: was taking Cymbalta for pain control. Was not sure it help; was discontinued to start prozac. The back pain is not any worse off the Cymbalta. Advil helps with occ flare; takes rarely. Doing chair exercise. Impaired glucose tolerance: treated with diet and exercise. Hyperlipidemia:  No new myalgias or GI upset on rosuvastatin (Crestor). Medication compliance: compliant most of the time. Patient is  following a low fat, low cholesterol diet. She is  exercising regularly.      Lab Results   Component Value Date    CHOL 162 07/19/2021    TRIG 126 07/19/2021    HDL 56 07/19/2021    LDLCALC 81 07/19/2021    LDLDIRECT 118 (H) 10/25/2016     Lab Results   Component Value Date    ALT 17 07/19/2021    AST 17 07/19/2021         Labs Renal Latest Ref Rng & Units 7/19/2021 11/10/2020 6/8/2020 1/15/2020 1/9/2019   BUN 7 - 20 mg/dL 20 32(H) 20 23(H) 31(H)   Cr 0.6 - 1.2 mg/dL 0.9 0.9 0.8 0.9 0.9   K 3.5 - 5.1 mmol/L 3.8 5.0 3.7 3.8 4.5   Na 136 - 145 mmol/L 143 140 141 143 147(H)      Lab Results   Component Value Date    LABA1C 6.1 07/19/2021     Lab Results   Component Value Date    .4 07/19/2021     Lab Results   Component Value Date    WBC 10.5 07/19/2021    HGB 13.3 07/19/2021    HCT 40.4 07/19/2021    MCV 87.8 07/19/2021     07/19/2021     TSH   Date Value Ref Range Status   07/19/2021 2.64 0.27 - 4.20 uIU/mL Final   06/08/2020 3.68 0.27 - 4.20 uIU/mL Final   01/15/2020 3.87 0.27 - 4.20 uIU/mL Final   04/27/2017 2.38 0.40 - 4.50 mIU/L Final   01/21/2016 1.16 0.40 - 4.50 mIU/L Final   06/24/2013 3.27 0.40 - 4.50 mIU/L Final         Outpatient Medications Marked as Taking for the 11/16/21 encounter (Office Visit) with Maricruz Richardson MD   Medication Sig Dispense Refill    polyethylene glycol (GLYCOLAX) 17 GM/SCOOP powder Take 17 g by mouth daily      Cholecalciferol (D3 PO) Take by mouth      verapamil (CALAN SR) 120 MG extended release tablet Take 1 tablet by mouth daily 90 tablet 1    FLUoxetine (PROZAC) 20 MG capsule Take 1 capsule by mouth daily 30 capsule 2    furosemide (LASIX) 20 MG tablet TAKE ONE TO TWO TABLETS BY MOUTH EVERY  tablet 1    oxybutynin (DITROPAN-XL) 10 MG extended release tablet Take 1 tablet by mouth daily 90 tablet 1    rosuvastatin (CRESTOR) 20 MG tablet Take 1 tablet by mouth daily 90 tablet 2    pantoprazole (PROTONIX) 40 MG tablet Take 1 tablet by mouth daily 90 tablet 2    Dupilumab (DUPIXENT) 300 MG/2ML SOPN Inject into the skin      methylcellulose (CITRUCEL) oral powder Take 2 g by mouth daily Take by mouth daily.  calcium citrate-vitamin D (CALCIUM CITRATE + D3) 315-250 MG-UNIT TABS per tablet Take 1 tablet by mouth daily (with breakfast) 120 tablet     Alpha-D-Galactosidase (BEANO PO) Take  by mouth. Allergies   Allergen Reactions    Ace Inhibitors      cough    Latanoprost Other (See Comments)    Losartan Other (See Comments)     cough    Taztia Xt [Diltiazem Hcl]        Patient Active Problem List   Diagnosis    Recurrent major depressive disorder, in full remission ([facility]    Osteopenia    Cervical stenosis of spine    Benign essential hypertension    Colon polyps    Essential familial hypercholesterolemia    IBS (irritable bowel syndrome)    Elevated lipoprotein(a)    Methylenetetrahydrofolate reductase deficiency (HCC)    Renal angiolipoma    Lumbar spondylosis    Lumbar stenosis    Trochanteric bursitis of right hip    Glucose intolerance (impaired glucose tolerance)    Memory loss    AILYN (obstructive sleep apnea)    Class 2 obesity without serious comorbidity with body mass index (BMI) of 36.0 to 36.9 in adult       Past Medical History:   Diagnosis Date    Acute esophagitis     h/o    Acute hearing loss of right ear 7/12/2016    [facility]N [facility] of bone) ([facility]) 1/23/2014    Hip      Benign essential hypertension     Cervical stenosis of spine     Colon polyps     Depressive disorder, not elsewhere classified     Essential familial hypercholesterolemia     Fatigue     Fracture of cuboid, right ankle, closed 5/2013    Hypercalcemia 7/27/2018    Lumbar herniated disc     Lumbar radiculopathy 6/9/2015    Osteopenia        Past Surgical History:   Procedure Laterality Date    APPENDECTOMY      BREAST BIOPSY      BREAST SURGERY      reduction    HYSTERECTOMY      JOINT REPLACEMENT Left 4/2014    hip    KNEE SURGERY      left    LAMINECTOMY      decompressive more than 2 lumbar segments    TONSILLECTOMY          Family History   Problem Relation Age of Onset    Other Mother         lung disease    Heart Disease Father        Social History     Tobacco Use    Smoking status: Never Smoker    Smokeless tobacco: Never Used   Substance Use Topics    Alcohol use: No    Drug use: No            Review of Systems  Review of Systems    Objective:   Physical Exam  Vitals:    11/16/21 1111   BP: (!) 152/102   Pulse: 75   Resp: 18   Temp: 97.7 °F (36.5 °C)   SpO2: 94%   Weight: 175 lb (79.4 kg)     Wt Readings from Last 3 Encounters:   11/16/21 175 lb (79.4 kg)   11/03/21 174 lb (78.9 kg)   10/06/21 178 lb (80.7 kg)     BP Readings from Last 3 Encounters:   11/16/21 (!) 152/102   11/03/21 (!) 160/84   09/22/21 (!) 154/90        Physical Exam  .NAD    Skin is warm and dry. No rash. Well hydrated  Alert and oriented x 3. Mood and affect are mildly depressed. No agitation or psychomotor retardation. Not suicidal.   The neck is supple and free of adenopathy or masses, the thyroid is normal without enlargement or nodules. Chest: clear with no wheezes or rales. No retractions, or use of accessory muscles noted. Cardiovascular: PMI is not displaced, and no thrill noted. Regular rate and rhythm with no rub, murmur or gallop. No peripheral edema. The abdomen is soft without tenderness, guarding, mass, rebound or organomegaly. Aorta, femoral, DP and PT pulses intact. Assessment:       Diagnosis Orders   1. Moderate episode of recurrent major depressive disorder (HCC)  FLUoxetine (PROZAC) 40 MG capsule  Increase Prozac to 40 mg; continue counseling. 2. Glucose intolerance (impaired glucose tolerance)  Discussed diet, exercise and weight loss strategies consider metformin   3. Essential familial hypercholesterolemia  LDL at goal < 130  Continue statin; annual labs   4. Spinal stenosis of lumbar region without neurogenic claudication  Stable. Continue HEP   5. Dyspnea on exertion  PFT and echo tomorrow as planned   6. Benign essential hypertension  verapamil (CALAN SR) 180 MG extended release tablet  Not at goal < 130/80  Increase Calan to 180 mg  DASH diet          Plan:      Follow up in 4 weeks or prn.

## 2021-11-16 ENCOUNTER — OFFICE VISIT (OUTPATIENT)
Dept: FAMILY MEDICINE CLINIC | Age: 81
End: 2021-11-16
Payer: MEDICARE

## 2021-11-16 VITALS
RESPIRATION RATE: 18 BRPM | OXYGEN SATURATION: 94 % | TEMPERATURE: 97.7 F | DIASTOLIC BLOOD PRESSURE: 82 MMHG | HEART RATE: 75 BPM | SYSTOLIC BLOOD PRESSURE: 156 MMHG | BODY MASS INDEX: 35.35 KG/M2 | WEIGHT: 175 LBS

## 2021-11-16 DIAGNOSIS — E78.01 ESSENTIAL FAMILIAL HYPERCHOLESTEROLEMIA: ICD-10-CM

## 2021-11-16 DIAGNOSIS — F33.1 MODERATE EPISODE OF RECURRENT MAJOR DEPRESSIVE DISORDER (HCC): Primary | ICD-10-CM

## 2021-11-16 DIAGNOSIS — R06.09 DYSPNEA ON EXERTION: ICD-10-CM

## 2021-11-16 DIAGNOSIS — I10 BENIGN ESSENTIAL HYPERTENSION: ICD-10-CM

## 2021-11-16 DIAGNOSIS — R73.02 GLUCOSE INTOLERANCE (IMPAIRED GLUCOSE TOLERANCE): ICD-10-CM

## 2021-11-16 DIAGNOSIS — M48.061 SPINAL STENOSIS OF LUMBAR REGION WITHOUT NEUROGENIC CLAUDICATION: ICD-10-CM

## 2021-11-16 PROCEDURE — 1123F ACP DISCUSS/DSCN MKR DOCD: CPT | Performed by: FAMILY MEDICINE

## 2021-11-16 PROCEDURE — G8427 DOCREV CUR MEDS BY ELIG CLIN: HCPCS | Performed by: FAMILY MEDICINE

## 2021-11-16 PROCEDURE — 1036F TOBACCO NON-USER: CPT | Performed by: FAMILY MEDICINE

## 2021-11-16 PROCEDURE — G8417 CALC BMI ABV UP PARAM F/U: HCPCS | Performed by: FAMILY MEDICINE

## 2021-11-16 PROCEDURE — 4040F PNEUMOC VAC/ADMIN/RCVD: CPT | Performed by: FAMILY MEDICINE

## 2021-11-16 PROCEDURE — G8484 FLU IMMUNIZE NO ADMIN: HCPCS | Performed by: FAMILY MEDICINE

## 2021-11-16 PROCEDURE — 1090F PRES/ABSN URINE INCON ASSESS: CPT | Performed by: FAMILY MEDICINE

## 2021-11-16 PROCEDURE — 99214 OFFICE O/P EST MOD 30 MIN: CPT | Performed by: FAMILY MEDICINE

## 2021-11-16 PROCEDURE — G8399 PT W/DXA RESULTS DOCUMENT: HCPCS | Performed by: FAMILY MEDICINE

## 2021-11-16 RX ORDER — FLUOXETINE HYDROCHLORIDE 40 MG/1
40 CAPSULE ORAL DAILY
Qty: 90 CAPSULE | Refills: 1 | Status: SHIPPED | OUTPATIENT
Start: 2021-11-16 | End: 2022-05-16 | Stop reason: SDUPTHER

## 2021-11-16 RX ORDER — PANTOPRAZOLE SODIUM 40 MG/1
40 TABLET, DELAYED RELEASE ORAL DAILY
Qty: 90 TABLET | Refills: 2 | Status: SHIPPED | OUTPATIENT
Start: 2021-11-16 | End: 2021-12-14 | Stop reason: SDUPTHER

## 2021-11-17 ENCOUNTER — HOSPITAL ENCOUNTER (OUTPATIENT)
Dept: NON INVASIVE DIAGNOSTICS | Age: 81
Discharge: HOME | End: 2021-11-17
Payer: MEDICARE

## 2021-11-17 ENCOUNTER — HOSPITAL ENCOUNTER (OUTPATIENT)
Dept: PULMONOLOGY | Age: 81
Discharge: HOME | End: 2021-11-17
Payer: MEDICARE

## 2021-11-17 VITALS — OXYGEN SATURATION: 100 %

## 2021-11-17 LAB
LV EF: 58 %
LVEF MODALITY: NORMAL

## 2021-11-17 PROCEDURE — 6360000002 HC RX W HCPCS: Performed by: INTERNAL MEDICINE

## 2021-11-17 PROCEDURE — 94729 DIFFUSING CAPACITY: CPT

## 2021-11-17 PROCEDURE — 93306 TTE W/DOPPLER COMPLETE: CPT

## 2021-11-17 PROCEDURE — 94060 EVALUATION OF WHEEZING: CPT

## 2021-11-17 PROCEDURE — 94726 PLETHYSMOGRAPHY LUNG VOLUMES: CPT

## 2021-11-17 PROCEDURE — 94664 DEMO&/EVAL PT USE INHALER: CPT

## 2021-11-17 PROCEDURE — 94760 N-INVAS EAR/PLS OXIMETRY 1: CPT

## 2021-11-17 RX ORDER — ALBUTEROL SULFATE 2.5 MG/3ML
2.5 SOLUTION RESPIRATORY (INHALATION) ONCE
Status: COMPLETED | OUTPATIENT
Start: 2021-11-17 | End: 2021-11-17

## 2021-11-17 RX ADMIN — ALBUTEROL SULFATE 2.5 MG: 2.5 SOLUTION RESPIRATORY (INHALATION) at 10:14

## 2021-11-18 NOTE — PROCEDURES
[address], [address]                               PULMONARY FUNCTION    PATIENT NAME: Nixon Cui                    :        1940  MED REC NO:   [id number]                          ROOM:  ACCOUNT NO:   [de-identified]                           ADMIT DATE: 2021  PROVIDER:     Hudson Vega MD    DATE OF PROCEDURE:  2021    FINDINGS:  Forced vital capacity and FEV1 mildly reduced, FEV1 to FVC  ratio normal.  No change after bronchodilator. Lung volume  determinations by plethysmography show mild reduction of total lung  capacity and residual volume, normal FRC. Single-breath diffusion  capacity normal when compared to alveolar volume. IMPRESSION:  Mild restrictive ventilatory defect. Findings consistent  with neuromuscular chest wall, pleural or parenchymal disorder.         Harpreet Duran MD    D: 2021 14:27:59       T: 2021 14:30:58     [email or web address]  Job#: [id number]     Doc#: [id number]    CC:

## 2021-12-02 ENCOUNTER — TELEPHONE (OUTPATIENT)
Dept: CARDIOLOGY CLINIC | Age: 81
End: 2021-12-02

## 2021-12-02 NOTE — TELEPHONE ENCOUNTER
Patient would like call back as soon as possible about her echo and PFT results. She has appointment in January but cannot wait until then to get her results. She would like call back about this at [phone].

## 2021-12-13 NOTE — PROGRESS NOTES
Subjective:      Patient ID: Dayton Kellogg 80 y.o. female. The primary encounter diagnosis was Moderate episode of recurrent major depressive disorder ([facility] [address] Diagnoses of Dyspnea on exertion and AILYN (obstructive sleep apnea) were also pertinent to this visit. HPI    Dyspnea; has normal myoview stress test, normal echo. PFTs show mild restrictive deficit. Dyspnea is better. Weight is down 5 lbs. AILYN:  Was prescribed modafinil by pulmonology but at last visit had not started it yet. She still has not taken it. Her energy during the day is improved; she is not napping. Waking up feeling refreshed. Depression: worsened after her 's death. Seeing a therapist.  Last month Prozac was increased from 20 to 40 mg. Sleeps well with CPAP. Eats \"too much\"  Never suicidal  Today she is feeling better. Is moving to the [place] where she will have more activities and company. Depression is better; Prozac and decision to move have helped. Hypertension: Patient here for follow-up of elevated blood pressure. She is exercising and is adherent to low salt diet. Blood pressure is not well controlled at home. Cardiac symptoms none. Pedal edema resolved with stopping Amlodipine. Patient denies chest pressure/discomfort, dyspnea, exertional chest pressure/discomfort, lower extremity edema, orthopnea and palpitations. Cardiovascular risk factors: hypertension and obesity (BMI >= 30 kg/m2). Use of agents associated with hypertension: none. History of target organ damage: none.       Lab Results   Component Value Date     07/19/2021    K 3.8 07/19/2021     07/19/2021    CO2 31 07/19/2021    BUN 20 07/19/2021    CREATININE 0.9 07/19/2021    GLUCOSE 94 07/19/2021    CALCIUM 10.1 07/19/2021    PROT 7.3 07/19/2021    LABALBU 4.6 07/19/2021    BILITOT 0.4 07/19/2021    ALKPHOS 81 07/19/2021    AST 17 07/19/2021    ALT 17 07/19/2021    LABGLOM >60 07/19/2021    GFRAA >60 07/19/2021    AGRATIO 1.7 07/19/2021    GLOB 2.7 07/19/2021        TSH   Date Value Ref Range Status   07/19/2021 2.64 0.27 - 4.20 uIU/mL Final   06/08/2020 3.68 0.27 - 4.20 uIU/mL Final   01/15/2020 3.87 0.27 - 4.20 uIU/mL Final   04/27/2017 2.38 0.40 - 4.50 mIU/L Final   01/21/2016 1.16 0.40 - 4.50 mIU/L Final   06/24/2013 3.27 0.40 - 4.50 mIU/L Final     Lab Results   Component Value Date    WBC 10.5 07/19/2021    HGB 13.3 07/19/2021    HCT 40.4 07/19/2021    MCV 87.8 07/19/2021     07/19/2021        Outpatient Medications Marked as Taking for the 12/14/21 encounter (Office Visit) with Clayton Navarrete MD   Medication Sig Dispense Refill    pantoprazole (PROTONIX) 40 MG tablet Take 1 tablet by mouth daily 90 tablet 2    FLUoxetine (PROZAC) 40 MG capsule Take 1 capsule by mouth daily 90 capsule 1    verapamil (CALAN SR) 180 MG extended release tablet Take 1 tablet by mouth daily 90 tablet 1    polyethylene glycol (GLYCOLAX) 17 GM/SCOOP powder Take 17 g by mouth daily      Cholecalciferol (D3 PO) Take by mouth      furosemide (LASIX) 20 MG tablet TAKE ONE TO TWO TABLETS BY MOUTH EVERY  tablet 1    oxybutynin (DITROPAN-XL) 10 MG extended release tablet Take 1 tablet by mouth daily 90 tablet 1    rosuvastatin (CRESTOR) 20 MG tablet Take 1 tablet by mouth daily 90 tablet 2    Dupilumab (DUPIXENT) 300 MG/2ML SOPN Inject into the skin      methylcellulose (CITRUCEL) oral powder Take 2 g by mouth daily Take by mouth daily.  calcium citrate-vitamin D (CALCIUM CITRATE + D3) 315-250 MG-UNIT TABS per tablet Take 1 tablet by mouth daily (with breakfast) 120 tablet     Alpha-D-Galactosidase (BEANO PO) Take  by mouth.           Allergies   Allergen Reactions    Ace Inhibitors      cough    Latanoprost Other (See Comments)    Losartan Other (See Comments)     cough    Taztia Xt [Diltiazem Hcl]        Patient Active Problem List   Diagnosis    Recurrent major depressive disorder, in full remission ([facility] [address]    Osteopenia  Cervical stenosis of spine    Benign essential hypertension    Colon polyps    Essential familial hypercholesterolemia    IBS (irritable bowel syndrome)    Elevated lipoprotein(a)    Methylenetetrahydrofolate reductase deficiency (HCC)    Renal angiolipoma    Lumbar spondylosis    Lumbar stenosis    Trochanteric bursitis of right hip    Glucose intolerance (impaired glucose tolerance)    Memory loss    AILYN (obstructive sleep apnea)    Class 2 obesity without serious comorbidity with body mass index (BMI) of 36.0 to 36.9 in adult    Moderate episode of recurrent major depressive disorder ([facility])       Past Medical History:   Diagnosis Date    Acute esophagitis     h/o    Acute hearing loss of right ear 7/12/2016    AVN (avascular necrosis of bone) ([facility]) 1/23/2014    Hip      Benign essential hypertension     Cervical stenosis of spine     Colon polyps     Depressive disorder, not elsewhere classified     Essential familial hypercholesterolemia     Fatigue     Fracture of cuboid, right ankle, closed 5/2013    Hypercalcemia 7/27/2018    Lumbar herniated disc     Lumbar radiculopathy 6/9/2015    Osteopenia        Past Surgical History:   Procedure Laterality Date    APPENDECTOMY      BREAST BIOPSY      BREAST SURGERY      reduction    HYSTERECTOMY      JOINT REPLACEMENT Left 4/2014    hip    KNEE SURGERY      left    LAMINECTOMY      decompressive more than 2 lumbar segments    TONSILLECTOMY          Family History   Problem Relation Age of Onset    Other Mother         lung disease    Heart Disease Father        Social History     Tobacco Use    Smoking status: Never Smoker    Smokeless tobacco: Never Used   Substance Use Topics    Alcohol use: No    Drug use: No            Review of Systems  Review of Systems    Objective:   Physical Exam  Vitals:    12/14/21 1650 12/14/21 1744   BP: (!) 146/86 130/72   Pulse: 74    Resp: 15    Temp: 97.3 °F (36.3 °C)    TempSrc: Temporal SpO2: 91%    Weight: 175 lb (79.4 kg)        Physical Exam  NAD    Skin is warm and dry. No rash. Well hydrated  Alert and oriented x 3. Mood and affect are normal.  The neck is supple and free of adenopathy or masses, the thyroid is normal without enlargement or nodules. Chest: clear with no wheezes or rales. No retractions, or use of accessory muscles noted. Cardiovascular: PMI is not displaced, and no thrill noted. Regular rate and rhythm with no rub, murmur or gallop. No peripheral edema. The abdomen is soft without tenderness, guarding, mass, rebound or organomegaly. Aorta, femoral, DP and PT pulses intact. Assessment:       Diagnosis Orders   1. Moderate episode of recurrent major depressive disorder ([address]  Doing well. Continue Prozac and counseling    2. Dyspnea on exertion  Improved with some wt loss, stopping amlodipine? Discussed likely due to deconditioning since cardiovascular tests show no cause. Exercise addressed. 3. AILYN (obstructive sleep apnea)  well controlled; good compliance   4. Essential familial hypercholesterolemia  rosuvastatin (CRESTOR) 20 MG tablet    5. Hypertension: improved sxs with med change. At goal < 130/70        Plan:      Side effects of current medications reviewed and questions answered. Follow up in 6 months or prn.

## 2021-12-14 ENCOUNTER — OFFICE VISIT (OUTPATIENT)
Dept: FAMILY MEDICINE CLINIC | Age: 81
End: 2021-12-14
Payer: MEDICARE

## 2021-12-14 VITALS
OXYGEN SATURATION: 91 % | BODY MASS INDEX: 35.35 KG/M2 | HEART RATE: 74 BPM | TEMPERATURE: 97.3 F | SYSTOLIC BLOOD PRESSURE: 130 MMHG | WEIGHT: 175 LBS | DIASTOLIC BLOOD PRESSURE: 72 MMHG | RESPIRATION RATE: 15 BRPM

## 2021-12-14 DIAGNOSIS — G47.33 OSA (OBSTRUCTIVE SLEEP APNEA): ICD-10-CM

## 2021-12-14 DIAGNOSIS — I10 BENIGN ESSENTIAL HYPERTENSION: Chronic | ICD-10-CM

## 2021-12-14 DIAGNOSIS — R06.09 DYSPNEA ON EXERTION: ICD-10-CM

## 2021-12-14 DIAGNOSIS — F33.1 MODERATE EPISODE OF RECURRENT MAJOR DEPRESSIVE DISORDER (HCC): Primary | ICD-10-CM

## 2021-12-14 DIAGNOSIS — E78.01 ESSENTIAL FAMILIAL HYPERCHOLESTEROLEMIA: ICD-10-CM

## 2021-12-14 PROCEDURE — G8484 FLU IMMUNIZE NO ADMIN: HCPCS | Performed by: FAMILY MEDICINE

## 2021-12-14 PROCEDURE — 4040F PNEUMOC VAC/ADMIN/RCVD: CPT | Performed by: FAMILY MEDICINE

## 2021-12-14 PROCEDURE — G8427 DOCREV CUR MEDS BY ELIG CLIN: HCPCS | Performed by: FAMILY MEDICINE

## 2021-12-14 PROCEDURE — G8417 CALC BMI ABV UP PARAM F/U: HCPCS | Performed by: FAMILY MEDICINE

## 2021-12-14 PROCEDURE — 1123F ACP DISCUSS/DSCN MKR DOCD: CPT | Performed by: FAMILY MEDICINE

## 2021-12-14 PROCEDURE — 1036F TOBACCO NON-USER: CPT | Performed by: FAMILY MEDICINE

## 2021-12-14 PROCEDURE — 1090F PRES/ABSN URINE INCON ASSESS: CPT | Performed by: FAMILY MEDICINE

## 2021-12-14 PROCEDURE — 99214 OFFICE O/P EST MOD 30 MIN: CPT | Performed by: FAMILY MEDICINE

## 2021-12-14 PROCEDURE — G8399 PT W/DXA RESULTS DOCUMENT: HCPCS | Performed by: FAMILY MEDICINE

## 2021-12-14 RX ORDER — ROSUVASTATIN CALCIUM 20 MG/1
20 TABLET, COATED ORAL DAILY
Qty: 90 TABLET | Refills: 2 | Status: SHIPPED | OUTPATIENT
Start: 2021-12-14 | End: 2022-09-13 | Stop reason: SDUPTHER

## 2021-12-14 RX ORDER — PANTOPRAZOLE SODIUM 40 MG/1
40 TABLET, DELAYED RELEASE ORAL DAILY
Qty: 90 TABLET | Refills: 2 | Status: SHIPPED | OUTPATIENT
Start: 2021-12-14 | End: 2022-09-13 | Stop reason: SDUPTHER

## 2022-01-12 ENCOUNTER — OFFICE VISIT (OUTPATIENT)
Dept: CARDIOLOGY CLINIC | Age: 82
End: 2022-01-12
Payer: MEDICARE

## 2022-01-12 VITALS
SYSTOLIC BLOOD PRESSURE: 152 MMHG | DIASTOLIC BLOOD PRESSURE: 70 MMHG | BODY MASS INDEX: 34.21 KG/M2 | HEART RATE: 81 BPM | WEIGHT: 169.4 LBS

## 2022-01-12 DIAGNOSIS — I10 ESSENTIAL HYPERTENSION: ICD-10-CM

## 2022-01-12 DIAGNOSIS — I45.10 RBBB: ICD-10-CM

## 2022-01-12 DIAGNOSIS — R06.02 SOB (SHORTNESS OF BREATH): Primary | ICD-10-CM

## 2022-01-12 PROCEDURE — 4040F PNEUMOC VAC/ADMIN/RCVD: CPT | Performed by: INTERNAL MEDICINE

## 2022-01-12 PROCEDURE — G8484 FLU IMMUNIZE NO ADMIN: HCPCS | Performed by: INTERNAL MEDICINE

## 2022-01-12 PROCEDURE — G8399 PT W/DXA RESULTS DOCUMENT: HCPCS | Performed by: INTERNAL MEDICINE

## 2022-01-12 PROCEDURE — 1090F PRES/ABSN URINE INCON ASSESS: CPT | Performed by: INTERNAL MEDICINE

## 2022-01-12 PROCEDURE — G8427 DOCREV CUR MEDS BY ELIG CLIN: HCPCS | Performed by: INTERNAL MEDICINE

## 2022-01-12 PROCEDURE — 1123F ACP DISCUSS/DSCN MKR DOCD: CPT | Performed by: INTERNAL MEDICINE

## 2022-01-12 PROCEDURE — G8417 CALC BMI ABV UP PARAM F/U: HCPCS | Performed by: INTERNAL MEDICINE

## 2022-01-12 PROCEDURE — 99213 OFFICE O/P EST LOW 20 MIN: CPT | Performed by: INTERNAL MEDICINE

## 2022-01-12 PROCEDURE — 1036F TOBACCO NON-USER: CPT | Performed by: INTERNAL MEDICINE

## 2022-01-12 ASSESSMENT — ENCOUNTER SYMPTOMS
COUGH: 0
SHORTNESS OF BREATH: 1
CHEST TIGHTNESS: 0
CHOKING: 0

## 2022-01-12 NOTE — PROGRESS NOTES
Subjective:      Patient ID: Divya Grigsby is a 80 y.o. female. HPI  Follow up for HTN/sob. On CPAP and MD mentioned cardiac evaluation. On CPAP for yr. SOB for 6 month. Not getting better. BP med changed   for swelling. Edema resolved after stopping Norvasc. No pnd. No orthopnea. No tachycardia/syncope. No chest pain. Not real active for some time. No exercise. Wt down intentionally about 4-5 lbs 2 wks. BRITO and gets sob walking from car, in mall. Out of proportion to level of conditioning. Non smoker. No asthma. No seasonal allergies. No wheezing. bp 140s/70-80s. Past Medical History:   Diagnosis Date    Acute esophagitis     h/o    Acute hearing loss of right ear 7/12/2016    AVN (avascular necrosis of bone) ([facility] [address] 1/23/2014    Hip      Benign essential hypertension     Cervical stenosis of spine     Colon polyps     Depressive disorder, not elsewhere classified     Essential familial hypercholesterolemia     Fatigue     Fracture of cuboid, right ankle, closed 5/2013    Hypercalcemia 7/27/2018    Lumbar herniated disc     Lumbar radiculopathy 6/9/2015    Osteopenia      Past Surgical History:   Procedure Laterality Date    APPENDECTOMY      BREAST BIOPSY      BREAST SURGERY      reduction    HYSTERECTOMY      JOINT REPLACEMENT Left 4/2014    hip    KNEE SURGERY      left    LAMINECTOMY      decompressive more than 2 lumbar segments    TONSILLECTOMY       Social History     Socioeconomic History    Marital status:       Spouse name: Not on file    Number of children: Not on file    Years of education: Not on file    Highest education level: Not on file   Occupational History    Not on file   Tobacco Use    Smoking status: Never Smoker    Smokeless tobacco: Never Used   Substance and Sexual Activity    Alcohol use: No    Drug use: No    Sexual activity: Not Currently     Partners: Male   Other Topics Concern    Not on file   Social History Narrative    Not on file     Social Determinants of Health     Financial Resource Strain: Low Risk     Difficulty of Paying Living Expenses: Not hard at all   Food Insecurity: No Food Insecurity    Worried About Running Out of Food in the Last Year: Never true    [address] in the Last Year: Never true   Transportation Needs:     Lack of Transportation (Medical): Not on file    Lack of Transportation (Non-Medical): Not on file   Physical Activity:     Days of Exercise per Week: Not on file    Minutes of Exercise per Session: Not on file   Stress:     Feeling of Stress : Not on file   Social Connections:     Frequency of Communication with Friends and Family: Not on file    Frequency of Social Gatherings with Friends and Family: Not on file    Attends [employer] Services: Not on file    Active Member of [address] [place] [address] [employer] or Organizations: Not on file    Attends Club or Organization Meetings: Not on file    Marital Status: Not on file   Intimate Partner Violence:     Fear of Current or Ex-Partner: Not on file    Emotionally Abused: Not on file    Physically Abused: Not on file    Sexually Abused: Not on file   Housing Stability:     Unable to Pay for Housing in the Last Year: Not on file    Number of [place] in the Last Year: Not on file    Unstable Housing in the Last Year: Not on file      reviewed    Vitals:    01/12/22 1305   BP: (!) 152/70   Pulse: Wt 169    Review of Systems   Constitutional: Negative for activity change, appetite change and fatigue. Respiratory: Positive for shortness of breath. Negative for cough, choking and chest tightness. Cardiovascular: Negative for chest pain, palpitations and leg swelling. Denies PND or orthopnea. No tachycardia or syncope. Neurological: Negative for dizziness, syncope and light-headedness. Psychiatric/Behavioral: Negative for agitation, behavioral problems and confusion. All other systems reviewed and are negative.       Objective:

## 2022-02-06 DIAGNOSIS — I10 BENIGN ESSENTIAL HYPERTENSION: ICD-10-CM

## 2022-02-07 RX ORDER — FUROSEMIDE 20 MG/1
TABLET ORAL
Qty: 180 TABLET | Refills: 1 | Status: SHIPPED | OUTPATIENT
Start: 2022-02-07 | End: 2022-10-04

## 2022-02-07 NOTE — TELEPHONE ENCOUNTER
Requested Prescriptions     Pending Prescriptions Disp Refills    furosemide (LASIX) 20 MG tablet [Pharmacy Med Name: FUROSEMIDE 20 MG TABLET] 180 tablet 1     Sig: TAKE ONE TO TWO TABLETS BY MOUTH EVERY DAY       LOV 12/14/2021  Nov 9/27/22  Labs 7/19/21

## 2022-04-10 NOTE — PROGRESS NOTES
Subjective:      Patient ID: James Odell 80 y.o. female. The primary encounter diagnosis was Dyspnea on exertion. Diagnoses of Moderate episode of recurrent major depressive disorder (HCC) and AILYN (obstructive sleep apnea) were also pertinent to this visit. HPI    Dyspnea; complaint for several years. PFT showed restrictive lung disease. Normal stress myoview. Echo showed  Normal EF. Is not anemic. CTA lungs in 2019 showed benign low risk nodules and cardiac CT 1/2021 showed no lung disease. Saw Dr. Atiya Olvera; he did not think sxs are cardiac. On CPAP and is compliant. Has been trying to loose weight. TODAY: dyspnea is a bit worse. No problems walking out to the car, cannot walk even a few blocks. No cough, chest pain, PND or orthopnea.      Lab Results   Component Value Date     07/19/2021    K 3.8 07/19/2021     07/19/2021    CO2 31 07/19/2021    BUN 20 07/19/2021    CREATININE 0.9 07/19/2021    GLUCOSE 94 07/19/2021    CALCIUM 10.1 07/19/2021    PROT 7.3 07/19/2021    LABALBU 4.6 07/19/2021    BILITOT 0.4 07/19/2021    ALKPHOS 81 07/19/2021    AST 17 07/19/2021    ALT 17 07/19/2021    LABGLOM >60 07/19/2021    GFRAA >60 07/19/2021    AGRATIO 1.7 07/19/2021    GLOB 2.7 07/19/2021        Lab Results   Component Value Date    WBC 10.5 07/19/2021    HGB 13.3 07/19/2021    HCT 40.4 07/19/2021    MCV 87.8 07/19/2021     07/19/2021      TSH   Date Value Ref Range Status   07/19/2021 2.64 0.27 - 4.20 uIU/mL Final   06/08/2020 3.68 0.27 - 4.20 uIU/mL Final   01/15/2020 3.87 0.27 - 4.20 uIU/mL Final   04/27/2017 2.38 0.40 - 4.50 mIU/L Final   01/21/2016 1.16 0.40 - 4.50 mIU/L Final   06/24/2013 3.27 0.40 - 4.50 mIU/L Final      Outpatient Medications Marked as Taking for the 4/13/22 encounter (Office Visit) with Lolita Schwab MD   Medication Sig Dispense Refill    albuterol sulfate  (90 Base) MCG/ACT inhaler Inhale 2 puffs into the lungs every 4 hours as needed for Wheezing 18 g 2    furosemide (LASIX) 20 MG tablet TAKE ONE TO TWO TABLETS BY MOUTH EVERY  tablet 1    pantoprazole (PROTONIX) 40 MG tablet Take 1 tablet by mouth daily 90 tablet 2    rosuvastatin (CRESTOR) 20 MG tablet Take 1 tablet by mouth daily 90 tablet 2    FLUoxetine (PROZAC) 40 MG capsule Take 1 capsule by mouth daily 90 capsule 1    verapamil (CALAN SR) 180 MG extended release tablet Take 1 tablet by mouth daily 90 tablet 1    polyethylene glycol (GLYCOLAX) 17 GM/SCOOP powder Take 17 g by mouth daily      Cholecalciferol (D3 PO) Take by mouth      Dupilumab (DUPIXENT) 300 MG/2ML SOPN Inject into the skin      methylcellulose (CITRUCEL) oral powder Take 2 g by mouth daily Take by mouth daily.       calcium citrate-vitamin D (CALCIUM CITRATE + D3) 315-250 MG-UNIT TABS per tablet Take 1 tablet by mouth daily (with breakfast) 120 tablet         Allergies   Allergen Reactions    Ace Inhibitors      cough    Latanoprost Other (See Comments)    Losartan Other (See Comments)     cough    Taztia Xt [Diltiazem Hcl]        Patient Active Problem List   Diagnosis    Recurrent major depressive disorder, in full remission ([facility] [address]    Osteopenia    Cervical stenosis of spine    Benign essential hypertension    Colon polyps    Essential familial hypercholesterolemia    IBS (irritable bowel syndrome)    Elevated lipoprotein(a)    Renal angiolipoma    Lumbar spondylosis    Lumbar stenosis    Trochanteric bursitis of right hip    Glucose intolerance (impaired glucose tolerance)    Memory loss    AILYN (obstructive sleep apnea)    Class 2 obesity without serious comorbidity with body mass index (BMI) of 36.0 to 36.9 in adult    Moderate episode of recurrent major depressive disorder (HCC)       Past Medical History:   Diagnosis Date    Acute esophagitis     h/o    Acute hearing loss of right ear 7/12/2016    AVN (avascular necrosis of bone) ([facility] [address] 1/23/2014    Hip      Benign essential hypertension     Cervical stenosis of spine     Colon polyps     Depressive disorder, not elsewhere classified     Essential familial hypercholesterolemia     Fatigue     Fracture of cuboid, right ankle, closed 5/2013    Hypercalcemia 7/27/2018    Lumbar herniated disc     Lumbar radiculopathy 6/9/2015    Osteopenia        Past Surgical History:   Procedure Laterality Date    APPENDECTOMY      BREAST BIOPSY      BREAST SURGERY      reduction    HYSTERECTOMY      JOINT REPLACEMENT Left 4/2014    hip    KNEE SURGERY      left    LAMINECTOMY      decompressive more than 2 lumbar segments    TONSILLECTOMY          Family History   Problem Relation Age of Onset    Other Mother         lung disease    Heart Disease Father        Social History     Tobacco Use    Smoking status: Never Smoker    Smokeless tobacco: Never Used   Substance Use Topics    Alcohol use: No    Drug use: No            Review of Systems  Review of Systems    Objective:   Physical Exam  Vitals:    04/13/22 1317   BP: 120/60   Pulse: 80   Resp: 14   Temp: 97.3 °F (36.3 °C)   TempSrc: Temporal   SpO2: 96%       Physical Exam    NAD    Skin is warm and dry. No rash. Well hydrated  Alert and oriented x 3. Mood and affect are normal.  The neck is supple and free of adenopathy or masses, the thyroid is normal without enlargement or nodules. Chest: clear with no wheezes or rales. No retractions, or use of accessory muscles noted. Cardiovascular: PMI is not displaced, and no thrill noted. Regular rate and rhythm with no rub, murmur or gallop. No peripheral edema. Assessment:       Diagnosis Orders   1. Dyspnea on exertion  CT CHEST WO CONTRAST    albuterol sulfate  (90 Base) MCG/ACT inhaler    Tess Mckenzie MD, Pulmonary, [facility]   2. Moderate episode of recurrent major depressive disorder ([facility] [address]  Doing well    3. AILYN (obstructive sleep apnea)     4.  Restrictive lung disease  CT CHEST WO CONTRAST Plan:      Side effects of current medications reviewed and questions answered.

## 2022-04-13 ENCOUNTER — OFFICE VISIT (OUTPATIENT)
Dept: FAMILY MEDICINE CLINIC | Age: 82
End: 2022-04-13
Payer: MEDICARE

## 2022-04-13 VITALS
RESPIRATION RATE: 14 BRPM | HEART RATE: 80 BPM | TEMPERATURE: 97.3 F | DIASTOLIC BLOOD PRESSURE: 60 MMHG | SYSTOLIC BLOOD PRESSURE: 120 MMHG | OXYGEN SATURATION: 96 %

## 2022-04-13 DIAGNOSIS — G47.33 OSA (OBSTRUCTIVE SLEEP APNEA): ICD-10-CM

## 2022-04-13 DIAGNOSIS — R06.09 DYSPNEA ON EXERTION: Primary | ICD-10-CM

## 2022-04-13 DIAGNOSIS — J98.4 RESTRICTIVE LUNG DISEASE: ICD-10-CM

## 2022-04-13 DIAGNOSIS — F33.1 MODERATE EPISODE OF RECURRENT MAJOR DEPRESSIVE DISORDER (HCC): ICD-10-CM

## 2022-04-13 PROCEDURE — 4040F PNEUMOC VAC/ADMIN/RCVD: CPT | Performed by: FAMILY MEDICINE

## 2022-04-13 PROCEDURE — 1090F PRES/ABSN URINE INCON ASSESS: CPT | Performed by: FAMILY MEDICINE

## 2022-04-13 PROCEDURE — G8417 CALC BMI ABV UP PARAM F/U: HCPCS | Performed by: FAMILY MEDICINE

## 2022-04-13 PROCEDURE — G8427 DOCREV CUR MEDS BY ELIG CLIN: HCPCS | Performed by: FAMILY MEDICINE

## 2022-04-13 PROCEDURE — 99214 OFFICE O/P EST MOD 30 MIN: CPT | Performed by: FAMILY MEDICINE

## 2022-04-13 PROCEDURE — G8399 PT W/DXA RESULTS DOCUMENT: HCPCS | Performed by: FAMILY MEDICINE

## 2022-04-13 PROCEDURE — 1036F TOBACCO NON-USER: CPT | Performed by: FAMILY MEDICINE

## 2022-04-13 PROCEDURE — 1123F ACP DISCUSS/DSCN MKR DOCD: CPT | Performed by: FAMILY MEDICINE

## 2022-04-13 RX ORDER — ALBUTEROL SULFATE 90 UG/1
2 AEROSOL, METERED RESPIRATORY (INHALATION) EVERY 4 HOURS PRN
Qty: 18 G | Refills: 2 | Status: SHIPPED | OUTPATIENT
Start: 2022-04-13 | End: 2022-05-04

## 2022-04-29 ENCOUNTER — HOSPITAL ENCOUNTER (EMERGENCY)
Age: 82
Discharge: HOME | End: 2022-04-29
Attending: EMERGENCY MEDICINE
Payer: MEDICARE

## 2022-04-29 VITALS
SYSTOLIC BLOOD PRESSURE: 166 MMHG | OXYGEN SATURATION: 99 % | BODY MASS INDEX: 33.26 KG/M2 | DIASTOLIC BLOOD PRESSURE: 96 MMHG | HEIGHT: 59 IN | RESPIRATION RATE: 16 BRPM | HEART RATE: 70 BPM | TEMPERATURE: 98.6 F | WEIGHT: 165 LBS

## 2022-04-29 DIAGNOSIS — K59.00 CONSTIPATION, UNSPECIFIED CONSTIPATION TYPE: ICD-10-CM

## 2022-04-29 DIAGNOSIS — I10 HYPERTENSION, UNSPECIFIED TYPE: Primary | ICD-10-CM

## 2022-04-29 PROCEDURE — 99283 EMERGENCY DEPT VISIT LOW MDM: CPT

## 2022-04-29 PROCEDURE — 6370000000 HC RX 637 (ALT 250 FOR IP): Performed by: PHYSICIAN ASSISTANT

## 2022-04-29 PROCEDURE — 93005 ELECTROCARDIOGRAM TRACING: CPT | Performed by: EMERGENCY MEDICINE

## 2022-04-29 RX ADMIN — MINERAL OIL 330 ML: 1000 SOLUTION ORAL at 21:44

## 2022-04-29 RX ADMIN — METOPROLOL TARTRATE 50 MG: 25 TABLET, FILM COATED ORAL at 21:20

## 2022-04-29 ASSESSMENT — ENCOUNTER SYMPTOMS
BACK PAIN: 0
VOMITING: 0
NAUSEA: 0
COLOR CHANGE: 0
ABDOMINAL PAIN: 0
CONSTIPATION: 1
SHORTNESS OF BREATH: 1
DIARRHEA: 0

## 2022-04-30 LAB
EKG ATRIAL RATE: 68 BPM
EKG DIAGNOSIS: NORMAL
EKG P AXIS: 48 DEGREES
EKG P-R INTERVAL: 136 MS
EKG Q-T INTERVAL: 452 MS
EKG QRS DURATION: 134 MS
EKG QTC CALCULATION (BAZETT): 480 MS
EKG R AXIS: -47 DEGREES
EKG T AXIS: 104 DEGREES
EKG VENTRICULAR RATE: 68 BPM

## 2022-04-30 NOTE — ED PROVIDER NOTES
1 [facility]  EMERGENCY DEPARTMENT ENCOUNTER          PHYSICIAN ASSISTANT NOTE       Date of evaluation: 4/29/2022    Chief Complaint     Constipation and Hypertension      History of Present Illness     Evelin Arteaga is a 80 y.o. female who presents with complaint of constipation and hypertension. Patient has slow transit constipation and has had it ever since she was young. However, states that since she is been getting older its become more of a problem. She is on Citrucel and MiraLAX. She states that its been a week since her last normal bowel movement. She states that every day she has been having a small amount but feels like she is not having any normal size that she should be. She denies abdominal pain, nausea, vomiting. She also states that she has been feeling clammy and like her blood pressure is elevated. She states in the past her blood pressure has increased when she has been very constipated. She denies headache, chest pain, difficulty urinating. She does admit to shortness of breath but states that this is being worked up as an outpatient and is unchanged. Review of Systems     Review of Systems   Constitutional: Negative for chills and fever. Respiratory: Positive for shortness of breath (unchanged). Cardiovascular: Negative for chest pain. Gastrointestinal: Positive for constipation. Negative for abdominal pain, diarrhea, nausea and vomiting. Genitourinary: Negative for difficulty urinating. Musculoskeletal: Negative for back pain and myalgias. Skin: Negative for color change and wound. Neurological: Negative for weakness, light-headedness, numbness and headaches. Psychiatric/Behavioral: Negative for confusion. All other systems reviewed and are negative.       Past Medical, Surgical, Family, and Social History     She has a past medical history of Acute esophagitis, Acute hearing loss of right ear, AVN (avascular necrosis of bone) ([facility] [address] Benign essential hypertension, Cervical stenosis of spine, Colon polyps, Depressive disorder, not elsewhere classified, Essential familial hypercholesterolemia, Fatigue, Fracture of cuboid, right ankle, closed, Hypercalcemia, Lumbar herniated disc, Lumbar radiculopathy, and Osteopenia. She has a past surgical history that includes laminectomy; Hysterectomy; joint replacement (Left, 4/2014); Tonsillectomy; Appendectomy; knee surgery; Breast surgery; and Breast biopsy. Her family history includes Heart Disease in her father; Other in her mother. She reports that she has never smoked. She has never used smokeless tobacco. She reports that she does not drink alcohol and does not use drugs. Medications     Previous Medications    ALBUTEROL SULFATE  (90 BASE) MCG/ACT INHALER    Inhale 2 puffs into the lungs every 4 hours as needed for Wheezing    ALPHA-D-GALACTOSIDASE (BEANO PO)    Take by mouth     CALCIUM CITRATE-VITAMIN D (CALCIUM CITRATE + D3) 315-250 MG-UNIT TABS PER TABLET    Take 1 tablet by mouth daily (with breakfast)    CHOLECALCIFEROL (D3 PO)    Take by mouth    DUPILUMAB (DUPIXENT) 300 MG/2ML SOPN    Inject into the skin    FLUOXETINE (PROZAC) 40 MG CAPSULE    Take 1 capsule by mouth daily    FUROSEMIDE (LASIX) 20 MG TABLET    TAKE ONE TO TWO TABLETS BY MOUTH EVERY DAY    METHYLCELLULOSE (CITRUCEL) ORAL POWDER    Take 2 g by mouth daily Take by mouth daily.     MIRABEGRON (MYRBETRIQ PO)    Take by mouth every other day Unsure of dose    OXYBUTYNIN (DITROPAN-XL) 10 MG EXTENDED RELEASE TABLET    Take 1 tablet by mouth daily    PANTOPRAZOLE (PROTONIX) 40 MG TABLET    Take 1 tablet by mouth daily    POLYETHYLENE GLYCOL (GLYCOLAX) 17 GM/SCOOP POWDER    Take 17 g by mouth daily    ROSUVASTATIN (CRESTOR) 20 MG TABLET    Take 1 tablet by mouth daily    VERAPAMIL (CALAN SR) 180 MG EXTENDED RELEASE TABLET    Take 1 tablet by mouth daily       Allergies     She is allergic to ace inhibitors, latanoprost, losartan, and taztia xt [diltiazem hcl]. Physical Exam     INITIAL VITALS: BP: (!) 224/95, Temp: 98.6 °F (37 °C), Pulse: 70, Resp: 16, SpO2: 96 %  Physical Exam  Vitals and nursing note reviewed. Exam conducted with a chaperone present. Constitutional:       General: She is not in acute distress. Appearance: Normal appearance. She is well-developed. She is not diaphoretic. HENT:      Head: Normocephalic and atraumatic. Eyes:      Conjunctiva/sclera: Conjunctivae normal.   Cardiovascular:      Rate and Rhythm: Normal rate and regular rhythm. Heart sounds: Normal heart sounds. Pulmonary:      Effort: Pulmonary effort is normal. No respiratory distress. Breath sounds: Normal breath sounds. Abdominal:      Palpations: Abdomen is soft. Tenderness: There is no abdominal tenderness. Genitourinary:     Rectum: No tenderness. Normal anal tone. Comments: No stool in the rectal vault, small amount of brown stool noted around the anus    Musculoskeletal:         General: No tenderness. Normal range of motion. Cervical back: Normal range of motion. Skin:     General: Skin is warm and dry. Neurological:      General: No focal deficit present. Mental Status: She is alert and oriented to person, place, and time. Psychiatric:         Mood and Affect: Mood normal.         Behavior: Behavior normal.         Thought Content:  Thought content normal.         Judgment: Judgment normal.         Diagnostic Results     EKG   Interpreted in conjunction with emergency department physician Amira Kohli MD  Rhythm: normal sinus   Rate: normal  Axis: normal  Ectopy: none  Conduction: right bundle branch block (complete) and left anterior fasciclar block  ST Segments: normal  T Waves: inversion in  v1, v2, aVr and aVl  Q Waves: unchanged V1, V2 since 9/2021  Clinical Impression: non-specific EKG  Comparison:  No acute changes 9/2021,also had RBBB and LAFB in cardiology note 11/4/21    RADIOLOGY:  No orders to display       LABS:   No results found for this visit on 04/29/22. ED BEDSIDE ULTRASOUND:      RECENT VITALS:  BP: (!) 166/96, Temp: 98.6 °F (37 °C), Pulse: 70, Resp: 16, SpO2: 99 %     Procedures         ED Course     Nursing Notes, Past Medical Hx,Past Surgical Hx, Social Hx, Allergies, and Family Hx were reviewed. The patient was given the following medications:  Orders Placed This Encounter   Medications    metoprolol tartrate (LOPRESSOR) tablet 50 mg    SMOG Enema       CONSULTS:  None    MEDICAL DECISION MAKING / ASSESSMENT / Kristi Robledo is a 80 y.o. female who presented to the emergency department with 2 complaints I believe are separate. First was that she was constipated which she has problems with chronically and is on 2 different medications daily. She reports this is happened in the past and she has needed an enema years ago. Abdomen is soft and she has no tenderness, nausea or vomiting. She is passing gas so my suspicion for small bowel obstruction is low. She had no impaction on physical exam.  My concern for an intra-abdominal process is low based on patient's benign exam and she was ordered an enema. She also had colonoscopy in 2018 and per her primary care provider's note it was relatively benign and they had decided that would likely be her last colonoscopy due to her age. Her second complaint is hypertension and she is noted to be hypertensive of 224/95. She is currently on verapamil at home. She states she has not missed any doses. She admits to feeling clammy but denies chest pain or headache has no weakness or numbness. EKG is unchanged from previous. Patient was given dose of metoprolol for treatment of her blood pressure. Patient was given a dose of metoprolol and blood pressure waxed and waned but continue to be elevated. After a smog enema patient had significant relief and was monitored in the emergency department.   Her blood pressure also improved after having bowel movements. Patient feels comfortable with being discharged and can follow-up with a primary care provider. If she has any new or worsening symptoms she will return the emergency room. Discharged in stable condition. This patient was also evaluated by the attending physician. All care plans were discussed and agreed upon. Clinical Impression     1. Hypertension, unspecified type    2.  Constipation, unspecified constipation type        Disposition     PATIENT REFERRED TO:  Bharat Bermudez MD  [address]  [phone]            DISCHARGE MEDICATIONS:  New Prescriptions    No medications on file       DISPOSITION  Discharge        [facility], [address]  04/29/22 [facility], [address]  06/02/22 1441

## 2022-04-30 NOTE — ED NOTES
Pt had large BM with some small hard stool and one large burden of soft but formed, nonbloody, brown stool.      Butch Vega RN  04/29/22 [id number]

## 2022-04-30 NOTE — ED PROVIDER NOTES
ED Attending Attestation Note     Date of evaluation: 4/29/2022    This patient was seen by the advance practice provider. I have seen and examined the patient, agree with the workup, evaluation, management and diagnosis. The care plan has been discussed. I have reviewed the ECG and concur with the KRYSTINA's interpretation. My assessment reveals adult female who presents emerged department with concern for constipation. The patient states that she has been having small, quarter sized bowel movements for the last few days and feels that she is \"backed up\". The patient states that this is happened periodically and she requires an enema. The patient states that during these periods her blood pressure also becomes quite high. She states that she has been compliant with all of her blood pressure medications. She denies any headache, changes in vision or chest pain. She requests an enema be performed so that she can \"get home\"    On examination find a pleasant adult female, speaking in complete sentences. No increased work of breathing or accessory muscle use during respiration. She is moving all 4 extremities. Cranial nerves grossly intact. We will proceed with enema and blood pressure medications. Minerva Wang MD MPH   Physician.         Lincoln No MD  04/29/22 [id number]

## 2022-05-04 ENCOUNTER — OFFICE VISIT (OUTPATIENT)
Dept: FAMILY MEDICINE CLINIC | Age: 82
End: 2022-05-04
Payer: MEDICARE

## 2022-05-04 ENCOUNTER — HOSPITAL ENCOUNTER (EMERGENCY)
Age: 82
Discharge: HOME | End: 2022-05-04
Attending: EMERGENCY MEDICINE
Payer: MEDICARE

## 2022-05-04 VITALS
OXYGEN SATURATION: 94 % | RESPIRATION RATE: 18 BRPM | DIASTOLIC BLOOD PRESSURE: 75 MMHG | BODY MASS INDEX: 33.33 KG/M2 | SYSTOLIC BLOOD PRESSURE: 180 MMHG | HEART RATE: 63 BPM | WEIGHT: 165 LBS | TEMPERATURE: 98 F

## 2022-05-04 VITALS
BODY MASS INDEX: 33.41 KG/M2 | HEART RATE: 93 BPM | SYSTOLIC BLOOD PRESSURE: 146 MMHG | DIASTOLIC BLOOD PRESSURE: 82 MMHG | TEMPERATURE: 97.3 F | OXYGEN SATURATION: 97 % | WEIGHT: 165.4 LBS | RESPIRATION RATE: 14 BRPM

## 2022-05-04 DIAGNOSIS — K58.1 IRRITABLE BOWEL SYNDROME WITH CONSTIPATION: Primary | ICD-10-CM

## 2022-05-04 DIAGNOSIS — I10 BENIGN ESSENTIAL HYPERTENSION: ICD-10-CM

## 2022-05-04 DIAGNOSIS — I10 PRIMARY HYPERTENSION: Primary | ICD-10-CM

## 2022-05-04 DIAGNOSIS — R20.2 PARESTHESIAS: ICD-10-CM

## 2022-05-04 LAB
ANION GAP SERPL CALCULATED.3IONS-SCNC: 16 MMOL/L (ref 3–16)
BASOPHILS ABSOLUTE: 0 K/UL (ref 0–0.2)
BASOPHILS RELATIVE PERCENT: 0.4 %
BUN BLDV-MCNC: 18 MG/DL (ref 7–20)
CALCIUM SERPL-MCNC: 10.2 MG/DL (ref 8.3–10.6)
CHLORIDE BLD-SCNC: 101 MMOL/L (ref 99–110)
CO2: 24 MMOL/L (ref 21–32)
CREAT SERPL-MCNC: 0.8 MG/DL (ref 0.6–1.2)
EKG ATRIAL RATE: 69 BPM
EKG DIAGNOSIS: NORMAL
EKG P AXIS: 37 DEGREES
EKG P-R INTERVAL: 136 MS
EKG Q-T INTERVAL: 450 MS
EKG QRS DURATION: 126 MS
EKG QTC CALCULATION (BAZETT): 482 MS
EKG R AXIS: -53 DEGREES
EKG T AXIS: 102 DEGREES
EKG VENTRICULAR RATE: 69 BPM
EOSINOPHILS ABSOLUTE: 0.2 K/UL (ref 0–0.6)
EOSINOPHILS RELATIVE PERCENT: 2 %
GFR AFRICAN AMERICAN: >60
GFR NON-AFRICAN AMERICAN: >60
GLUCOSE BLD-MCNC: 108 MG/DL (ref 70–99)
HCT VFR BLD CALC: 41.1 % (ref 36–48)
HEMOGLOBIN: 13.3 G/DL (ref 12–16)
LYMPHOCYTES ABSOLUTE: 0.9 K/UL (ref 1–5.1)
LYMPHOCYTES RELATIVE PERCENT: 10.8 %
MCH RBC QN AUTO: 27.8 PG (ref 26–34)
MCHC RBC AUTO-ENTMCNC: 32.3 G/DL (ref 31–36)
MCV RBC AUTO: 86 FL (ref 80–100)
MONOCYTES ABSOLUTE: 1.1 K/UL (ref 0–1.3)
MONOCYTES RELATIVE PERCENT: 13.2 %
NEUTROPHILS ABSOLUTE: 6.4 K/UL (ref 1.7–7.7)
NEUTROPHILS RELATIVE PERCENT: 73.6 %
PDW BLD-RTO: 15.1 % (ref 12.4–15.4)
PLATELET # BLD: 201 K/UL (ref 135–450)
PMV BLD AUTO: 9.6 FL (ref 5–10.5)
POTASSIUM SERPL-SCNC: 3.6 MMOL/L (ref 3.5–5.1)
RBC # BLD: 4.78 M/UL (ref 4–5.2)
SODIUM BLD-SCNC: 141 MMOL/L (ref 136–145)
T4 FREE: 1.4 NG/DL (ref 0.9–1.8)
TROPONIN: <0.01 NG/ML
TSH REFLEX: 5.45 UIU/ML (ref 0.27–4.2)
WBC # BLD: 8.7 K/UL (ref 4–11)

## 2022-05-04 PROCEDURE — G8417 CALC BMI ABV UP PARAM F/U: HCPCS | Performed by: FAMILY MEDICINE

## 2022-05-04 PROCEDURE — 99214 OFFICE O/P EST MOD 30 MIN: CPT | Performed by: FAMILY MEDICINE

## 2022-05-04 PROCEDURE — 84439 ASSAY OF FREE THYROXINE: CPT

## 2022-05-04 PROCEDURE — 80048 BASIC METABOLIC PNL TOTAL CA: CPT

## 2022-05-04 PROCEDURE — 84443 ASSAY THYROID STIM HORMONE: CPT

## 2022-05-04 PROCEDURE — 6370000000 HC RX 637 (ALT 250 FOR IP): Performed by: EMERGENCY MEDICINE

## 2022-05-04 PROCEDURE — 93005 ELECTROCARDIOGRAM TRACING: CPT | Performed by: EMERGENCY MEDICINE

## 2022-05-04 PROCEDURE — 84484 ASSAY OF TROPONIN QUANT: CPT

## 2022-05-04 PROCEDURE — 1123F ACP DISCUSS/DSCN MKR DOCD: CPT | Performed by: FAMILY MEDICINE

## 2022-05-04 PROCEDURE — G8427 DOCREV CUR MEDS BY ELIG CLIN: HCPCS | Performed by: FAMILY MEDICINE

## 2022-05-04 PROCEDURE — G8399 PT W/DXA RESULTS DOCUMENT: HCPCS | Performed by: FAMILY MEDICINE

## 2022-05-04 PROCEDURE — 1090F PRES/ABSN URINE INCON ASSESS: CPT | Performed by: FAMILY MEDICINE

## 2022-05-04 PROCEDURE — 4040F PNEUMOC VAC/ADMIN/RCVD: CPT | Performed by: FAMILY MEDICINE

## 2022-05-04 PROCEDURE — 99284 EMERGENCY DEPT VISIT MOD MDM: CPT

## 2022-05-04 PROCEDURE — 85025 COMPLETE CBC W/AUTO DIFF WBC: CPT

## 2022-05-04 PROCEDURE — 1036F TOBACCO NON-USER: CPT | Performed by: FAMILY MEDICINE

## 2022-05-04 RX ORDER — CLONIDINE HYDROCHLORIDE 0.1 MG/1
TABLET ORAL
Qty: 12 TABLET | Refills: 0 | Status: SHIPPED | OUTPATIENT
Start: 2022-05-04 | End: 2022-05-20 | Stop reason: SDUPTHER

## 2022-05-04 RX ORDER — CLONIDINE HYDROCHLORIDE 0.1 MG/1
0.1 TABLET ORAL ONCE
Status: COMPLETED | OUTPATIENT
Start: 2022-05-04 | End: 2022-05-04

## 2022-05-04 RX ORDER — DOXAZOSIN MESYLATE 1 MG/1
1 TABLET ORAL DAILY
Qty: 30 TABLET | Refills: 2 | Status: SHIPPED | OUTPATIENT
Start: 2022-05-04 | End: 2022-05-16

## 2022-05-04 RX ORDER — HYDROXYZINE HYDROCHLORIDE 10 MG/1
10-20 TABLET, FILM COATED ORAL 3 TIMES DAILY PRN
Qty: 10 TABLET | Refills: 2 | Status: SHIPPED | OUTPATIENT
Start: 2022-05-04 | End: 2022-05-20 | Stop reason: SDUPTHER

## 2022-05-04 RX ADMIN — CLONIDINE HYDROCHLORIDE 0.1 MG: 0.1 TABLET ORAL at 04:06

## 2022-05-04 ASSESSMENT — ENCOUNTER SYMPTOMS
EYES NEGATIVE: 1
RESPIRATORY NEGATIVE: 1
CONSTIPATION: 1

## 2022-05-04 ASSESSMENT — PAIN - FUNCTIONAL ASSESSMENT
PAIN_FUNCTIONAL_ASSESSMENT: NONE - DENIES PAIN
PAIN_FUNCTIONAL_ASSESSMENT: NONE - DENIES PAIN

## 2022-05-04 NOTE — ED TRIAGE NOTES
Pt states she feels clammy and this happens when her blood pressure is elevated. Pt states her BP med was changed 3 weeks ago.

## 2022-05-04 NOTE — ED NOTES
Blood work collected and sent to lab     Michael Jimenez, [address] [place] [address]  05/04/22 0445

## 2022-05-04 NOTE — PROGRESS NOTES
Subjective:      Patient ID: Jose Juan Appiah 80 y.o. female. The primary encounter diagnosis was Irritable bowel syndrome with constipation. A diagnosis of Essential familial hypercholesterolemia was also pertinent to this visit. GERALDO Holder presented to the ER on 4/29 with the following complaint:  Jose Juan Appiah is a 80 y.o. female who presents with complaint of constipation and hypertension. Patient has slow transit constipation and has had it ever since she was young. However, states that since she is been getting older its become more of a problem. She is on Citrucel and MiraLAX. She states that its been a week since her last normal bowel movement. She states that every day she has been having a small amount but feels like she is not having any normal size that she should be. She denies abdominal pain, nausea, vomiting. She also states that she has been feeling clammy and like her blood pressure is elevated. She states in the past her blood pressure has increased when she has been very constipated. She denies headache, chest pain, difficulty urinating. She does admit to shortness of breath but states that this is being worked up as an outpatient and is unchanged    BP in the ER was 224/95, pulse 70, O2 sat 96%  EKG no acute changes  BP post Metoprolol 166/96    Constipation treated with SMOG enema. Today:  BP increased at the same time she developed constipation. Up to 200 . She does not recall what it was prior to constipation. She feels clammy and tingly when her Bp went up. Constipation started one week ago. She is eating pretty well; does not have much of an appetite but eats pretty healthy foods. She is drinking 6 to 8 8-ounce glasses of water a day. Is taking Citrucel and MiraLax. Is taking 1 cap of Miralax. Last BM was when she had the enema in the ER last Friday.          Labs Renal Latest Ref Rng & Units 7/19/2021 11/10/2020 6/8/2020 1/15/2020 1/9/2019   BUN 7 - 20 mg/dL 20 32(H) 20 23(H) 31(H)   Cr 0.6 - 1.2 mg/dL 0.9 0.9 0.8 0.9 0.9   K 3.5 - 5.1 mmol/L 3.8 5.0 3.7 3.8 4.5   Na 136 - 145 mmol/L 143 140 141 143 147(H)     Lab Results   Component Value Date    WBC 10.5 07/19/2021    HGB 13.3 07/19/2021    HCT 40.4 07/19/2021    MCV 87.8 07/19/2021     07/19/2021      TSH   Date Value Ref Range Status   07/19/2021 2.64 0.27 - 4.20 uIU/mL Final   06/08/2020 3.68 0.27 - 4.20 uIU/mL Final   01/15/2020 3.87 0.27 - 4.20 uIU/mL Final   04/27/2017 2.38 0.40 - 4.50 mIU/L Final   01/21/2016 1.16 0.40 - 4.50 mIU/L Final   06/24/2013 3.27 0.40 - 4.50 mIU/L Final          Outpatient Medications Marked as Taking for the 5/4/22 encounter (Office Visit) with Torres Porter MD   Medication Sig Dispense Refill    Mirabegron (MYRBETRIQ PO) Take by mouth every other day Unsure of dose      furosemide (LASIX) 20 MG tablet TAKE ONE TO TWO TABLETS BY MOUTH EVERY  tablet 1    pantoprazole (PROTONIX) 40 MG tablet Take 1 tablet by mouth daily 90 tablet 2    rosuvastatin (CRESTOR) 20 MG tablet Take 1 tablet by mouth daily 90 tablet 2    FLUoxetine (PROZAC) 40 MG capsule Take 1 capsule by mouth daily 90 capsule 1    verapamil (CALAN SR) 180 MG extended release tablet Take 1 tablet by mouth daily 90 tablet 1    polyethylene glycol (GLYCOLAX) 17 GM/SCOOP powder Take 17 g by mouth daily      Cholecalciferol (D3 PO) Take by mouth      Dupilumab (DUPIXENT) 300 MG/2ML SOPN Inject into the skin      methylcellulose (CITRUCEL) oral powder Take 2 g by mouth daily Take by mouth daily.       calcium citrate-vitamin D (CALCIUM CITRATE + D3) 315-250 MG-UNIT TABS per tablet Take 1 tablet by mouth daily (with breakfast) 120 tablet         Allergies   Allergen Reactions    Ace Inhibitors      cough    Latanoprost Other (See Comments)    Losartan Other (See Comments)     cough    Taztia Xt [Diltiazem Hcl]        Patient Active Problem List   Diagnosis    Recurrent major depressive disorder, in full remission ([facility] [address]    Osteopenia    Cervical stenosis of spine    Benign essential hypertension    Colon polyps    Essential familial hypercholesterolemia    IBS (irritable bowel syndrome)    Elevated lipoprotein(a)    Renal angiolipoma    Lumbar spondylosis    Lumbar stenosis    Trochanteric bursitis of right hip    Glucose intolerance (impaired glucose tolerance)    Memory loss    AILYN (obstructive sleep apnea)    Class 2 obesity without serious comorbidity with body mass index (BMI) of 36.0 to 36.9 in adult    Moderate episode of recurrent major depressive disorder (HCC)       Past Medical History:   Diagnosis Date    Acute esophagitis     h/o    Acute hearing loss of right ear 7/12/2016    AVN (avascular necrosis of bone) (HCC) 1/23/2014    Hip      Benign essential hypertension     Cervical stenosis of spine     Colon polyps     Depressive disorder, not elsewhere classified     Essential familial hypercholesterolemia     Fatigue     Fracture of cuboid, right ankle, closed 5/2013    Hypercalcemia 7/27/2018    Lumbar herniated disc     Lumbar radiculopathy 6/9/2015    Osteopenia        Past Surgical History:   Procedure Laterality Date    APPENDECTOMY      BREAST BIOPSY      BREAST SURGERY      reduction    HYSTERECTOMY      JOINT REPLACEMENT Left 4/2014    hip    KNEE SURGERY      left    LAMINECTOMY      decompressive more than 2 lumbar segments    TONSILLECTOMY          Family History   Problem Relation Age of Onset    Other Mother         lung disease    Heart Disease Father        Social History     Tobacco Use    Smoking status: Never Smoker    Smokeless tobacco: Never Used   Substance Use Topics    Alcohol use: No    Drug use: No            Review of Systems  Review of Systems    Objective:   Physical Exam  Vitals:    05/04/22 1610 05/04/22 1649   BP: (!) 142/70 (!) 146/82   Pulse: 93    Resp: 14    Temp: 97.3 °F (36.3 °C)    TempSrc: Temporal    SpO2: 97% Weight: 165 lb 6.4 oz (75 kg)      Wt Readings from Last 3 Encounters:   05/04/22 165 lb 6.4 oz (75 kg)   05/04/22 165 lb (74.8 kg)   04/29/22 165 lb (74.8 kg)        Physical Exam  .NAD    Skin is warm and dry. No rash. Well hydrated  Alert and oriented x 3. Mood and affect are normal.  The neck is supple and free of adenopathy or masses, the thyroid is normal without enlargement or nodules. Chest: clear with no wheezes or rales. No retractions, or use of accessory muscles noted. Cardiovascular: PMI is not displaced, and no thrill noted. Regular rate and rhythm with no rub, murmur or gallop. No peripheral edema. The abdomen is soft without tenderness, guarding, mass, rebound or organomegaly. Normal bowel sounds. Assessment:       Diagnosis Orders   1. Irritable bowel syndrome with constipation  magnesium citrate solution - call if ineffective  Stop Verapamil due to constipating effecti   2. Benign essential hypertension  doxazosin (CARDURA) 1 MG tablet    cloNIDine (CATAPRES) 0.1 MG tablet          Plan:      Side effects of current medications reviewed and questions answered. Follow up in 4 weeks or prn.

## 2022-05-04 NOTE — ED PROVIDER NOTES
[address]          ATTENDING PHYSICIAN NOTE       Date of evaluation: 5/4/2022    Chief Complaint     Numbness (states she woke up with tingling from the waist up, states it resovled prior to rrival by ems. )      History of Present Illness     Evelin Arteaga is a 80 y.o. female who presents to the emergency department complaining of high blood pressure. Patient states that over the last several days she has been having issues with her blood pressure being elevated. She was seen in the emergency department 4 days ago for the same. She states she has been compliant with her medications. She states that she started helping a sensation of numbness and tingling to her torso which she states typically occurs when her blood pressure is elevated. She denies any headache. She denies any chest pain or shortness of breath. She states she did have her blood pressure medications changed approximately 3 months ago from amlodipine to verapamil due to peripheral edema. Patient does also note constipation which she complained of several days ago as well. Review of Systems     Review of Systems   Constitutional: Negative. HENT: Negative. Eyes: Negative. Respiratory: Negative. Cardiovascular: Negative. Gastrointestinal: Positive for constipation. Genitourinary: Negative. Musculoskeletal: Negative. Neurological: Negative. All other systems reviewed and are negative. Past Medical, Surgical, Family, and Social History     She has a past medical history of Acute esophagitis, Acute hearing loss of right ear, AVN (avascular necrosis of bone) ([address] Benign essential hypertension, Cervical stenosis of spine, Colon polyps, Depressive disorder, not elsewhere classified, Essential familial hypercholesterolemia, Fatigue, Fracture of cuboid, right ankle, closed, Hypercalcemia, Lumbar herniated disc, Lumbar radiculopathy, and Osteopenia.   She has a past surgical history that includes laminectomy; Hysterectomy; joint replacement (Left, 4/2014); Tonsillectomy; Appendectomy; knee surgery; Breast surgery; and Breast biopsy. Her family history includes Heart Disease in her father; Other in her mother. She reports that she has never smoked. She has never used smokeless tobacco. She reports that she does not drink alcohol and does not use drugs. Medications     Current Discharge Medication List      CONTINUE these medications which have NOT CHANGED    Details   Mirabegron (MYRBETRIQ PO) Take by mouth every other day Unsure of dose      albuterol sulfate  (90 Base) MCG/ACT inhaler Inhale 2 puffs into the lungs every 4 hours as needed for Wheezing  Qty: 18 g, Refills: 2    Associated Diagnoses: Dyspnea on exertion      furosemide (LASIX) 20 MG tablet TAKE ONE TO TWO TABLETS BY MOUTH EVERY DAY  Qty: 180 tablet, Refills: 1    Associated Diagnoses: Benign essential hypertension      pantoprazole (PROTONIX) 40 MG tablet Take 1 tablet by mouth daily  Qty: 90 tablet, Refills: 2      rosuvastatin (CRESTOR) 20 MG tablet Take 1 tablet by mouth daily  Qty: 90 tablet, Refills: 2    Associated Diagnoses: Essential familial hypercholesterolemia      FLUoxetine (PROZAC) 40 MG capsule Take 1 capsule by mouth daily  Qty: 90 capsule, Refills: 1    Associated Diagnoses: Moderate episode of recurrent major depressive disorder (HCC)      verapamil (CALAN SR) 180 MG extended release tablet Take 1 tablet by mouth daily  Qty: 90 tablet, Refills: 1    Associated Diagnoses: Benign essential hypertension      polyethylene glycol (GLYCOLAX) 17 GM/SCOOP powder Take 17 g by mouth daily      Cholecalciferol (D3 PO) Take by mouth      oxybutynin (DITROPAN-XL) 10 MG extended release tablet Take 1 tablet by mouth daily  Qty: 90 tablet, Refills: 1      Dupilumab (DUPIXENT) 300 MG/2ML SOPN Inject into the skin      methylcellulose (CITRUCEL) oral powder Take 2 g by mouth daily Take by mouth daily. calcium citrate-vitamin D (CALCIUM CITRATE + D3) 315-250 MG-UNIT TABS per tablet Take 1 tablet by mouth daily (with breakfast)  Qty: 120 tablet      Alpha-D-Galactosidase (BEANO PO) Take by mouth              Allergies     She is allergic to ace inhibitors, latanoprost, losartan, and taztia xt [diltiazem hcl]. Physical Exam     INITIAL VITALS: BP: (!) 215/89, Temp: 98 °F (36.7 °C), Pulse: 66, Resp: 16, SpO2: 96 %   Physical Exam  Vitals and nursing note reviewed. Constitutional:       General: She is not in acute distress. HENT:      Head: Normocephalic and atraumatic. Mouth/Throat:      Mouth: Mucous membranes are moist.      Pharynx: No oropharyngeal exudate. Eyes:      General: No scleral icterus. Extraocular Movements: Extraocular movements intact. Conjunctiva/sclera: Conjunctivae normal.      Pupils: Pupils are equal, round, and reactive to light. Cardiovascular:      Rate and Rhythm: Normal rate and regular rhythm. Heart sounds: Normal heart sounds. Pulmonary:      Effort: Pulmonary effort is normal.      Breath sounds: Normal breath sounds. No wheezing, rhonchi or rales. Abdominal:      General: Bowel sounds are normal.      Palpations: Abdomen is soft. Tenderness: There is no abdominal tenderness. There is no guarding or rebound. Musculoskeletal:      Cervical back: Normal range of motion and neck supple. Neurological:      General: No focal deficit present. Mental Status: She is alert and oriented to person, place, and time. Cranial Nerves: No cranial nerve deficit. Motor: No weakness. Coordination: Coordination normal.         Diagnostic Results     EKG   EKG as interpreted by me shows patient to be in a normal sinus rhythm with left axis deviation, normal UT and QT intervals, QRS is widened with a nonspecific block, no ST segment abnormalities, T wave inversions present in leads I and aVL.     LABS:   Results for orders placed or performed during the hospital encounter of 05/04/22   CBC with Auto Differential   Result Value Ref Range    WBC 8.7 4.0 - 11.0 K/uL    RBC 4.78 4.00 - 5.20 M/uL    Hemoglobin 13.3 12.0 - 16.0 g/dL    Hematocrit 41.1 36.0 - 48.0 %    MCV 86.0 80.0 - 100.0 fL    MCH 27.8 26.0 - 34.0 pg    MCHC 32.3 31.0 - 36.0 g/dL    RDW 15.1 12.4 - 15.4 %    Platelets 430 115 - 888 K/uL    MPV 9.6 5.0 - 10.5 fL    Neutrophils % 73.6 %    Lymphocytes % 10.8 %    Monocytes % 13.2 %    Eosinophils % 2.0 %    Basophils % 0.4 %    Neutrophils Absolute 6.4 1.7 - 7.7 K/uL    Lymphocytes Absolute 0.9 (L) 1.0 - 5.1 K/uL    Monocytes Absolute 1.1 0.0 - 1.3 K/uL    Eosinophils Absolute 0.2 0.0 - 0.6 K/uL    Basophils Absolute 0.0 0.0 - 0.2 K/uL   Basic Metabolic Panel   Result Value Ref Range    Sodium 141 136 - 145 mmol/L    Potassium 3.6 3.5 - 5.1 mmol/L    Chloride 101 99 - 110 mmol/L    CO2 24 21 - 32 mmol/L    Anion Gap 16 3 - 16    Glucose 108 (H) 70 - 99 mg/dL    BUN 18 7 - 20 mg/dL    CREATININE 0.8 0.6 - 1.2 mg/dL    GFR Non-African American >60 >60    GFR African American >60 >60    Calcium 10.2 8.3 - 10.6 mg/dL   Troponin   Result Value Ref Range    Troponin <0.01 <0.01 ng/mL     RECENT VITALS:  BP: (!) 192/80,Temp: 98 °F (36.7 °C), Pulse: 67, Resp: 23, SpO2: 94 %     Procedures     N/A    ED Course     Nursing Notes, Past Medical Hx, Past Surgical Hx, Social Hx,Allergies, and Family Hx were reviewed. patient was given the following medications:  Orders Placed This Encounter   Medications    cloNIDine (CATAPRES) tablet 0.1 mg       CONSULTS:  None    MEDICAL DECISIONMAKING / ASSESSMENT / Melissa Ano is a 80 y.o. female presents concern for high blood pressure. Patient states that she has a sensation of tingling and numbness to her head and torso which she states is typical for her when her blood pressure was elevated. Patient was seen in the emergency department 4 days ago for elevated blood pressure as well.   She states she was changed to verapamil for blood pressure control several months ago but has had no changes otherwise. She does report adherence to a low-sodium diet. On arrival, patient is hypertensive with blood pressures in the 906D systolic. She has clear breath sounds normal heart sounds. She has no focal neurologic deficits. Laboratory studies are unremarkable. Thyroid studies are pending. EKG shows evidence of LVH but no acute ischemic abnormalities and is unchanged from prior. Patient was given 0.1 mg of clonidine with improvement of her blood pressure to 722 systolic. I did  the patient that she will need to follow-up with her primary care provider as scheduled later today for further evaluation. She will need additional blood pressure control, possibly a second dose of verapamil in the evening or a second agent. Clinical Impression     1. Primary hypertension    2. Paresthesias        Disposition     PATIENT REFERRED TO:  No follow-up provider specified.     DISCHARGE MEDICATIONS:  Current Discharge Medication List          DISPOSITION Decision To Discharge 05/04/2022 04:53:47 AM        Ines Breen MD  05/04/22 0884

## 2022-05-05 ENCOUNTER — APPOINTMENT (OUTPATIENT)
Dept: GENERAL RADIOLOGY | Age: 82
End: 2022-05-05
Payer: MEDICARE

## 2022-05-05 ENCOUNTER — HOSPITAL ENCOUNTER (EMERGENCY)
Age: 82
Discharge: HOME | End: 2022-05-05
Attending: EMERGENCY MEDICINE
Payer: MEDICARE

## 2022-05-05 VITALS
RESPIRATION RATE: 16 BRPM | TEMPERATURE: 97.9 F | SYSTOLIC BLOOD PRESSURE: 180 MMHG | HEART RATE: 79 BPM | BODY MASS INDEX: 33.26 KG/M2 | OXYGEN SATURATION: 93 % | WEIGHT: 165 LBS | DIASTOLIC BLOOD PRESSURE: 104 MMHG | HEIGHT: 59 IN

## 2022-05-05 DIAGNOSIS — R10.84 GENERALIZED ABDOMINAL PAIN: ICD-10-CM

## 2022-05-05 DIAGNOSIS — K59.09 OTHER CONSTIPATION: Primary | ICD-10-CM

## 2022-05-05 PROCEDURE — 99283 EMERGENCY DEPT VISIT LOW MDM: CPT

## 2022-05-05 PROCEDURE — 74018 RADEX ABDOMEN 1 VIEW: CPT

## 2022-05-05 ASSESSMENT — PAIN - FUNCTIONAL ASSESSMENT: PAIN_FUNCTIONAL_ASSESSMENT: NONE - DENIES PAIN

## 2022-05-05 NOTE — ED NOTES
Discharge instructions provided to patient by RN at bedside. No further concerns addressed at this time. Patient currently attempting to find a ride at this time. Patient getting dressed at this time.      Tonya Gage RN  05/05/22 [id number]       Tonya Gage RN  05/05/22 [id number] caffeine

## 2022-05-05 NOTE — ED PROVIDER NOTES
[facility]  EMERGENCY DEPARTMENT ENCOUNTER          ATTENDING PHYSICIAN NOTE       Date of evaluation: 5/5/2022    Chief Complaint     Constipation      History of Present Illness     Mari Armenta is a 80 y.o. female who presents for constipation. The patient states she has been increasing her oral agents to help with her constipation. She saw her primary care physician yesterday and was told to drink mag citrate which she did. She had some small luis enrique come out as well as some liquid but not a large bowel movement. She states she has some abdominal cramping at times, got nervous at home and said to come the ER for help with her bowel movements. Reports having problems with a bowel movement since childhood, states she uses MiraLAX once or twice a day along with Citrucel and was here about 5 days ago and got an enema with some reasonable stool. Patient is not vomiting, no diarrhea, no fevers, is concerned that she is not moving her bowels and that this will lead to obstruction or other complications. Review of Systems     Review of Systems--positive for constipation. Positive for abdominal cramps. Negative for fevers or chills. Negative for vomiting. Negative for diarrhea. Negative for chest pain. Positive for occasional nausea. Otherwise negative review of systems    Past Medical, Surgical, Family, and Social History     She has a past medical history of Acute esophagitis, Acute hearing loss of right ear, AVN (avascular necrosis of bone) ([address] Benign essential hypertension, Cervical stenosis of spine, Colon polyps, Depressive disorder, not elsewhere classified, Essential familial hypercholesterolemia, Fatigue, Fracture of cuboid, right ankle, closed, Hypercalcemia, Lumbar herniated disc, Lumbar radiculopathy, and Osteopenia. She has a past surgical history that includes laminectomy; Hysterectomy; joint replacement (Left, 4/2014); Tonsillectomy;  Appendectomy; knee surgery; Breast surgery; and Breast biopsy. Her family history includes Heart Disease in her father; Other in her mother. She reports that she has never smoked. She has never used smokeless tobacco. She reports that she does not drink alcohol and does not use drugs. Medications     Previous Medications    CALCIUM CITRATE-VITAMIN D (CALCIUM CITRATE + D3) 315-250 MG-UNIT TABS PER TABLET    Take 1 tablet by mouth daily (with breakfast)    CHOLECALCIFEROL (D3 PO)    Take by mouth    CLONIDINE (CATAPRES) 0.1 MG TABLET    Take one tab po every 8 hours prn SBP > 160    DOXAZOSIN (CARDURA) 1 MG TABLET    Take 1 tablet by mouth daily    DUPILUMAB (DUPIXENT) 300 MG/2ML SOPN    Inject into the skin    FLUOXETINE (PROZAC) 40 MG CAPSULE    Take 1 capsule by mouth daily    FUROSEMIDE (LASIX) 20 MG TABLET    TAKE ONE TO TWO TABLETS BY MOUTH EVERY DAY    HYDROXYZINE (ATARAX) 10 MG TABLET    Take 1-2 tablets by mouth 3 times daily as needed for Anxiety    MAGNESIUM CITRATE SOLUTION    Take 296 mLs by mouth once for 1 dose    METHYLCELLULOSE (CITRUCEL) ORAL POWDER    Take 2 g by mouth daily Take by mouth daily. MIRABEGRON (MYRBETRIQ PO)    Take by mouth every other day Unsure of dose    PANTOPRAZOLE (PROTONIX) 40 MG TABLET    Take 1 tablet by mouth daily    POLYETHYLENE GLYCOL (GLYCOLAX) 17 GM/SCOOP POWDER    Take 17 g by mouth daily    ROSUVASTATIN (CRESTOR) 20 MG TABLET    Take 1 tablet by mouth daily    VERAPAMIL (CALAN SR) 180 MG EXTENDED RELEASE TABLET    Take 1 tablet by mouth daily       Allergies     She is allergic to ace inhibitors, latanoprost, losartan, and taztia xt [diltiazem hcl].     Physical Exam     INITIAL VITALS: BP: (!) 180/104, Temp: 97.9 °F (36.6 °C), Pulse: 79, Resp: 16, SpO2: 93 %   Physical Exam  General--alert, laying in bed, no distress  HEENT--no trauma, pupils are equal round react light, EOMI  Neck--trachea midline, good range of motion  Chest--equal breath sounds bilaterally  Heart--regular, not tachycardic  Abdomen--soft, bowel sounds are present, there is no rebound or focal pain anywhere in her abdomen. No significant distention noted  Extremities--trace lower extremity edema, no deformities  Neuro--alert and oriented to person place time situation, speech is clear, no facial asymmetry  Psych--appropriate affect and mood  Diagnostic Results       RADIOLOGY:  XR ABDOMEN (KUB) (SINGLE AP VIEW)   Final Result   1. Moderate stool burden in the central pelvis, presumably in the    distal rectosigmoid region. 2.  Slightly prominent small bowel gas may represent normal    variation or enteritis. Nonobstructive appearance. LABS:   No results found for this visit on 05/05/22. RECENT VITALS:  BP: (!) 180/104,Temp: 97.9 °F (36.6 °C), Pulse: 79, Resp: 16, SpO2: 93 %     Procedures         ED Course     Nursing Notes, Past Medical Hx, Past Surgical Hx, Social Hx,Allergies, and Family Hx were reviewed. patient was given the following medications:  No orders of the defined types were placed in this encounter. CONSULTS:  None    MEDICAL DECISIONMAKING / ASSESSMENT / PLAN     Sheyla Cortes is a 80 y.o. female who comes in with concern of constipation, no effective bowel movements for several days. She reports a decent bowel movement when she had an enema here about 5 days ago but none since. She drank mag citrate before coming to the ER and only had some small amount of output at home and therefore came in for assistance with her constipation. I ordered a KUB x-ray which did not show an obstruction pattern but did show a moderate stool burden. While here the patient's mag citrate apparently began to work, the patient had several large bowel movements in the emergency department and felt much improved with her gas and abdominal cramps. I reevaluated the patient at 5:50 AM, she was awake, alert, felt much better after several bowel movements and requesting discharge home.   The patient will use MiraLAX twice a day 1 full capful along with her Citrucel and she is to follow-up with her primary care physician as necessary for this. Abdomen soft, she has no signs of peritoneal irritation or need for further imaging or laboratory studies. Clinical Impression     Acute on chronic constipation    Abdominal cramping    Disposition     PATIENT REFERRED TO:  No follow-up provider specified.     DISCHARGE MEDICATIONS:  New Prescriptions    No medications on file       [address], MD  05/05/22 6403

## 2022-05-06 ENCOUNTER — TELEPHONE (OUTPATIENT)
Dept: FAMILY MEDICINE CLINIC | Age: 82
End: 2022-05-06

## 2022-05-06 NOTE — TELEPHONE ENCOUNTER
Pt called stating that she is having liquid explosive diarrhea. She stopped taking the MiraLAX last night. She is worried about the amount of times she has went. Also worried about getting dehydrated. I advised her to get some pedialyte to help keep her hydrated and to call back later with an update on how she is doing.

## 2022-05-06 NOTE — TELEPHONE ENCOUNTER
Pt called back, she drank the pedialyte and was going to eat an egg. She hasn't had a bowel movement since I spoke with her this morning and is feeling much better.

## 2022-05-09 ENCOUNTER — PATIENT MESSAGE (OUTPATIENT)
Dept: FAMILY MEDICINE CLINIC | Age: 82
End: 2022-05-09

## 2022-05-09 NOTE — TELEPHONE ENCOUNTER
From: Hannah Thrasher  To: Dr. Sheriff Bras: 5/9/2022 10:20 AM EDT  Subject: message from Blessing Bagley, could you please address my message from [facility]. I am still having problems all weekend and cannot dump. It is brown water with some stool floating in it. The ER does nothing for me. Could someone please acknowledge my inquiry.   Thank you, Antwon Mata

## 2022-05-10 ENCOUNTER — TELEPHONE (OUTPATIENT)
Dept: FAMILY MEDICINE CLINIC | Age: 82
End: 2022-05-10

## 2022-05-10 ENCOUNTER — PATIENT MESSAGE (OUTPATIENT)
Dept: FAMILY MEDICINE CLINIC | Age: 82
End: 2022-05-10

## 2022-05-10 NOTE — TELEPHONE ENCOUNTER
I have stopped passing liquid stool. Little stomach pain, little nausea. Spoke to Dr. Gloria Armijo today. She said stop the miralax, take 1 tablespoon of citrucel 2x day and call her next week. So that is what I will do. My pressure has been up. I am taking the new BP med at night to try to avoid dizziness. I have taken the clonidine twice when pressure is higher than 170. I ammm just miserable and scared.   Antwon Mata

## 2022-05-10 NOTE — TELEPHONE ENCOUNTER
Pt stated that at 4:00 she had a little watery stool and then again at 4:10. Shelly stated that she should take the rest of the magnesium citrate and monitor her sx over night. She was advised to call back tomorrow morning with an update.

## 2022-05-10 NOTE — TELEPHONE ENCOUNTER
From: Tonya Ceballos  To: Dr. Kendrick Rein: 5/10/2022 10:31 AM EDT  Subject: Bathroom    I haven't passed stool in 3 days.  I am getting more magnesium citrate this am.

## 2022-05-10 NOTE — TELEPHONE ENCOUNTER
Pt states it has been way over 3 days since this has been going on. She took her BP at 6am 161/101 and at 130pm 180/89. Pt states that she did take the medication Clonidine at 130. As of right now she is feeling okay but she still has not had a BM. In addition she also wanted to let  know that she took a half of a bottle of Magnesium citrate at noon, at 145 she took her anxiety pill and at 215 she drank a little bit of her Pedialyte. Pt is very concerned and is needing to know what she is supposed to do from here. She would like a call back today about this matter.      Thank you

## 2022-05-11 ENCOUNTER — OFFICE VISIT (OUTPATIENT)
Dept: FAMILY MEDICINE CLINIC | Age: 82
End: 2022-05-11
Payer: MEDICARE

## 2022-05-11 VITALS
RESPIRATION RATE: 16 BRPM | WEIGHT: 164 LBS | TEMPERATURE: 98.7 F | OXYGEN SATURATION: 95 % | BODY MASS INDEX: 33.12 KG/M2 | SYSTOLIC BLOOD PRESSURE: 162 MMHG | DIASTOLIC BLOOD PRESSURE: 90 MMHG | HEART RATE: 85 BPM

## 2022-05-11 DIAGNOSIS — R03.0 ELEVATED BLOOD PRESSURE READING: ICD-10-CM

## 2022-05-11 DIAGNOSIS — I10 BENIGN ESSENTIAL HYPERTENSION: ICD-10-CM

## 2022-05-11 DIAGNOSIS — K58.1 IRRITABLE BOWEL SYNDROME WITH CONSTIPATION: Primary | ICD-10-CM

## 2022-05-11 PROCEDURE — 1090F PRES/ABSN URINE INCON ASSESS: CPT | Performed by: FAMILY MEDICINE

## 2022-05-11 PROCEDURE — G8399 PT W/DXA RESULTS DOCUMENT: HCPCS | Performed by: FAMILY MEDICINE

## 2022-05-11 PROCEDURE — 1123F ACP DISCUSS/DSCN MKR DOCD: CPT | Performed by: FAMILY MEDICINE

## 2022-05-11 PROCEDURE — 99214 OFFICE O/P EST MOD 30 MIN: CPT | Performed by: FAMILY MEDICINE

## 2022-05-11 PROCEDURE — G8417 CALC BMI ABV UP PARAM F/U: HCPCS | Performed by: FAMILY MEDICINE

## 2022-05-11 PROCEDURE — G8427 DOCREV CUR MEDS BY ELIG CLIN: HCPCS | Performed by: FAMILY MEDICINE

## 2022-05-11 PROCEDURE — 4040F PNEUMOC VAC/ADMIN/RCVD: CPT | Performed by: FAMILY MEDICINE

## 2022-05-11 PROCEDURE — 1036F TOBACCO NON-USER: CPT | Performed by: FAMILY MEDICINE

## 2022-05-11 NOTE — TELEPHONE ENCOUNTER
Pt stated that she passed a little watery stool 4 times last night but nothing big. Spoke with Alireza and she agreed she needed to come in today at noon to make sure she does not have an obstruction.     appt made

## 2022-05-11 NOTE — PROGRESS NOTES
Subjective:      Patient ID: Alena Lopez 80 y.o. female. is here for evaluation for constipation      HPI    Had enema in the ER 10 days ago. She feels she never completely evacuated stool. Took Mag citrate one week ago and yesterday. Is passing brown water 4 to 5 times a day after taking mag citrate. Did go 3 days without BM, ended yesterday when took the mag citrate. No blood stools. Dry heaves one night last week. Cramping lower abdominal pain before BM. Taking Citrucel. She was taking Citrucel and Miralax 17 gm with a good routine of daily BMs until a few weeks ago. Was having diarrhea so Dr. Danette Carter had her stop the MiraLax. That is when the constipation started. Has not been eating much; is eating small meals. Her BP is still high at home  160-170/. Is not eating a lot of salt. Patient denies any exertional chest pain, dyspnea, palpitations, syncope, orthopnea, edema or paroxysmal nocturnal dyspnea.        Outpatient Medications Marked as Taking for the 5/11/22 encounter (Office Visit) with Lola Salcedo MD   Medication Sig Dispense Refill    magnesium citrate solution Take 296 mLs by mouth once for 1 dose 296 mL 0    doxazosin (CARDURA) 1 MG tablet Take 1 tablet by mouth daily 30 tablet 2    cloNIDine (CATAPRES) 0.1 MG tablet Take one tab po every 8 hours prn SBP > 160 12 tablet 0    hydrOXYzine (ATARAX) 10 MG tablet Take 1-2 tablets by mouth 3 times daily as needed for Anxiety 10 tablet 2    Mirabegron (MYRBETRIQ PO) Take by mouth every other day Unsure of dose      furosemide (LASIX) 20 MG tablet TAKE ONE TO TWO TABLETS BY MOUTH EVERY  tablet 1    pantoprazole (PROTONIX) 40 MG tablet Take 1 tablet by mouth daily 90 tablet 2    rosuvastatin (CRESTOR) 20 MG tablet Take 1 tablet by mouth daily 90 tablet 2    FLUoxetine (PROZAC) 40 MG capsule Take 1 capsule by mouth daily 90 capsule 1    polyethylene glycol (GLYCOLAX) 17 GM/SCOOP powder Take 17 g by mouth daily      Cholecalciferol (D3 PO) Take by mouth      Dupilumab (DUPIXENT) 300 MG/2ML SOPN Inject into the skin      methylcellulose (CITRUCEL) oral powder Take 2 g by mouth daily Take by mouth daily.       calcium citrate-vitamin D (CALCIUM CITRATE + D3) 315-250 MG-UNIT TABS per tablet Take 1 tablet by mouth daily (with breakfast) 120 tablet         Allergies   Allergen Reactions    Ace Inhibitors      cough    Latanoprost Other (See Comments)    Losartan Other (See Comments)     cough    Taztia Xt [Diltiazem Hcl]        Patient Active Problem List   Diagnosis    Recurrent major depressive disorder, in full remission ([facility] [facility]    Osteopenia    Cervical stenosis of spine    Benign essential hypertension    Colon polyps    Essential familial hypercholesterolemia    IBS (irritable bowel syndrome)    Elevated lipoprotein(a)    Renal angiolipoma    Lumbar spondylosis    Lumbar stenosis    Trochanteric bursitis of right hip    Glucose intolerance (impaired glucose tolerance)    Memory loss    AILYN (obstructive sleep apnea)    Class 2 obesity without serious comorbidity with body mass index (BMI) of 36.0 to 36.9 in adult    Moderate episode of recurrent major depressive disorder (HCC)       Past Medical History:   Diagnosis Date    Acute esophagitis     h/o    Acute hearing loss of right ear 7/12/2016    AVN (avascular necrosis of bone) ([facility] 1/23/2014    Hip      Benign essential hypertension     Cervical stenosis of spine     Colon polyps     Depressive disorder, not elsewhere classified     Essential familial hypercholesterolemia     Fatigue     Fracture of cuboid, right ankle, closed 5/2013    Hypercalcemia 7/27/2018    Lumbar herniated disc     Lumbar radiculopathy 6/9/2015    Osteopenia        Past Surgical History:   Procedure Laterality Date    APPENDECTOMY      BREAST BIOPSY      BREAST SURGERY      reduction    HYSTERECTOMY      JOINT REPLACEMENT Left 4/2014    hip    KNEE SURGERY      left    LAMINECTOMY      decompressive more than 2 lumbar segments    TONSILLECTOMY          Family History   Problem Relation Age of Onset    Other Mother         lung disease    Heart Disease Father        Social History     Tobacco Use    Smoking status: Never Smoker    Smokeless tobacco: Never Used   Vaping Use    Vaping Use: Never used   Substance Use Topics    Alcohol use: No    Drug use: No            Review of Systems  Review of Systems    Objective:   Physical Exam  Vitals:    05/11/22 1201 05/11/22 1229 05/11/22 1230   BP: (!) 144/68 (!) 166/90 (!) 162/90   Site:  Right Upper Arm Left Upper Arm   Pulse: 85     Resp: 16     Temp: 98.7 °F (37.1 °C)     TempSrc: Temporal     SpO2: 95%     Weight: 164 lb (74.4 kg)         Physical Exam    NAD    Skin is warm and dry. No rash. Well hydrated  Alert and oriented x 3. Mood and affect are moderately anxious  The neck is supple and free of adenopathy or masses, the thyroid is normal without enlargement or nodules. Chest: clear with no wheezes or rales. No retractions, or use of accessory muscles noted. Cardiovascular: PMI is not displaced, and no thrill noted. Regular rate and rhythm with no rub, murmur or gallop. No peripheral edema. The abdomen is soft without tenderness, guarding, mass, rebound or organomegaly. Aorta, femoral, DP and PT pulses intact. Rectal normal tone, no masses. No stool in distal rectum    Assessment:       Diagnosis Orders   1. Irritable bowel syndrome with constipation  Stop all laxatives. Continue Citrucel. Try to get back to normal eating routine. Start MiraLax 1/2 scoop on Saturday   2. Elevated blood pressure reading  Continue PRN Clonidine for now   3. Benign essential hypertension  Continue Doxazosin  Consider increase dose if BP still elevated early next week. Plan:      Side effects of current medications reviewed and questions answered.    MyChart follow up on Monday or prn.

## 2022-05-16 ENCOUNTER — PATIENT MESSAGE (OUTPATIENT)
Dept: FAMILY MEDICINE CLINIC | Age: 82
End: 2022-05-16

## 2022-05-16 ENCOUNTER — TELEPHONE (OUTPATIENT)
Dept: FAMILY MEDICINE CLINIC | Age: 82
End: 2022-05-16

## 2022-05-16 DIAGNOSIS — F33.1 MODERATE EPISODE OF RECURRENT MAJOR DEPRESSIVE DISORDER (HCC): ICD-10-CM

## 2022-05-16 DIAGNOSIS — I10 BENIGN ESSENTIAL HYPERTENSION: ICD-10-CM

## 2022-05-16 RX ORDER — DOXAZOSIN 2 MG/1
2 TABLET ORAL DAILY
Qty: 30 TABLET | Refills: 2 | Status: SHIPPED | OUTPATIENT
Start: 2022-05-16 | End: 2022-05-16 | Stop reason: SDUPTHER

## 2022-05-16 RX ORDER — FLUOXETINE HYDROCHLORIDE 40 MG/1
40 CAPSULE ORAL DAILY
Qty: 90 CAPSULE | Refills: 1 | Status: SHIPPED | OUTPATIENT
Start: 2022-05-16 | End: 2022-05-16 | Stop reason: SDUPTHER

## 2022-05-16 RX ORDER — DOXAZOSIN 2 MG/1
2 TABLET ORAL DAILY
Qty: 30 TABLET | Refills: 2 | Status: SHIPPED | OUTPATIENT
Start: 2022-05-16 | End: 2022-06-30

## 2022-05-16 RX ORDER — FLUOXETINE HYDROCHLORIDE 40 MG/1
40 CAPSULE ORAL DAILY
Qty: 90 CAPSULE | Refills: 1 | Status: SHIPPED | OUTPATIENT
Start: 2022-05-16 | End: 2022-08-03 | Stop reason: DRUGHIGH

## 2022-05-16 NOTE — TELEPHONE ENCOUNTER
From: Daxa Carbajal  To: Dr. Blane Galeano: 5/16/2022 8:53 AM EDT  Subject: renew script    Please renew my script for Fluoxetine HCL 40 mg capsule  [facility] - phone # [phone]  thank you, Bailey Ogden

## 2022-05-16 NOTE — TELEPHONE ENCOUNTER
Increase Doxazosin to 2 mg. Can take 2 of the 1 mg at the same time and I will order the 2 mg tabs. Let me know BP in one week and follow up with me in 2 weeks. How is the constipation?

## 2022-05-16 NOTE — TELEPHONE ENCOUNTER
Requested Prescriptions     Pending Prescriptions Disp Refills    doxazosin (CARDURA) 2 MG tablet 30 tablet 2     Sig: Take 1 tablet by mouth daily     Signed Prescriptions Disp Refills    doxazosin (CARDURA) 2 MG tablet 30 tablet 2     Sig: Take 1 tablet by mouth daily     Authorizing Provider: Annie Swartz     Pt is going a little bit everyday. Pt states that both the Prozac and the Doxazosin needs to be sent to a different [facility]. The correct Thais Garibay has been replaced.  Thank you

## 2022-05-16 NOTE — TELEPHONE ENCOUNTER
Requested Prescriptions     Pending Prescriptions Disp Refills    FLUoxetine (PROZAC) 40 MG capsule 90 capsule 1     Sig: Take 1 capsule by mouth daily     Last OV; 5/11/2022  Last labs; 5/4/2022  F/U 9/27/2022

## 2022-05-16 NOTE — TELEPHONE ENCOUNTER
Patient was told to call [facility] with an update on how she was feeling. Patient BP last night 180/84 and she feels that her BP medication need to be increased; BP systolic remains above 531. Thanks.

## 2022-05-19 ENCOUNTER — HOSPITAL ENCOUNTER (OUTPATIENT)
Dept: CT IMAGING | Age: 82
Discharge: HOME | End: 2022-05-19
Payer: MEDICARE

## 2022-05-19 DIAGNOSIS — J98.4 RESTRICTIVE LUNG DISEASE: ICD-10-CM

## 2022-05-19 DIAGNOSIS — R06.09 DYSPNEA ON EXERTION: ICD-10-CM

## 2022-05-19 PROCEDURE — 71250 CT THORAX DX C-: CPT

## 2022-05-20 ENCOUNTER — TELEPHONE (OUTPATIENT)
Dept: FAMILY MEDICINE CLINIC | Age: 82
End: 2022-05-20

## 2022-05-20 DIAGNOSIS — I10 BENIGN ESSENTIAL HYPERTENSION: ICD-10-CM

## 2022-05-20 RX ORDER — CLONIDINE HYDROCHLORIDE 0.1 MG/1
TABLET ORAL
Qty: 20 TABLET | Refills: 0 | Status: SHIPPED | OUTPATIENT
Start: 2022-05-20 | End: 2022-05-23

## 2022-05-20 RX ORDER — HYDROXYZINE HYDROCHLORIDE 10 MG/1
10-20 TABLET, FILM COATED ORAL 3 TIMES DAILY PRN
Qty: 20 TABLET | Refills: 2 | Status: SHIPPED | OUTPATIENT
Start: 2022-05-20

## 2022-05-20 NOTE — TELEPHONE ENCOUNTER
Taking Clonidine once a day. Feels better after she takes it - feels tired and sweaty before. Also taking Atrax twice a day. BP is still 160/90. Not testing after takes a Clonidine. Continue same meds over weekend. Check BP 1-2 hours post on Clonidine. Consider stopping Doxazosin and starting Clonidine patch.

## 2022-05-21 DIAGNOSIS — I10 BENIGN ESSENTIAL HYPERTENSION: ICD-10-CM

## 2022-05-23 RX ORDER — CLONIDINE HYDROCHLORIDE 0.1 MG/1
TABLET ORAL
Qty: 20 TABLET | Refills: 1 | Status: SHIPPED | OUTPATIENT
Start: 2022-05-23 | End: 2022-08-05 | Stop reason: SDUPTHER

## 2022-05-23 NOTE — TELEPHONE ENCOUNTER
Requested Prescriptions     Pending Prescriptions Disp Refills    cloNIDine (CATAPRES) 0.1 MG tablet [Pharmacy Med Name: cloNIDine HCL 0.1MG TABLET] 12 tablet      Sig: TAKE ONE TABLET BY MOUTH EVERY 8 HOURS AS NEEDED FOR SYSTOLIC BLOOD PRESSURE GREATER THAN 160     Last ov 5/11/22  Last lab 7/19/21  Next ov 6/1/22

## 2022-05-31 NOTE — PROGRESS NOTES
Subjective:      Patient ID: Diaz Hyde 80 y.o. female. The primary encounter diagnosis was Benign essential hypertension. A diagnosis of Constipation, unspecified constipation type was also pertinent to this visit. GERALDO Ferreira is here after a protracted bout of constipation requiring 2 ER visits. At the same time her BP was elevated. The constipation started after a bout of diarrhea - she was instructed to stop her MiraLax. She was given Clonidine to use for PRN elevated BP and Cardura was started for BP control (diltiazem was stopped, thinking it may have contributed to the constipation). She was started back on her Miralax and self dose adjustment was discussed. Today her constipation is much better. Taking MiraLax qd and Citrucel. Is having 2 to 3 mushy stools a day; occ urgency and incontinence. Saw Dr. Ramonita Jones this AM.  He did not think she needed any more pulmonary work up. She saw Dr. Carmencita Randle for cardiac eval.  Is going to see Dr. Maria C Basilio for second opinion. Depression is better since bowels are better but still somewhat depressed mood. Appetite is decreased. Sleeping well. Not suicidal.   Low energy. Does not have a lot of anxiety. Seeing therapist but would like recommendation for someone who does geriatric psychology. Hypertension: Patient here for follow-up of elevated blood pressure. She is not exercising and is adherent to low salt diet. Blood pressure is not well controlled at home. 139/78 - 177/100. Cardiac symptoms none. Patient denies chest pressure/discomfort, exertional chest pressure/discomfort, lower extremity edema, palpitations, paroxysmal nocturnal dyspnea and syncope. Cardiovascular risk factors: hypertension and obesity (BMI >= 30 kg/m2). Use of agents associated with hypertension: none. History of target organ damage: none.      Labs Renal Latest Ref Rng & Units 5/4/2022 7/19/2021 11/10/2020 6/8/2020 1/15/2020   BUN 7 - 20 mg/dL 18 20 32(H) 20 23(H) Cr 0.6 - 1.2 mg/dL 0.8 0.9 0.9 0.8 0.9   K 3.5 - 5.1 mmol/L 3.6 3.8 5.0 3.7 3.8   Na 136 - 145 mmol/L 141 143 140 141 143     Lab Results   Component Value Date    WBC 8.7 05/04/2022    HGB 13.3 05/04/2022    HCT 41.1 05/04/2022    MCV 86.0 05/04/2022     05/04/2022     TSH   Date Value Ref Range Status   05/04/2022 5.45 (H) 0.27 - 4.20 uIU/mL Final   07/19/2021 2.64 0.27 - 4.20 uIU/mL Final   06/08/2020 3.68 0.27 - 4.20 uIU/mL Final   04/27/2017 2.38 0.40 - 4.50 mIU/L Final   01/21/2016 1.16 0.40 - 4.50 mIU/L Final   06/24/2013 3.27 0.40 - 4.50 mIU/L Final          Outpatient Medications Marked as Taking for the 6/1/22 encounter (Office Visit) with Santos Reynoso MD   Medication Sig Dispense Refill    cloNIDine (CATAPRES) 0.1 MG tablet TAKE ONE TABLET BY MOUTH EVERY 8 HOURS AS NEEDED FOR SYSTOLIC BLOOD PRESSURE GREATER THAN 160 20 tablet 1    hydrOXYzine (ATARAX) 10 MG tablet Take 1-2 tablets by mouth 3 times daily as needed for Anxiety 20 tablet 2    doxazosin (CARDURA) 2 MG tablet Take 1 tablet by mouth daily 30 tablet 2    FLUoxetine (PROZAC) 40 MG capsule Take 1 capsule by mouth daily 90 capsule 1    Mirabegron (MYRBETRIQ PO) Take by mouth every other day Unsure of dose      furosemide (LASIX) 20 MG tablet TAKE ONE TO TWO TABLETS BY MOUTH EVERY  tablet 1    pantoprazole (PROTONIX) 40 MG tablet Take 1 tablet by mouth daily 90 tablet 2    rosuvastatin (CRESTOR) 20 MG tablet Take 1 tablet by mouth daily 90 tablet 2    polyethylene glycol (GLYCOLAX) 17 GM/SCOOP powder Take 17 g by mouth daily      Cholecalciferol (D3 PO) Take by mouth      Dupilumab (DUPIXENT) 300 MG/2ML SOPN Inject into the skin      methylcellulose (CITRUCEL) oral powder Take 2 g by mouth daily Take by mouth daily.       calcium citrate-vitamin D (CALCIUM CITRATE + D3) 315-250 MG-UNIT TABS per tablet Take 1 tablet by mouth daily (with breakfast) 120 tablet         Allergies   Allergen Reactions    Ace Inhibitors cough    Latanoprost Other (See Comments)    Losartan Other (See Comments)     cough    Taztia Xt [Diltiazem Hcl]        Patient Active Problem List   Diagnosis    Recurrent major depressive disorder, in full remission ([facility] [address]    Osteopenia    Cervical stenosis of spine    Benign essential hypertension    Colon polyps    Essential familial hypercholesterolemia    IBS (irritable bowel syndrome)    Elevated lipoprotein(a)    Renal angiolipoma    Lumbar spondylosis    Lumbar stenosis    Trochanteric bursitis of right hip    Glucose intolerance (impaired glucose tolerance)    Memory loss    AILYN (obstructive sleep apnea)    Class 2 obesity without serious comorbidity with body mass index (BMI) of 36.0 to 36.9 in adult    Moderate episode of recurrent major depressive disorder (HCC)       Past Medical History:   Diagnosis Date    Acute esophagitis     h/o    Acute hearing loss of right ear 7/12/2016    AVN (avascular necrosis of bone) (HCC) 1/23/2014    Hip      Benign essential hypertension     Cervical stenosis of spine     Colon polyps     Depressive disorder, not elsewhere classified     Essential familial hypercholesterolemia     Fatigue     Fracture of cuboid, right ankle, closed 5/2013    Hypercalcemia 7/27/2018    Lumbar herniated disc     Lumbar radiculopathy 6/9/2015    Osteopenia        Past Surgical History:   Procedure Laterality Date    APPENDECTOMY      BREAST BIOPSY      BREAST SURGERY      reduction    HYSTERECTOMY      JOINT REPLACEMENT Left 4/2014    hip    KNEE SURGERY      left    LAMINECTOMY      decompressive more than 2 lumbar segments    TONSILLECTOMY          Family History   Problem Relation Age of Onset    Other Mother         lung disease    Heart Disease Father        Social History     Tobacco Use    Smoking status: Never Smoker    Smokeless tobacco: Never Used   Vaping Use    Vaping Use: Never used   Substance Use Topics    Alcohol use:  No

## 2022-06-01 ENCOUNTER — OFFICE VISIT (OUTPATIENT)
Dept: PULMONOLOGY | Age: 82
End: 2022-06-01
Payer: MEDICARE

## 2022-06-01 ENCOUNTER — OFFICE VISIT (OUTPATIENT)
Dept: FAMILY MEDICINE CLINIC | Age: 82
End: 2022-06-01
Payer: MEDICARE

## 2022-06-01 VITALS
SYSTOLIC BLOOD PRESSURE: 171 MMHG | HEIGHT: 59 IN | HEART RATE: 77 BPM | BODY MASS INDEX: 32.46 KG/M2 | DIASTOLIC BLOOD PRESSURE: 94 MMHG | WEIGHT: 161 LBS | TEMPERATURE: 98 F | OXYGEN SATURATION: 95 %

## 2022-06-01 VITALS
DIASTOLIC BLOOD PRESSURE: 60 MMHG | SYSTOLIC BLOOD PRESSURE: 128 MMHG | RESPIRATION RATE: 16 BRPM | OXYGEN SATURATION: 95 % | TEMPERATURE: 97.9 F | WEIGHT: 162.6 LBS | HEART RATE: 100 BPM | BODY MASS INDEX: 32.84 KG/M2

## 2022-06-01 DIAGNOSIS — G47.33 OSA TREATED WITH BIPAP: ICD-10-CM

## 2022-06-01 DIAGNOSIS — F33.42 RECURRENT MAJOR DEPRESSIVE DISORDER, IN FULL REMISSION (HCC): ICD-10-CM

## 2022-06-01 DIAGNOSIS — K59.00 CONSTIPATION, UNSPECIFIED CONSTIPATION TYPE: ICD-10-CM

## 2022-06-01 DIAGNOSIS — R07.89 CHEST PRESSURE: ICD-10-CM

## 2022-06-01 DIAGNOSIS — R06.09 DOE (DYSPNEA ON EXERTION): Primary | ICD-10-CM

## 2022-06-01 DIAGNOSIS — I10 BENIGN ESSENTIAL HYPERTENSION: Primary | ICD-10-CM

## 2022-06-01 PROCEDURE — G8399 PT W/DXA RESULTS DOCUMENT: HCPCS | Performed by: INTERNAL MEDICINE

## 2022-06-01 PROCEDURE — 1090F PRES/ABSN URINE INCON ASSESS: CPT | Performed by: INTERNAL MEDICINE

## 2022-06-01 PROCEDURE — G8417 CALC BMI ABV UP PARAM F/U: HCPCS | Performed by: FAMILY MEDICINE

## 2022-06-01 PROCEDURE — 1123F ACP DISCUSS/DSCN MKR DOCD: CPT | Performed by: FAMILY MEDICINE

## 2022-06-01 PROCEDURE — 1036F TOBACCO NON-USER: CPT | Performed by: INTERNAL MEDICINE

## 2022-06-01 PROCEDURE — 99204 OFFICE O/P NEW MOD 45 MIN: CPT | Performed by: INTERNAL MEDICINE

## 2022-06-01 PROCEDURE — 99214 OFFICE O/P EST MOD 30 MIN: CPT | Performed by: FAMILY MEDICINE

## 2022-06-01 PROCEDURE — G8399 PT W/DXA RESULTS DOCUMENT: HCPCS | Performed by: FAMILY MEDICINE

## 2022-06-01 PROCEDURE — G8427 DOCREV CUR MEDS BY ELIG CLIN: HCPCS | Performed by: INTERNAL MEDICINE

## 2022-06-01 PROCEDURE — 1090F PRES/ABSN URINE INCON ASSESS: CPT | Performed by: FAMILY MEDICINE

## 2022-06-01 PROCEDURE — 1123F ACP DISCUSS/DSCN MKR DOCD: CPT | Performed by: INTERNAL MEDICINE

## 2022-06-01 PROCEDURE — G8417 CALC BMI ABV UP PARAM F/U: HCPCS | Performed by: INTERNAL MEDICINE

## 2022-06-01 PROCEDURE — G8427 DOCREV CUR MEDS BY ELIG CLIN: HCPCS | Performed by: FAMILY MEDICINE

## 2022-06-01 PROCEDURE — 1036F TOBACCO NON-USER: CPT | Performed by: FAMILY MEDICINE

## 2022-06-01 RX ORDER — FLUOXETINE HYDROCHLORIDE 20 MG/1
20 CAPSULE ORAL DAILY
Qty: 90 CAPSULE | Refills: 1 | Status: SHIPPED | OUTPATIENT
Start: 2022-06-01 | End: 2022-08-03

## 2022-06-01 NOTE — PROGRESS NOTES
[facility]    Outpatient Initial Note    Subjective:   CHIEF COMPLAINT / HPI:     The patient is 80 y.o. female who presents today for a new patient visit for evaluation of dyspnea on exertion. She initially had symptoms back in fall 2021. She states she will get dyspneic walking from her car to her house and going up a flight of steps. She has associated chest pressure and at times diaphoresis that will get better after resting for 1 to 2 minutes. She rarely coughs and has no wheezing, fevers, chills, nausea, lightheadedness, or peripheral edema. She states her appetite is not great and she does seem down given the passing of her  and having no children locally. [place] and tries to keep active but still seems to be lonely. She has had an extensive evaluation including echo, nuclear medicine stress test, blood work, CT chest, and PFTs without definitive diagnosis. She has never seen a pulmonologist before and has never been diagnosed with chronic lung disease. Of late she has been having more difficulty controlling her hypertension although she is thinks that it is finally getting better with the addition of clonidine and Cardura. She does have AILYN and is compliant with BiPAP.   She is also been having issues with constipation that is getting better as well with MiraLAX and Citrucel    Past Medical History:    Past Medical History:   Diagnosis Date    Acute esophagitis     h/o    Acute hearing loss of right ear 7/12/2016    AVN (avascular necrosis of bone) ([facility] [address] 1/23/2014    Hip      Benign essential hypertension     Cervical stenosis of spine     Colon polyps     Depressive disorder, not elsewhere classified     Essential familial hypercholesterolemia     Fatigue     Fracture of cuboid, right ankle, closed 5/2013    Hypercalcemia 7/27/2018    Lumbar herniated disc     Lumbar radiculopathy 6/9/2015    Osteopenia        Social History:    Manuelito Beckman is . No history of tobacco use  She lives at the [facility]. Blayne 79 History:  CAD  Unknown chronic lung disease    Current Medications:  Current Outpatient Medications on File Prior to Visit   Medication Sig Dispense Refill    cloNIDine (CATAPRES) 0.1 MG tablet TAKE ONE TABLET BY MOUTH EVERY 8 HOURS AS NEEDED FOR SYSTOLIC BLOOD PRESSURE GREATER THAN 160 20 tablet 1    hydrOXYzine (ATARAX) 10 MG tablet Take 1-2 tablets by mouth 3 times daily as needed for Anxiety 20 tablet 2    doxazosin (CARDURA) 2 MG tablet Take 1 tablet by mouth daily 30 tablet 2    FLUoxetine (PROZAC) 40 MG capsule Take 1 capsule by mouth daily 90 capsule 1    Mirabegron (MYRBETRIQ PO) Take by mouth every other day Unsure of dose      furosemide (LASIX) 20 MG tablet TAKE ONE TO TWO TABLETS BY MOUTH EVERY  tablet 1    pantoprazole (PROTONIX) 40 MG tablet Take 1 tablet by mouth daily 90 tablet 2    rosuvastatin (CRESTOR) 20 MG tablet Take 1 tablet by mouth daily 90 tablet 2    polyethylene glycol (GLYCOLAX) 17 GM/SCOOP powder Take 17 g by mouth daily      Cholecalciferol (D3 PO) Take by mouth      Dupilumab (DUPIXENT) 300 MG/2ML SOPN Inject into the skin      methylcellulose (CITRUCEL) oral powder Take 2 g by mouth daily Take by mouth daily.  calcium citrate-vitamin D (CALCIUM CITRATE + D3) 315-250 MG-UNIT TABS per tablet Take 1 tablet by mouth daily (with breakfast) 120 tablet      No current facility-administered medications on file prior to visit. REVIEW OF SYSTEMS:    CONSTITUTIONAL: Negative for fevers and chills  HEENT: Negative for oropharyngeal exudate, post nasal drip, sinus pain / pressure, nasal congestion, ear pain  RESPIRATORY:  See HPI  CARDIOVASCULAR: Positive for chest pain.   Negative for palpitations, edema  GASTROINTESTINAL: Negative for nausea, vomiting, diarrhea, constipation and abdominal pain  GENITOURINARY: Negative for dysuria, urinary frequency, urinary hesitancy  HEMATOLOGICAL: Negative for adenopathy  SKIN: Negative for clubbing, cyanosis, skin lesions  ENDOCRINE: Negative for polyuria, polydipsia, heat intolerance, cold intolerance   EXTREMITIES: Negative for weakness or decreased ROM in all extremities  NEUROLOGICAL: Negative for unilateral weakness, speech or gait abnormalities    Objective:   PHYSICAL EXAM:        VITALS:  BP (!) 171/94 (Site: Right Upper Arm, Position: Sitting, Cuff Size: Large Adult)   Pulse 77   Temp 98 °F (36.7 °C) (Infrared)   Ht 4' 11\" (1.499 m)   Wt 161 lb (73 kg)   SpO2 95%   Breastfeeding No   BMI 32.52 kg/m²     CONSTITUTIONAL:  Awake, alert, cooperative, no apparent distress, and appears stated age  HEENT: No oropharyngeal exudate, PERRL, no cervical adenopathy, no tracheal deviation, thyroid size normal  LUNGS:  No increased work of breathing and clear to auscultation, no crackles or wheezing  CARDIOVASCULAR:  normal S1 and S2 and no JVD  ABDOMEN:  Normal bowel sounds, non-distended and non-tender to palpation  EXT: No edema, no calf tenderness. Pulses are present bilaterally. NEUROLOGIC:  Mental Status Exam:  Level of Alertness:   awake  Orientation:   person, place, time. SKIN:  normal skin color, texture, turgor, no redness, warmth, or swelling     DATA:      Radiology Review:  Pertinent images / reports were reviewed as a part of this visit. CT Chest 2022 reveals the followin. No active airspace disease, benign appearing small pulmonary nodules right lower lobe subsegmental atelectasis with no evidence of interstitial fibrosis or mediastinal adenopathy.       2. Right basilar subsegmental atelectasis or scarring.       3. Interval increase in size of right renal hemangioma .        Follow-up with urology consultation is recommended with consideration of interval follow-up imaging in comparison with patient's prior study of 2020, outside institution.  [facility])     Last PFTs:  DATE OF PROCEDURE:  11/17/2021     FINDINGS:  Forced vital capacity and FEV1 mildly reduced, FEV1 to FVC  ratio normal.  No change after bronchodilator. Lung volume  determinations by plethysmography show mild reduction of total lung  capacity and residual volume, normal FRC. Single-breath diffusion  capacity normal when compared to alveolar volume.     IMPRESSION:  Mild restrictive ventilatory defect. Findings consistent  with neuromuscular chest wall, pleural or parenchymal disorder.       ECHO 11/17/2021   Findings      Left Ventricle    Left ventricular cavity size is normal. There is mild concentric left    ventricular hypertrophy. Overall left ventricular systolic function appears    normal with an ejection fraction of 55-60%. No regional wall motion    abnormalities are noted. Normal diastolic function.      Mitral Valve    The mitral valve leaflets appear normal in structure. Mitral annular    calcification is present. Mild mitral regurgitation is present. No evidence    of mitral valve stenosis.      Left Atrium    The left atrium is normal in size.      Aortic Valve    The aortic valve is structurally normal. The aortic valve appears tricuspid    . No evidence of aortic valve regurgitation. No evidence of aortic valve    stenosis.      Aorta    The aortic root is normal in size.      Right Ventricle    Normal right ventricular size and function.    TAPSE measures 2.51 cm and the RVS velocity measures 13.6 cm/s.      Tricuspid Valve    Tricuspid valve leaflets are structurally normal. Trivial tricuspid    regurgitation. No evidence of tricuspid stenosis.      Right Atrium    Right atrium is not well visualized.      Pulmonic Valve    The pulmonic valve is not well visualized.    No evidence of pulmonic valve regurgitation.  No evidence of pulmonic valve    stenosis.      Pericardial Effusion    No pericardial effusion noted.      Pleural Effusion    No pleural effusion.      Miscellaneous    IVC size is normal (<2.1 cm) but collapses < 50% with respiration consistent    with elevated RA pressure (8 mmHg). Estimated pulmonary artery systolic    pressure is at 34 mmHg assuming a right atrial pressure of 8 mmHg. Nuclear medicine stress test September 28, 2021  Summary    There is normal isotope uptake at stress and rest. There is no evidence of    myocardial ischemia or scar.    Normal LV size and systolic function.    Left ventricular ejection fraction of 77 %.    There are no regional wall motion abnormalities.    Overall findings represent a low risk scan. Assessment:      Diagnosis Orders   1. BRITO (dyspnea on exertion)     2. Chest pressure         Plan:     Etiology of dyspnea on exertion is unclear. She has had fairly extensive cardiac and pulmonary testing without definitive abnormalities indicative of why she is dyspneic with exertion. I reviewed her PFTs and CT chest with her and PFTs only show a mild restrictive defect which could be related to her BMI of 33. CT chest show no parenchymal, pleural, or airway abnormalities. I reviewed her echo and nuclear medicine stress test as well. Likewise there is no significant abnormalities, although echo does show mild pulmonary hypertension. She does have AILYN and is compliant with BiPAP, followed by a [facility] physician. However and her history I am bothered by the fact that she has chest pressure and occasional diaphoresis with exertion in addition to her BRITO. While not happening frequently a false negative stress test does occur from time to time. She was referred to Dr. Ellie Hashimoto in November for evaluation of dyspnea. Echo and PFTs were ordered at that time and on follow-up in January no further testing was done in etiology was speculated to be deconditioning. Danuta Palmer is open for further cardiac evaluation but was interested in a second opinion.   Her  had been under the care of Dr. Skip Nunn and they were quite satisfied with his care.  I will refer Fatemeh Jett to Dr. Mercedes Mosley for further evaluation of dyspnea on exertion and chest pressure with exertion with consideration for possible LHC

## 2022-06-02 ENCOUNTER — HOSPITAL ENCOUNTER (OUTPATIENT)
Dept: CT IMAGING | Age: 82
Discharge: HOME | End: 2022-06-02
Payer: MEDICARE

## 2022-06-02 DIAGNOSIS — D17.71 RENAL ANGIOMYOLIPOMA: ICD-10-CM

## 2022-06-02 DIAGNOSIS — D30.00 BENIGN NEOPLASM OF KIDNEY, UNSPECIFIED LATERALITY: ICD-10-CM

## 2022-06-02 PROCEDURE — 6360000004 HC RX CONTRAST MEDICATION: Performed by: UROLOGY

## 2022-06-02 PROCEDURE — 74170 CT ABD WO CNTRST FLWD CNTRST: CPT

## 2022-06-02 RX ADMIN — IOPAMIDOL 75 ML: 755 INJECTION, SOLUTION INTRAVENOUS at 15:11

## 2022-06-30 ENCOUNTER — PATIENT MESSAGE (OUTPATIENT)
Dept: FAMILY MEDICINE CLINIC | Age: 82
End: 2022-06-30

## 2022-06-30 DIAGNOSIS — I10 BENIGN ESSENTIAL HYPERTENSION: ICD-10-CM

## 2022-06-30 RX ORDER — DOXAZOSIN 2 MG/1
3 TABLET ORAL DAILY
Qty: 135 TABLET | Refills: 1 | Status: SHIPPED | OUTPATIENT
Start: 2022-06-30 | End: 2022-07-18 | Stop reason: ALTCHOICE

## 2022-06-30 NOTE — TELEPHONE ENCOUNTER
From: Reinaldo Marroquin  To: Dr. Quentin Randall: 6/30/2022 3:51 PM EDT  Subject: doxazosin    Tried to renew my doxazosin and it is too soon. My script is for 2 mg and I am taking 3 mg , so I cut in half to make 3,  I only have 2 pills left and [facility] will not refill it at this time. I do not have enough to last the holiday weekend,  They said they put in a call to you and have not heard back. I need a refill for 135 pills because I have to cut 90 of them in half. Please call in a new script for 135 pills that I can  tomorrow, [phone]. If not please let me know why.   Thank you, Tr Galindo

## 2022-07-12 NOTE — PROGRESS NOTES
12/10/2020    TELEHEALTH EVALUATION -- Audio/Visual (During [facility]- public health emergency)    HPI:    Lilo Contreras (:  1940) has requested an audio/video evaluation for the following concern(s):    The primary encounter diagnosis was Anxiety. Bruising right hand, Diagnoses of Class 2 obesity due to excess calories without serious comorbidity with body mass index (BMI) of 36.0 to 36.9 in adult, Recurrent major depressive disorder, in full remission (HCC)t. FLU VACCINE!!!!!!!!!!!!!!!!!!!!!!!      Laxmi Del Valle complains of being anxious for a few months. She sold her house and is moving. She is anxious and having a panic attacks. She especially having trouble waking up with a panic attack at 3 AM.  No trouble falling to sleep. Appetite is fine; stress eating. She manages well during the day because she is distracted. She has taken Ativan in the past and requests refill. She does not need anything during the day. She feels she will be better in January after her move is complete. She is not sure if increased anxiety started when Wellbutrin was increased for depression. She does not note improved depression on higher dose. She takes Cymbalta and Wellbutrin for depression. Has a bruise on the back of the right hand x one week. No trauma or injury. Not painful. Improving. Request renewal handicapped placard.     Lab Results   Component Value Date    WBC 11.8 (H) 11/10/2020    HGB 13.6 11/10/2020    HCT 42.6 11/10/2020    MCV 87.4 11/10/2020     11/10/2020      TSH   Date Value Ref Range Status   2020 3.68 0.27 - 4.20 uIU/mL Final   01/15/2020 3.87 0.27 - 4.20 uIU/mL Final   2019 3.37 0.27 - 4.20 uIU/mL Final   2017 2.38 0.40 - 4.50 mIU/L Final   2016 1.16 0.40 - 4.50 mIU/L Final   2013 3.27 0.40 - 4.50 mIU/L Final     Lab Results   Component Value Date    LABA1C 6.0 2020     Lab Results   Component Value Date    .5 2020     Lab Results Component Value Date     11/10/2020    K 5.0 11/10/2020    CL 97 (L) 11/10/2020    CO2 29 11/10/2020    BUN 32 (H) 11/10/2020    CREATININE 0.9 11/10/2020    GLUCOSE 101 (H) 11/10/2020    CALCIUM 10.8 (H) 11/10/2020    PROT 6.8 06/08/2020    LABALBU 4.5 06/08/2020    BILITOT 0.5 06/08/2020    ALKPHOS 74 06/08/2020    AST 17 06/08/2020    ALT 17 06/08/2020    LABGLOM >60 11/10/2020    GFRAA >60 11/10/2020    AGRATIO 2.0 06/08/2020    GLOB 2.3 06/08/2020          Review of Systems    Outpatient Medications Marked as Taking for the 12/10/20 encounter (Virtual Visit) with Juanis Montoya MD   Medication Sig Dispense Refill    mirabegron (MYRBETRIQ) 50 MG TB24 Take 50 mg by mouth daily      furosemide (LASIX) 20 MG tablet Take 2 tablets by mouth daily 180 tablet 1    buPROPion (WELLBUTRIN XL) 150 MG extended release tablet Take 1 tablet by mouth every morning Take with Wellbutrin  mg for a total of 450 mg 30 tablet 5    rosuvastatin (CRESTOR) 20 MG tablet Take 1 tablet by mouth daily 90 tablet 3    buPROPion (WELLBUTRIN XL) 300 MG extended release tablet Take 1 tablet by mouth every morning 90 tablet 3    DULoxetine (CYMBALTA) 60 MG extended release capsule Take 1 capsule by mouth daily 90 capsule 3    amLODIPine (NORVASC) 10 MG tablet Take 1 tablet by mouth daily 90 tablet 3    pantoprazole (PROTONIX) 40 MG tablet Take 1 tablet by mouth daily 90 tablet 3    methylcellulose (CITRUCEL) oral powder Take 2 g by mouth daily Take by mouth daily.  calcium citrate-vitamin D (CALCIUM CITRATE + D3) 315-250 MG-UNIT TABS per tablet Take 1 tablet by mouth daily (with breakfast) 120 tablet     Alpha-D-Galactosidase (BEANO PO) Take  by mouth.           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)    Renal angiolipoma    Lumbar spondylosis    Lumbar stenosis    Trochanteric bursitis of right hip    Glucose intolerance (impaired glucose tolerance)    Hypercalcemia    Memory loss    AILYN (obstructive sleep apnea)    Class 2 obesity without serious comorbidity with body mass index (BMI) of 36.0 to 36.9 in adult          Allergies   Allergen Reactions    Ace Inhibitors      cough    Latanoprost Other (See Comments)    Losartan Other (See Comments)     cough    Taztia Xt [Diltiazem Hcl]    ,   Past Medical History:   Diagnosis Date    Acute esophagitis     h/o    Acute hearing loss of right ear 7/12/2016    AVN (avascular necrosis of bone) ([facility] [address] 1/23/2014    Hip      Benign essential hypertension     Cervical stenosis of spine     Colon polyps     Depressive disorder, not elsewhere classified     Essential familial hypercholesterolemia     Fatigue     Fracture of cuboid, right ankle, closed 5/2013    Lumbar herniated disc     Lumbar radiculopathy 6/9/2015    Osteopenia    ,   Past Surgical History:   Procedure Laterality Date    APPENDECTOMY      BREAST SURGERY      reduction    HYSTERECTOMY      JOINT REPLACEMENT Left 4/2014    hip    KNEE SURGERY      left    LAMINECTOMY      decompressive more than 2 lumbar segments    TONSILLECTOMY     ,   Social History     Tobacco Use    Smoking status: Never Smoker    Smokeless tobacco: Never Used   Substance Use Topics    Alcohol use: No    Drug use: No       PHYSICAL EXAMINATION:  [ INSTRUCTIONS:  \"[x]\" Indicates a positive item  \"[]\" Indicates a negative item  -- DELETE ALL ITEMS NOT EXAMINED]  Vital Signs: (As obtained by patient/caregiver or practitioner observation)    Blood pressure- 130/82 Heart rate-    Respiratory rate-    Temperature-  Pulse oximetry-   Wt 175 lb    Wt Readings from Last 3 Encounters:   10/27/20 181 lb (82.1 kg)   06/08/20 167 lb (75.8 kg)   12/27/19 167 lb 8 oz (76 kg)     Temp Readings from Last 3 Encounters:   10/27/20 97.6 °F (36.4 °C) (Temporal)   06/08/20 98.1 °F (36.7 °C) (Oral)   12/27/19 96.3 °F (35.7 °C) (Oral)     BP Readings from Last 3 Encounters:   10/27/20 (!) 148/80   06/08/20 128/80   12/27/19 132/84     Pulse Readings from Last 3 Encounters:   10/27/20 96   06/08/20 77   12/27/19 81      Constitutional: [x] Appears well-developed and well-nourished [x] No apparent distress      [] Abnormal-   Mental status  [x] Alert and awake  [x] Oriented to person/place/time [x]Able to follow commands      Eyes:  EOM    [x]  Normal  [] Abnormal-  Sclera  [x]  Normal  [] Abnormal -         Discharge [x]  None visible  [] Abnormal -    HENT:   [x] Normocephalic, atraumatic. [] Abnormal     Neck: [x] No visualized mass     Pulmonary/Chest: [x] Respiratory effort normal.  [x] No visualized signs of difficulty breathing or respiratory distress        [] Abnormal- -       Neurological:        [x] No Facial Asymmetry (Cranial nerve 7 motor function) (limited exam to video visit)          [] No gaze palsy        [] Abnormal-         Skin:        [x] No significant exanthematous lesions or discoloration noted on facial skin         [] Abnormal-            Psychiatric:       [] Normal Affect [x] No Hallucinations        [x] Abnormal-   NAD  Skin is warm and dry. Mood and affect are mildly anxious. No agitation or psychomotor retardation. No pressured speech, grandiosity or tangential thoughts. Insight and judgement are intact. Not suicidal.     Other pertinent observable physical exam findings-     ASSESSMENT/PLAN:  1. Anxiety  Addressed risk of Ativan. Try Buspar  [facility]  - busPIRone (BUSPAR) 10 MG tablet;  Take 0.5-2 tablets by mouth 2 times daily as needed (anxiety)  Dispense: 60 tablet; Refill: 2      2. Recurrent major depressive disorder, in full remission ([address]  Stop Wellbutrin 150 mg. May be contributing to anxiety and not helping depression  Refer to [address]    3. Bruising  Reassured. 4. Lumbar disc disease - handicapped placard letter provided. Side effects of current medications reviewed and questions answered. Call or return to clinic prn if these symptoms worsen or fail to improve as anticipated. No follow-ups on file. Angelita Santos is a [de-identified] y.o. female being evaluated by a Virtual Visit (video visit) encounter to address concerns as mentioned above. A caregiver was present when appropriate. Due to this being a TeleHealth encounter (During [facility] public health emergency), evaluation of the following organ systems was limited: Vitals/Constitutional/EENT/Resp/CV/GI//MS/Neuro/Skin/Heme-Lymph-Imm. Pursuant to the emergency declaration under the [address] [place] [address] and the [employer] Act, this Virtual Visit was conducted with patient's (and/or legal guardian's) consent, to reduce the patient's risk of exposure to COVID-19 and provide necessary medical care. The patient (and/or legal guardian) has also been advised to contact this office for worsening conditions or problems, and seek emergency medical treatment and/or call 911 if deemed necessary. Patient identification was verified at the start of the visit: Yes    Total time spent on this encounter: Not billed by time    Services were provided through a video synchronous discussion virtually to substitute for in-person clinic visit. Patient and provider were located at their individual homes. --Jessy Shepard MD on 12/9/2020 at 10:27 PM    An electronic signature was used to authenticate this note. Glycopyrrolate Counseling:  I discussed with the patient the risks of glycopyrrolate including but not limited to skin rash, drowsiness, dry mouth, difficulty urinating, and blurred vision.

## 2022-07-17 ENCOUNTER — PATIENT MESSAGE (OUTPATIENT)
Dept: FAMILY MEDICINE CLINIC | Age: 82
End: 2022-07-17

## 2022-07-18 ENCOUNTER — OFFICE VISIT (OUTPATIENT)
Dept: FAMILY MEDICINE CLINIC | Age: 82
End: 2022-07-18
Payer: MEDICARE

## 2022-07-18 VITALS
DIASTOLIC BLOOD PRESSURE: 60 MMHG | BODY MASS INDEX: 31.81 KG/M2 | TEMPERATURE: 97.9 F | HEART RATE: 95 BPM | SYSTOLIC BLOOD PRESSURE: 116 MMHG | HEIGHT: 59 IN | RESPIRATION RATE: 14 BRPM | WEIGHT: 157.8 LBS | OXYGEN SATURATION: 97 %

## 2022-07-18 DIAGNOSIS — I10 BENIGN ESSENTIAL HYPERTENSION: ICD-10-CM

## 2022-07-18 DIAGNOSIS — R10.13 DYSPEPSIA: Primary | ICD-10-CM

## 2022-07-18 PROCEDURE — 1123F ACP DISCUSS/DSCN MKR DOCD: CPT | Performed by: FAMILY MEDICINE

## 2022-07-18 PROCEDURE — 99214 OFFICE O/P EST MOD 30 MIN: CPT | Performed by: FAMILY MEDICINE

## 2022-07-18 RX ORDER — ONDANSETRON 4 MG/1
4 TABLET, FILM COATED ORAL 3 TIMES DAILY PRN
Qty: 15 TABLET | Refills: 0 | Status: SHIPPED | OUTPATIENT
Start: 2022-07-18 | End: 2022-08-03

## 2022-07-18 SDOH — ECONOMIC STABILITY: FOOD INSECURITY: WITHIN THE PAST 12 MONTHS, YOU WORRIED THAT YOUR FOOD WOULD RUN OUT BEFORE YOU GOT MONEY TO BUY MORE.: NEVER TRUE

## 2022-07-18 SDOH — ECONOMIC STABILITY: FOOD INSECURITY: WITHIN THE PAST 12 MONTHS, THE FOOD YOU BOUGHT JUST DIDN'T LAST AND YOU DIDN'T HAVE MONEY TO GET MORE.: NEVER TRUE

## 2022-07-18 ASSESSMENT — SOCIAL DETERMINANTS OF HEALTH (SDOH): HOW HARD IS IT FOR YOU TO PAY FOR THE VERY BASICS LIKE FOOD, HOUSING, MEDICAL CARE, AND HEATING?: NOT HARD AT ALL

## 2022-07-18 ASSESSMENT — PATIENT HEALTH QUESTIONNAIRE - PHQ9
6. FEELING BAD ABOUT YOURSELF - OR THAT YOU ARE A FAILURE OR HAVE LET YOURSELF OR YOUR FAMILY DOWN: 1
1. LITTLE INTEREST OR PLEASURE IN DOING THINGS: 2
SUM OF ALL RESPONSES TO PHQ QUESTIONS 1-9: 7
8. MOVING OR SPEAKING SO SLOWLY THAT OTHER PEOPLE COULD HAVE NOTICED. OR THE OPPOSITE, BEING SO FIGETY OR RESTLESS THAT YOU HAVE BEEN MOVING AROUND A LOT MORE THAN USUAL: 0
10. IF YOU CHECKED OFF ANY PROBLEMS, HOW DIFFICULT HAVE THESE PROBLEMS MADE IT FOR YOU TO DO YOUR WORK, TAKE CARE OF THINGS AT HOME, OR GET ALONG WITH OTHER PEOPLE: 1
9. THOUGHTS THAT YOU WOULD BE BETTER OFF DEAD, OR OF HURTING YOURSELF: 1
SUM OF ALL RESPONSES TO PHQ QUESTIONS 1-9: 8
4. FEELING TIRED OR HAVING LITTLE ENERGY: 1
7. TROUBLE CONCENTRATING ON THINGS, SUCH AS READING THE NEWSPAPER OR WATCHING TELEVISION: 0
SUM OF ALL RESPONSES TO PHQ9 QUESTIONS 1 & 2: 4
2. FEELING DOWN, DEPRESSED OR HOPELESS: 2
5. POOR APPETITE OR OVEREATING: 1
3. TROUBLE FALLING OR STAYING ASLEEP: 0
SUM OF ALL RESPONSES TO PHQ QUESTIONS 1-9: 8
SUM OF ALL RESPONSES TO PHQ QUESTIONS 1-9: 8

## 2022-07-18 ASSESSMENT — COLUMBIA-SUICIDE SEVERITY RATING SCALE - C-SSRS
6. HAVE YOU EVER DONE ANYTHING, STARTED TO DO ANYTHING, OR PREPARED TO DO ANYTHING TO END YOUR LIFE?: NO
2. HAVE YOU ACTUALLY HAD ANY THOUGHTS OF KILLING YOURSELF?: NO
1. WITHIN THE PAST MONTH, HAVE YOU WISHED YOU WERE DEAD OR WISHED YOU COULD GO TO SLEEP AND NOT WAKE UP?: YES

## 2022-07-18 NOTE — TELEPHONE ENCOUNTER
From: Frank Rodas  To: Dr. Yang Simpler: 7/17/2022 8:57 AM EDT  Subject: Ivory Cazares [facility]. Sherryle Moder Sherryle Moder I am not feeling well for 2-3 days. I am dizzy, sweaty, dry heaves. Spoke to Herlinda Kim from your office yesterday, she said ER. Been there before and they do not do anything. I have noone here to help me. What can I do. My head is hot and swimming sometimes, and my body is sweating from within. I also have no appetite. am trying to keep drinking. Please get back to me. Just took clonidine for bp and hydroxyzone for anxiety.   Antonio Patterson [phone]

## 2022-07-18 NOTE — PROGRESS NOTES
Subjective:      Patient ID: Elaine Griffin 80 y.o. female. is here for evaluation for sweating and nausea. HPI    One week nausea and sweating. Nausea mostly when she brushes her teeth - gags and has dry heaves. Can also occur in the am if she drinks water in the am only. She feels her balance is off. Not light headed and no vertigo. Denies palpitations, chest pain. Occ feels HR is fast when she first lies down. Does not feel irregular. No pedal edema. Has stable BRITO. Gets sweaty when has nausea. No heartburn. Appetite is poor. Nausea comes and goes over the day, worse in am.  Does not get better or worse with eating. Sometimes she gets nauseating looking at food. Has one to two normal BMs per day since on Citrucel and Miralax. If she takes Clonidine and Atrax in the am seems to help calm her. Takes Clonidine when BP is up and the BP is up when she is anxious. The nausea is increasing her anxiety; the anxiety is not making her nauseated. Her weight is down 5 lbs in the past week or so. She has not felt well since on Doxazosin. Had CT abd on 6/2/22 that was normal except angiolipoma. /110 last week, lowest 150/85. Takes after sitting for 10 minutes. Low Na diet. Is going to have embolization of angiolipomas of both kidneys.       Lab Results   Component Value Date     05/04/2022    K 3.6 05/04/2022     05/04/2022    CO2 24 05/04/2022    BUN 18 05/04/2022    CREATININE 0.8 05/04/2022    GLUCOSE 108 (H) 05/04/2022    CALCIUM 10.2 05/04/2022    PROT 7.3 07/19/2021    LABALBU 4.6 07/19/2021    BILITOT 0.4 07/19/2021    ALKPHOS 81 07/19/2021    AST 17 07/19/2021    ALT 17 07/19/2021    LABGLOM >60 05/04/2022    GFRAA >60 05/04/2022    AGRATIO 1.7 07/19/2021    GLOB 2.7 07/19/2021        TSH   Date Value Ref Range Status   05/04/2022 5.45 (H) 0.27 - 4.20 uIU/mL Final   07/19/2021 2.64 0.27 - 4.20 uIU/mL Final   06/08/2020 3.68 0.27 - 4.20 uIU/mL Final 04/27/2017 2.38 0.40 - 4.50 mIU/L Final   01/21/2016 1.16 0.40 - 4.50 mIU/L Final   06/24/2013 3.27 0.40 - 4.50 mIU/L Final     Lab Results   Component Value Date    WBC 8.7 05/04/2022    HGB 13.3 05/04/2022    HCT 41.1 05/04/2022    MCV 86.0 05/04/2022     05/04/2022        Outpatient Medications Marked as Taking for the 7/18/22 encounter (Office Visit) with Jayden Gil MD   Medication Sig Dispense Refill    verapamil (CALAN SR) 180 MG extended release tablet Take 1 tablet by mouth in the morning. 30 tablet 2    ondansetron (ZOFRAN) 4 MG tablet Take 1 tablet by mouth 3 times daily as needed for Nausea or Vomiting 15 tablet 0    FLUoxetine (PROZAC) 20 MG capsule Take 1 capsule by mouth daily Take with Prozac 40 mg for a total of 60 mg 90 capsule 1    cloNIDine (CATAPRES) 0.1 MG tablet TAKE ONE TABLET BY MOUTH EVERY 8 HOURS AS NEEDED FOR SYSTOLIC BLOOD PRESSURE GREATER THAN 160 20 tablet 1    hydrOXYzine (ATARAX) 10 MG tablet Take 1-2 tablets by mouth 3 times daily as needed for Anxiety 20 tablet 2    FLUoxetine (PROZAC) 40 MG capsule Take 1 capsule by mouth daily 90 capsule 1    Mirabegron (MYRBETRIQ PO) Take by mouth every other day Unsure of dose      furosemide (LASIX) 20 MG tablet TAKE ONE TO TWO TABLETS BY MOUTH EVERY  tablet 1    pantoprazole (PROTONIX) 40 MG tablet Take 1 tablet by mouth daily 90 tablet 2    rosuvastatin (CRESTOR) 20 MG tablet Take 1 tablet by mouth daily 90 tablet 2    polyethylene glycol (GLYCOLAX) 17 GM/SCOOP powder Take 17 g by mouth daily      Cholecalciferol (D3 PO) Take by mouth      methylcellulose (CITRUCEL) oral powder Take 2 g by mouth daily Take by mouth daily.       calcium citrate-vitamin D (CALCIUM CITRATE + D3) 315-250 MG-UNIT TABS per tablet Take 1 tablet by mouth daily (with breakfast) 120 tablet         Allergies   Allergen Reactions    Ace Inhibitors      cough    Latanoprost Other (See Comments)    Losartan Other (See Comments)     cough    Lucero Code [Diltiazem Hcl]        Patient Active Problem List   Diagnosis    Recurrent major depressive disorder, in full remission ([address]    Osteopenia    Cervical stenosis of spine    Benign essential hypertension    Colon polyps    Essential familial hypercholesterolemia    IBS (irritable bowel syndrome)    Elevated lipoprotein(a)    Renal angiolipoma    Lumbar spondylosis    Lumbar stenosis    Trochanteric bursitis of right hip    Glucose intolerance (impaired glucose tolerance)    Memory loss    AILYN (obstructive sleep apnea)    Class 2 obesity without serious comorbidity with body mass index (BMI) of 36.0 to 36.9 in adult    Moderate episode of recurrent major depressive disorder ([address]       Past Medical History:   Diagnosis Date    Acute esophagitis     h/o    Acute hearing loss of right ear 7/12/2016    AVN (avascular necrosis of bone) ([address] 1/23/2014    Hip      Benign essential hypertension     Cervical stenosis of spine     Colon polyps     Depressive disorder, not elsewhere classified     Essential familial hypercholesterolemia     Fatigue     Fracture of cuboid, right ankle, closed 5/2013    Hypercalcemia 7/27/2018    Lumbar herniated disc     Lumbar radiculopathy 6/9/2015    Osteopenia        Past Surgical History:   Procedure Laterality Date    APPENDECTOMY      BREAST BIOPSY      BREAST SURGERY      reduction    HYSTERECTOMY (CERVIX STATUS UNKNOWN)      JOINT REPLACEMENT Left 4/2014    hip    KNEE SURGERY      left    LAMINECTOMY      decompressive more than 2 lumbar segments    TONSILLECTOMY          Family History   Problem Relation Age of Onset    Other Mother         lung disease    Heart Disease Father        Social History     Tobacco Use    Smoking status: Never    Smokeless tobacco: Never   Vaping Use    Vaping Use: Never used   Substance Use Topics    Alcohol use: No    Drug use: No            Review of Systems  Review of Systems    Objective:   Physical Exam  Vitals:    07/18/22 1418   BP: 116/60 Pulse: 95   Resp: 14   Temp: 97.9 °F (36.6 °C)   TempSrc: Temporal   SpO2: 97%   Weight: 157 lb 12.8 oz (71.6 kg)   Height: 4' 11\" (1.499 m)     Wt Readings from Last 3 Encounters:   07/18/22 157 lb 12.8 oz (71.6 kg)   06/01/22 162 lb 9.6 oz (73.8 kg)   06/01/22 161 lb (73 kg)      Physical Exam  NAD    Skin is warm and dry. No rash. Well hydrated  Alert and oriented x 3. Mood and affect are normal.  The neck is supple and free of adenopathy or masses, the thyroid is normal without enlargement or nodules. Chest: clear with no wheezes or rales. No retractions, or use of accessory muscles noted. Cardiovascular: PMI is not displaced, and no thrill noted. Regular rate and rhythm with no rub, murmur or gallop. No peripheral edema. No carotid bruits. The abdomen is soft without tenderness, guarding, mass, rebound or organomegaly. No inguinal LN or hernia. Aorta, femoral, DP and PT pulses intact. Assessment:       Diagnosis Orders   1. Dyspepsia  Comprehensive Metabolic Panel    Lipase    CBC with Auto Differential    H. Pylori Breath Test, Adult    ondansetron (ZOFRAN) 4 MG tablet  Consider EGD, US       2. Benign essential hypertension  verapamil (CALAN SR) 180 MG extended release tablet    Stop Cardura               Plan:      Side effects of current medications reviewed and questions answered. Follow up in 4 weeks or prn.   Bring BP cuff with her

## 2022-07-20 DIAGNOSIS — R10.13 DYSPEPSIA: ICD-10-CM

## 2022-07-20 LAB
A/G RATIO: 1.8 (ref 1.1–2.2)
ALBUMIN SERPL-MCNC: 4.2 G/DL (ref 3.4–5)
ALP BLD-CCNC: 89 U/L (ref 40–129)
ALT SERPL-CCNC: 37 U/L (ref 10–40)
ANION GAP SERPL CALCULATED.3IONS-SCNC: 11 MMOL/L (ref 3–16)
AST SERPL-CCNC: 33 U/L (ref 15–37)
BASOPHILS ABSOLUTE: 0 K/UL (ref 0–0.2)
BASOPHILS RELATIVE PERCENT: 0 %
BILIRUB SERPL-MCNC: 0.5 MG/DL (ref 0–1)
BUN BLDV-MCNC: 21 MG/DL (ref 7–20)
CALCIUM SERPL-MCNC: 9.8 MG/DL (ref 8.3–10.6)
CHLORIDE BLD-SCNC: 99 MMOL/L (ref 99–110)
CO2: 29 MMOL/L (ref 21–32)
CREAT SERPL-MCNC: 0.8 MG/DL (ref 0.6–1.2)
EOSINOPHILS ABSOLUTE: 0 K/UL (ref 0–0.6)
EOSINOPHILS RELATIVE PERCENT: 0 %
GFR AFRICAN AMERICAN: >60
GFR NON-AFRICAN AMERICAN: >60
GLUCOSE BLD-MCNC: 130 MG/DL (ref 70–99)
HCT VFR BLD CALC: 36.7 % (ref 36–48)
HEMOGLOBIN: 11.9 G/DL (ref 12–16)
LIPASE: 20 U/L (ref 13–60)
LYMPHOCYTES ABSOLUTE: 0.7 K/UL (ref 1–5.1)
LYMPHOCYTES RELATIVE PERCENT: 15 %
MCH RBC QN AUTO: 26.9 PG (ref 26–34)
MCHC RBC AUTO-ENTMCNC: 32.3 G/DL (ref 31–36)
MCV RBC AUTO: 83.3 FL (ref 80–100)
MONOCYTES ABSOLUTE: 0.3 K/UL (ref 0–1.3)
MONOCYTES RELATIVE PERCENT: 6 %
NEUTROPHILS ABSOLUTE: 3.5 K/UL (ref 1.7–7.7)
NEUTROPHILS RELATIVE PERCENT: 79 %
PDW BLD-RTO: 15.2 % (ref 12.4–15.4)
PLATELET # BLD: 179 K/UL (ref 135–450)
PLATELET SLIDE REVIEW: ADEQUATE
PMV BLD AUTO: 9 FL (ref 5–10.5)
POTASSIUM SERPL-SCNC: 3.9 MMOL/L (ref 3.5–5.1)
RBC # BLD: 4.4 M/UL (ref 4–5.2)
RBC # BLD: NORMAL 10*6/UL
SLIDE REVIEW: ABNORMAL
SODIUM BLD-SCNC: 139 MMOL/L (ref 136–145)
TOTAL PROTEIN: 6.6 G/DL (ref 6.4–8.2)
WBC # BLD: 4.4 K/UL (ref 4–11)

## 2022-07-21 LAB — H PYLORI BREATH TEST: NEGATIVE

## 2022-07-22 ENCOUNTER — TELEPHONE (OUTPATIENT)
Dept: FAMILY MEDICINE CLINIC | Age: 82
End: 2022-07-22

## 2022-07-22 NOTE — TELEPHONE ENCOUNTER
Yen Montana called to report BP readings for patient with a high reading today. It was an upper arm reading of 172/78. She is assymptomatic.  If there any questions call Paul Turner at [phone]

## 2022-07-27 ENCOUNTER — HOSPITAL ENCOUNTER (OUTPATIENT)
Dept: INTERVENTIONAL RADIOLOGY/VASCULAR | Age: 82
Discharge: HOME | End: 2022-07-27
Payer: MEDICARE

## 2022-07-27 VITALS
TEMPERATURE: 97 F | RESPIRATION RATE: 20 BRPM | SYSTOLIC BLOOD PRESSURE: 150 MMHG | WEIGHT: 161.49 LBS | HEIGHT: 59 IN | OXYGEN SATURATION: 93 % | DIASTOLIC BLOOD PRESSURE: 82 MMHG | HEART RATE: 68 BPM | BODY MASS INDEX: 32.56 KG/M2

## 2022-07-27 DIAGNOSIS — D30.00 BENIGN NEOPLASM OF KIDNEY, UNSPECIFIED LATERALITY: ICD-10-CM

## 2022-07-27 LAB
INR BLD: 1.11 (ref 0.87–1.14)
PROTHROMBIN TIME: 14.2 SEC (ref 11.7–14.5)

## 2022-07-27 PROCEDURE — 36415 COLL VENOUS BLD VENIPUNCTURE: CPT

## 2022-07-27 PROCEDURE — 2580000003 HC RX 258: Performed by: RADIOLOGY

## 2022-07-27 PROCEDURE — 6360000002 HC RX W HCPCS: Performed by: RADIOLOGY

## 2022-07-27 PROCEDURE — 7100000011 HC PHASE II RECOVERY - ADDTL 15 MIN

## 2022-07-27 PROCEDURE — 99152 MOD SED SAME PHYS/QHP 5/>YRS: CPT

## 2022-07-27 PROCEDURE — 99153 MOD SED SAME PHYS/QHP EA: CPT

## 2022-07-27 PROCEDURE — 7100000010 HC PHASE II RECOVERY - FIRST 15 MIN

## 2022-07-27 PROCEDURE — 36252 INS CATH REN ART 1ST BILAT: CPT

## 2022-07-27 PROCEDURE — 2709999900 IR EMBOLIZATION TUMOR/ORGAN ISCH/INFARC

## 2022-07-27 PROCEDURE — 6360000004 HC RX CONTRAST MEDICATION: Performed by: RADIOLOGY

## 2022-07-27 PROCEDURE — 85610 PROTHROMBIN TIME: CPT

## 2022-07-27 RX ORDER — MIDAZOLAM HYDROCHLORIDE 1 MG/ML
INJECTION INTRAMUSCULAR; INTRAVENOUS DAILY PRN
Status: COMPLETED | OUTPATIENT
Start: 2022-07-27 | End: 2022-07-27

## 2022-07-27 RX ORDER — DEXAMETHASONE SODIUM PHOSPHATE 4 MG/ML
8 INJECTION, SOLUTION INTRA-ARTICULAR; INTRALESIONAL; INTRAMUSCULAR; INTRAVENOUS; SOFT TISSUE ONCE
Status: COMPLETED | OUTPATIENT
Start: 2022-07-27 | End: 2022-07-27

## 2022-07-27 RX ORDER — DIPHENHYDRAMINE HYDROCHLORIDE 50 MG/ML
INJECTION INTRAMUSCULAR; INTRAVENOUS DAILY PRN
Status: COMPLETED | OUTPATIENT
Start: 2022-07-27 | End: 2022-07-27

## 2022-07-27 RX ORDER — ONDANSETRON 2 MG/ML
4 INJECTION INTRAMUSCULAR; INTRAVENOUS ONCE
Status: COMPLETED | OUTPATIENT
Start: 2022-07-27 | End: 2022-07-27

## 2022-07-27 RX ORDER — SODIUM CHLORIDE 9 MG/ML
INJECTION, SOLUTION INTRAVENOUS PRN
Status: DISCONTINUED | OUTPATIENT
Start: 2022-07-27 | End: 2022-07-28 | Stop reason: HOSPADM

## 2022-07-27 RX ORDER — FENTANYL CITRATE 50 UG/ML
INJECTION, SOLUTION INTRAMUSCULAR; INTRAVENOUS DAILY PRN
Status: COMPLETED | OUTPATIENT
Start: 2022-07-27 | End: 2022-07-27

## 2022-07-27 RX ORDER — ACETAMINOPHEN 325 MG/1
650 TABLET ORAL EVERY 4 HOURS PRN
Status: DISCONTINUED | OUTPATIENT
Start: 2022-07-27 | End: 2022-07-28 | Stop reason: HOSPADM

## 2022-07-27 RX ADMIN — FENTANYL CITRATE 50 MCG: 50 INJECTION INTRAMUSCULAR; INTRAVENOUS at 09:35

## 2022-07-27 RX ADMIN — FENTANYL CITRATE 25 MCG: 50 INJECTION INTRAMUSCULAR; INTRAVENOUS at 10:25

## 2022-07-27 RX ADMIN — DEXAMETHASONE SODIUM PHOSPHATE 8 MG: 4 INJECTION, SOLUTION INTRAMUSCULAR; INTRAVENOUS at 08:53

## 2022-07-27 RX ADMIN — MIDAZOLAM 0.5 MG: 1 INJECTION INTRAMUSCULAR; INTRAVENOUS at 09:51

## 2022-07-27 RX ADMIN — MIDAZOLAM 0.5 MG: 1 INJECTION INTRAMUSCULAR; INTRAVENOUS at 10:25

## 2022-07-27 RX ADMIN — MIDAZOLAM 1 MG: 1 INJECTION INTRAMUSCULAR; INTRAVENOUS at 09:35

## 2022-07-27 RX ADMIN — IOPAMIDOL 160 ML: 612 INJECTION, SOLUTION INTRAVENOUS at 13:06

## 2022-07-27 RX ADMIN — FENTANYL CITRATE 25 MCG: 50 INJECTION INTRAMUSCULAR; INTRAVENOUS at 09:51

## 2022-07-27 RX ADMIN — DIPHENHYDRAMINE HYDROCHLORIDE 25 MG: 50 INJECTION, SOLUTION INTRAMUSCULAR; INTRAVENOUS at 10:32

## 2022-07-27 RX ADMIN — MIDAZOLAM 1 MG: 1 INJECTION INTRAMUSCULAR; INTRAVENOUS at 11:21

## 2022-07-27 RX ADMIN — SODIUM CHLORIDE 3000 MG: 900 INJECTION INTRAVENOUS at 09:22

## 2022-07-27 RX ADMIN — ONDANSETRON 4 MG: 2 INJECTION INTRAMUSCULAR; INTRAVENOUS at 08:55

## 2022-07-27 RX ADMIN — DIPHENHYDRAMINE HYDROCHLORIDE 25 MG: 50 INJECTION, SOLUTION INTRAMUSCULAR; INTRAVENOUS at 09:32

## 2022-07-27 ASSESSMENT — PAIN SCALES - GENERAL: PAINLEVEL_OUTOF10: 0

## 2022-07-27 ASSESSMENT — PAIN - FUNCTIONAL ASSESSMENT: PAIN_FUNCTIONAL_ASSESSMENT: 0-10

## 2022-07-27 NOTE — DISCHARGE INSTRUCTIONS
Francisco Dominguez  [address]  Telephone: [phone]      PATIENT NAME: [employer] [place] RECORD NUMBER:  [id number]  TODAY'S DATE: @ED@      DISCHARGE INSTRUCTIONS: POST ABLATION         [x]  Do not take Aspirin or Aspirin products the day of your procedure. [x] Watch for excessive bleeding, increased pain, fever, redness or drainage at your procedure site. If this occurs call Dr. Ginette Gitelman, MD.     [x] Do not participate in any strenuous exercise for 48 hours after your procedure, such as  tennis, aerobics, weight lifting and household activities. Do not lift over 10 pounds for two days. [x] You may remove your dressing tomorrow and shower. Do not submerge your procedure site in water for 4 days (ie tub, pool, hot tub, etc)    *You will be contacted by radiology for your follow up CT scans at 6/12/24 and 36 months. *If you also had a biopsy, Dr. Ginette Gitelman, MD will have those results in 2-3 days, please contact their office for these.

## 2022-07-27 NOTE — BRIEF OP NOTE
Brief Postoperative Note    Shana Links  YOB: 1940  [id number]    Pre-operative Diagnosis: Bilateral renal angiomyelolipomas    Post-operative Diagnosis: Same    Procedure: Aortic and Bilateral renal angiograms    Anesthesia: Moderate Sedation    Surgeons: Lady Surya MERRITT    Estimated Blood Loss: Less than 5 mL    Complications: None    Specimens: Was Not Obtained    Findings: Bilateral renal angiograms did not opacify the AMLs. Therefore no embolization was performed. This can mean one of two things: The AMLs are not very vascular and therefore have a less likelihood of bleeding. 2.  The AMLs are not supplied by the renal arteries and the feeding artery was not identified on the angiograms and aortogram.     Recommend further evaluation with dedicated CTA.     Electronically signed by Lady Surya MD on 7/27/2022 at 11:32 AM

## 2022-07-27 NOTE — PROGRESS NOTES
Patient to phase 2 at this time. Right groin site clean, dry, intact with transparent dressing. Site soft, no hematoma noted. RLE +2. Dr. Anne Marie Jane to bedside to review findings with patient and son. All questions were answered. Explained to patient the need to lie flat until 1530. Will continue to closely monitor.

## 2022-07-27 NOTE — PRE SEDATION
Sedation Pre-Procedure Note    Patient Name: Rajeev Perdomo   YOB: 1940  Room/Bed: Room/bed info not found  Medical Record Number: [id number]  Date: 7/27/2022   Time: 11:31 AM       Indication:  Bilateral renal AMLs here for angiogram and possible embolization. Consent: I have discussed with the patient and/or the patient representative the indication, alternatives, and the possible risks and/or complications of the planned procedure and the anesthesia methods. The patient and/or patient representative appear to understand and agree to proceed. Vital Signs:   Vitals:    07/27/22 1119   BP: (!) 160/72   Pulse: 64   Resp: 15   Temp:    SpO2: 99%       Past Medical History:   has a past medical history of Acute esophagitis, Acute hearing loss of right ear, AVN (avascular necrosis of bone) ([facility] [address] Benign essential hypertension, Cervical stenosis of spine, Colon polyps, Depressive disorder, not elsewhere classified, Essential familial hypercholesterolemia, Fatigue, Fracture of cuboid, right ankle, closed, Hypercalcemia, Lumbar herniated disc, Lumbar radiculopathy, and Osteopenia. Past Surgical History:   has a past surgical history that includes laminectomy; Hysterectomy; joint replacement (Left, 4/2014); Tonsillectomy; Appendectomy; knee surgery; Breast surgery; and Breast biopsy. Medications:   Scheduled Meds:   Continuous Infusions:    sodium chloride       PRN Meds: sodium chloride, diphenhydrAMINE, midazolam, fentanNYL  Home Meds:   Prior to Admission medications    Medication Sig Start Date End Date Taking? Authorizing Provider   verapamil (CALAN SR) 180 MG extended release tablet Take 1 tablet by mouth in the morning.  7/18/22   Tracie Osuna MD   ondansetron (ZOFRAN) 4 MG tablet Take 1 tablet by mouth 3 times daily as needed for Nausea or Vomiting 7/18/22   Tracie Osuna MD   FLUoxetine (PROZAC) 20 MG capsule Take 1 capsule by mouth daily Take with Prozac 40 mg for a total of 60 mg 6/1/22   Roxana Nunez MD   cloNIDine (CATAPRES) 0.1 MG tablet TAKE ONE TABLET BY MOUTH EVERY 8 HOURS AS NEEDED FOR SYSTOLIC BLOOD PRESSURE GREATER THAN 160 5/23/22   Roxana Nunez MD   hydrOXYzine (ATARAX) 10 MG tablet Take 1-2 tablets by mouth 3 times daily as needed for Anxiety 5/20/22   Roxana Nunez MD   FLUoxetine (PROZAC) 40 MG capsule Take 1 capsule by mouth daily 5/16/22   Roxana Nunez MD   Mirabegron (MYRBETRIQ PO) Take by mouth every other day Unsure of dose    Historical Provider, MD   furosemide (LASIX) 20 MG tablet TAKE ONE TO TWO TABLETS BY MOUTH EVERY DAY 2/7/22   Roxana Nunez MD   pantoprazole (PROTONIX) 40 MG tablet Take 1 tablet by mouth daily 12/14/21   Roxana Nunez MD   rosuvastatin (CRESTOR) 20 MG tablet Take 1 tablet by mouth daily 12/14/21   Roxana Nunez MD   polyethylene glycol [facility]) 17 GM/SCOOP powder Take 17 g by mouth daily    Historical Provider, MD   Cholecalciferol (D3 PO) Take by mouth    Historical Provider, MD   methylcellulose (CITRUCEL) oral powder Take 2 g by mouth daily Take by mouth daily. Historical Provider, MD   calcium citrate-vitamin D (CALCIUM CITRATE + D3) 315-250 MG-UNIT TABS per tablet Take 1 tablet by mouth daily (with breakfast) 7/27/18   Roxana Nunez MD     Coumadin Use Last 7 Days:  no  Antiplatelet drug therapy use last 7 days: no  Other anticoagulant use last 7 days: no  Additional Medication Information:  n/a      Pre-Sedation Documentation and Exam:   I have reviewed the patient's history and review of systems.     Mallampati Airway Assessment:  Mallampati Class II - (soft palate, fauces & uvula are visible)    Prior History of Anesthesia Complications:   none    ASA Classification:  Class 2 - A normal healthy patient with mild systemic disease    Sedation/ Anesthesia Plan:   intravenous sedation    Medications Planned:   midazolam (Versed) intravenously and fentanyl intravenously    Patient is an appropriate candidate for plan of sedation: yes    Electronically signed by Edna Jones MD on 7/27/2022 at 11:31 AM

## 2022-07-29 ENCOUNTER — TELEPHONE (OUTPATIENT)
Dept: FAMILY MEDICINE CLINIC | Age: 82
End: 2022-07-29

## 2022-07-29 NOTE — TELEPHONE ENCOUNTER
Patient has questions concenring an order for a stress test that she says was ordered 9/2021. She says she has no knowledge of this order and would like a call back concerning this matter.     [phone] (home)

## 2022-08-02 ENCOUNTER — HOSPITAL ENCOUNTER (OUTPATIENT)
Dept: CT IMAGING | Age: 82
Discharge: HOME | End: 2022-08-02
Payer: MEDICARE

## 2022-08-02 DIAGNOSIS — D30.00 BENIGN NEOPLASM OF KIDNEY, UNSPECIFIED LATERALITY: ICD-10-CM

## 2022-08-02 PROCEDURE — 6360000004 HC RX CONTRAST MEDICATION: Performed by: PHYSICIAN ASSISTANT

## 2022-08-02 PROCEDURE — G1010 CDSM STANSON: HCPCS

## 2022-08-02 RX ADMIN — IOPAMIDOL 80 ML: 755 INJECTION, SOLUTION INTRAVENOUS at 10:56

## 2022-08-03 ENCOUNTER — OFFICE VISIT (OUTPATIENT)
Dept: FAMILY MEDICINE CLINIC | Age: 82
End: 2022-08-03
Payer: MEDICARE

## 2022-08-03 VITALS
TEMPERATURE: 97.7 F | HEART RATE: 72 BPM | WEIGHT: 158 LBS | RESPIRATION RATE: 14 BRPM | BODY MASS INDEX: 31.91 KG/M2 | DIASTOLIC BLOOD PRESSURE: 64 MMHG | OXYGEN SATURATION: 96 % | SYSTOLIC BLOOD PRESSURE: 145 MMHG

## 2022-08-03 DIAGNOSIS — I10 BENIGN ESSENTIAL HYPERTENSION: ICD-10-CM

## 2022-08-03 DIAGNOSIS — R61 SWEATING INCREASE: ICD-10-CM

## 2022-08-03 DIAGNOSIS — F33.1 MODERATE EPISODE OF RECURRENT MAJOR DEPRESSIVE DISORDER (HCC): ICD-10-CM

## 2022-08-03 DIAGNOSIS — R03.0 ELEVATED BLOOD PRESSURE READING: Primary | ICD-10-CM

## 2022-08-03 DIAGNOSIS — K58.1 IRRITABLE BOWEL SYNDROME WITH CONSTIPATION: ICD-10-CM

## 2022-08-03 LAB
PROTEIN PROTEIN: 0.01 G/DL
PROTEIN PROTEIN: 10 MG/DL

## 2022-08-03 PROCEDURE — G8417 CALC BMI ABV UP PARAM F/U: HCPCS | Performed by: FAMILY MEDICINE

## 2022-08-03 PROCEDURE — 1036F TOBACCO NON-USER: CPT | Performed by: FAMILY MEDICINE

## 2022-08-03 PROCEDURE — 1090F PRES/ABSN URINE INCON ASSESS: CPT | Performed by: FAMILY MEDICINE

## 2022-08-03 PROCEDURE — 99214 OFFICE O/P EST MOD 30 MIN: CPT | Performed by: FAMILY MEDICINE

## 2022-08-03 PROCEDURE — 1123F ACP DISCUSS/DSCN MKR DOCD: CPT | Performed by: FAMILY MEDICINE

## 2022-08-03 PROCEDURE — G8399 PT W/DXA RESULTS DOCUMENT: HCPCS | Performed by: FAMILY MEDICINE

## 2022-08-03 PROCEDURE — G8427 DOCREV CUR MEDS BY ELIG CLIN: HCPCS | Performed by: FAMILY MEDICINE

## 2022-08-03 RX ORDER — FLUOXETINE HYDROCHLORIDE 40 MG/1
40 CAPSULE ORAL EVERY OTHER DAY
Qty: 90 CAPSULE | Refills: 1 | Status: SHIPPED
Start: 2022-08-03 | End: 2022-08-15 | Stop reason: ALTCHOICE

## 2022-08-03 NOTE — PROGRESS NOTES
07/20/2022    MCV 83.3 07/20/2022     07/20/2022      Lab Results   Component Value Date    TSH 2.38 04/27/2017      Outpatient Medications Marked as Taking for the 8/3/22 encounter (Office Visit) with Liyah Muhammad MD   Medication Sig Dispense Refill    verapamil (CALAN SR) 180 MG extended release tablet Take 1 tablet by mouth in the morning. 30 tablet 2    cloNIDine (CATAPRES) 0.1 MG tablet TAKE ONE TABLET BY MOUTH EVERY 8 HOURS AS NEEDED FOR SYSTOLIC BLOOD PRESSURE GREATER THAN 160 20 tablet 1    hydrOXYzine (ATARAX) 10 MG tablet Take 1-2 tablets by mouth 3 times daily as needed for Anxiety 20 tablet 2    FLUoxetine (PROZAC) 40 MG capsule Take 1 capsule by mouth daily 90 capsule 1    Mirabegron (MYRBETRIQ PO) Take by mouth every other day Unsure of dose      furosemide (LASIX) 20 MG tablet TAKE ONE TO TWO TABLETS BY MOUTH EVERY  tablet 1    pantoprazole (PROTONIX) 40 MG tablet Take 1 tablet by mouth daily 90 tablet 2    rosuvastatin (CRESTOR) 20 MG tablet Take 1 tablet by mouth daily 90 tablet 2    polyethylene glycol (GLYCOLAX) 17 GM/SCOOP powder Take 17 g by mouth daily      Cholecalciferol (D3 PO) Take by mouth      methylcellulose (CITRUCEL) oral powder Take 2 g by mouth daily Take by mouth daily.       calcium citrate-vitamin D (CALCIUM CITRATE + D3) 315-250 MG-UNIT TABS per tablet Take 1 tablet by mouth daily (with breakfast) 120 tablet         Allergies   Allergen Reactions    Ace Inhibitors      cough    Latanoprost Other (See Comments)    Losartan Other (See Comments)     cough    Taztia Xt [Diltiazem Hcl]        Patient Active Problem List   Diagnosis    Recurrent major depressive disorder, in full remission ([facility] [address]    Osteopenia    Cervical stenosis of spine    Benign essential hypertension    Colon polyps    Essential familial hypercholesterolemia    IBS (irritable bowel syndrome)    Elevated lipoprotein(a)    Renal angiolipoma    Lumbar spondylosis    Lumbar stenosis    Trochanteric bursitis of right hip    Glucose intolerance (impaired glucose tolerance)    Memory loss    AILYN (obstructive sleep apnea)    Class 2 obesity without serious comorbidity with body mass index (BMI) of 36.0 to 36.9 in adult    Moderate episode of recurrent major depressive disorder (HCC)       Past Medical History:   Diagnosis Date    Acute esophagitis     h/o    Acute hearing loss of right ear 7/12/2016    AVN (avascular necrosis of bone) ([address] 1/23/2014    Hip      Benign essential hypertension     Cervical stenosis of spine     Colon polyps     Depressive disorder, not elsewhere classified     Essential familial hypercholesterolemia     Fatigue     Fracture of cuboid, right ankle, closed 5/2013    Hypercalcemia 7/27/2018    Lumbar herniated disc     Lumbar radiculopathy 6/9/2015    Osteopenia        Past Surgical History:   Procedure Laterality Date    APPENDECTOMY      BREAST BIOPSY      BREAST SURGERY      reduction    HYSTERECTOMY (CERVIX STATUS UNKNOWN)      IR EMBOLIZATION TUMOR / ORGAN  7/27/2022    IR EMBOLIZATION TUMOR / ORGAN 7/27/2022 [facility]    JOINT REPLACEMENT Left 4/2014    hip    KNEE SURGERY      left    LAMINECTOMY      decompressive more than 2 lumbar segments    TONSILLECTOMY          Family History   Problem Relation Age of Onset    Other Mother         lung disease    Heart Disease Father        Social History     Tobacco Use    Smoking status: Never    Smokeless tobacco: Never   Vaping Use    Vaping Use: Never used   Substance Use Topics    Alcohol use: No    Drug use: No            Review of Systems  Review of Systems    Objective:   Physical Exam  Vitals:    08/03/22 1046 08/03/22 1133   BP: (!) 150/72 (!) 145/64   Site:  Left Upper Arm   Pulse: 72    Resp: 14    Temp: 97.7 °F (36.5 °C)    TempSrc: Temporal    SpO2: 96%    Weight: 158 lb (71.7 kg)      BP Readings from Last 3 Encounters:   08/03/22 (!) 150/72   07/27/22 (!) 150/82   07/18/22 116/60      Wt Readings from Last 3 Encounters:   08/03/22 158 lb (71.7 kg)   07/27/22 161 lb 7.8 oz (73.2 kg)   07/18/22 157 lb 12.8 oz (71.6 kg)        BP on her cuff 144/88. I repeated it with the cuff loose (how she does her BP) - BP was 166/94. She does have access to a nurse who can check BP in the AM.    Physical Exam    NAD    Skin is warm and dry. No rash. Well hydrated  Alert and oriented x 3. Mood and affect are moderately anxious. The neck is supple and free of adenopathy or masses, the thyroid is normal without enlargement or nodules. Chest: clear with no wheezes or rales. No retractions, or use of accessory muscles noted. Cardiovascular: PMI is not displaced, and no thrill noted. Regular rate and rhythm with no rub, murmur or gallop. No peripheral edema. No carotid bruits. The abdomen is soft without tenderness, guarding, mass, rebound or organomegaly. Aorta, femoral, DP and PT pulses intact. Assessment:       Diagnosis Orders   1. Elevated blood pressure reading  I think her home readings are inaccurate. She will have them checked by a nurse several times a week. Continue same meds. 2. Sweating increase  VMA, Urine    Electrophoresis Protein, Serum    Immunofixation urine random profile    I suspect caused by Prozac. Decrease to 40 mg QOD or 20 mg QD ( she has both strengths) and see if improves. If not, will decrease to 20 mg QOD       3. Benign essential hypertension  External Referral To Cardiology      4. Moderate episode of recurrent major depressive disorder (HCC)  FLUoxetine (PROZAC) 40 MG capsule  Hold any new meds until we sort out the sweating. Follow up if worsening depression. 5. Irritable bowel syndrome with constipation               Plan:      Side effects of current medications reviewed and questions answered. Follow up in 4 weeks or prn.

## 2022-08-04 ENCOUNTER — TELEPHONE (OUTPATIENT)
Dept: INTERNAL MEDICINE CLINIC | Age: 82
End: 2022-08-04

## 2022-08-04 NOTE — TELEPHONE ENCOUNTER
Patient scheduled a NTP appointment with Dr. Aaron Swanson which is: 11-02, 2022. She is requesting to be seen sooner, stating that she has known Danielle Ramirez since she was 11years old, and is a friend to Dr. June Lujan mother in law. Is an exception able to be made? Please advise. Please contact patient accordingly @ phone # provided.

## 2022-08-05 DIAGNOSIS — I10 BENIGN ESSENTIAL HYPERTENSION: ICD-10-CM

## 2022-08-05 LAB
ALBUMIN SERPL-MCNC: 3.1 G/DL (ref 3.1–4.9)
ALPHA-1-GLOBULIN: 0.3 G/DL (ref 0.2–0.4)
ALPHA-2-GLOBULIN: 0.9 G/DL (ref 0.4–1.1)
BETA GLOBULIN: 1.1 G/DL (ref 0.9–1.6)
GAMMA GLOBULIN: 1.1 G/DL (ref 0.6–1.8)
SPE/IFE INTERPRETATION: NORMAL
TOTAL PROTEIN: 6.5 G/DL (ref 6.4–8.2)
URINE ELECTROPHORESIS INTERP: NORMAL

## 2022-08-05 NOTE — TELEPHONE ENCOUNTER
I guess I am curious as to why she is needing to change providers. She was just seen by Dr. Danilo Gooden. What necessitates the urgency?

## 2022-08-06 LAB
CREATININE 24 HOUR URINE: NORMAL MG/D (ref 400–1300)
CREATININE URINE: 53 MG/DL
VMA INTERPRETATION: NORMAL
VMA URINE MG/ML: 1.7 MG/L
VMA, UR/G CRT: 3 MG/GCR (ref 0–6)
VMA-MG/D: NORMAL MG/D (ref 0–7)

## 2022-08-07 RX ORDER — CLONIDINE HYDROCHLORIDE 0.1 MG/1
TABLET ORAL
Qty: 20 TABLET | Refills: 2 | Status: SHIPPED | OUTPATIENT
Start: 2022-08-07

## 2022-08-09 ENCOUNTER — PATIENT MESSAGE (OUTPATIENT)
Dept: FAMILY MEDICINE CLINIC | Age: 82
End: 2022-08-09

## 2022-08-09 NOTE — TELEPHONE ENCOUNTER
From: Patrick Padilla  To: Dr. Fiona Santos: 8/9/2022 11:41 AM EDT  Subject: Dr. Alfaro Prim    Dr. Caryn Burton office does not have the referral you sent for me. His fax # is [phone]. Please let me know when this is sent.   Jennifer Salomon  [phone]

## 2022-08-10 NOTE — TELEPHONE ENCOUNTER
I spoke with patient and she states up to this point \"Dr. Dat Perez, has not been able to \"figure out what's going on with her. \"  She states Sahara Rojas is 80, , depressed, has no appetite, and has lost 18lbs in the past 6-8 weeks. \"  Her \"blood pressure has been elevated; 160-180 and occasionally 200 over 89/90/100. \"  She is \"seeing specialist for Sweating, and her current doctor feels the sweating is related to Prozac, so she is weaning off, and should be off by the end of this week. \"  She has: \"Tingling of the Skin, Constipation, Vertigo, (but is getting a \"Epley procedure\", and shoulder problems. \"    These are the reasons why she wants to be seen sooner. She \"heard from her friend; Saritha Campbell, that Dr. Regis Ontiveros is a great Geriatric doctor. \"    Please advise, and contact patient at phone # provided.

## 2022-08-10 NOTE — TELEPHONE ENCOUNTER
I am okay if she wants to switch. However, she should know I am not board-certified in geriatrics. I just see a lot of geriatrics. Okay to bring her in sooner in the next few weeks.

## 2022-08-15 ENCOUNTER — PATIENT MESSAGE (OUTPATIENT)
Dept: FAMILY MEDICINE CLINIC | Age: 82
End: 2022-08-15

## 2022-08-15 DIAGNOSIS — L74.9 SWEATING ABNORMALITY: Primary | ICD-10-CM

## 2022-08-15 NOTE — TELEPHONE ENCOUNTER
From: Jona Child  To: Dr. Michele Sterling: 8/15/2022 8:46 AM EDT  Subject: sweats    I have finished the prozac 4 days ago and still have the sweats.  What is next???   [employer]

## 2022-08-22 ENCOUNTER — HOSPITAL ENCOUNTER (OUTPATIENT)
Dept: CT IMAGING | Age: 82
Discharge: HOME | End: 2022-08-22
Payer: MEDICARE

## 2022-08-22 DIAGNOSIS — L74.9 SWEATING ABNORMALITY: ICD-10-CM

## 2022-08-22 PROCEDURE — 71250 CT THORAX DX C-: CPT

## 2022-08-31 ENCOUNTER — OFFICE VISIT (OUTPATIENT)
Dept: INTERNAL MEDICINE CLINIC | Age: 82
End: 2022-08-31
Payer: MEDICARE

## 2022-08-31 VITALS
BODY MASS INDEX: 31.53 KG/M2 | HEART RATE: 81 BPM | RESPIRATION RATE: 12 BRPM | OXYGEN SATURATION: 98 % | HEIGHT: 59 IN | SYSTOLIC BLOOD PRESSURE: 128 MMHG | DIASTOLIC BLOOD PRESSURE: 68 MMHG | WEIGHT: 156.4 LBS

## 2022-08-31 DIAGNOSIS — F43.23 ADJUSTMENT DISORDER WITH MIXED ANXIETY AND DEPRESSED MOOD: ICD-10-CM

## 2022-08-31 DIAGNOSIS — D17.71 ANGIOMYOLIPOMA OF BOTH KIDNEYS: ICD-10-CM

## 2022-08-31 DIAGNOSIS — F41.0 PANIC ATTACKS: ICD-10-CM

## 2022-08-31 DIAGNOSIS — R73.02 IMPAIRED GLUCOSE TOLERANCE: ICD-10-CM

## 2022-08-31 DIAGNOSIS — R19.7 DIARRHEA, UNSPECIFIED TYPE: ICD-10-CM

## 2022-08-31 DIAGNOSIS — K58.0 IRRITABLE BOWEL SYNDROME WITH DIARRHEA: ICD-10-CM

## 2022-08-31 DIAGNOSIS — F33.1 MODERATE EPISODE OF RECURRENT MAJOR DEPRESSIVE DISORDER (HCC): ICD-10-CM

## 2022-08-31 DIAGNOSIS — R61 ABNORMAL FLUSHING AND SWEATING: Primary | ICD-10-CM

## 2022-08-31 DIAGNOSIS — D64.9 NORMOCYTIC ANEMIA: ICD-10-CM

## 2022-08-31 DIAGNOSIS — R73.01 IFG (IMPAIRED FASTING GLUCOSE): ICD-10-CM

## 2022-08-31 DIAGNOSIS — I10 BENIGN ESSENTIAL HYPERTENSION: Chronic | ICD-10-CM

## 2022-08-31 DIAGNOSIS — R19.4 CHANGE IN BOWEL HABIT: ICD-10-CM

## 2022-08-31 DIAGNOSIS — R23.2 ABNORMAL FLUSHING AND SWEATING: Primary | ICD-10-CM

## 2022-08-31 PROCEDURE — G8399 PT W/DXA RESULTS DOCUMENT: HCPCS | Performed by: INTERNAL MEDICINE

## 2022-08-31 PROCEDURE — 1123F ACP DISCUSS/DSCN MKR DOCD: CPT | Performed by: INTERNAL MEDICINE

## 2022-08-31 PROCEDURE — G8427 DOCREV CUR MEDS BY ELIG CLIN: HCPCS | Performed by: INTERNAL MEDICINE

## 2022-08-31 PROCEDURE — 1036F TOBACCO NON-USER: CPT | Performed by: INTERNAL MEDICINE

## 2022-08-31 PROCEDURE — 99205 OFFICE O/P NEW HI 60 MIN: CPT | Performed by: INTERNAL MEDICINE

## 2022-08-31 PROCEDURE — 1090F PRES/ABSN URINE INCON ASSESS: CPT | Performed by: INTERNAL MEDICINE

## 2022-08-31 PROCEDURE — G8417 CALC BMI ABV UP PARAM F/U: HCPCS | Performed by: INTERNAL MEDICINE

## 2022-08-31 ASSESSMENT — PATIENT HEALTH QUESTIONNAIRE - PHQ9
3. TROUBLE FALLING OR STAYING ASLEEP: 0
5. POOR APPETITE OR OVEREATING: 2
SUM OF ALL RESPONSES TO PHQ QUESTIONS 1-9: 11
1. LITTLE INTEREST OR PLEASURE IN DOING THINGS: 1
6. FEELING BAD ABOUT YOURSELF - OR THAT YOU ARE A FAILURE OR HAVE LET YOURSELF OR YOUR FAMILY DOWN: 1
SUM OF ALL RESPONSES TO PHQ QUESTIONS 1-9: 11
9. THOUGHTS THAT YOU WOULD BE BETTER OFF DEAD, OR OF HURTING YOURSELF: 1
4. FEELING TIRED OR HAVING LITTLE ENERGY: 1
8. MOVING OR SPEAKING SO SLOWLY THAT OTHER PEOPLE COULD HAVE NOTICED. OR THE OPPOSITE, BEING SO FIGETY OR RESTLESS THAT YOU HAVE BEEN MOVING AROUND A LOT MORE THAN USUAL: 3
SUM OF ALL RESPONSES TO PHQ9 QUESTIONS 1 & 2: 3
7. TROUBLE CONCENTRATING ON THINGS, SUCH AS READING THE NEWSPAPER OR WATCHING TELEVISION: 0
2. FEELING DOWN, DEPRESSED OR HOPELESS: 2
SUM OF ALL RESPONSES TO PHQ QUESTIONS 1-9: 10
SUM OF ALL RESPONSES TO PHQ QUESTIONS 1-9: 11
10. IF YOU CHECKED OFF ANY PROBLEMS, HOW DIFFICULT HAVE THESE PROBLEMS MADE IT FOR YOU TO DO YOUR WORK, TAKE CARE OF THINGS AT HOME, OR GET ALONG WITH OTHER PEOPLE: 1

## 2022-08-31 ASSESSMENT — ANXIETY QUESTIONNAIRES
IF YOU CHECKED OFF ANY PROBLEMS ON THIS QUESTIONNAIRE, HOW DIFFICULT HAVE THESE PROBLEMS MADE IT FOR YOU TO DO YOUR WORK, TAKE CARE OF THINGS AT HOME, OR GET ALONG WITH OTHER PEOPLE: SOMEWHAT DIFFICULT
2. NOT BEING ABLE TO STOP OR CONTROL WORRYING: 3
6. BECOMING EASILY ANNOYED OR IRRITABLE: 3
4. TROUBLE RELAXING: 1
3. WORRYING TOO MUCH ABOUT DIFFERENT THINGS: 0
1. FEELING NERVOUS, ANXIOUS, OR ON EDGE: 2
5. BEING SO RESTLESS THAT IT IS HARD TO SIT STILL: 2
GAD7 TOTAL SCORE: 14
7. FEELING AFRAID AS IF SOMETHING AWFUL MIGHT HAPPEN: 3

## 2022-08-31 NOTE — PROGRESS NOTES
[facility]) Physicians  Internal Medicine  Patient Encounter  Salena Eisenberg D.O., [facility]          Chief Complaint   Patient presents with    Sweats    Diarrhea    Depression       HPI-- 80 y.o. female seen today as a new patient in order to establish new primary care. The patient has been under the care of  for 20+ years. In fact, she was last seen on 8/3/2022. Patient was looking for a second opinion regarding some of her concerns. She has a follow up with Dr. Taniya French next week. She was referred by a family friend. Patient has multiple medical problems including but not limited to hypertension, colon polyps, hyperlipidemia, lumbar spine disease, irritable bowel syndrome, impaired glucose tolerance, obstructive sleep apnea, osteopenia and recently diagnosed angiomyolipomas. She has several concerns today. Her main concern today is sweating. Patient states that starting in early to mid May she began waking up with drenching sweats. Upon further questioning she endorsed associated symptoms including flushing and a prickly sensation in her skin. Patient had recently started Prozac in early May and it was thought that the sweating was due to this medication. After weaning off the Prozac, the sweats stopped until this past week when the sweats recurred. Patient reported a decreased appetite and a 15 pound weight loss. Her weight has stabilized. She has also struggled with associated accelerated blood pressures. Her systolic blood pressure has been anywhere between 160s-210/80s-100. Her blood pressure can accelerate during these episodes of sweating. When asked to describe any other symptoms, she admitted to feeling a sense of nervousness and that something bad was going to happen. She states \"I have a sense of doom and it feels like the walls are coming down. \"  She states her heart starts to race. She denies any hyperventilation or rapid breathing.   She states she feels like she Angiomyolipomas--CT scan of the abdomen and pelvis on 5/27/2022 showed 2 lesions in both kidneys that have increased in size. Characteristics are compatible with enlarging angiomyolipomas. Consultation was obtained from interventional radiology for possible embolization. On 7/27/2022 the patient underwent an aortogram and bilateral renal angiograms which did not definitively identify a feeding artery to the angiomyolipomas. It was recommended that she have a dedicated CTA for better evaluation of the arterial anatomy supplying these lesions. The CTA was completed on 8/2/2022 which identified the accessory artery feeding the superior lesion on the right and a branch of the main renal artery feeding the left angiomyolipoma. Other tests reviewed:  CT scan chest 8/15/2022:  Redemonstration of bilateral renal angiomyolipomas  Stable apparent right subclavian artery    Lab testing 7/27/2022 and 8/3/2022  Spot urine VMA-1.7  Random urine protein was normal  SPEP--no monoclonal gammopathy    CBC 7/20/2022  Hemoglobin 11.9--- down from 13.3. PHQ Scores 8/31/2022 7/18/2022 9/15/2021 7/1/2019 4/4/2019 1/30/2019 1/16/2019   PHQ2 Score 3 4 2 0 2 2 2   PHQ9 Score 11 8 2 0 3 4 3     Interpretation of Total Score Depression Severity: 1-4 = Minimal depression, 5-9 = Mild depression, 10-14 = Moderate depression, 15-19 = Moderately severe depression, 20-27 = Severe depression      DEBBIE 7 SCORE 8/31/2022 4/4/2019 1/30/2019 1/16/2019   DEBBIE-7 Total Score 14 - - -   DEBBIE-7 Total Score - 4 5 4     Interpretation of DEBBIE-7 score: 5-9 = mild anxiety, 10-14 = moderate anxiety, 15+ = severe anxiety. Recommend referral to behavioral health for scores 10 or greater.     Medical/Surgical Histories     Past Medical History:   Diagnosis Date    Acute esophagitis     h/o    Acute hearing loss of right ear 7/12/2016    AVN (avascular necrosis of bone) ([facility] [address] 1/23/2014    Hip      Benign essential hypertension     Cervical stenosis of spine Colon polyps     Depressive disorder, not elsewhere classified     Essential familial hypercholesterolemia     Fatigue     Fracture of cuboid, right ankle, closed 5/2013    Hypercalcemia 7/27/2018    Lumbar herniated disc     Lumbar radiculopathy 6/9/2015    Osteopenia           Past Surgical History:   Procedure Laterality Date    APPENDECTOMY      BREAST BIOPSY      BREAST SURGERY      reduction    HYSTERECTOMY (CERVIX STATUS UNKNOWN)      IR EMBOLIZATION TUMOR / ORGAN  7/27/2022    IR EMBOLIZATION TUMOR / ORGAN 7/27/2022 [facility] PROCED[facility]    JOINT REPLACEMENT Left 4/2014    hip    KNEE SURGERY      left    LAMINECTOMY      decompressive more than 2 lumbar segments    TONSILLECTOMY             Medications/Allergies     Current Outpatient Medications   Medication Sig    dupilumab (DUPIXENT) 300 MG/2ML SOPN injection Inject 300 mg into the skin every 14 days    cloNIDine (CATAPRES) 0.1 MG tablet TAKE ONE TABLET BY MOUTH EVERY 8 HOURS AS NEEDED FOR SYSTOLIC BLOOD PRESSURE GREATER THAN 160    verapamil (CALAN SR) 180 MG extended release tablet Take 1 tablet by mouth in the morning.    hydrOXYzine (ATARAX) 10 MG tablet Take 1-2 tablets by mouth 3 times daily as needed for Anxiety    Mirabegron (MYRBETRIQ PO) Take by mouth every other day Unsure of dose    furosemide (LASIX) 20 MG tablet TAKE ONE TO TWO TABLETS BY MOUTH EVERY DAY    pantoprazole (PROTONIX) 40 MG tablet Take 1 tablet by mouth daily    rosuvastatin (CRESTOR) 20 MG tablet Take 1 tablet by mouth daily    polyethylene glycol (GLYCOLAX) 17 GM/SCOOP powder Take 17 g by mouth daily    Cholecalciferol (D3 PO) Take by mouth    methylcellulose (CITRUCEL) oral powder Take 2 g by mouth daily Take by mouth daily. calcium citrate-vitamin D (CALCIUM CITRATE + D3) 315-250 MG-UNIT TABS per tablet Take 1 tablet by mouth daily (with breakfast)     No current facility-administered medications for this visit.         Allergies   Allergen Reactions    Ace Inhibitors      cough    Latanoprost Other (See Comments)    Losartan Other (See Comments)     cough    Taztia Xt [Diltiazem Hcl]          Substance Use History     Social History     Tobacco Use    Smoking status: Never    Smokeless tobacco: Never   Vaping Use    Vaping Use: Never used   Substance Use Topics    Alcohol use: No    Drug use: No          Family History     Family History   Problem Relation Age of Onset    Other Mother         lung disease    Heart Disease Father         No family history of problems with general anesthesia, venous thrombosis, or bleeding dyscrasias. REVIEW OF SYSTEMS:    CONSTITUTIONAL: + Recent weight loss, + fatigue, + early morning sweats  EYES: Neg  Blurry vision, loss of vision, double vision, tearing, itching, eye pain. EARS:  Neg Hearing loss, tinnitus, vertigo, discharge or otalgia. NOSE:  Neg  Rhinorrhea, sneezing, itching, allergy, epistaxis, or snoring  MOUTH/THROAT:  Neg Bleeding gums, hoarseness, sore throat, dysphagia, throat infections, or dentures  RESPIRATORY:  Neg SOB ,wheeze, cough, sputum, hemoptysis. No report of + TB test.    CARDIOVASCULAR:  Neg Chest pain, palpitations, heart murmur, dyspnea on exertion, orthopnea, paroxysmal nocturnal dyspnea or edema of extremities, or claudication. Last echo 11/17/2021-EF 55 to 60%, mild LVH  GASTROINTESTINAL: + Diarrhea, loose bowel movements, frequent bowel movements.   Denies nausea, vomiting, hematochezia, melena  GENITOURINARY:  Neg  Urinary frequency, hesitancy, urgency, polyuria, dysuria, hematuria, nocturia, incontinence, change in stream, genital pain or swelling, kidney stones  HEMATOLOGIC/LYMPHATIC:  Neg  Anemia, bleeding dyscrasias, easy bruising, blood clots (DVT/PE), transfusions, or enlarged lymph nodes  MUSCULOSKELETAL: + Low back pain  NEUROLOGICAL:  Neg  Loss of Consciousness, memeory loss or forgetfulness, confusion, difficulty concentrating, seizures, insomina, aphasia or dysarthria unilateral weakness or paresthesias, ataxia, headaches, syncope, tremor, or H/O head trauma. PSYCHIATRIC: As per HPI + Depression, + anxiety  SKIN :  Neg  Rash, nail changes, sun burns, tattoos, change in moles, or skin color changes  ENDOCRINE:  Neg  Polydipsia,polyuria,abnormal weight changes,heat /cold intolerance, Hair changes. No H/O Diabetes or Thyroid disease. Physical Exam  Vitals:    08/31/22 1542   BP: 128/68   Pulse: 81   Resp: 12   SpO2: 98%   Weight: 156 lb 6.4 oz (70.9 kg)   Height: 4' 11\" (1.499 m)     Body mass index is 31.59 kg/m². Wt Readings from Last 3 Encounters:   08/31/22 156 lb 6.4 oz (70.9 kg)   08/03/22 158 lb (71.7 kg)   07/27/22 161 lb 7.8 oz (73.2 kg)     BP Readings from Last 3 Encounters:   08/31/22 128/68   08/03/22 (!) 145/64   07/27/22 (!) 150/82        GEN:  80 y.o. female who is in NAD, A&O. She appears stated age and well nourished. She is cooperative. Irritable. Tearful at times during the visit  HEAD:  NC/AT, no lesions. EYES:  DARCY, EOMI, No scleral icterus or conjunctival injection or discharge. Visual fields in tact to confrontation. EARS:  EAC's clear, TM's normal.  MOUTH & THROAT:  Oral cavity is clear without mucosal lesions. Tongue is midline. NECK:  Supple. Full ROM. Trachea is midline. No increased JVD. No thyromegaly or nodules. No masses  LYMPH: No cervical or supraclavicular lymphadenopathy  CARDIAC:  S1S2 NL. Regular rhythm. Soft, early systolic murmur  VASC:  Pedal pulses palpable and symmetric. Carotid upstrokes 2+. No bruits noted. PULM:  Lungs are CTA. Symmetric breath sounds noted. AP Diameter NL. GI:  Abdomen is soft and nontender. No distension. No organomegaly. No masses. No pulsatile masses. EXT:  No Cyanosis or clubbing. No edema. SKIN: Warm and dry, normal turgor, no rash or lesions of concern. NEURO: No focal or lateralizing deficits. Gait without ataxia  MS:  No C/T/L paraspinal tenderness. No scoliosis.   No joint effusions. Full joint ROM. PSYCH: Mood is depressed. Intermittently tearful. Irritable. Dressed and groomed appropriately. Eye contact is normal.  Speech tempo normal.        ASSESSMENT/PLAN:    Summer Chinchilla is an 77-year-old white female with multiple medical problems seen today for another opinion regarding recent symptoms of drenching sweats, accelerated blood pressures and diarrhea. 1. Abnormal flushing and sweating  Symptoms may very well be a component of anxiety and panic attacks. I think she is suffering from severe anxiety and depression. Definitely have to rule out other causes  Consider repeating evaluation for pheochromocytoma and carcinoid  Check 24-hour urine fractionated catecholamines, metanephrines and 5-HIAA  Could also add plasma as well    2. Panic attacks  Her symptoms that she endorses sound like panic attacks  Continue hydroxyzine on an as-needed basis  Need to find a different medication to address. Could revisit sertraline  I recommend consultation with geriatric psychiatry. This is hard to find in [place]. Dr.Babu Uzair Johnson at the [facility] is a good choice. Dr. Kobi Callaway is also an option if you can find out where he is practicing. 3. Adjustment disorder with mixed anxiety and depressed mood  Patient seems to be struggling with changes in her life which is fueling a lot of fear of the unknown and anxiety. She has struggled adjusting to living in the [place] and she still mourns the loss of her . She is feeling somewhat socially isolated  DEBBIE-7 was 14  She could have a prolonged bereavement syndrome as well. Continue counseling  Consider starting Propranolol 10 mg TID (Watch pulse on the Verapamil), PLUS hydroxyzine as needed. I would also consider using Xanax or another Benzo such as Clonazepam or Lorazepam.  Xanax has a shorter half life. She have to be on this routinely. Very complicated    4.  Benign essential hypertension  Blood pressure accelerations may be due to anxiety or panic attacks. Blood pressure comes down when she takes a clonidine  Rule out pheochromocytoma to explain her blood pressure accelerations. A urine VMA was unremarkable  Would expand the evaluation to include a 24-hour analysis of fractionated catecholamines and metanephrines. It would be best to try to collect the urine during an \"attack. \"    5. Diarrhea, unspecified type  Likely due to irritable bowel syndrome with diarrhea fueled by increased anxiety  Rule out inflammatory colitis such as microscopic colitis. Also consider overflow diarrhea given her H/O Constipation. May need stool analysis with fecal leukocytes, fecal calprotectin in addition to other stool studies. With new anemia noted above, may want to check a stool Hemoccult or fecal immunochemical test    6. Change in bowel habit  As above    7. Irritable bowel syndrome with diarrhea  As above  Continue Citrucel  Stop the MiraLAX for now given her loose stool, but may need to restart. If her tendency is towards constipation, perhaps stopping the Miralax and trying a low dose of Linzess without Miralax could be an option  Follow-up with GI, Dr. Grant Renteria    8. Normocytic anemia  I would recommend repeating CBC  SPEP has already been completed and normal    9. Moderate episode of recurrent major depressive disorder ([facility] [address]  As noted above  I think she is struggling with significant depression  PHQ-9 was 11 suggestive of at least moderate depression. She also has some suicidal thoughts but no intent. We did come to an agreement that if suicidal thoughts recurred or worsened that she would take her self to the emergency department. 10. IFG (impaired fasting glucose)  Due for A1c    11. Impaired glucose tolerance  Due for A1c    12. Angiomyolipomas  Lesions have been enlarging.   An attempt at embolization was unsuccessful as the interventional radiologist was unable to isolate a feeding vessel. She is seeing a specialist at [facility] soon    I will call Dr. Blayne Joseph and discuss our visit today and provide my thoughts. On this date 8/31/2022 I have spent 70 minutes reviewing previous notes, test results and face to face with the patient discussing the diagnosis and importance of compliance with the treatment plan as well as documenting on the day of the visit.        Copy of H&P given to pt and sent to Sx

## 2022-08-31 NOTE — PATIENT INSTRUCTIONS
To do list:    #1 follow-up with your family doctor. I will discuss my recommendations directly with her as well    #2 consider seeing your gastroenterologist if the diarrhea does not slow. I would stop the MiraLAX for now. Continue your fiber    #3 consider seeing a geriatric psychiatrist. Dr. Devin Russell    #4 I recommend reevaluation with a 24-hour urine collection for metanephrines, catecholamines and 5-HIAA along with repeat lab testing    #5 medications can be adjusted to address your blood pressure, anxiety, fear, depression. Orthopedic

## 2022-08-31 NOTE — Clinical Note
Stoney , Thanks for talking. I apologize for my scatter brained note. She is very complicated. I was pretty much sold on this all being psych based on a couple of patients I have seen at [facility]. Geriatric anxiety is so hard. Happy to talk whenever!!  Be well, Joanie Santana

## 2022-09-04 NOTE — PROGRESS NOTES
Subjective:      Patient ID: Ritesh Mcdermott 80 y.o. female. The primary encounter diagnosis was Moderate episode of recurrent major depressive disorder ([address] Diagnoses of Panic attacks, Benign essential hypertension, Sweating increase, and Anemia, unspecified type were also pertinent to this visit. GERALDO Mckinney is here for follow up after seeing Dr. Cece Jimenez for second opinion for sweating. I spoke to him last week. He suspects her sweating is related to panic attacks; she has no family in [place]. She is lonely and fearful of loss of independence. We discussed trial BB or benzodiazepine and referral to psychiatry. Sweating is 75 % better. She notes sweating from her chest up to the top of her head. Skin feels wet but is not. Her heart races. The heart racing has resolved since off the Prozac; the sweating is better. Lasts about 15 minutes. Is not triggered by any event or activity. Depression is increased since off Prozac. If she is busy she does well; cries easily if not busy. Continues to see her therapist.  Would like someone who gives her more feedback. She has feeling of not wanting to live any more but not suicidal.     Angiolipoma: she saw an interventional radiologist at [facility] who recommended watching them. Recommended follow up in 6 months. 2 weeks urgency with stool, soft thin stool. Occ looses control of bowels. No numbness of the groin or buttocks. She   Stopped taking Miralax 2 weeks ago. She decreased Citrucel to level tsp instead of a heaping tsp; not better or worse. Hypertension:   Saw Dr. Teresita Daniels. He added HCTZ and suggested Valsartan. She was on Valsartan in the past; was stopped because of cough. Patient here for follow-up of elevated blood pressure. She is exercising and is adherent to low salt diet. Blood pressure is not well controlled at home. 150-160/.  Cardiac symptoms none.  Patient denies chest pressure/discomfort, dyspnea, and lower extremity edema.  Dyspnea has resolved. Cardiovascular risk factors: advanced age (older than 54 for men, 72 for women) and hypertension. Use of agents associated with hypertension: none. History of target organ damage: none. .  She just started the HCTZ last weekend. Lab Results   Component Value Date     07/20/2022    K 3.9 07/20/2022    CL 99 07/20/2022    CO2 29 07/20/2022    BUN 21 (H) 07/20/2022    CREATININE 0.8 07/20/2022    GLUCOSE 130 (H) 07/20/2022    CALCIUM 9.8 07/20/2022    PROT 6.5 08/03/2022    LABALBU 3.1 08/03/2022    BILITOT 0.5 07/20/2022    ALKPHOS 89 07/20/2022    AST 33 07/20/2022    ALT 37 07/20/2022    LABGLOM >60 07/20/2022    GFRAA >60 07/20/2022    AGRATIO 1.8 07/20/2022    GLOB 2.7 07/19/2021        Lab Results   Component Value Date    WBC 4.4 07/20/2022    HGB 11.9 (L) 07/20/2022    HCT 36.7 07/20/2022    MCV 83.3 07/20/2022     07/20/2022     No results found for: IRON, TIBC, FERRITIN   Lab Results   Component Value Date    [id number] 616 07/19/2021      TSH   Date Value Ref Range Status   05/04/2022 5.45 (H) 0.27 - 4.20 uIU/mL Final   07/19/2021 2.64 0.27 - 4.20 uIU/mL Final   06/08/2020 3.68 0.27 - 4.20 uIU/mL Final   04/27/2017 2.38 0.40 - 4.50 mIU/L Final   01/21/2016 1.16 0.40 - 4.50 mIU/L Final   06/24/2013 3.27 0.40 - 4.50 mIU/L Final         Outpatient Medications Marked as Taking for the 9/7/22 encounter (Office Visit) with Kathryn Tirado MD   Medication Sig Dispense Refill    dupilumab (DUPIXENT) 300 MG/2ML SOPN injection Inject 300 mg into the skin every 14 days      cloNIDine (CATAPRES) 0.1 MG tablet TAKE ONE TABLET BY MOUTH EVERY 8 HOURS AS NEEDED FOR SYSTOLIC BLOOD PRESSURE GREATER THAN 160 20 tablet 2    verapamil (CALAN SR) 180 MG extended release tablet Take 1 tablet by mouth in the morning.  30 tablet 2    hydrOXYzine (ATARAX) 10 MG tablet Take 1-2 tablets by mouth 3 times daily as needed for Anxiety 20 tablet 2    Mirabegron (MYRBETRIQ PO) Take by mouth every other day Unsure of dose      furosemide (LASIX) 20 MG tablet TAKE ONE TO TWO TABLETS BY MOUTH EVERY  tablet 1    pantoprazole (PROTONIX) 40 MG tablet Take 1 tablet by mouth daily 90 tablet 2    rosuvastatin (CRESTOR) 20 MG tablet Take 1 tablet by mouth daily 90 tablet 2    polyethylene glycol (GLYCOLAX) 17 GM/SCOOP powder Take 17 g by mouth daily      Cholecalciferol (D3 PO) Take by mouth      methylcellulose (CITRUCEL) oral powder Take 2 g by mouth daily Take by mouth daily.       calcium citrate-vitamin D (CALCIUM CITRATE + D3) 315-250 MG-UNIT TABS per tablet Take 1 tablet by mouth daily (with breakfast) 120 tablet         Allergies   Allergen Reactions    Ace Inhibitors      cough    Latanoprost Other (See Comments)    Losartan Other (See Comments)     cough    Taztia Xt [Diltiazem Hcl]        Patient Active Problem List   Diagnosis    Recurrent major depressive disorder, in full remission ([address]    Osteopenia    Cervical stenosis of spine    Benign essential hypertension    Colon polyps    Essential familial hypercholesterolemia    IBS (irritable bowel syndrome)    Elevated lipoprotein(a)    Renal angiolipoma    Lumbar spondylosis    Lumbar stenosis    Trochanteric bursitis of right hip    Glucose intolerance (impaired glucose tolerance)    Memory loss    AILYN (obstructive sleep apnea)    Class 2 obesity without serious comorbidity with body mass index (BMI) of 36.0 to 36.9 in adult    Moderate episode of recurrent major depressive disorder (HCC)       Past Medical History:   Diagnosis Date    Acute esophagitis     h/o    Acute hearing loss of right ear 7/12/2016    AVN (avascular necrosis of bone) ([address] 1/23/2014    Hip      Benign essential hypertension     Cervical stenosis of spine     Colon polyps     Depressive disorder, not elsewhere classified     Essential familial hypercholesterolemia     Fatigue     Fracture of cuboid, right ankle, closed 5/2013    Hypercalcemia 7/27/2018    Lumbar herniated disc     Lumbar radiculopathy 6/9/2015    Osteopenia        Past Surgical History:   Procedure Laterality Date    APPENDECTOMY      BREAST BIOPSY      BREAST SURGERY      reduction    HYSTERECTOMY (CERVIX STATUS UNKNOWN)      IR EMBOLIZATION TUMOR / ORGAN  7/27/2022    IR EMBOLIZATION TUMOR / ORGAN 7/27/2022 [facility] PROCED[facility]    JOINT REPLACEMENT Left 4/2014    hip    KNEE SURGERY      left    LAMINECTOMY      decompressive more than 2 lumbar segments    TONSILLECTOMY          Family History   Problem Relation Age of Onset    Other Mother         lung disease    Heart Disease Father        Social History     Tobacco Use    Smoking status: Never    Smokeless tobacco: Never   Vaping Use    Vaping Use: Never used   Substance Use Topics    Alcohol use: No    Drug use: No            Review of Systems  Review of Systems    Objective:   Physical Exam  Vitals:    09/07/22 1029 09/07/22 1040   BP: (!) 152/82 (!) 152/70   Pulse: 85    Resp: 14    Temp: 97.9 °F (36.6 °C)    TempSrc: Temporal    SpO2: 96%    Weight: 158 lb (71.7 kg)    Height: 4' 10.75\" (1.492 m)      Wt Readings from Last 3 Encounters:   09/07/22 158 lb (71.7 kg)   08/31/22 156 lb 6.4 oz (70.9 kg)   08/03/22 158 lb (71.7 kg)      Physical Exam  NAD  Skin is warm and dry. Mood and affect are mildly depressed. No agitation or psychomotor retardation. No pressured speech, grandiosity or tangential thoughts. Insight and judgement are intact. Not suicidal.   The neck is supple and free of adenopathy or masses, the thyroid is normal without enlargement or nodules. Chest is clear, no wheezing or rales. Normal symmetric air entry throughout both lung fields. Heart regular with normal rate, no murmer or gallop    No pedal edema    Assessment:       Diagnosis Orders   1. Moderate episode of recurrent major depressive disorder ([address]  [facility]    sertraline (ZOLOFT) 25 MG tablet        2. Panic attacks  Improved       3.  Benign essential hypertension  Continue Verapamil and HCTA  If still high at AWV in 3 weeks will add ARB      4. Sweating increase  CATECHOLAMINES FREE URINE    METANEPHRINES URINE    5-HYDROXYINDOLEACETIC ACID (HIAA) URINE      5. Anemia, unspecified type  CBC with Auto Differential    Ferritin    Iron and TIBC    Vitamin B12      6. Essential familial hypercholesterolemia  Lipid Panel      7. Impaired glucose tolerance  Basic Metabolic Panel    Hemoglobin A1C           Side effects of current medications reviewed and questions answered. Plan:      Keep follow up in 3 weeks as planned.

## 2022-09-07 ENCOUNTER — OFFICE VISIT (OUTPATIENT)
Dept: FAMILY MEDICINE CLINIC | Age: 82
End: 2022-09-07
Payer: MEDICARE

## 2022-09-07 VITALS
OXYGEN SATURATION: 96 % | RESPIRATION RATE: 14 BRPM | DIASTOLIC BLOOD PRESSURE: 70 MMHG | TEMPERATURE: 97.9 F | HEIGHT: 59 IN | HEART RATE: 85 BPM | BODY MASS INDEX: 31.85 KG/M2 | WEIGHT: 158 LBS | SYSTOLIC BLOOD PRESSURE: 152 MMHG

## 2022-09-07 DIAGNOSIS — I10 BENIGN ESSENTIAL HYPERTENSION: ICD-10-CM

## 2022-09-07 DIAGNOSIS — F33.1 MODERATE EPISODE OF RECURRENT MAJOR DEPRESSIVE DISORDER (HCC): Primary | ICD-10-CM

## 2022-09-07 DIAGNOSIS — F41.0 PANIC ATTACKS: ICD-10-CM

## 2022-09-07 DIAGNOSIS — D64.9 ANEMIA, UNSPECIFIED TYPE: ICD-10-CM

## 2022-09-07 DIAGNOSIS — E78.01 ESSENTIAL FAMILIAL HYPERCHOLESTEROLEMIA: ICD-10-CM

## 2022-09-07 DIAGNOSIS — R61 SWEATING INCREASE: ICD-10-CM

## 2022-09-07 DIAGNOSIS — R73.02 IMPAIRED GLUCOSE TOLERANCE: ICD-10-CM

## 2022-09-07 PROCEDURE — 1123F ACP DISCUSS/DSCN MKR DOCD: CPT | Performed by: FAMILY MEDICINE

## 2022-09-07 PROCEDURE — 99214 OFFICE O/P EST MOD 30 MIN: CPT | Performed by: FAMILY MEDICINE

## 2022-09-07 RX ORDER — SERTRALINE HYDROCHLORIDE 25 MG/1
25 TABLET, FILM COATED ORAL DAILY
Qty: 30 TABLET | Refills: 2 | Status: SHIPPED | OUTPATIENT
Start: 2022-09-07 | End: 2022-10-04 | Stop reason: SDUPTHER

## 2022-09-07 ASSESSMENT — PATIENT HEALTH QUESTIONNAIRE - PHQ9
10. IF YOU CHECKED OFF ANY PROBLEMS, HOW DIFFICULT HAVE THESE PROBLEMS MADE IT FOR YOU TO DO YOUR WORK, TAKE CARE OF THINGS AT HOME, OR GET ALONG WITH OTHER PEOPLE: 0
5. POOR APPETITE OR OVEREATING: 0
SUM OF ALL RESPONSES TO PHQ QUESTIONS 1-9: 0
SUM OF ALL RESPONSES TO PHQ9 QUESTIONS 1 & 2: 0
6. FEELING BAD ABOUT YOURSELF - OR THAT YOU ARE A FAILURE OR HAVE LET YOURSELF OR YOUR FAMILY DOWN: 0
SUM OF ALL RESPONSES TO PHQ QUESTIONS 1-9: 0
1. LITTLE INTEREST OR PLEASURE IN DOING THINGS: 0
9. THOUGHTS THAT YOU WOULD BE BETTER OFF DEAD, OR OF HURTING YOURSELF: 0
8. MOVING OR SPEAKING SO SLOWLY THAT OTHER PEOPLE COULD HAVE NOTICED. OR THE OPPOSITE, BEING SO FIGETY OR RESTLESS THAT YOU HAVE BEEN MOVING AROUND A LOT MORE THAN USUAL: 0
2. FEELING DOWN, DEPRESSED OR HOPELESS: 0
4. FEELING TIRED OR HAVING LITTLE ENERGY: 0
7. TROUBLE CONCENTRATING ON THINGS, SUCH AS READING THE NEWSPAPER OR WATCHING TELEVISION: 0
SUM OF ALL RESPONSES TO PHQ QUESTIONS 1-9: 0
SUM OF ALL RESPONSES TO PHQ QUESTIONS 1-9: 0
3. TROUBLE FALLING OR STAYING ASLEEP: 0

## 2022-09-13 ENCOUNTER — PATIENT MESSAGE (OUTPATIENT)
Dept: FAMILY MEDICINE CLINIC | Age: 82
End: 2022-09-13

## 2022-09-13 DIAGNOSIS — E78.01 ESSENTIAL FAMILIAL HYPERCHOLESTEROLEMIA: ICD-10-CM

## 2022-09-13 RX ORDER — ROSUVASTATIN CALCIUM 20 MG/1
20 TABLET, COATED ORAL DAILY
Qty: 90 TABLET | Refills: 2 | Status: SHIPPED | OUTPATIENT
Start: 2022-09-13

## 2022-09-13 RX ORDER — PANTOPRAZOLE SODIUM 40 MG/1
40 TABLET, DELAYED RELEASE ORAL DAILY
Qty: 90 TABLET | Refills: 2 | Status: SHIPPED | OUTPATIENT
Start: 2022-09-13

## 2022-09-13 NOTE — TELEPHONE ENCOUNTER
From: Frank Rodas  To: Dr. Yang Simpler: 2022 11:51 AM EDT  Subject: refill scripts    Please send new scripts to [facility] for  refills of 90 pills for the following    Pantaprazole  40 mg  Rosuvastatin  20 mg    Thank you, Antonio Patterson         Requested Prescriptions     Pending Prescriptions Disp Refills    pantoprazole (PROTONIX) 40 MG tablet 90 tablet 2     Sig: Take 1 tablet by mouth daily    rosuvastatin (CRESTOR) 20 MG tablet 90 tablet 2     Sig: Take 1 tablet by mouth daily     Last ov 2022  Last labs 22

## 2022-09-23 DIAGNOSIS — R61 SWEATING INCREASE: ICD-10-CM

## 2022-09-28 LAB
5 HIAA URINE (PER GM CREAT): 5 MG/GCR (ref 0–14)
5-HIAA 24 HOUR URINE: 4 MG/D (ref 0–15)
5-HIAA INTERPRETATION: NORMAL
5-HIAA URINE: 3.2 MG/L
METANEPHRINE INTREP URINE: NORMAL
METANEPHRINE UG/G CRE: 84 UG/G CRT (ref 0–300)
METANEPHRINE, UR-PER VOL: 54 UG/L
METANEPHRINES URINE: 73 UG/D (ref 36–229)
NORMETANEPHRINE 24 HOUR URINE: 224 UG/D (ref 95–650)
NORMETANEPHRINE, (G/CRT): 259 UG/G CRT (ref 0–400)
NORMETANEPHRINES, NMOL/L: 166 UG/L

## 2022-09-30 LAB
CATE U INT: NORMAL
CREATININE 24 HOUR URINE: 864 MG/D (ref 400–1300)
CREATININE URINE: 64 MG/DL
DOPAMINE (G CRT): 159 UG/G CRT (ref 0–250)
DOPAMINE 24 HOUR URINE: 138 UG/D (ref 71–485)
DOPAMINE, (UG/L): 102 UG/L
EPINEPHRINE (G CRT): 2 UG/G CRT (ref 0–20)
EPINEPHRINE 24 HOUR URINE: 1 UG/D (ref 1–14)
EPINEPHRINE, (UG/L): 1 UG/L
HOURS COLLECTED: 24
NOREPINEPH (G CRT): 41 UG/G CRT (ref 0–45)
NOREPINEPHRINE 24 HOUR URINE: 35 UG/D (ref 14–120)
NOREPINEPHRINE, (UG/L): 26 UG/L
URINE TOTAL VOLUME: 1350

## 2022-10-03 DIAGNOSIS — E78.01 ESSENTIAL FAMILIAL HYPERCHOLESTEROLEMIA: ICD-10-CM

## 2022-10-03 DIAGNOSIS — D64.9 ANEMIA, UNSPECIFIED TYPE: ICD-10-CM

## 2022-10-03 DIAGNOSIS — R73.02 IMPAIRED GLUCOSE TOLERANCE: ICD-10-CM

## 2022-10-03 LAB
ANION GAP SERPL CALCULATED.3IONS-SCNC: 14 MMOL/L (ref 3–16)
BASOPHILS ABSOLUTE: 0 K/UL (ref 0–0.2)
BASOPHILS RELATIVE PERCENT: 0.7 %
BUN BLDV-MCNC: 25 MG/DL (ref 7–20)
CALCIUM SERPL-MCNC: 10.1 MG/DL (ref 8.3–10.6)
CHLORIDE BLD-SCNC: 98 MMOL/L (ref 99–110)
CHOLESTEROL, TOTAL: 148 MG/DL (ref 0–199)
CO2: 28 MMOL/L (ref 21–32)
CREAT SERPL-MCNC: 1 MG/DL (ref 0.6–1.2)
EOSINOPHILS ABSOLUTE: 0.1 K/UL (ref 0–0.6)
EOSINOPHILS RELATIVE PERCENT: 2.1 %
FERRITIN: 58.5 NG/ML (ref 15–150)
GFR AFRICAN AMERICAN: >60
GFR NON-AFRICAN AMERICAN: 53
GLUCOSE BLD-MCNC: 87 MG/DL (ref 70–99)
HCT VFR BLD CALC: 38 % (ref 36–48)
HDLC SERPL-MCNC: 44 MG/DL (ref 40–60)
HEMOGLOBIN: 12.4 G/DL (ref 12–16)
IRON SATURATION: 20 % (ref 15–50)
IRON: 63 UG/DL (ref 37–145)
LDL CHOLESTEROL CALCULATED: 79 MG/DL
LYMPHOCYTES ABSOLUTE: 0.9 K/UL (ref 1–5.1)
LYMPHOCYTES RELATIVE PERCENT: 14.9 %
MCH RBC QN AUTO: 27.2 PG (ref 26–34)
MCHC RBC AUTO-ENTMCNC: 32.5 G/DL (ref 31–36)
MCV RBC AUTO: 83.6 FL (ref 80–100)
MONOCYTES ABSOLUTE: 0.9 K/UL (ref 0–1.3)
MONOCYTES RELATIVE PERCENT: 15 %
NEUTROPHILS ABSOLUTE: 3.9 K/UL (ref 1.7–7.7)
NEUTROPHILS RELATIVE PERCENT: 67.3 %
PDW BLD-RTO: 16.5 % (ref 12.4–15.4)
PLATELET # BLD: 176 K/UL (ref 135–450)
PMV BLD AUTO: 9 FL (ref 5–10.5)
POTASSIUM SERPL-SCNC: 3.3 MMOL/L (ref 3.5–5.1)
RBC # BLD: 4.55 M/UL (ref 4–5.2)
SODIUM BLD-SCNC: 140 MMOL/L (ref 136–145)
TOTAL IRON BINDING CAPACITY: 319 UG/DL (ref 260–445)
TRIGL SERPL-MCNC: 123 MG/DL (ref 0–150)
VITAMIN B-12: 731 PG/ML (ref 211–911)
VLDLC SERPL CALC-MCNC: 25 MG/DL
WBC # BLD: 5.8 K/UL (ref 4–11)

## 2022-10-04 ENCOUNTER — OFFICE VISIT (OUTPATIENT)
Dept: FAMILY MEDICINE CLINIC | Age: 82
End: 2022-10-04
Payer: MEDICARE

## 2022-10-04 VITALS
RESPIRATION RATE: 14 BRPM | SYSTOLIC BLOOD PRESSURE: 138 MMHG | WEIGHT: 154 LBS | DIASTOLIC BLOOD PRESSURE: 74 MMHG | OXYGEN SATURATION: 97 % | HEIGHT: 58 IN | TEMPERATURE: 97.9 F | BODY MASS INDEX: 32.32 KG/M2 | HEART RATE: 99 BPM

## 2022-10-04 DIAGNOSIS — N28.9 RENAL INSUFFICIENCY: ICD-10-CM

## 2022-10-04 DIAGNOSIS — I10 BENIGN ESSENTIAL HYPERTENSION: ICD-10-CM

## 2022-10-04 DIAGNOSIS — F33.1 MODERATE EPISODE OF RECURRENT MAJOR DEPRESSIVE DISORDER (HCC): ICD-10-CM

## 2022-10-04 DIAGNOSIS — E87.6 HYPOKALEMIA: ICD-10-CM

## 2022-10-04 DIAGNOSIS — Z00.00 MEDICARE ANNUAL WELLNESS VISIT, SUBSEQUENT: Primary | ICD-10-CM

## 2022-10-04 DIAGNOSIS — Z23 NEED FOR INFLUENZA VACCINATION: ICD-10-CM

## 2022-10-04 LAB
ESTIMATED AVERAGE GLUCOSE: 122.6 MG/DL
HBA1C MFR BLD: 5.9 %

## 2022-10-04 PROCEDURE — G0439 PPPS, SUBSEQ VISIT: HCPCS | Performed by: FAMILY MEDICINE

## 2022-10-04 PROCEDURE — 1123F ACP DISCUSS/DSCN MKR DOCD: CPT | Performed by: FAMILY MEDICINE

## 2022-10-04 PROCEDURE — G0008 ADMIN INFLUENZA VIRUS VAC: HCPCS | Performed by: FAMILY MEDICINE

## 2022-10-04 PROCEDURE — G8484 FLU IMMUNIZE NO ADMIN: HCPCS | Performed by: FAMILY MEDICINE

## 2022-10-04 PROCEDURE — 90694 VACC AIIV4 NO PRSRV 0.5ML IM: CPT | Performed by: FAMILY MEDICINE

## 2022-10-04 RX ORDER — FUROSEMIDE 20 MG/1
20 TABLET ORAL EVERY OTHER DAY
Qty: 180 TABLET | Refills: 1
Start: 2022-10-04

## 2022-10-04 RX ORDER — HYDROCHLOROTHIAZIDE 25 MG/1
1 TABLET ORAL DAILY
COMMUNITY
Start: 2022-08-29

## 2022-10-04 RX ORDER — POTASSIUM CHLORIDE 750 MG/1
10 TABLET, EXTENDED RELEASE ORAL DAILY
Qty: 30 TABLET | Refills: 3 | Status: SHIPPED | OUTPATIENT
Start: 2022-10-04

## 2022-10-04 ASSESSMENT — PATIENT HEALTH QUESTIONNAIRE - PHQ9
4. FEELING TIRED OR HAVING LITTLE ENERGY: 0
SUM OF ALL RESPONSES TO PHQ9 QUESTIONS 1 & 2: 0
8. MOVING OR SPEAKING SO SLOWLY THAT OTHER PEOPLE COULD HAVE NOTICED. OR THE OPPOSITE, BEING SO FIGETY OR RESTLESS THAT YOU HAVE BEEN MOVING AROUND A LOT MORE THAN USUAL: 0
SUM OF ALL RESPONSES TO PHQ QUESTIONS 1-9: 0
SUM OF ALL RESPONSES TO PHQ QUESTIONS 1-9: 0
10. IF YOU CHECKED OFF ANY PROBLEMS, HOW DIFFICULT HAVE THESE PROBLEMS MADE IT FOR YOU TO DO YOUR WORK, TAKE CARE OF THINGS AT HOME, OR GET ALONG WITH OTHER PEOPLE: 0
1. LITTLE INTEREST OR PLEASURE IN DOING THINGS: 0
5. POOR APPETITE OR OVEREATING: 0
6. FEELING BAD ABOUT YOURSELF - OR THAT YOU ARE A FAILURE OR HAVE LET YOURSELF OR YOUR FAMILY DOWN: 0
3. TROUBLE FALLING OR STAYING ASLEEP: 0
SUM OF ALL RESPONSES TO PHQ QUESTIONS 1-9: 0
SUM OF ALL RESPONSES TO PHQ QUESTIONS 1-9: 0
9. THOUGHTS THAT YOU WOULD BE BETTER OFF DEAD, OR OF HURTING YOURSELF: 0
2. FEELING DOWN, DEPRESSED OR HOPELESS: 0
7. TROUBLE CONCENTRATING ON THINGS, SUCH AS READING THE NEWSPAPER OR WATCHING TELEVISION: 0

## 2022-10-04 ASSESSMENT — LIFESTYLE VARIABLES
HOW MANY STANDARD DRINKS CONTAINING ALCOHOL DO YOU HAVE ON A TYPICAL DAY: PATIENT DOES NOT DRINK
HOW OFTEN DO YOU HAVE A DRINK CONTAINING ALCOHOL: NEVER

## 2022-10-04 NOTE — PROGRESS NOTES
Medicare Annual Wellness Visit    Cara Molina is here for Medicare AWV    Assessment & Plan   Medicare annual wellness visit, subsequent  Moderate episode of recurrent major depressive disorder ([facility] [address]  -     sertraline (ZOLOFT) 50 MG tablet; Take 1 tablet by mouth daily, Disp-30 tablet, R-2Adjust Sig  Benign essential hypertension  -     potassium chloride (KLOR-CON M) 10 MEQ extended release tablet; Take 1 tablet by mouth daily, Disp-30 tablet, R-3Normal  -     Basic Metabolic Panel; Future  -     furosemide (LASIX) 20 MG tablet; Take 1 tablet by mouth every other day, Disp-180 tablet, R-1Adjust Sig  Hypokalemia  Start L, decrease lasix to QOD - should also help Creatinine. Labs in 4 weeks. Side effects of current medications reviewed and questions answered. Follow up in 3 months or prn. Recommendations for Preventive Services Due: see orders and patient instructions/AVS.  Recommended screening schedule for the next 5-10 years is provided to the patient in written form: see Patient Instructions/AVS.     No follow-ups on file. Subjective   The following acute and/or chronic problems were also addressed today:      Constipation:  saw Dr. Garth Wu yesterday. He recommend Amitiza. He also ordered a manometry. Has not started it yet    Depression: not good. Still seeing her therapist; is considering seeing a different therapist but she has discussed her needs with therapist and this has helped. Sweating episodes have resolved. Takes occ Hydroxyzine in the AM if anxious - does not make her sleepy. Patient's complete Health Risk Assessment and screening values have been reviewed and are found in Flowsheets. The following problems were reviewed today and where indicated follow up appointments were made and/or referrals ordered.     Positive Risk Factor Screenings with Interventions:             General Health and ACP:  General  In general, how would you say your health is?: Fair  In the past 7 S2, no murmurs, rubs, clicks, or gallops, distal pulses intact, no carotid bruits  Abdomen: soft, non-tender, non-distended, normal bowel sounds, no masses or organomegaly  Extremities: no cyanosis, clubbing or edema  Musculoskeletal: normal range of motion, no joint swelling, deformity or tenderness  Neurologic: reflexes normal and symmetric, no cranial nerve deficit, gait, coordination and speech normal       Allergies   Allergen Reactions    Ace Inhibitors      cough    Latanoprost Other (See Comments)    Losartan Other (See Comments)     cough    Taztia Xt [Diltiazem Hcl]      Prior to Visit Medications    Medication Sig Taking? Authorizing Provider   hydroCHLOROthiazide (HYDRODIURIL) 25 MG tablet 1 tablet daily Yes Historical Provider, MD   pantoprazole (PROTONIX) 40 MG tablet Take 1 tablet by mouth daily Yes Deja Jett MD   rosuvastatin (CRESTOR) 20 MG tablet Take 1 tablet by mouth daily Yes Deja Jett MD   sertraline (ZOLOFT) 25 MG tablet Take 1 tablet by mouth daily Yes Deja Jett MD   dupilumab (DUPIXENT) 300 MG/2ML SOPN injection Inject 300 mg into the skin every 14 days Yes Historical Provider, MD   cloNIDine (CATAPRES) 0.1 MG tablet TAKE ONE TABLET BY MOUTH EVERY 8 HOURS AS NEEDED FOR SYSTOLIC BLOOD PRESSURE GREATER THAN 160 Yes Deja Jett MD   verapamil (CALAN SR) 180 MG extended release tablet Take 1 tablet by mouth in the morning.  Yes Deja Jett MD   hydrOXYzine (ATARAX) 10 MG tablet Take 1-2 tablets by mouth 3 times daily as needed for Anxiety Yes Deja Jett MD   Mirabegron (MYRBETRIQ PO) Take 50 mg by mouth every other day Unsure of dose Yes Historical Provider, MD   furosemide (LASIX) 20 MG tablet TAKE ONE TO TWO TABLETS BY MOUTH EVERY DAY Yes Deja Jett MD   polyethylene glycol (GLYCOLAX) 17 GM/SCOOP powder Take 17 g by mouth daily Yes Historical Provider, MD   Cholecalciferol (D3 PO) Take by mouth Yes Historical Provider, MD methylcellulose (CITRUCEL) oral powder Take 2 g by mouth daily Take by mouth daily. Yes Historical Provider, MD   calcium citrate-vitamin D (CALCIUM CITRATE + D3) 315-250 MG-UNIT TABS per tablet Take 1 tablet by mouth daily (with breakfast) Yes MD Ashely Cameron (Including outside providers/suppliers regularly involved in providing care):   Patient Care Team:  Annemarie Saucedo MD as PCP - General (Family Medicine)  Annemarie Saucedo MD as PCP - [facility] Empaneled Provider  DOREEN Zimmer - CNP as Nurse Practitioner (Nurse Practitioner)     Reviewed and updated this visit:  Tobacco  Allergies  Meds  Problems  Med Hx  Surg Hx  Soc Hx  Fam Hx               Advance Care Planning   Advanced Care Planning: Discussed the patients choices for care and treatment in case of a health event that adversely affects decision-making abilities. Also discussed the patients long-term treatment options. Reviewed with the patient the appropriate state-specific advance directive documents. Reviewed the process of designating a competent adult as an Agent (or -in-fact) that could take make health care decisions for the patient if incompetent. Patient was asked to complete the declaration forms, either acknowledge the forms by a public notary or an eligible witness and provide a signed copy to the practice office. Time spent (minutes):  will provide copy. Does not want life support or tube feedings if terminal  2 mintues      Obesity Counseling: Assessed behavioral health risks and factors affecting choice of behavior. Suggested weight control approaches, including dietary changes behavioral modification and follow up plan. Provided educational and support documentation. Time spent (minutes): 5   Cardiovascular Disease Risk Counseling: Assessed the patient's risk to develop cardiovascular disease and reviewed main risk factors.    Reviewed steps to reduce disease risk including:   Quitting tobacco use, reducing amount smoked, or not starting the habit  Making healthy food choices  Being physically active and gradualy increasing activity levels   Reduce weight and determine a healthy BMI goal  Monitor blood pressure and treat if higher than 140/90 mmHg  Maintain blood total cholesterol levels under 5 mmol/l or 190 mg/dl  Maintain LDL cholesterol levels under 3.0 mmol/l or 115 mg/dl   Control blood glucose levels  Consider taking aspirin (75 mg daily), once blood pressure is controlled   Provided a follow up plan.   Time spent (minutes): 5  Labs reviewed

## 2022-10-04 NOTE — PATIENT INSTRUCTIONS
Personalized Preventive Plan for Ashely Vicente - 10/4/2022  Medicare offers a range of preventive health benefits. Some of the tests and screenings are paid in full while other may be subject to a deductible, co-insurance, and/or copay. Some of these benefits include a comprehensive review of your medical history including lifestyle, illnesses that may run in your family, and various assessments and screenings as appropriate. After reviewing your medical record and screening and assessments performed today your provider may have ordered immunizations, labs, imaging, and/or referrals for you. A list of these orders (if applicable) as well as your Preventive Care list are included within your After Visit Summary for your review. Other Preventive Recommendations:    A preventive eye exam performed by an eye specialist is recommended every 1-2 years to screen for glaucoma; cataracts, macular degeneration, and other eye disorders. A preventive dental visit is recommended every 6 months. Try to get at least 150 minutes of exercise per week or 10,000 steps per day on a pedometer . Order or download the FREE \"Exercise & Physical Activity: Your Everyday Guide\" from [employer]. Call 5-489.121.8724 or search [employer] online. You need 0833-7435 mg of calcium and 1216-7439 IU of vitamin D per day. It is possible to meet your calcium requirement with diet alone, but a vitamin D supplement is usually necessary to meet this goal.  When exposed to the sun, use a sunscreen that protects against both UVA and UVB radiation with an SPF of 30 or greater. Reapply every 2 to 3 hours or after sweating, drying off with a towel, or swimming. Always wear a seat belt when traveling in a car. Always wear a helmet when riding a bicycle or motorcycle.

## 2022-10-16 ENCOUNTER — PATIENT MESSAGE (OUTPATIENT)
Dept: FAMILY MEDICINE CLINIC | Age: 82
End: 2022-10-16

## 2022-10-16 DIAGNOSIS — F33.1 MODERATE EPISODE OF RECURRENT MAJOR DEPRESSIVE DISORDER (HCC): ICD-10-CM

## 2022-10-17 NOTE — TELEPHONE ENCOUNTER
From: Sebastián Holman  To: Dr. Nancy Alvarado: 10/16/2022 11:11 AM EDT  Subject: Zoloft    Pls order Zoloft 50 mg as we dicsussed and make it for 90 days. Tarri Ripyasmine.   Thank you, Reyna Denise

## 2022-10-31 ENCOUNTER — PATIENT MESSAGE (OUTPATIENT)
Dept: FAMILY MEDICINE CLINIC | Age: 82
End: 2022-10-31

## 2022-10-31 DIAGNOSIS — I10 BENIGN ESSENTIAL HYPERTENSION: ICD-10-CM

## 2022-10-31 NOTE — TELEPHONE ENCOUNTER
From: Katia Islas  To: Dr. Tonya Stock: 10/31/2022 10:08 AM EDT  Subject: refill verapimil    Please refill verapimil 180 mg - 90 pills at [facility] [phone]  Thank you, Eligio Simon      Last ov 10/4/2022  Last labs 10/3/22

## 2022-11-02 ENCOUNTER — HOSPITAL ENCOUNTER (OUTPATIENT)
Dept: MAMMOGRAPHY | Age: 82
Discharge: HOME | End: 2022-11-02
Payer: MEDICARE

## 2022-11-02 VITALS — BODY MASS INDEX: 32.32 KG/M2 | WEIGHT: 154 LBS | HEIGHT: 58 IN

## 2022-11-02 DIAGNOSIS — Z12.31 VISIT FOR SCREENING MAMMOGRAM: ICD-10-CM

## 2022-11-02 PROCEDURE — 77067 SCR MAMMO BI INCL CAD: CPT

## 2022-12-11 NOTE — PROGRESS NOTES
Subjective:      Patient ID: Chrissy Setting 80 y.o. female. The primary encounter diagnosis was Benign essential hypertension. Diagnoses of Moderate episode of recurrent major depressive disorder (HCC) and Glucose intolerance (impaired glucose tolerance) were also pertinent to this visit. HPI    Depression:  taking Zoloft. Seeing therapist.  No hx suicidal ideation, hospitalization, substance use. Sons supportive but out of town. Has good friends but feels lonely parmjit at night. Today: Is doing very well. No appetite or sleep disturbance, no anhedonia, Not suicidal.   Mild anxiety, very manageable. Hypertension: Patient here for follow-up of elevated blood pressure. She is exercising and is adherent to low salt diet. Blood pressure is not testing at home. Cardiac symptoms none. Patient denies chest pressure/discomfort, exertional chest pressure/discomfort, palpitations, and paroxysmal nocturnal dyspnea. Cardiovascular risk factors: advanced age (older than 54 for men, 72 for women), hypertension, obesity (BMI >= 30 kg/m2), and sedentary lifestyle. Use of agents associated with hypertension: none. History of target organ damage: none. No pedal edema on Lasix every other day. BP at DR. Gee's a few weeks ago the BP was 138/80. He increased the Verapamil to 240 mg; she has not picked up the new script yet. Had anal mammometery. .   showed pelvic floor dysfunction. PT was ordered. Will start in January. Is taking fiber and magnesium. Has BM every other day.      Lab Results   Component Value Date     10/03/2022    K 3.3 (L) 10/03/2022    CL 98 (L) 10/03/2022    CO2 28 10/03/2022    BUN 25 (H) 10/03/2022    CREATININE 1.0 10/03/2022    GLUCOSE 87 10/03/2022    CALCIUM 10.1 10/03/2022    PROT 6.5 08/03/2022    LABALBU 3.1 08/03/2022    BILITOT 0.5 07/20/2022    ALKPHOS 89 07/20/2022    AST 33 07/20/2022    ALT 37 07/20/2022    LABGLOM 53 (A) 10/03/2022    GFRAA >60 10/03/2022    AGRATIO 1.8 07/20/2022    GLOB 2.7 07/19/2021        Lab Results   Component Value Date    WBC 5.8 10/03/2022    HGB 12.4 10/03/2022    HCT 38.0 10/03/2022    MCV 83.6 10/03/2022     10/03/2022     TSH   Date Value Ref Range Status   05/04/2022 5.45 (H) 0.27 - 4.20 uIU/mL Final   07/19/2021 2.64 0.27 - 4.20 uIU/mL Final   06/08/2020 3.68 0.27 - 4.20 uIU/mL Final   04/27/2017 2.38 0.40 - 4.50 mIU/L Final   01/21/2016 1.16 0.40 - 4.50 mIU/L Final   06/24/2013 3.27 0.40 - 4.50 mIU/L Final     Lab Results   Component Value Date    CHOL 148 10/03/2022    CHOL 162 07/19/2021    CHOL 163 06/08/2020     Lab Results   Component Value Date    TRIG 123 10/03/2022    TRIG 126 07/19/2021    TRIG 108 06/08/2020     Lab Results   Component Value Date    HDL 44 10/03/2022    HDL 56 07/19/2021    HDL 58 06/08/2020     Lab Results   Component Value Date    LDLCALC 79 10/03/2022    LDLCALC 81 07/19/2021    LDLCALC 83 06/08/2020     Lab Results   Component Value Date    LABVLDL 25 10/03/2022    LABVLDL 25 07/19/2021    LABVLDL 22 06/08/2020     Lab Results   Component Value Date    CHOLHDLRATIO 2.9 04/27/2017    CHOLHDLRATIO 2.9 01/21/2016    CHOLHDLRATIO 2.9 11/13/2013        Outpatient Medications Marked as Taking for the 12/13/22 encounter (Office Visit) with Jenny Arboleda MD   Medication Sig Dispense Refill    verapamil (CALAN SR) 180 MG extended release tablet Take 1 tablet by mouth daily 90 tablet 1    sertraline (ZOLOFT) 50 MG tablet Take 1 tablet by mouth daily 90 tablet 1    hydroCHLOROthiazide (HYDRODIURIL) 25 MG tablet 1 tablet daily      potassium chloride (KLOR-CON M) 10 MEQ extended release tablet Take 1 tablet by mouth daily 30 tablet 3    furosemide (LASIX) 20 MG tablet Take 1 tablet by mouth every other day 180 tablet 1    pantoprazole (PROTONIX) 40 MG tablet Take 1 tablet by mouth daily 90 tablet 2    rosuvastatin (CRESTOR) 20 MG tablet Take 1 tablet by mouth daily 90 tablet 2    hydrOXYzine (ATARAX) 10 MG tablet Take 1-2 tablets by mouth 3 times daily as needed for Anxiety (Patient taking differently: Take 10-20 mg by mouth as needed for Anxiety) 20 tablet 2    Mirabegron (MYRBETRIQ PO) Take 50 mg by mouth every other day Unsure of dose      polyethylene glycol (GLYCOLAX) 17 GM/SCOOP powder Take 17 g by mouth daily      Cholecalciferol (D3 PO) Take by mouth      methylcellulose (CITRUCEL) oral powder Take 2 g by mouth daily Take by mouth daily.       calcium citrate-vitamin D (CALCIUM CITRATE + D3) 315-250 MG-UNIT TABS per tablet Take 1 tablet by mouth daily (with breakfast) 120 tablet         Allergies   Allergen Reactions    Ace Inhibitors      cough    Latanoprost Other (See Comments)    Losartan Other (See Comments)     cough    Taztia Xt [Diltiazem Hcl]        Patient Active Problem List   Diagnosis    Recurrent major depressive disorder, in full remission ([facility] [place] [address]    Osteopenia    Cervical stenosis of spine    Benign essential hypertension    Colon polyps    Essential familial hypercholesterolemia    IBS (irritable bowel syndrome)    Elevated lipoprotein(a)    Renal angiolipoma    Lumbar spondylosis    Lumbar stenosis    Trochanteric bursitis of right hip    Glucose intolerance (impaired glucose tolerance)    Memory loss    AILYN (obstructive sleep apnea)    Class 2 obesity without serious comorbidity with body mass index (BMI) of 36.0 to 36.9 in adult    Moderate episode of recurrent major depressive disorder (HCC)       Past Medical History:   Diagnosis Date    Acute esophagitis     h/o    Acute hearing loss of right ear 7/12/2016    AVN (avascular necrosis of bone) ([facility] [place] [address] 1/23/2014    Hip      Benign essential hypertension     Cervical stenosis of spine     Colon polyps     Depressive disorder, not elsewhere classified     Essential familial hypercholesterolemia     Fatigue     Fracture of cuboid, right ankle, closed 5/2013    Hypercalcemia 7/27/2018    Lumbar herniated disc     Lumbar radiculopathy 6/9/2015    Osteopenia Past Surgical History:   Procedure Laterality Date    APPENDECTOMY      BREAST BIOPSY      BREAST SURGERY      reduction    HYSTERECTOMY (CERVIX STATUS UNKNOWN)      IR EMBOLIZATION TUMOR / ORGAN  7/27/2022    IR EMBOLIZATION TUMOR / ORGAN 7/27/2022 [facility]    JOINT REPLACEMENT Left 4/2014    hip    KNEE SURGERY      left    LAMINECTOMY      decompressive more than 2 lumbar segments    TONSILLECTOMY          Family History   Problem Relation Age of Onset    Other Mother         lung disease    Heart Disease Father        Social History     Tobacco Use    Smoking status: Never    Smokeless tobacco: Never   Vaping Use    Vaping Use: Never used   Substance Use Topics    Alcohol use: No    Drug use: No            Review of Systems  Review of Systems    Objective:   Physical Exam  Vitals:    12/13/22 1046 12/13/22 1114   BP: 138/72 136/74   Pulse: 77    Resp: 14    Temp: 97.8 °F (36.6 °C)    TempSrc: Temporal    SpO2: 97%    Weight: 157 lb 12.8 oz (71.6 kg)      Wt Readings from Last 3 Encounters:   12/13/22 157 lb 12.8 oz (71.6 kg)   11/02/22 154 lb (69.9 kg)   10/04/22 154 lb (69.9 kg)        Physical Exam    NAD    Skin is warm and dry. No rash. Well hydrated  Alert and oriented x 3. Mood and affect are normal.  The neck is supple and free of adenopathy or masses, the thyroid is normal without enlargement or nodules. Chest: clear with no wheezes or rales. No retractions, or use of accessory muscles noted. Cardiovascular: PMI is not displaced, and no thrill noted. Regular rate and rhythm with no rub, murmur or gallop. No peripheral edema. No carotid bruits. The abdomen is soft without tenderness, guarding, mass, rebound or organomegaly. Aorta, femoral, DP and PT pulses intact. Assessment:       Diagnosis Orders   1. Benign essential hypertension  Basic Metabolic Panel    potassium chloride (KLOR-CON M) 10 MEQ extended release tablet  Increase Verapamil as planned.          2. Moderate episode of recurrent major depressive disorder (HCC)  Well controlled   Continue Zoloft       3. Glucose intolerance (impaired glucose tolerance)  Continue diet. Exercise addressed. Referred to [employer]             Plan:      Side effects of current medications reviewed and questions answered. Follow up in 6 months or prn.

## 2022-12-13 ENCOUNTER — OFFICE VISIT (OUTPATIENT)
Dept: FAMILY MEDICINE CLINIC | Age: 82
End: 2022-12-13
Payer: MEDICARE

## 2022-12-13 VITALS
TEMPERATURE: 97.8 F | BODY MASS INDEX: 32.98 KG/M2 | HEART RATE: 77 BPM | OXYGEN SATURATION: 97 % | DIASTOLIC BLOOD PRESSURE: 74 MMHG | SYSTOLIC BLOOD PRESSURE: 136 MMHG | WEIGHT: 157.8 LBS | RESPIRATION RATE: 14 BRPM

## 2022-12-13 DIAGNOSIS — I10 BENIGN ESSENTIAL HYPERTENSION: Primary | ICD-10-CM

## 2022-12-13 DIAGNOSIS — R73.02 GLUCOSE INTOLERANCE (IMPAIRED GLUCOSE TOLERANCE): ICD-10-CM

## 2022-12-13 DIAGNOSIS — F33.1 MODERATE EPISODE OF RECURRENT MAJOR DEPRESSIVE DISORDER (HCC): ICD-10-CM

## 2022-12-13 PROCEDURE — G8417 CALC BMI ABV UP PARAM F/U: HCPCS | Performed by: FAMILY MEDICINE

## 2022-12-13 PROCEDURE — 1036F TOBACCO NON-USER: CPT | Performed by: FAMILY MEDICINE

## 2022-12-13 PROCEDURE — 99214 OFFICE O/P EST MOD 30 MIN: CPT | Performed by: FAMILY MEDICINE

## 2022-12-13 PROCEDURE — 3074F SYST BP LT 130 MM HG: CPT | Performed by: FAMILY MEDICINE

## 2022-12-13 PROCEDURE — G8484 FLU IMMUNIZE NO ADMIN: HCPCS | Performed by: FAMILY MEDICINE

## 2022-12-13 PROCEDURE — 3078F DIAST BP <80 MM HG: CPT | Performed by: FAMILY MEDICINE

## 2022-12-13 PROCEDURE — 1090F PRES/ABSN URINE INCON ASSESS: CPT | Performed by: FAMILY MEDICINE

## 2022-12-13 PROCEDURE — G8399 PT W/DXA RESULTS DOCUMENT: HCPCS | Performed by: FAMILY MEDICINE

## 2022-12-13 PROCEDURE — G8427 DOCREV CUR MEDS BY ELIG CLIN: HCPCS | Performed by: FAMILY MEDICINE

## 2022-12-13 PROCEDURE — 1123F ACP DISCUSS/DSCN MKR DOCD: CPT | Performed by: FAMILY MEDICINE

## 2022-12-13 RX ORDER — VERAPAMIL HYDROCHLORIDE 240 MG/1
TABLET, FILM COATED, EXTENDED RELEASE ORAL
COMMUNITY
Start: 2022-12-02

## 2022-12-13 RX ORDER — POTASSIUM CHLORIDE 750 MG/1
10 TABLET, EXTENDED RELEASE ORAL DAILY
Qty: 90 TABLET | Refills: 1 | Status: SHIPPED | OUTPATIENT
Start: 2022-12-13

## 2023-02-27 SDOH — ECONOMIC STABILITY: FOOD INSECURITY: WITHIN THE PAST 12 MONTHS, YOU WORRIED THAT YOUR FOOD WOULD RUN OUT BEFORE YOU GOT MONEY TO BUY MORE.: NEVER TRUE

## 2023-02-27 SDOH — ECONOMIC STABILITY: INCOME INSECURITY: HOW HARD IS IT FOR YOU TO PAY FOR THE VERY BASICS LIKE FOOD, HOUSING, MEDICAL CARE, AND HEATING?: NOT HARD AT ALL

## 2023-02-27 SDOH — ECONOMIC STABILITY: FOOD INSECURITY: WITHIN THE PAST 12 MONTHS, THE FOOD YOU BOUGHT JUST DIDN'T LAST AND YOU DIDN'T HAVE MONEY TO GET MORE.: NEVER TRUE

## 2023-02-27 SDOH — ECONOMIC STABILITY: TRANSPORTATION INSECURITY
IN THE PAST 12 MONTHS, HAS LACK OF TRANSPORTATION KEPT YOU FROM MEETINGS, WORK, OR FROM GETTING THINGS NEEDED FOR DAILY LIVING?: NO

## 2023-02-27 SDOH — ECONOMIC STABILITY: HOUSING INSECURITY
IN THE LAST 12 MONTHS, WAS THERE A TIME WHEN YOU DID NOT HAVE A STEADY PLACE TO SLEEP OR SLEPT IN A SHELTER (INCLUDING NOW)?: NO

## 2023-02-28 ENCOUNTER — OFFICE VISIT (OUTPATIENT)
Dept: FAMILY MEDICINE CLINIC | Age: 83
End: 2023-02-28
Payer: MEDICARE

## 2023-02-28 VITALS
SYSTOLIC BLOOD PRESSURE: 148 MMHG | RESPIRATION RATE: 12 BRPM | TEMPERATURE: 97.9 F | OXYGEN SATURATION: 94 % | DIASTOLIC BLOOD PRESSURE: 70 MMHG | HEART RATE: 66 BPM

## 2023-02-28 DIAGNOSIS — K59.04 CHRONIC IDIOPATHIC CONSTIPATION: Primary | ICD-10-CM

## 2023-02-28 PROCEDURE — G8484 FLU IMMUNIZE NO ADMIN: HCPCS | Performed by: FAMILY MEDICINE

## 2023-02-28 PROCEDURE — G8417 CALC BMI ABV UP PARAM F/U: HCPCS | Performed by: FAMILY MEDICINE

## 2023-02-28 PROCEDURE — G8399 PT W/DXA RESULTS DOCUMENT: HCPCS | Performed by: FAMILY MEDICINE

## 2023-02-28 PROCEDURE — 1123F ACP DISCUSS/DSCN MKR DOCD: CPT | Performed by: FAMILY MEDICINE

## 2023-02-28 PROCEDURE — 99213 OFFICE O/P EST LOW 20 MIN: CPT | Performed by: FAMILY MEDICINE

## 2023-02-28 PROCEDURE — 3078F DIAST BP <80 MM HG: CPT | Performed by: FAMILY MEDICINE

## 2023-02-28 PROCEDURE — 1036F TOBACCO NON-USER: CPT | Performed by: FAMILY MEDICINE

## 2023-02-28 PROCEDURE — 1090F PRES/ABSN URINE INCON ASSESS: CPT | Performed by: FAMILY MEDICINE

## 2023-02-28 PROCEDURE — G8427 DOCREV CUR MEDS BY ELIG CLIN: HCPCS | Performed by: FAMILY MEDICINE

## 2023-02-28 PROCEDURE — 3077F SYST BP >= 140 MM HG: CPT | Performed by: FAMILY MEDICINE

## 2023-02-28 ASSESSMENT — PATIENT HEALTH QUESTIONNAIRE - PHQ9
2. FEELING DOWN, DEPRESSED OR HOPELESS: 0
SUM OF ALL RESPONSES TO PHQ QUESTIONS 1-9: 0
6. FEELING BAD ABOUT YOURSELF - OR THAT YOU ARE A FAILURE OR HAVE LET YOURSELF OR YOUR FAMILY DOWN: 0
1. LITTLE INTEREST OR PLEASURE IN DOING THINGS: 0
8. MOVING OR SPEAKING SO SLOWLY THAT OTHER PEOPLE COULD HAVE NOTICED. OR THE OPPOSITE, BEING SO FIGETY OR RESTLESS THAT YOU HAVE BEEN MOVING AROUND A LOT MORE THAN USUAL: 0
SUM OF ALL RESPONSES TO PHQ QUESTIONS 1-9: 0
9. THOUGHTS THAT YOU WOULD BE BETTER OFF DEAD, OR OF HURTING YOURSELF: 0
SUM OF ALL RESPONSES TO PHQ QUESTIONS 1-9: 0
4. FEELING TIRED OR HAVING LITTLE ENERGY: 0
3. TROUBLE FALLING OR STAYING ASLEEP: 0
SUM OF ALL RESPONSES TO PHQ QUESTIONS 1-9: 0
10. IF YOU CHECKED OFF ANY PROBLEMS, HOW DIFFICULT HAVE THESE PROBLEMS MADE IT FOR YOU TO DO YOUR WORK, TAKE CARE OF THINGS AT HOME, OR GET ALONG WITH OTHER PEOPLE: 0
5. POOR APPETITE OR OVEREATING: 0
7. TROUBLE CONCENTRATING ON THINGS, SUCH AS READING THE NEWSPAPER OR WATCHING TELEVISION: 0
SUM OF ALL RESPONSES TO PHQ9 QUESTIONS 1 & 2: 0

## 2023-02-28 NOTE — PROGRESS NOTES
Subjective:      Patient ID: Srikanth Leyva 80 y.o. female. is here for evaluation for constipation. HPI    Last normal BM several weeks ago. She has small BMs daily. Has tried fiber, Miralax 1/3 scoop BID, enema ( fleets ). Had 1/2 bottle Mag Citrate that she took 2 - 3 weeks ago, this the last time she had a good BM. Was having a BM every 2 to 3 days prior to current constipation. No nausea or vomiting. She saw gyne tried pessary for prolapse - would not stay in. Dr. Carlos Julien sent her for pelvic floor PT. Gyne sent her to Dr. Justin Klein. Has appt in April.      Lab Results   Component Value Date/Time     10/03/2022 09:31 AM    K 3.3 10/03/2022 09:31 AM    CL 98 10/03/2022 09:31 AM    CO2 28 10/03/2022 09:31 AM    BUN 25 10/03/2022 09:31 AM    CREATININE 1.0 10/03/2022 09:31 AM    GLUCOSE 87 10/03/2022 09:31 AM    GLUCOSE 93 02/06/2012 08:55 AM    CALCIUM 10.1 10/03/2022 09:31 AM       Lab Results   Component Value Date    WBC 5.8 10/03/2022    HGB 12.4 10/03/2022    HCT 38.0 10/03/2022    MCV 83.6 10/03/2022     10/03/2022        Outpatient Medications Marked as Taking for the 2/28/23 encounter (Office Visit) with Daryle Austria, MD   Medication Sig Dispense Refill    verapamil (CALAN SR) 240 MG extended release tablet       potassium chloride (KLOR-CON M) 10 MEQ extended release tablet Take 1 tablet by mouth daily 90 tablet 1    sertraline (ZOLOFT) 50 MG tablet Take 1 tablet by mouth daily 90 tablet 1    hydroCHLOROthiazide (HYDRODIURIL) 25 MG tablet 1 tablet daily      furosemide (LASIX) 20 MG tablet Take 1 tablet by mouth every other day 180 tablet 1    pantoprazole (PROTONIX) 40 MG tablet Take 1 tablet by mouth daily 90 tablet 2    rosuvastatin (CRESTOR) 20 MG tablet Take 1 tablet by mouth daily 90 tablet 2    hydrOXYzine (ATARAX) 10 MG tablet Take 1-2 tablets by mouth 3 times daily as needed for Anxiety (Patient taking differently: Take 10-20 mg by mouth as needed for Anxiety) 20 tablet 2    Mirabegron (MYRBETRIQ PO) Take 50 mg by mouth every other day Unsure of dose      polyethylene glycol (GLYCOLAX) 17 GM/SCOOP powder Take 17 g by mouth daily      Cholecalciferol (D3 PO) Take by mouth      methylcellulose (CITRUCEL) oral powder Take 2 g by mouth daily Take by mouth daily.       calcium citrate-vitamin D (CALCIUM CITRATE + D3) 315-250 MG-UNIT TABS per tablet Take 1 tablet by mouth daily (with breakfast) 120 tablet         Allergies   Allergen Reactions    Ace Inhibitors      cough    Latanoprost Other (See Comments)    Losartan Other (See Comments)     cough    Taztia Xt [Diltiazem Hcl]        Patient Active Problem List   Diagnosis    Recurrent major depressive disorder, in full remission ([address]    Osteopenia    Cervical stenosis of spine    Benign essential hypertension    Colon polyps    Essential familial hypercholesterolemia    IBS (irritable bowel syndrome)    Elevated lipoprotein(a)    Renal angiolipoma    Lumbar spondylosis    Lumbar stenosis    Trochanteric bursitis of right hip    Glucose intolerance (impaired glucose tolerance)    Memory loss    AILYN (obstructive sleep apnea)    Class 2 obesity without serious comorbidity with body mass index (BMI) of 36.0 to 36.9 in adult    Moderate episode of recurrent major depressive disorder (HCC)       Past Medical History:   Diagnosis Date    Acute esophagitis     h/o    Acute hearing loss of right ear 7/12/2016    AVN (avascular necrosis of bone) ([address] 1/23/2014    Hip      Benign essential hypertension     Cervical stenosis of spine     Colon polyps     Depressive disorder, not elsewhere classified     Essential familial hypercholesterolemia     Fatigue     Fracture of cuboid, right ankle, closed 5/2013    Hypercalcemia 7/27/2018    Lumbar herniated disc     Lumbar radiculopathy 6/9/2015    Osteopenia        Past Surgical History:   Procedure Laterality Date    APPENDECTOMY      BREAST BIOPSY      BREAST SURGERY      reduction HYSTERECTOMY (CERVIX STATUS UNKNOWN)      IR EMBOLIZATION TUMOR / ORGAN  7/27/2022    IR EMBOLIZATION TUMOR / ORGAN 7/27/2022 [facility]    JOINT REPLACEMENT Left 4/2014    hip    KNEE SURGERY      left    LAMINECTOMY      decompressive more than 2 lumbar segments    TONSILLECTOMY          Family History   Problem Relation Age of Onset    Other Mother         lung disease    Heart Disease Father        Social History     Tobacco Use    Smoking status: Never    Smokeless tobacco: Never   Vaping Use    Vaping Use: Never used   Substance Use Topics    Alcohol use: No    Drug use: No            Review of Systems  Review of Systems    Objective:   Physical Exam  Vitals:    02/28/23 1715   BP: (!) 148/70   Pulse: 66   Resp: 12   Temp: 97.9 °F (36.6 °C)   TempSrc: Temporal   SpO2: 94%       Physical Exam  NAD  Skin is warm and dry. Well hydrated, no rash. Chest is clear, no wheezing or rales. Normal symmetric air entry throughout both lung fields. Heart regular with normal rate, no murmer or gallop  The abdomen is soft without tenderness, guarding, mass, rebound or organomegaly. Bowel sounds are normal. No CVA tenderness or inguinal adenopathy noted. Rectal: low tone. Soft stool. No impaction. Assessment:       Diagnosis Orders   1. Chronic idiopathic constipation               Plan:      Dulcolax 2 tabs BID prn; if no BM in 24 hours consider trial Lactulose. She will let me know if not effective. Once BM can go back on Miralax up to 1 scoop BID>   Appt with colorectal surgeon as planned. Side effects of current medications reviewed and questions answered. Call or return to clinic prn if these symptoms worsen or fail to improve as anticipated.

## 2023-03-03 ENCOUNTER — TELEPHONE (OUTPATIENT)
Dept: FAMILY MEDICINE CLINIC | Age: 83
End: 2023-03-03

## 2023-03-03 RX ORDER — LACTULOSE 10 G/15ML
20 SOLUTION ORAL 2 TIMES DAILY PRN
Qty: 473 ML | Refills: 1 | Status: SHIPPED | OUTPATIENT
Start: 2023-03-03

## 2023-03-03 NOTE — TELEPHONE ENCOUNTER
Pt called stating she is still constipated and medication is not working. She would like something else to be called in.

## 2023-03-24 DIAGNOSIS — I10 BENIGN ESSENTIAL HYPERTENSION: ICD-10-CM

## 2023-03-27 RX ORDER — HYDROXYZINE HYDROCHLORIDE 10 MG/1
10-20 TABLET, FILM COATED ORAL EVERY 6 HOURS PRN
Qty: 60 TABLET | Refills: 1 | Status: SHIPPED | OUTPATIENT
Start: 2023-03-27

## 2023-03-27 RX ORDER — FUROSEMIDE 20 MG/1
TABLET ORAL
Qty: 180 TABLET | Refills: 1 | Status: SHIPPED | OUTPATIENT
Start: 2023-03-27

## 2023-04-01 ENCOUNTER — APPOINTMENT (OUTPATIENT)
Dept: GENERAL RADIOLOGY | Age: 83
End: 2023-04-01
Payer: MEDICARE

## 2023-04-01 ENCOUNTER — HOSPITAL ENCOUNTER (EMERGENCY)
Age: 83
Discharge: HOME | End: 2023-04-02
Attending: EMERGENCY MEDICINE
Payer: MEDICARE

## 2023-04-01 VITALS
RESPIRATION RATE: 18 BRPM | OXYGEN SATURATION: 100 % | TEMPERATURE: 97.8 F | SYSTOLIC BLOOD PRESSURE: 164 MMHG | DIASTOLIC BLOOD PRESSURE: 81 MMHG | WEIGHT: 164 LBS | HEART RATE: 83 BPM | BODY MASS INDEX: 34.28 KG/M2

## 2023-04-01 DIAGNOSIS — K59.00 CONSTIPATION, UNSPECIFIED CONSTIPATION TYPE: Primary | ICD-10-CM

## 2023-04-01 LAB
ALBUMIN SERPL-MCNC: 4.2 G/DL (ref 3.4–5)
ALBUMIN/GLOB SERPL: 1.6 {RATIO} (ref 1.1–2.2)
ALP SERPL-CCNC: 63 U/L (ref 40–129)
ALT SERPL-CCNC: 27 U/L (ref 10–40)
ANION GAP SERPL CALCULATED.3IONS-SCNC: 10 MMOL/L (ref 3–16)
AST SERPL-CCNC: 31 U/L (ref 15–37)
BASOPHILS # BLD: 0 K/UL (ref 0–0.2)
BASOPHILS NFR BLD: 0.7 %
BILIRUB SERPL-MCNC: 0.5 MG/DL (ref 0–1)
BUN SERPL-MCNC: 30 MG/DL (ref 7–20)
CALCIUM SERPL-MCNC: 10.4 MG/DL (ref 8.3–10.6)
CHLORIDE SERPL-SCNC: 99 MMOL/L (ref 99–110)
CO2 SERPL-SCNC: 28 MMOL/L (ref 21–32)
CREAT SERPL-MCNC: 1.1 MG/DL (ref 0.6–1.2)
DEPRECATED RDW RBC AUTO: 15.2 % (ref 12.4–15.4)
EOSINOPHIL # BLD: 0.1 K/UL (ref 0–0.6)
EOSINOPHIL NFR BLD: 1.4 %
GFR SERPLBLD CREATININE-BSD FMLA CKD-EPI: 50 ML/MIN/{1.73_M2}
GLUCOSE SERPL-MCNC: 106 MG/DL (ref 70–99)
HCT VFR BLD AUTO: 35.8 % (ref 36–48)
HGB BLD-MCNC: 12.1 G/DL (ref 12–16)
LIPASE SERPL-CCNC: 31 U/L (ref 13–60)
LYMPHOCYTES # BLD: 1 K/UL (ref 1–5.1)
LYMPHOCYTES NFR BLD: 15.7 %
MCH RBC QN AUTO: 28.2 PG (ref 26–34)
MCHC RBC AUTO-ENTMCNC: 33.7 G/DL (ref 31–36)
MCV RBC AUTO: 83.5 FL (ref 80–100)
MONOCYTES # BLD: 1 K/UL (ref 0–1.3)
MONOCYTES NFR BLD: 16.3 %
NEUTROPHILS # BLD: 4.1 K/UL (ref 1.7–7.7)
NEUTROPHILS NFR BLD: 65.9 %
PLATELET # BLD AUTO: 198 K/UL (ref 135–450)
PMV BLD AUTO: 8.7 FL (ref 5–10.5)
POTASSIUM SERPL-SCNC: 3.6 MMOL/L (ref 3.5–5.1)
PROT SERPL-MCNC: 6.8 G/DL (ref 6.4–8.2)
RBC # BLD AUTO: 4.28 M/UL (ref 4–5.2)
SODIUM SERPL-SCNC: 137 MMOL/L (ref 136–145)
WBC # BLD AUTO: 6.3 K/UL (ref 4–11)

## 2023-04-01 PROCEDURE — 80053 COMPREHEN METABOLIC PANEL: CPT

## 2023-04-01 PROCEDURE — 2500000003 HC RX 250 WO HCPCS: Performed by: EMERGENCY MEDICINE

## 2023-04-01 PROCEDURE — 74018 RADEX ABDOMEN 1 VIEW: CPT

## 2023-04-01 PROCEDURE — 85025 COMPLETE CBC W/AUTO DIFF WBC: CPT

## 2023-04-01 PROCEDURE — 99284 EMERGENCY DEPT VISIT MOD MDM: CPT

## 2023-04-01 PROCEDURE — 36415 COLL VENOUS BLD VENIPUNCTURE: CPT

## 2023-04-01 PROCEDURE — 83690 ASSAY OF LIPASE: CPT

## 2023-04-01 RX ORDER — POLYETHYLENE GLYCOL 3350 17 G/17G
17 POWDER, FOR SOLUTION ORAL DAILY PRN
Qty: 510 G | Refills: 0 | Status: SHIPPED | OUTPATIENT
Start: 2023-04-01 | End: 2023-05-01

## 2023-04-01 RX ORDER — DOCUSATE SODIUM 100 MG/1
100 CAPSULE, LIQUID FILLED ORAL 2 TIMES DAILY
Qty: 16 CAPSULE | Refills: 0 | Status: SHIPPED | OUTPATIENT
Start: 2023-04-01 | End: 2023-04-09

## 2023-04-01 RX ADMIN — Medication 960 ML: at 22:33

## 2023-04-01 NOTE — ED TRIAGE NOTES
Pt. Presents with constipation x 2 weeks, states\" I have not had a reasonable bowel movement in two weeks\", chronic hx.

## 2023-04-02 NOTE — ED NOTES
Pt does not have adequate sphincter control to hold liquid from enemas in for adequate time to work, despite repositioning. Dr. Gordy Zhou aware. Mag citrate ordered.      Joseph Dash RN  04/01/23 0591

## 2023-04-02 NOTE — ED PROVIDER NOTES
[address] [facility]          ATTENDING PHYSICIAN NOTE       Date of evaluation: 4/1/2023    Chief Complaint     Constipation      History of Present Illness     Volodymyr Ron is a 80 y.o. female who presents with complaint of constipation. Patient reports she has not had a \"decent bowel movement in 2 weeks\" and reports that she does get daily output of some stool, but says that it does not feel like it is much, sometimes the size of a cigar, sometimes just a little bit. She is having some abdominal pressure as a result, denies nausea vomiting. She reports she is passing large amounts of flatus. Reports has had some issues with constipation in the past, was at one point seen in the emergency department and given an enema with some improvement. ASSESSMENT / PLAN  (MEDICAL DECISION MAKING)     INITIAL VITALS: BP: (!) 164/81, Temp: 97.8 °F (36.6 °C), Heart Rate: 83, Resp: 18, SpO2: 100 %      Volodymyr Ron is a 80 y.o. female who presents with constipation. She does not appear to be impacted rectally. The KUB does demonstrate stool through the colon, but her abdomen is soft, nontender, and her symptoms match up quite well with her chronic history of constipation. We did attempt an enema twice, but the patient reports that she has baseline low sphincter tone and subsequently was unable to hold the enema for a large amount. We did try to order some magnesium citrate but is apparently on backorder, which patient has had issues obtaining as well. Her labs are unremarkable, I do not think she requires CT scan of the abdomen pelvis is pretty clearly is and in the setting of constipation without abdominal tenderness. She is comfortable going home and trying outpatient bowel prep regimen with Dulcolax and MiraLAX and the instructions were given to her prescriptions were written. Given return precautions for worsening symptoms or other concerns. .    Is this patient to be elsewhere classified, Essential familial hypercholesterolemia, Fatigue, Fracture of cuboid, right ankle, closed, Hypercalcemia, Lumbar herniated disc, Lumbar radiculopathy, and Osteopenia. She has a past surgical history that includes laminectomy; Hysterectomy; joint replacement (Left, 4/2014); Tonsillectomy; Appendectomy; knee surgery; Breast surgery; Breast biopsy; and IR EMBOLIZATION TUMOR/ORGAN ISCH/INFARC (7/27/2022). Her family history includes Heart Disease in her father; Other in her mother. She reports that she has never smoked. She has never used smokeless tobacco. She reports that she does not drink alcohol and does not use drugs.     Medications     Discharge Medication List as of 4/1/2023 11:51 PM        CONTINUE these medications which have NOT CHANGED    Details   hydrOXYzine HCl (ATARAX) 10 MG tablet Take 1-2 tablets by mouth every 6 hours as needed for Anxiety, Disp-60 tablet, R-1Normal      furosemide (LASIX) 20 MG tablet TAKE ONE TO TWO TABLETS BY MOUTH EVERY DAY, Disp-180 tablet, R-1Normal      lactulose (CHRONULAC) 10 GM/15ML solution Take 30 mLs by mouth 2 times daily as needed (constipation), Disp-473 mL, R-1Normal      verapamil (CALAN SR) 240 MG extended release tablet Historical Med      potassium chloride (KLOR-CON M) 10 MEQ extended release tablet Take 1 tablet by mouth daily, Disp-90 tablet, R-1Normal      sertraline (ZOLOFT) 50 MG tablet Take 1 tablet by mouth daily, Disp-90 tablet, R-1Normal      hydroCHLOROthiazide (HYDRODIURIL) 25 MG tablet 1 tablet dailyHistorical Med      pantoprazole (PROTONIX) 40 MG tablet Take 1 tablet by mouth daily, Disp-90 tablet, R-2Normal      rosuvastatin (CRESTOR) 20 MG tablet Take 1 tablet by mouth daily, Disp-90 tablet, R-2Normal      Mirabegron (MYRBETRIQ PO) Take 50 mg by mouth every other day Unsure of doseHistorical Med      !! polyethylene glycol (GLYCOLAX) 17 GM/SCOOP powder Take 17 g by mouth dailyHistorical Med      Cholecalciferol (D3 PO)

## 2023-04-02 NOTE — ED NOTES
Patient prepared for and ready to be discharged. Patient discharged at this time in no acute distress after verbalizing understanding of discharge instructions. Patient left after receiving After Visit Summary instructions.       Jessica Falk RN  04/02/23 0003

## 2023-04-02 NOTE — DISCHARGE INSTRUCTIONS
Return to emergency department for worsening symptoms or other concerns. You can try the bowel prep method on the accompanying sheet for using MiraLAX and Dulcolax to help relieve the constipation. Starting in the morning, take 4 Dulcolax tablets. Mix 13 capfuls of MiraLAX with 64 ounces of Gatorade, which is usually 2 bottles. Drink one 8 ounce glass of the mixture every 15 minutes until it is gone or you have sufficient bowel movements. This should produce a significant bowel movement. Drink plenty of clear liquids following this as well while you are having bowel movements to prevent dehydration.

## 2023-04-15 DIAGNOSIS — F33.1 MODERATE EPISODE OF RECURRENT MAJOR DEPRESSIVE DISORDER (HCC): ICD-10-CM

## 2023-04-17 NOTE — TELEPHONE ENCOUNTER
Requested Prescriptions     Pending Prescriptions Disp Refills    sertraline (ZOLOFT) 50 MG tablet [Pharmacy Med Name: SERTRALINE HCL 50 MG TABLET] 90 tablet 1     Sig: TAKE ONE TABLET BY MOUTH DAILY         Last OV; 4/12/2023   Last labs; 4/1/2023  No F/u

## 2023-04-18 ENCOUNTER — HOSPITAL ENCOUNTER (EMERGENCY)
Age: 83
Discharge: HOME | End: 2023-04-18
Attending: STUDENT IN AN ORGANIZED HEALTH CARE EDUCATION/TRAINING PROGRAM
Payer: MEDICARE

## 2023-04-18 ENCOUNTER — APPOINTMENT (OUTPATIENT)
Dept: CT IMAGING | Age: 83
End: 2023-04-18
Payer: MEDICARE

## 2023-04-18 VITALS
TEMPERATURE: 98.1 F | DIASTOLIC BLOOD PRESSURE: 102 MMHG | WEIGHT: 158.95 LBS | HEART RATE: 60 BPM | RESPIRATION RATE: 12 BRPM | BODY MASS INDEX: 32.04 KG/M2 | HEIGHT: 59 IN | OXYGEN SATURATION: 96 % | SYSTOLIC BLOOD PRESSURE: 177 MMHG

## 2023-04-18 DIAGNOSIS — R10.9 FLANK PAIN: Primary | ICD-10-CM

## 2023-04-18 LAB
ALBUMIN SERPL-MCNC: 4.6 G/DL (ref 3.4–5)
ALP SERPL-CCNC: 59 U/L (ref 40–129)
ALT SERPL-CCNC: 28 U/L (ref 10–40)
AMORPH SED URNS QL MICRO: ABNORMAL /HPF
ANION GAP SERPL CALCULATED.3IONS-SCNC: 14 MMOL/L (ref 3–16)
AST SERPL-CCNC: 27 U/L (ref 15–37)
BACTERIA URNS QL MICRO: ABNORMAL /HPF
BASOPHILS # BLD: 0 K/UL (ref 0–0.2)
BASOPHILS NFR BLD: 0.6 %
BILIRUB DIRECT SERPL-MCNC: <0.2 MG/DL (ref 0–0.3)
BILIRUB INDIRECT SERPL-MCNC: NORMAL MG/DL (ref 0–1)
BILIRUB SERPL-MCNC: 0.5 MG/DL (ref 0–1)
BILIRUB UR QL STRIP.AUTO: NEGATIVE
BUN SERPL-MCNC: 27 MG/DL (ref 7–20)
CALCIUM SERPL-MCNC: 10.3 MG/DL (ref 8.3–10.6)
CHLORIDE SERPL-SCNC: 101 MMOL/L (ref 99–110)
CLARITY UR: CLEAR
CO2 SERPL-SCNC: 26 MMOL/L (ref 21–32)
COLOR UR: YELLOW
CREAT SERPL-MCNC: 0.8 MG/DL (ref 0.6–1.2)
DEPRECATED RDW RBC AUTO: 15.8 % (ref 12.4–15.4)
EOSINOPHIL # BLD: 0.1 K/UL (ref 0–0.6)
EOSINOPHIL NFR BLD: 1.6 %
EPI CELLS #/AREA URNS HPF: ABNORMAL /HPF (ref 0–5)
GFR SERPLBLD CREATININE-BSD FMLA CKD-EPI: >60 ML/MIN/{1.73_M2}
GLUCOSE SERPL-MCNC: 85 MG/DL (ref 70–99)
GLUCOSE UR STRIP.AUTO-MCNC: NEGATIVE MG/DL
HCT VFR BLD AUTO: 39.3 % (ref 36–48)
HGB BLD-MCNC: 13.1 G/DL (ref 12–16)
HGB UR QL STRIP.AUTO: NEGATIVE
KETONES UR STRIP.AUTO-MCNC: NEGATIVE MG/DL
LEUKOCYTE ESTERASE UR QL STRIP.AUTO: ABNORMAL
LIPASE SERPL-CCNC: 27 U/L (ref 13–60)
LYMPHOCYTES # BLD: 0.9 K/UL (ref 1–5.1)
LYMPHOCYTES NFR BLD: 15.2 %
MCH RBC QN AUTO: 28.2 PG (ref 26–34)
MCHC RBC AUTO-ENTMCNC: 33.4 G/DL (ref 31–36)
MCV RBC AUTO: 84.3 FL (ref 80–100)
MONOCYTES # BLD: 1 K/UL (ref 0–1.3)
MONOCYTES NFR BLD: 15.8 %
NEUTROPHILS # BLD: 4.1 K/UL (ref 1.7–7.7)
NEUTROPHILS NFR BLD: 66.8 %
NITRITE UR QL STRIP.AUTO: NEGATIVE
PH UR STRIP.AUTO: 6.5 [PH] (ref 5–8)
PLATELET # BLD AUTO: 190 K/UL (ref 135–450)
PMV BLD AUTO: 9.2 FL (ref 5–10.5)
POTASSIUM SERPL-SCNC: 3.7 MMOL/L (ref 3.5–5.1)
PROT SERPL-MCNC: 7.2 G/DL (ref 6.4–8.2)
PROT UR STRIP.AUTO-MCNC: NEGATIVE MG/DL
RBC # BLD AUTO: 4.66 M/UL (ref 4–5.2)
RBC #/AREA URNS HPF: ABNORMAL /HPF (ref 0–4)
RENAL EPI CELLS #/AREA UR COMP ASSIST: ABNORMAL /HPF (ref 0–1)
SODIUM SERPL-SCNC: 141 MMOL/L (ref 136–145)
SP GR UR STRIP.AUTO: 1.02 (ref 1–1.03)
UA COMPLETE W REFLEX CULTURE PNL UR: YES
UA DIPSTICK W REFLEX MICRO PNL UR: YES
URN SPEC COLLECT METH UR: ABNORMAL
UROBILINOGEN UR STRIP-ACNC: 0.2 E.U./DL
WBC # BLD AUTO: 6.2 K/UL (ref 4–11)
WBC #/AREA URNS HPF: ABNORMAL /HPF (ref 0–5)

## 2023-04-18 PROCEDURE — 6360000004 HC RX CONTRAST MEDICATION: Performed by: STUDENT IN AN ORGANIZED HEALTH CARE EDUCATION/TRAINING PROGRAM

## 2023-04-18 PROCEDURE — 85025 COMPLETE CBC W/AUTO DIFF WBC: CPT

## 2023-04-18 PROCEDURE — 87086 URINE CULTURE/COLONY COUNT: CPT

## 2023-04-18 PROCEDURE — 80048 BASIC METABOLIC PNL TOTAL CA: CPT

## 2023-04-18 PROCEDURE — 83690 ASSAY OF LIPASE: CPT

## 2023-04-18 PROCEDURE — 36415 COLL VENOUS BLD VENIPUNCTURE: CPT

## 2023-04-18 PROCEDURE — 81001 URINALYSIS AUTO W/SCOPE: CPT

## 2023-04-18 PROCEDURE — 80076 HEPATIC FUNCTION PANEL: CPT

## 2023-04-18 PROCEDURE — 99285 EMERGENCY DEPT VISIT HI MDM: CPT

## 2023-04-18 PROCEDURE — 74177 CT ABD & PELVIS W/CONTRAST: CPT

## 2023-04-18 RX ORDER — CEPHALEXIN 250 MG/1
250 CAPSULE ORAL 4 TIMES DAILY
Qty: 20 CAPSULE | Refills: 0 | Status: SHIPPED | OUTPATIENT
Start: 2023-04-18 | End: 2023-04-23

## 2023-04-18 RX ADMIN — IOPAMIDOL 75 ML: 755 INJECTION, SOLUTION INTRAVENOUS at 18:05

## 2023-04-18 ASSESSMENT — PAIN SCALES - GENERAL
PAINLEVEL_OUTOF10: 0
PAINLEVEL_OUTOF10: 2

## 2023-04-18 ASSESSMENT — PAIN DESCRIPTION - ORIENTATION: ORIENTATION: RIGHT

## 2023-04-18 ASSESSMENT — LIFESTYLE VARIABLES
HOW OFTEN DO YOU HAVE A DRINK CONTAINING ALCOHOL: MONTHLY OR LESS
HOW MANY STANDARD DRINKS CONTAINING ALCOHOL DO YOU HAVE ON A TYPICAL DAY: 1 OR 2

## 2023-04-18 ASSESSMENT — PAIN - FUNCTIONAL ASSESSMENT
PAIN_FUNCTIONAL_ASSESSMENT: NONE - DENIES PAIN
PAIN_FUNCTIONAL_ASSESSMENT: NONE - DENIES PAIN
PAIN_FUNCTIONAL_ASSESSMENT: ACTIVITIES ARE NOT PREVENTED
PAIN_FUNCTIONAL_ASSESSMENT: NONE - DENIES PAIN
PAIN_FUNCTIONAL_ASSESSMENT: 0-10

## 2023-04-18 ASSESSMENT — PAIN DESCRIPTION - LOCATION: LOCATION: FLANK

## 2023-04-18 ASSESSMENT — PAIN DESCRIPTION - DESCRIPTORS: DESCRIPTORS: CRAMPING

## 2023-04-18 NOTE — DISCHARGE INSTRUCTIONS
We saw you in the hospital for flank pain. A CT scan did not show any evidence for an abnormality. You need to take a pill called keflex when you go home, 4 times a day, for a possible UTI. Follow up your urine culture with your regular doctor. You need to see your regular doctor in 2-3 days to be checked. Follow up with your IR attending as scheduled. You should return to the emergency department if your symptoms worsen or do not resolve. In addition, return if:  - You have a fever (greater than 101 degrees)  - You have chest pain, shortness of breath, new/worsened abdominal pain, or uncontrollable vomiting  - You are unable to eat or drink  - You pass out  - You have difficulty moving your arms or legs   - You have difficulty speaking or slurred speech  - Or you have any concern that you feel needs acute physician evaluation.

## 2023-04-18 NOTE — ED PROVIDER NOTES
for Anxiety, Disp-60 tablet, R-1Normal      furosemide (LASIX) 20 MG tablet TAKE ONE TO TWO TABLETS BY MOUTH EVERY DAY, Disp-180 tablet, R-1Normal      potassium chloride (KLOR-CON M) 10 MEQ extended release tablet Take 1 tablet by mouth daily, Disp-90 tablet, R-1Normal      hydroCHLOROthiazide (HYDRODIURIL) 25 MG tablet 1 tablet dailyHistorical Med      pantoprazole (PROTONIX) 40 MG tablet Take 1 tablet by mouth daily, Disp-90 tablet, R-2Normal      rosuvastatin (CRESTOR) 20 MG tablet Take 1 tablet by mouth daily, Disp-90 tablet, R-2Normal      Mirabegron (MYRBETRIQ PO) Take 50 mg by mouth every other day Unsure of doseHistorical Med      !! polyethylene glycol (GLYCOLAX) 17 GM/SCOOP powder Take 17 g by mouth dailyHistorical Med      Cholecalciferol (D3 PO) Take by mouthHistorical Med      methylcellulose (CITRUCEL) oral powder Take 2 g by mouth daily Take by mouth daily. Historical Med      calcium citrate-vitamin D (CALCIUM CITRATE + D3) 315-250 MG-UNIT TABS per tablet Take 1 tablet by mouth daily (with breakfast), Disp-120 tabletOTC       !! - Potential duplicate medications found. Please discuss with provider. Allergies     She is allergic to ace inhibitors, latanoprost, losartan, and taztia xt [diltiazem hcl]. Physical Exam     INITIAL VITALS: BP: (!) 183/99, Temp: 98.1 °F (36.7 °C), Heart Rate: 62, Resp: 18, SpO2: 98 %     General:  Well appearing. No acute distress. Non-toxic appearing    Eyes:  Pupils equally round, reactive, brisk. No discharge from eyes. ENT:  No discharge from nose. OP clear. Neck:  Supple. Nontender. Pulmonary:   Non-labored breathing. Breath sounds clear bilaterally. Cardiac:  Regular rate and rhythm. No murmurs. Abdomen:  Soft. Non-tender throughout. Non-distended. No masses. Negative CVAT bilaterally    Musculoskeletal:  No long bone deformity. No ankle or wrist deformity. Vascular:  Extremities warm and perfused. Radial pulses 2+ bilaterally.      Skin:  No

## 2023-04-19 LAB — BACTERIA UR CULT: NORMAL

## 2023-06-19 ENCOUNTER — OFFICE VISIT (OUTPATIENT)
Age: 83
End: 2023-06-19

## 2023-06-19 VITALS
TEMPERATURE: 97.2 F | WEIGHT: 167 LBS | DIASTOLIC BLOOD PRESSURE: 78 MMHG | BODY MASS INDEX: 34.02 KG/M2 | OXYGEN SATURATION: 96 % | SYSTOLIC BLOOD PRESSURE: 138 MMHG | RESPIRATION RATE: 16 BRPM | HEART RATE: 57 BPM

## 2023-06-19 DIAGNOSIS — K58.1 IRRITABLE BOWEL SYNDROME WITH CONSTIPATION: ICD-10-CM

## 2023-06-19 DIAGNOSIS — M54.16 LUMBAR RADICULOPATHY: ICD-10-CM

## 2023-06-19 DIAGNOSIS — I10 BENIGN ESSENTIAL HYPERTENSION: ICD-10-CM

## 2023-06-19 DIAGNOSIS — F33.1 MODERATE EPISODE OF RECURRENT MAJOR DEPRESSIVE DISORDER (HCC): Primary | ICD-10-CM

## 2023-06-19 RX ORDER — BUPROPION HYDROCHLORIDE 150 MG/1
150 TABLET ORAL EVERY MORNING
Qty: 30 TABLET | Refills: 0 | Status: SHIPPED | OUTPATIENT
Start: 2023-06-19

## 2023-06-19 RX ORDER — FUROSEMIDE 20 MG/1
20 TABLET ORAL DAILY PRN
Qty: 180 TABLET | Refills: 1
Start: 2023-06-19

## 2023-06-19 NOTE — PROGRESS NOTES
Subjective:      Patient ID: Makenna Gomez 80 y.o. female. The primary encounter diagnosis was Moderate episode of recurrent major depressive disorder ([facility] [address] A diagnosis of Irritable bowel syndrome with constipation was also pertinent to this visit. HPI    Depression: last month Sertraline dose was decreased for sweating. Buspar was added. She never started the Buspar and did not decrease the Sertraline. She has the sweating every few weeks and taking a Atarax helps. Is still seeing therapist.  Not sure if it helps. She feels down more days than not. Sleeps well most nights but some nights she wakes up at 2 AM and has trouble getting back to sleep. Has some suicidal thoughts but no plan or intent. She was on Wellbutrin in the past and it worked well. Was stopped when it quit working. Never had a seizure. Is gaining weight; does crave carbs. Exercise: walks,  balance class. Back pain: needs an SUMAYA. Has not needed an SUMAYA for many years. Saw. Dr. Tung Dumont in the past.  Now has pain radiating to left foot. No loss of bladder control. Pain is there 80% of the time. No fever, weakness. Tylenol BID helps manage the pain. Had laminectomy 20+ years ago  Last MRI was 2018. IBS: using Miralax. Stools pasty. Fiber was increased. It did not help to make the stools less mushy. Overall she is ok with current control     HTN: has Verapamil at home; wonders if she can use up the rest of her script. Constipation is not better or worse off the Verapamil. She is taking HCTZ every day and Lasix 3 times a week.      Lab Results   Component Value Date/Time     04/18/2023 04:25 PM    K 3.7 04/18/2023 04:25 PM     04/18/2023 04:25 PM    CO2 26 04/18/2023 04:25 PM    BUN 27 04/18/2023 04:25 PM    CREATININE 0.8 04/18/2023 04:25 PM    GLUCOSE 85 04/18/2023 04:25 PM    GLUCOSE 93 02/06/2012 08:55 AM    CALCIUM 10.3 04/18/2023 04:25 PM    LABGLOM >60 04/18/2023 04:25 PM       Lab Results

## 2023-06-22 ENCOUNTER — PATIENT MESSAGE (OUTPATIENT)
Age: 83
End: 2023-06-22

## 2023-06-22 RX ORDER — VALSARTAN 80 MG/1
80 TABLET ORAL DAILY
Qty: 90 TABLET | Refills: 1 | Status: SHIPPED | OUTPATIENT
Start: 2023-06-22

## 2023-06-22 NOTE — TELEPHONE ENCOUNTER
From: Tammie Staff  To: Dr. Villagomez Ameena: 6/22/2023 3:27 PM EDT  Subject: Dr. Jorgensen Kidney Dr Letitia Wilks today. BP was 148/68 which he thought was high. He wants me to take 160mg valsartan,  240mg verapamil, and 25mg hydroCHLORthiazide every day. He wants you to be the prescribing doctor for these. What do you think? Is it a bit much? If you agree, please let me know and call in valsartan 160 mg to [address]. I have the mri for my back Thurs 6/29 with results going to you.  Thanks, Rosanna Rosario

## 2023-06-24 DIAGNOSIS — F33.1 MODERATE EPISODE OF RECURRENT MAJOR DEPRESSIVE DISORDER (HCC): ICD-10-CM

## 2023-06-26 RX ORDER — PANTOPRAZOLE SODIUM 40 MG/1
40 TABLET, DELAYED RELEASE ORAL DAILY
Qty: 90 TABLET | Refills: 2 | Status: SHIPPED | OUTPATIENT
Start: 2023-06-26

## 2023-06-26 NOTE — TELEPHONE ENCOUNTER
Requested Prescriptions     Pending Prescriptions Disp Refills    sertraline (ZOLOFT) 50 MG tablet 90 tablet 1     Sig: Take 0.5 tablets by mouth daily         Last OV; 6/19/2023  Last labs; 4/18/2023  F/u no

## 2023-06-28 DIAGNOSIS — E78.01 ESSENTIAL FAMILIAL HYPERCHOLESTEROLEMIA: ICD-10-CM

## 2023-06-28 RX ORDER — ROSUVASTATIN CALCIUM 20 MG/1
TABLET, COATED ORAL
Qty: 90 TABLET | Refills: 2 | Status: SHIPPED | OUTPATIENT
Start: 2023-06-28

## 2023-06-29 ENCOUNTER — HOSPITAL ENCOUNTER (OUTPATIENT)
Dept: MRI IMAGING | Age: 83
Discharge: HOME | End: 2023-06-29
Payer: MEDICARE

## 2023-06-29 DIAGNOSIS — M54.16 LUMBAR RADICULOPATHY: ICD-10-CM

## 2023-06-29 PROCEDURE — A9579 GAD-BASE MR CONTRAST NOS,1ML: HCPCS | Performed by: FAMILY MEDICINE

## 2023-06-29 PROCEDURE — 6360000004 HC RX CONTRAST MEDICATION: Performed by: FAMILY MEDICINE

## 2023-06-29 PROCEDURE — 72158 MRI LUMBAR SPINE W/O & W/DYE: CPT

## 2023-06-29 RX ADMIN — GADOTERIDOL 17 ML: 279.3 INJECTION, SOLUTION INTRAVENOUS at 13:31

## 2023-07-06 ENCOUNTER — PATIENT MESSAGE (OUTPATIENT)
Age: 83
End: 2023-07-06

## 2023-07-06 RX ORDER — VALSARTAN 80 MG/1
40 TABLET ORAL DAILY
Qty: 90 TABLET | Refills: 1 | Status: SHIPPED
Start: 2023-07-06

## 2023-07-06 NOTE — TELEPHONE ENCOUNTER
From: Johana Davis  To: Dr. Estrada Praful: 7/6/2023 10:08 AM EDT  Subject: BP meds    Dr. Uvaldo Duarte. I was taking 80mg Valsartan and 240 mg Verapamil. After a few days I started to sweat a lot and my body was very wet. I dropped the Valsartan and am continuing with 240 mg So now I am only taking the Verapamil and I have no sweats.  Should I stay on the  Verapamil 240 mg and see how I am?  Thanks, Matty Martinez

## 2023-07-11 ENCOUNTER — OFFICE VISIT (OUTPATIENT)
Age: 83
End: 2023-07-11

## 2023-07-11 VITALS
SYSTOLIC BLOOD PRESSURE: 138 MMHG | HEART RATE: 85 BPM | OXYGEN SATURATION: 94 % | TEMPERATURE: 98.2 F | RESPIRATION RATE: 16 BRPM | DIASTOLIC BLOOD PRESSURE: 78 MMHG

## 2023-07-11 DIAGNOSIS — F41.0 PANIC ATTACKS: ICD-10-CM

## 2023-07-11 DIAGNOSIS — K58.1 IRRITABLE BOWEL SYNDROME WITH CONSTIPATION: ICD-10-CM

## 2023-07-11 DIAGNOSIS — F41.1 GAD (GENERALIZED ANXIETY DISORDER): ICD-10-CM

## 2023-07-11 DIAGNOSIS — I10 HYPERTENSION, UNSPECIFIED TYPE: Primary | ICD-10-CM

## 2023-07-11 RX ORDER — VERAPAMIL HYDROCHLORIDE 240 MG/1
240 TABLET, FILM COATED, EXTENDED RELEASE ORAL NIGHTLY
COMMUNITY

## 2023-07-11 NOTE — PROGRESS NOTES
Subjective:      Patient ID: Dixie Celestin 80 y.o. female. There were no encounter diagnoses. HPI    Checks BP only if does not feel well. Over the weekend was feeling clammy and sweaty (recurrent). Hydroxyzine usually helps; but did not over the weekend. Her bp was 190/. She called me and I had her take Valsartan 1/2 tab; her BP came down to 150. She notes this always happens when she is alone and she worries about not having any family in town. Today her BP at home was 160/83 right and 158/ left. She sometimes wakes up with her heart racing. Lasts a few minutes. Patient denies any exertional chest pain, dyspnea, syncope, orthopnea, edema or paroxysmal nocturnal dyspnea. She has pain in the right jaw not associated with activity once a week to every few days. Not sure how long it lasts because she forgets about it. Has been going on for a year and is not progressive. She is on a low fat diet. No NSAIDs. Constipation: having a BM every 3 to 5 days. Pelvic floor PT did not help. Taking Miralax 1 cap qd and fiber gummy.     Outpatient Medications Marked as Taking for the 7/11/23 encounter (Office Visit) with Melissa Malave MD   Medication Sig Dispense Refill    verapamil (CALAN SR) 240 MG extended release tablet Take 1 tablet by mouth nightly      valsartan (DIOVAN) 80 MG tablet Take 0.5 tablets by mouth daily 90 tablet 1    rosuvastatin (CRESTOR) 20 MG tablet TAKE ONE TABLET BY MOUTH DAILY 90 tablet 2    pantoprazole (PROTONIX) 40 MG tablet Take 1 tablet by mouth daily 90 tablet 2    sertraline (ZOLOFT) 50 MG tablet Take 0.5 tablets by mouth daily 90 tablet 3    buPROPion (WELLBUTRIN XL) 150 MG extended release tablet Take 1 tablet by mouth every morning 30 tablet 0    furosemide (LASIX) 20 MG tablet Take 1 tablet by mouth daily as needed (edema) 180 tablet 1    potassium chloride (KLOR-CON M) 10 MEQ extended release tablet Take 1 tablet by mouth daily 90 tablet 1    FIBER ADULT

## 2023-07-12 ENCOUNTER — PATIENT MESSAGE (OUTPATIENT)
Age: 83
End: 2023-07-12

## 2023-07-12 DIAGNOSIS — F33.1 MODERATE EPISODE OF RECURRENT MAJOR DEPRESSIVE DISORDER (HCC): ICD-10-CM

## 2023-07-12 RX ORDER — BUPROPION HYDROCHLORIDE 150 MG/1
150 TABLET ORAL EVERY MORNING
Qty: 30 TABLET | Refills: 0 | Status: SHIPPED | OUTPATIENT
Start: 2023-07-12

## 2023-07-12 NOTE — TELEPHONE ENCOUNTER
Requested Prescriptions     Pending Prescriptions Disp Refills    buPROPion (WELLBUTRIN XL) 150 MG extended release tablet 30 tablet 0     Sig: Take 1 tablet by mouth every morning         Last OV: 7/11/2023  Last labs: 4/18/2023  F/u: 8/18/2023      Pt would like 90 day supply

## 2023-07-12 NOTE — TELEPHONE ENCOUNTER
From: Madison Sánchez  To: Dr. Emma Cyr: 7/12/2023 10:03 AM EDT  Subject: Bupropion 150 mg    Please refill bupropion 150 mg for 90 days with my pharmacy.   Thank you, Nadir Mcneill

## 2023-07-15 DIAGNOSIS — F33.1 MODERATE EPISODE OF RECURRENT MAJOR DEPRESSIVE DISORDER (HCC): ICD-10-CM

## 2023-07-17 RX ORDER — BUPROPION HYDROCHLORIDE 150 MG/1
TABLET ORAL
Qty: 30 TABLET | Refills: 0 | Status: SHIPPED | OUTPATIENT
Start: 2023-07-17

## 2023-07-17 NOTE — TELEPHONE ENCOUNTER
Requested Prescriptions     Pending Prescriptions Disp Refills    buPROPion (WELLBUTRIN XL) 150 MG extended release tablet [Pharmacy Med Name: buPROPion HCL  MG TABLET] 30 tablet 0     Sig: TAKE ONE TABLET BY MOUTH EVERY MORNING        Last OV: 7/11/23    Last labs: 4/18/23     F/u: 8/18/23

## 2023-07-21 ENCOUNTER — OFFICE VISIT (OUTPATIENT)
Age: 83
End: 2023-07-21

## 2023-07-21 VITALS
OXYGEN SATURATION: 96 % | SYSTOLIC BLOOD PRESSURE: 138 MMHG | DIASTOLIC BLOOD PRESSURE: 78 MMHG | HEART RATE: 67 BPM | RESPIRATION RATE: 16 BRPM | TEMPERATURE: 98 F

## 2023-07-21 DIAGNOSIS — F41.1 GAD (GENERALIZED ANXIETY DISORDER): ICD-10-CM

## 2023-07-21 DIAGNOSIS — Z66 DNR (DO NOT RESUSCITATE): ICD-10-CM

## 2023-07-21 DIAGNOSIS — R03.0 ELEVATED BLOOD PRESSURE READING: Primary | ICD-10-CM

## 2023-07-21 DIAGNOSIS — K58.1 IRRITABLE BOWEL SYNDROME WITH CONSTIPATION: ICD-10-CM

## 2023-07-21 DIAGNOSIS — I10 BENIGN ESSENTIAL HYPERTENSION: ICD-10-CM

## 2023-07-21 DIAGNOSIS — F33.1 MODERATE EPISODE OF RECURRENT MAJOR DEPRESSIVE DISORDER (HCC): ICD-10-CM

## 2023-07-21 NOTE — PROGRESS NOTES
Subjective:      Patient ID: Faby Dean 80 y.o. female. The primary encounter diagnosis was Elevated blood pressure reading. A diagnosis of Irritable bowel syndrome with constipation was also pertinent to this visit. HPI    On Wednesday she took Mag citrate Wednesday; had diarrhea on Thursday AM.  No cramping, blood stool. She was playing cards; one of her friends commented that she looked pale. Later she developed light headedness, balance off, fatigue. Her BP last night was 189/125 right, 161/78 on repeat right and 182/75 left. No chest pain palpitations or dyspnea. She is eating a low Na diet. Had a few small loose stool  She called me and I had her drink Pedialyte. She felt better after drinking this. Today the lightheadedness is gone. She feels very anxious and afraid that something will happen. BP running 150-180/  at home most of the time. 99/86 lowest blood. Checks several times a day. Anxiety continues to be a major problem. She is not more anxious on the Wellbutrin. Was started to help carb cravings. Is not helping. Has DNR she would like to have signed. Does not want CPR or resuscitation in the event of a cardiopulmonary arrest; would like aggressive tx for medical conditions.      Lab Results   Component Value Date/Time     04/18/2023 04:25 PM    K 3.7 04/18/2023 04:25 PM     04/18/2023 04:25 PM    CO2 26 04/18/2023 04:25 PM    BUN 27 04/18/2023 04:25 PM    CREATININE 0.8 04/18/2023 04:25 PM    GLUCOSE 85 04/18/2023 04:25 PM    GLUCOSE 93 02/06/2012 08:55 AM    CALCIUM 10.3 04/18/2023 04:25 PM    LABGLOM >60 04/18/2023 04:25 PM         Outpatient Medications Marked as Taking for the 7/21/23 encounter (Office Visit) with Kaity Rojas MD   Medication Sig Dispense Refill    buPROPion (WELLBUTRIN XL) 150 MG extended release tablet TAKE ONE TABLET BY MOUTH EVERY MORNING 30 tablet 0    verapamil (CALAN SR) 240 MG extended release tablet Take 1

## 2023-08-18 NOTE — PROGRESS NOTES
Benign essential hypertension    Colon polyps    Essential familial hypercholesterolemia    IBS (irritable bowel syndrome)    Elevated lipoprotein(a)    Renal angiolipoma    Lumbar spondylosis    Lumbar stenosis    Trochanteric bursitis of right hip    Glucose intolerance (impaired glucose tolerance)    Memory loss    AILYN (obstructive sleep apnea)    Class 2 obesity without serious comorbidity with body mass index (BMI) of 36.0 to 36.9 in adult    Moderate episode of recurrent major depressive disorder (HCC)    DEBBIE (generalized anxiety disorder)    Panic attacks    DNR (do not resuscitate)       Past Medical History:   Diagnosis Date    Acute esophagitis     h/o    Acute hearing loss of right ear 7/12/2016    AVN (avascular necrosis of bone) ([address]) 1/23/2014    Hip      Benign essential hypertension     Cervical stenosis of spine     Colon polyps     Depressive disorder, not elsewhere classified     Essential familial hypercholesterolemia     Fatigue     Fracture of cuboid, right ankle, closed 5/2013    Hypercalcemia 7/27/2018    Lumbar herniated disc     Lumbar radiculopathy 6/9/2015    Osteopenia        Past Surgical History:   Procedure Laterality Date    APPENDECTOMY      BREAST BIOPSY      BREAST SURGERY      reduction    HYSTERECTOMY (CERVIX STATUS UNKNOWN)      IR EMBOLIZATION TUMOR / ORGAN  7/27/2022    IR EMBOLIZATION TUMOR / ORGAN 7/27/2022 [facility]    JOINT REPLACEMENT Left 4/2014    hip    KNEE SURGERY      left    LAMINECTOMY      decompressive more than 2 lumbar segments    TONSILLECTOMY          Family History   Problem Relation Age of Onset    Other Mother         lung disease    Heart Disease Father        Social History     Tobacco Use    Smoking status: Never    Smokeless tobacco: Never   Vaping Use    Vaping Use: Never used   Substance Use Topics    Alcohol use: No    Drug use: No            Review of Systems  Review of Systems    Objective:   Physical Exam  Vitals:    08/22/23 1121

## 2023-08-22 ENCOUNTER — OFFICE VISIT (OUTPATIENT)
Age: 83
End: 2023-08-22

## 2023-08-22 VITALS
SYSTOLIC BLOOD PRESSURE: 138 MMHG | DIASTOLIC BLOOD PRESSURE: 72 MMHG | HEART RATE: 88 BPM | RESPIRATION RATE: 16 BRPM | OXYGEN SATURATION: 94 % | TEMPERATURE: 97 F

## 2023-08-22 DIAGNOSIS — F33.1 MODERATE EPISODE OF RECURRENT MAJOR DEPRESSIVE DISORDER (HCC): ICD-10-CM

## 2023-08-22 DIAGNOSIS — I10 BENIGN ESSENTIAL HYPERTENSION: Primary | Chronic | ICD-10-CM

## 2023-08-22 DIAGNOSIS — K58.1 IRRITABLE BOWEL SYNDROME WITH CONSTIPATION: ICD-10-CM

## 2023-08-28 RX ORDER — HYDROCHLOROTHIAZIDE 25 MG/1
25 TABLET ORAL DAILY
Qty: 90 TABLET | Refills: 1 | Status: SHIPPED | OUTPATIENT
Start: 2023-08-28

## 2023-08-28 NOTE — TELEPHONE ENCOUNTER
Requested Prescriptions     Pending Prescriptions Disp Refills    hydroCHLOROthiazide (HYDRODIURIL) 25 MG tablet 90 tablet 1     Sig: Take 1 tablet by mouth daily        Last OV: 8/22/23    Last labs: 4/18/23     F/u: None

## 2023-09-06 DIAGNOSIS — F33.1 MODERATE EPISODE OF RECURRENT MAJOR DEPRESSIVE DISORDER (HCC): ICD-10-CM

## 2023-09-06 RX ORDER — BUPROPION HYDROCHLORIDE 150 MG/1
TABLET ORAL
Qty: 90 TABLET | Refills: 3 | Status: SHIPPED | OUTPATIENT
Start: 2023-09-06

## 2023-09-06 NOTE — TELEPHONE ENCOUNTER
Requested Prescriptions     Pending Prescriptions Disp Refills    buPROPion (WELLBUTRIN XL) 150 MG extended release tablet [Pharmacy Med Name: buPROPion HCL  MG TABLET] 30 tablet      Sig: TAKE ONE TABLET BY MOUTH EVERY MORNING         Last OV: 8/22/2023    Last labs: 4/18/2023     F/u: no

## 2023-09-19 ENCOUNTER — TELEPHONE (OUTPATIENT)
Age: 83
End: 2023-09-19

## 2023-09-19 NOTE — TELEPHONE ENCOUNTER
Further info:     Pt fell at Drs appt last week, landed on both knees. Left knee is fine. By Sunday, her right knee was in pain, 5/10, and it began to swell. Today, pain is gone unless she is touching it, but it is still swollen \"it looks like a lemon under the skin\". While her right knee is warm to the touch, she does not have a fever. No redness surrounding the knee, just black and blue. She had her knee replaced in 2019 and she fears that something has happened to the prosthetic. Pt has Amoxicillin that she takes before going to the dentist and is wondering if that would be a good idea to start while she is waiting to hear back from  Joe.

## 2023-09-19 NOTE — TELEPHONE ENCOUNTER
Patient has been having knee pain that is achy. This is her right knee. It is swollen and warm to the touch. She is waiting to hear back from Dr. Divya Wong at [facility]. In the meantime, would it be ok for her to take Amoxicillin she has at home to prevent infection?      Please call to advise    Thank you

## 2023-09-19 NOTE — TELEPHONE ENCOUNTER
Falling on the knee will not cause infection; she does not need an antibiotic. It sounds as if she irritated a bursa - a sack of fluid that cushions the outside of a joint. This can cause swelling. Tell her to use ice packs for 20 minutes 3 to 4 times a day to decrease inflammation. I would be happy to see her if Dr. Jg Murphy or one of his partners cannot.   Thanks

## 2023-09-26 NOTE — PROGRESS NOTES
Subjective:      Patient ID: Rolo Zamorano 80 y.o. female. 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 Had follow up CT 2 weeks ago and are stable/slightly small right. No further attempt at embolization is recommended     Tripped in a doctor's office and landed on her knees. Saw Dr. [place] FERNANDEZ. Has swelling knee. Xray was normal. Is using ice. Was told could be drained if not better in a few weeks. Tylenol helps. Also has shoulder pain; is seeing Dr. Michelle Banerjee. He ordered an MRI and recommended seeing an ortho who does shoulder surgery. Depression is worse again. No new or different stresses. Is tearful frequently. Is still seeing a therapist weekly. Is sleeping well. Suicidal thoughts without plan or intent. Appetite is fine.  + anxiety parmjit at night or in the early AM. Atarax helps when the anxiety keeps her awake. BP every 2 to 3 days 140/80s. Only at 160/ twice in the past 2 months. Constipation: Dr. Nabeel Khoury is going to do a colonoscopy in December. Has BM daily; small and water. Going a small amount many times over an hour or so in the AM.      Sore cyst on the left cheek for a week. Called derm to get appt and was unable to get in for a long.          Latest Ref Rng & Units 9/5/2023    11:16 AM 4/18/2023     4:25 PM 4/1/2023     8:23 PM 10/3/2022     9:31 AM 7/20/2022    11:02 AM   Labs Renal   BUN 7 - 20 mg/dL 27  27  30  25  21    Cr 0.6 - 1.2 mg/dL 1.0  0.8  1.1  1.0  0.8    K 3.5 - 5.1 mmol/L 3.9  3.7  3.6  3.3  3.9    Na 136 - 145 mmol/L 141  141  137  140  139       Lab Results   Component Value Date    WBC 6.2 04/18/2023    HGB 13.1 04/18/2023    HCT 39.3 04/18/2023    MCV 84.3 04/18/2023     04/18/2023        Outpatient Medications Marked as Taking for the 9/28/23 encounter (Office Visit) with Joseph Steen MD   Medication Sig Dispense Refill    buPROPion (WELLBUTRIN XL) 150 MG extended release tablet TAKE ONE

## 2023-09-28 ENCOUNTER — OFFICE VISIT (OUTPATIENT)
Age: 83
End: 2023-09-28

## 2023-09-28 VITALS
HEART RATE: 68 BPM | TEMPERATURE: 98.3 F | RESPIRATION RATE: 14 BRPM | SYSTOLIC BLOOD PRESSURE: 138 MMHG | DIASTOLIC BLOOD PRESSURE: 76 MMHG | OXYGEN SATURATION: 97 %

## 2023-09-28 DIAGNOSIS — F41.1 GAD (GENERALIZED ANXIETY DISORDER): ICD-10-CM

## 2023-09-28 DIAGNOSIS — M70.41 PREPATELLAR BURSITIS OF RIGHT KNEE: ICD-10-CM

## 2023-09-28 DIAGNOSIS — E78.01 ESSENTIAL FAMILIAL HYPERCHOLESTEROLEMIA: Primary | ICD-10-CM

## 2023-09-28 DIAGNOSIS — L72.3 SEBACEOUS CYST: ICD-10-CM

## 2023-09-28 DIAGNOSIS — F33.1 MODERATE EPISODE OF RECURRENT MAJOR DEPRESSIVE DISORDER (HCC): ICD-10-CM

## 2023-09-28 RX ORDER — DOXYCYCLINE HYCLATE 100 MG
100 TABLET ORAL 2 TIMES DAILY
Qty: 20 TABLET | Refills: 0 | Status: SHIPPED | OUTPATIENT
Start: 2023-09-28 | End: 2023-10-08

## 2023-10-21 SDOH — HEALTH STABILITY: PHYSICAL HEALTH: ON AVERAGE, HOW MANY MINUTES DO YOU ENGAGE IN EXERCISE AT THIS LEVEL?: 0 MIN

## 2023-10-21 SDOH — HEALTH STABILITY: PHYSICAL HEALTH: ON AVERAGE, HOW MANY DAYS PER WEEK DO YOU ENGAGE IN MODERATE TO STRENUOUS EXERCISE (LIKE A BRISK WALK)?: 0 DAYS

## 2023-10-24 ENCOUNTER — OFFICE VISIT (OUTPATIENT)
Dept: ORTHOPEDIC SURGERY | Age: 83
End: 2023-10-24
Payer: MEDICARE

## 2023-10-24 VITALS — WEIGHT: 167 LBS | BODY MASS INDEX: 33.67 KG/M2 | HEIGHT: 59 IN

## 2023-10-24 DIAGNOSIS — M25.511 RIGHT SHOULDER PAIN, UNSPECIFIED CHRONICITY: Primary | ICD-10-CM

## 2023-10-24 DIAGNOSIS — M75.101 ROTATOR CUFF TEAR ARTHROPATHY OF RIGHT SHOULDER: ICD-10-CM

## 2023-10-24 DIAGNOSIS — M12.811 ROTATOR CUFF TEAR ARTHROPATHY OF RIGHT SHOULDER: ICD-10-CM

## 2023-10-24 PROCEDURE — G8417 CALC BMI ABV UP PARAM F/U: HCPCS | Performed by: ORTHOPAEDIC SURGERY

## 2023-10-24 PROCEDURE — 1036F TOBACCO NON-USER: CPT | Performed by: ORTHOPAEDIC SURGERY

## 2023-10-24 PROCEDURE — G8427 DOCREV CUR MEDS BY ELIG CLIN: HCPCS | Performed by: ORTHOPAEDIC SURGERY

## 2023-10-24 PROCEDURE — 1090F PRES/ABSN URINE INCON ASSESS: CPT | Performed by: ORTHOPAEDIC SURGERY

## 2023-10-24 PROCEDURE — G8484 FLU IMMUNIZE NO ADMIN: HCPCS | Performed by: ORTHOPAEDIC SURGERY

## 2023-10-24 PROCEDURE — G8399 PT W/DXA RESULTS DOCUMENT: HCPCS | Performed by: ORTHOPAEDIC SURGERY

## 2023-10-24 PROCEDURE — 1123F ACP DISCUSS/DSCN MKR DOCD: CPT | Performed by: ORTHOPAEDIC SURGERY

## 2023-10-24 PROCEDURE — 99203 OFFICE O/P NEW LOW 30 MIN: CPT | Performed by: ORTHOPAEDIC SURGERY

## 2023-10-24 RX ORDER — MAGNESIUM GLUCONATE 30 MG(550)
250 TABLET ORAL 2 TIMES DAILY
COMMUNITY

## 2023-10-24 NOTE — PROGRESS NOTES
family and social history unless otherwise noted. All of the patient's questions were answered. Corina eLe MD, PhD  10/24/2023

## 2023-10-27 SDOH — HEALTH STABILITY: PHYSICAL HEALTH: ON AVERAGE, HOW MANY MINUTES DO YOU ENGAGE IN EXERCISE AT THIS LEVEL?: 20 MIN

## 2023-10-27 SDOH — HEALTH STABILITY: PHYSICAL HEALTH: ON AVERAGE, HOW MANY DAYS PER WEEK DO YOU ENGAGE IN MODERATE TO STRENUOUS EXERCISE (LIKE A BRISK WALK)?: 1 DAY

## 2023-10-27 ASSESSMENT — PATIENT HEALTH QUESTIONNAIRE - PHQ9
SUM OF ALL RESPONSES TO PHQ QUESTIONS 1-9: 0
2. FEELING DOWN, DEPRESSED OR HOPELESS: 0
SUM OF ALL RESPONSES TO PHQ QUESTIONS 1-9: 0
1. LITTLE INTEREST OR PLEASURE IN DOING THINGS: 0
SUM OF ALL RESPONSES TO PHQ9 QUESTIONS 1 & 2: 0
SUM OF ALL RESPONSES TO PHQ QUESTIONS 1-9: 0
SUM OF ALL RESPONSES TO PHQ QUESTIONS 1-9: 0

## 2023-10-27 ASSESSMENT — LIFESTYLE VARIABLES
HOW MANY STANDARD DRINKS CONTAINING ALCOHOL DO YOU HAVE ON A TYPICAL DAY: 0
HOW OFTEN DO YOU HAVE A DRINK CONTAINING ALCOHOL: NEVER
HOW OFTEN DO YOU HAVE A DRINK CONTAINING ALCOHOL: 1
HOW MANY STANDARD DRINKS CONTAINING ALCOHOL DO YOU HAVE ON A TYPICAL DAY: PATIENT DOES NOT DRINK
HOW OFTEN DO YOU HAVE SIX OR MORE DRINKS ON ONE OCCASION: 1

## 2023-10-30 ENCOUNTER — OFFICE VISIT (OUTPATIENT)
Age: 83
End: 2023-10-30

## 2023-10-30 VITALS
RESPIRATION RATE: 16 BRPM | HEART RATE: 73 BPM | SYSTOLIC BLOOD PRESSURE: 130 MMHG | DIASTOLIC BLOOD PRESSURE: 78 MMHG | OXYGEN SATURATION: 95 % | TEMPERATURE: 98.2 F

## 2023-10-30 DIAGNOSIS — Z00.00 MEDICARE ANNUAL WELLNESS VISIT, SUBSEQUENT: Primary | ICD-10-CM

## 2023-10-30 ASSESSMENT — PATIENT HEALTH QUESTIONNAIRE - PHQ9
SUM OF ALL RESPONSES TO PHQ QUESTIONS 1-9: 3
9. THOUGHTS THAT YOU WOULD BE BETTER OFF DEAD, OR OF HURTING YOURSELF: 0
10. IF YOU CHECKED OFF ANY PROBLEMS, HOW DIFFICULT HAVE THESE PROBLEMS MADE IT FOR YOU TO DO YOUR WORK, TAKE CARE OF THINGS AT HOME, OR GET ALONG WITH OTHER PEOPLE: 0
7. TROUBLE CONCENTRATING ON THINGS, SUCH AS READING THE NEWSPAPER OR WATCHING TELEVISION: 0
2. FEELING DOWN, DEPRESSED OR HOPELESS: 0
5. POOR APPETITE OR OVEREATING: 1
SUM OF ALL RESPONSES TO PHQ9 QUESTIONS 1 & 2: 0
6. FEELING BAD ABOUT YOURSELF - OR THAT YOU ARE A FAILURE OR HAVE LET YOURSELF OR YOUR FAMILY DOWN: 1
4. FEELING TIRED OR HAVING LITTLE ENERGY: 1
SUM OF ALL RESPONSES TO PHQ QUESTIONS 1-9: 3
8. MOVING OR SPEAKING SO SLOWLY THAT OTHER PEOPLE COULD HAVE NOTICED. OR THE OPPOSITE, BEING SO FIGETY OR RESTLESS THAT YOU HAVE BEEN MOVING AROUND A LOT MORE THAN USUAL: 0
1. LITTLE INTEREST OR PLEASURE IN DOING THINGS: 0
3. TROUBLE FALLING OR STAYING ASLEEP: 0

## 2023-10-30 ASSESSMENT — COLUMBIA-SUICIDE SEVERITY RATING SCALE - C-SSRS
7. DID THIS OCCUR IN THE LAST THREE MONTHS: YES
3. HAVE YOU BEEN THINKING ABOUT HOW YOU MIGHT KILL YOURSELF?: YES
5. HAVE YOU STARTED TO WORK OUT OR WORKED OUT THE DETAILS OF HOW TO KILL YOURSELF? DO YOU INTEND TO CARRY OUT THIS PLAN?: NO
4. HAVE YOU HAD THESE THOUGHTS AND HAD SOME INTENTION OF ACTING ON THEM?: NO

## 2023-10-30 NOTE — PROGRESS NOTES
(CALCIUM CITRATE + D3) 315-250 MG-UNIT TABS per tablet Take 1 tablet by mouth daily (with breakfast) Yes Power Lay MD       [facility] (Including outside providers/suppliers regularly involved in providing care):   Patient Care Team:  Brittni Yang MD as PCP - General (Family Medicine)  [facility], Power Florence MD [facility]  DOREEN Hagan CNP as Nurse Practitioner (Nurse Practitioner)     Reviewed and updated this visit:  Tobacco  Allergies  Meds  Problems  Med Hx  Surg Hx  Soc Hx  Fam Hx

## 2023-11-03 ENCOUNTER — HOSPITAL ENCOUNTER (OUTPATIENT)
Dept: MAMMOGRAPHY | Age: 83
Discharge: HOME | End: 2023-11-03
Payer: MEDICARE

## 2023-11-03 VITALS — WEIGHT: 167 LBS | HEIGHT: 58 IN | BODY MASS INDEX: 35.05 KG/M2

## 2023-11-03 DIAGNOSIS — Z12.31 VISIT FOR SCREENING MAMMOGRAM: ICD-10-CM

## 2023-11-03 PROCEDURE — 77063 BREAST TOMOSYNTHESIS BI: CPT

## 2023-11-06 ENCOUNTER — TELEPHONE (OUTPATIENT)
Dept: ORTHOPEDIC SURGERY | Age: 83
End: 2023-11-06

## 2023-11-06 DIAGNOSIS — M25.511 RIGHT SHOULDER PAIN, UNSPECIFIED CHRONICITY: Primary | ICD-10-CM

## 2023-11-06 NOTE — TELEPHONE ENCOUNTER
Surgery and/or Procedure Scheduling     Contact Name: Francoise Schilling  Surgical/Procedure Request: RT SHOULDER SURGERY  Patient Contact Number: [phone]    PATIENT IS CALLING IN TO SCHEDULE SX PLEASE CALL PATIENT AT NUMBER LISTED ABOVE

## 2023-11-14 ENCOUNTER — TELEPHONE (OUTPATIENT)
Dept: ORTHOPEDIC SURGERY | Age: 83
End: 2023-11-14

## 2023-11-14 NOTE — TELEPHONE ENCOUNTER
General Question     Subject: R SHOULDER PACKET SX 12-13-23  Patient and /or Facility Request: TonieFrancoise avina MAYCOL  Contact Number: [phone]    PATIENT CALLED IN TO SEE IF SHE CAN SPEAK TO SOMEONE ABOUT HER UPCOMING SX 12-13-23 ON HER R SHOULDER...    PATIENT THAT SHE BEEN WAITING ABOUT 10 DAYS FOR THE PACKET OF INF TO COME IN THE MAIL. TO GET HER PRE-OP PHYSICAL AND BLOOD WORK DONE...    PLEASE ADVISE

## 2023-11-15 NOTE — TELEPHONE ENCOUNTER
General Question     Subject: PT RECEIVED PACKET, BUT SX DATE STATES 12-18-23 AND SAYS SHE SCHED FOR 12-13-23. PLEASE ADVISE.  Patient: Francoise Schilling MAYCOL  Contact Number: [phone]

## 2023-11-20 RX ORDER — VALSARTAN 80 MG/1
80 TABLET ORAL DAILY
Qty: 90 TABLET | Refills: 1 | Status: SHIPPED | OUTPATIENT
Start: 2023-11-20

## 2023-11-20 NOTE — TELEPHONE ENCOUNTER
Requested Prescriptions     Pending Prescriptions Disp Refills    valsartan (DIOVAN) 80 MG tablet [Pharmacy Med Name: VALSARTAN 80 MG TABLET] 90 tablet 1     Sig: TAKE 1 TABLET BY MOUTH DAILY        Last OV: 10/30/23    Last labs: 10/23/23     F/u: 11/28/23

## 2023-11-24 ENCOUNTER — TELEPHONE (OUTPATIENT)
Dept: ORTHOPEDIC SURGERY | Age: 83
End: 2023-11-24

## 2023-11-24 NOTE — TELEPHONE ENCOUNTER
CPT:  55547   AUTH: [employer]  INSURANCE: MEDICARE  LOCATION [facility] OF SX RT SHLD

## 2023-11-27 PROBLEM — M12.811 ROTATOR CUFF ARTHROPATHY OF RIGHT SHOULDER: Status: ACTIVE | Noted: 2023-11-27

## 2023-11-28 ENCOUNTER — OFFICE VISIT (OUTPATIENT)
Dept: ORTHOPEDIC SURGERY | Age: 83
End: 2023-11-28

## 2023-11-28 VITALS — BODY MASS INDEX: 35.05 KG/M2 | HEIGHT: 58 IN | WEIGHT: 167 LBS

## 2023-11-28 DIAGNOSIS — M75.101 ROTATOR CUFF TEAR ARTHROPATHY OF RIGHT SHOULDER: Primary | ICD-10-CM

## 2023-11-28 DIAGNOSIS — M12.811 ROTATOR CUFF TEAR ARTHROPATHY OF RIGHT SHOULDER: Primary | ICD-10-CM

## 2023-11-28 DIAGNOSIS — M19.011 PRIMARY OSTEOARTHRITIS OF RIGHT SHOULDER: ICD-10-CM

## 2023-11-28 NOTE — PROGRESS NOTES
Chief Complaint    Shoulder Pain (PRE OP RIGHT SHOULDER)      History of Present Illness:  Becca Guerrier is a pleasant, 80 y.o., female, here today for a pre-operative exam of her right shoulder. She has known rotator cuff arthropathy of the right shoulder. She has exhausted non-operative measures for this and has opted to undergo surgical intervention. She is presently on the surgery schedule for 12/13/23 for a reverse total shoulder replacement. She has not yet had her pre-operative CT scan. Her son was present via phone call for the duration of this examination. She does live in independent living at the [facility]. Medical History:  Patient's medications, allergies, past medical, surgical, social and family histories were reviewed and updated as appropriate. No notes on file    Review of Systems  A 14 point review of systems was completed by the patient and is available in the media section of the scanned medical record and was reviewed on 11/28/2023. The review is negative with the exception of those things mentioned in the HPI and Past Medical History    Vital Signs: There were no vitals filed for this visit. General/Appearance: Alert and oriented and in no apparent distress. Skin:  There are no skin lesions, cellulitis, or extreme edema. The patient has warm and well-perfused Bilateral upper extremities with brisk capillary refill. Right Shoulder Exam:  Inspection: No gross deformities, no signs of infection. Palpation: Crepitus is present     Active Range of Motion: Forward Elevation 130, External Rotation 45, Internal Rotation L4    Passive Range of Motion: Deferred    Strength:  Not tested today    Special Tests:  No Osman muscle deformity. Neurovascular: Sensation to light touch is intact, no motor deficits, palpable radial pulses 2+      Radiology:     No new XR obtained at this time.          Assessment :  Ms. Becca Guerrier is a pleasant, 80 y.o. patient with right

## 2023-11-28 NOTE — H&P (VIEW-ONLY)
Chief Complaint    Shoulder Pain (PRE OP RIGHT SHOULDER)      History of Present Illness:  Joaquina Rubio is a pleasant, 80 y.o., female, here today for a pre-operative exam of her right shoulder. She has known rotator cuff arthropathy of the right shoulder. She has exhausted non-operative measures for this and has opted to undergo surgical intervention. She is presently on the surgery schedule for 12/13/23 for a reverse total shoulder replacement. She has not yet had her pre-operative CT scan. Her son was present via phone call for the duration of this examination. She does live in independent living at the [facility]. Medical History:  Patient's medications, allergies, past medical, surgical, social and family histories were reviewed and updated as appropriate. No notes on file    Review of Systems  A 14 point review of systems was completed by the patient and is available in the media section of the scanned medical record and was reviewed on 11/28/2023. The review is negative with the exception of those things mentioned in the HPI and Past Medical History    Vital Signs: There were no vitals filed for this visit. General/Appearance: Alert and oriented and in no apparent distress. Skin:  There are no skin lesions, cellulitis, or extreme edema. The patient has warm and well-perfused Bilateral upper extremities with brisk capillary refill. Right Shoulder Exam:  Inspection: No gross deformities, no signs of infection. Palpation: Crepitus is present     Active Range of Motion: Forward Elevation 130, External Rotation 45, Internal Rotation L4    Passive Range of Motion: Deferred    Strength:  Not tested today    Special Tests:  No Osman muscle deformity. Neurovascular: Sensation to light touch is intact, no motor deficits, palpable radial pulses 2+      Radiology:     No new XR obtained at this time.          Assessment :  Ms. Joaquina Rubio is a pleasant, 80 y.o. patient with right

## 2023-12-01 ENCOUNTER — OFFICE VISIT (OUTPATIENT)
Age: 83
End: 2023-12-01

## 2023-12-01 VITALS
HEIGHT: 58 IN | BODY MASS INDEX: 33.58 KG/M2 | HEART RATE: 71 BPM | DIASTOLIC BLOOD PRESSURE: 78 MMHG | TEMPERATURE: 98.2 F | WEIGHT: 160 LBS | RESPIRATION RATE: 16 BRPM | SYSTOLIC BLOOD PRESSURE: 128 MMHG | OXYGEN SATURATION: 98 %

## 2023-12-01 DIAGNOSIS — G47.33 OSA (OBSTRUCTIVE SLEEP APNEA): ICD-10-CM

## 2023-12-01 DIAGNOSIS — R73.01 ELEVATED FASTING GLUCOSE: ICD-10-CM

## 2023-12-01 DIAGNOSIS — Z91.89 AT RISK FOR SEPSIS DUE TO URINARY TRACT INFECTION: ICD-10-CM

## 2023-12-01 DIAGNOSIS — N39.0 AT RISK FOR SEPSIS DUE TO URINARY TRACT INFECTION: ICD-10-CM

## 2023-12-01 DIAGNOSIS — M19.09 PRIMARY OSTEOARTHRITIS, OTHER SPECIFIED SITE: ICD-10-CM

## 2023-12-01 DIAGNOSIS — E78.01 ESSENTIAL FAMILIAL HYPERCHOLESTEROLEMIA: ICD-10-CM

## 2023-12-01 DIAGNOSIS — K59.04 CHRONIC IDIOPATHIC CONSTIPATION: ICD-10-CM

## 2023-12-01 DIAGNOSIS — M25.59 PAIN IN OTHER SPECIFIED JOINT: ICD-10-CM

## 2023-12-01 DIAGNOSIS — M12.811 ROTATOR CUFF ARTHROPATHY OF RIGHT SHOULDER: ICD-10-CM

## 2023-12-01 DIAGNOSIS — Z01.818 PREOP EXAMINATION: Primary | ICD-10-CM

## 2023-12-01 DIAGNOSIS — Z01.818 PREOP EXAMINATION: ICD-10-CM

## 2023-12-01 LAB
APTT BLD: 35.1 SEC (ref 22.7–35.9)
ERYTHROCYTE [SEDIMENTATION RATE] IN BLOOD BY WESTERGREN METHOD: 17 MM/HR (ref 0–30)
INR PPP: 1 (ref 0.84–1.16)
PROTHROMBIN TIME: 13.2 SEC (ref 11.5–14.8)

## 2023-12-01 RX ORDER — LUBIPROSTONE 24 UG/1
24 CAPSULE ORAL 2 TIMES DAILY WITH MEALS
Qty: 60 CAPSULE | Refills: 5 | Status: SHIPPED | OUTPATIENT
Start: 2023-12-01

## 2023-12-02 LAB
ALBUMIN SERPL-MCNC: 4.7 G/DL (ref 3.4–5)
ALBUMIN/GLOB SERPL: 2 {RATIO} (ref 1.1–2.2)
ALP SERPL-CCNC: 72 U/L (ref 40–129)
ALT SERPL-CCNC: 28 U/L (ref 10–40)
ANION GAP SERPL CALCULATED.3IONS-SCNC: 10 MMOL/L (ref 3–16)
AST SERPL-CCNC: 26 U/L (ref 15–37)
BASOPHILS # BLD: 0 K/UL (ref 0–0.2)
BASOPHILS NFR BLD: 0.4 %
BILIRUB SERPL-MCNC: 0.4 MG/DL (ref 0–1)
BUN SERPL-MCNC: 26 MG/DL (ref 7–20)
CALCIUM SERPL-MCNC: 10.2 MG/DL (ref 8.3–10.6)
CHLORIDE SERPL-SCNC: 100 MMOL/L (ref 99–110)
CO2 SERPL-SCNC: 31 MMOL/L (ref 21–32)
CREAT SERPL-MCNC: 0.9 MG/DL (ref 0.6–1.2)
CRP SERPL-MCNC: 5.2 MG/L (ref 0–5.1)
DEPRECATED RDW RBC AUTO: 14.4 % (ref 12.4–15.4)
EOSINOPHIL # BLD: 0.1 K/UL (ref 0–0.6)
EOSINOPHIL NFR BLD: 1.3 %
EST. AVERAGE GLUCOSE BLD GHB EST-MCNC: 114 MG/DL
GFR SERPLBLD CREATININE-BSD FMLA CKD-EPI: >60 ML/MIN/{1.73_M2}
GLUCOSE SERPL-MCNC: 92 MG/DL (ref 70–99)
HBA1C MFR BLD: 5.6 %
HCT VFR BLD AUTO: 40.3 % (ref 36–48)
HGB BLD-MCNC: 13.4 G/DL (ref 12–16)
LYMPHOCYTES # BLD: 1 K/UL (ref 1–5.1)
LYMPHOCYTES NFR BLD: 16.1 %
MCH RBC QN AUTO: 28.4 PG (ref 26–34)
MCHC RBC AUTO-ENTMCNC: 33.2 G/DL (ref 31–36)
MCV RBC AUTO: 85.3 FL (ref 80–100)
MONOCYTES # BLD: 0.9 K/UL (ref 0–1.3)
MONOCYTES NFR BLD: 14.9 %
NEUTROPHILS # BLD: 4 K/UL (ref 1.7–7.7)
NEUTROPHILS NFR BLD: 67.3 %
PLATELET # BLD AUTO: 189 K/UL (ref 135–450)
PMV BLD AUTO: 9.8 FL (ref 5–10.5)
POTASSIUM SERPL-SCNC: 4.4 MMOL/L (ref 3.5–5.1)
PROT SERPL-MCNC: 7.1 G/DL (ref 6.4–8.2)
RBC # BLD AUTO: 4.73 M/UL (ref 4–5.2)
SODIUM SERPL-SCNC: 141 MMOL/L (ref 136–145)
WBC # BLD AUTO: 5.9 K/UL (ref 4–11)

## 2023-12-03 LAB — MRSA DNA SPEC QL NAA+PROBE: NORMAL

## 2023-12-04 ENCOUNTER — TELEPHONE (OUTPATIENT)
Dept: ORTHOPEDIC SURGERY | Age: 83
End: 2023-12-04

## 2023-12-05 ENCOUNTER — TELEPHONE (OUTPATIENT)
Age: 83
End: 2023-12-05

## 2023-12-05 NOTE — TELEPHONE ENCOUNTER
CALLED BA AND SHE ADVISED SHE LEFT A URINE SAMPLE. CALLED THE LAB. THE PHLEBOTOMIST STATED THE PATIENT WAS UNABLE TO LEAVE A SAMPLE.  ADVISED BA OF THIS AND SHE SAID SHE KNOWS SHE LEFT A URINE SAMPLE BUT WOULD GO BACK UP TODAY, TOMORROW OR THE NEXT DAY AND LEAVE A SAMPLE. MARLENE

## 2023-12-05 NOTE — TELEPHONE ENCOUNTER
I have all labs back for her preop except the urine culture. If she did no leave spec a the lab she will have to go there or  come here to provided specimen.   Thanks

## 2023-12-07 LAB — BACTERIA UR CULT: NORMAL

## 2023-12-08 RX ORDER — ACETAMINOPHEN 160 MG
TABLET,DISINTEGRATING ORAL DAILY
COMMUNITY

## 2023-12-10 DIAGNOSIS — I10 BENIGN ESSENTIAL HYPERTENSION: ICD-10-CM

## 2023-12-11 ENCOUNTER — TELEPHONE (OUTPATIENT)
Dept: ORTHOPEDIC SURGERY | Age: 83
End: 2023-12-11

## 2023-12-11 RX ORDER — POTASSIUM CHLORIDE 750 MG/1
10 TABLET, EXTENDED RELEASE ORAL DAILY
Qty: 90 TABLET | Refills: 1 | Status: SHIPPED | OUTPATIENT
Start: 2023-12-11

## 2023-12-11 NOTE — TELEPHONE ENCOUNTER
Requested Prescriptions     Pending Prescriptions Disp Refills    potassium chloride (KLOR-CON M) 10 MEQ extended release tablet 90 tablet 1     Sig: Take 1 tablet by mouth daily        Last OV: 12/1/23    Last labs: 12/6/23     F/u: None

## 2023-12-11 NOTE — TELEPHONE ENCOUNTER
OTHER    PATIENT CALLED TO INQUIRE WHAT TIME TO ARRIVE AT HOSPITAL FOR SCHEDULED SX 12/13.    PLEASE ADVISE

## 2023-12-12 ENCOUNTER — ANESTHESIA EVENT (OUTPATIENT)
Dept: OPERATING ROOM | Age: 83
End: 2023-12-12
Payer: MEDICARE

## 2023-12-13 ENCOUNTER — HOSPITAL ENCOUNTER (OUTPATIENT)
Age: 83
Setting detail: OUTPATIENT SURGERY
Discharge: HOME | End: 2023-12-13
Attending: ORTHOPAEDIC SURGERY | Admitting: ORTHOPAEDIC SURGERY
Payer: MEDICARE

## 2023-12-13 ENCOUNTER — APPOINTMENT (OUTPATIENT)
Dept: GENERAL RADIOLOGY | Age: 83
End: 2023-12-13
Attending: ORTHOPAEDIC SURGERY
Payer: MEDICARE

## 2023-12-13 ENCOUNTER — ANESTHESIA (OUTPATIENT)
Dept: OPERATING ROOM | Age: 83
End: 2023-12-13
Payer: MEDICARE

## 2023-12-13 VITALS
HEIGHT: 58 IN | RESPIRATION RATE: 20 BRPM | HEART RATE: 67 BPM | WEIGHT: 159 LBS | BODY MASS INDEX: 33.37 KG/M2 | DIASTOLIC BLOOD PRESSURE: 83 MMHG | SYSTOLIC BLOOD PRESSURE: 149 MMHG | TEMPERATURE: 97 F | OXYGEN SATURATION: 92 %

## 2023-12-13 DIAGNOSIS — Z96.611 STATUS POST REVERSE ARTHROPLASTY OF RIGHT SHOULDER: Primary | ICD-10-CM

## 2023-12-13 LAB
ABO + RH BLD: NORMAL
BLD GP AB SCN SERPL QL: NORMAL

## 2023-12-13 PROCEDURE — 97166 OT EVAL MOD COMPLEX 45 MIN: CPT

## 2023-12-13 PROCEDURE — 97530 THERAPEUTIC ACTIVITIES: CPT

## 2023-12-13 PROCEDURE — 97116 GAIT TRAINING THERAPY: CPT

## 2023-12-13 PROCEDURE — 2580000003 HC RX 258: Performed by: ORTHOPAEDIC SURGERY

## 2023-12-13 PROCEDURE — 3700000001 HC ADD 15 MINUTES (ANESTHESIA): Performed by: ORTHOPAEDIC SURGERY

## 2023-12-13 PROCEDURE — 86900 BLOOD TYPING SEROLOGIC ABO: CPT

## 2023-12-13 PROCEDURE — 7100000010 HC PHASE II RECOVERY - FIRST 15 MIN: Performed by: ORTHOPAEDIC SURGERY

## 2023-12-13 PROCEDURE — 3700000000 HC ANESTHESIA ATTENDED CARE: Performed by: ORTHOPAEDIC SURGERY

## 2023-12-13 PROCEDURE — 2580000003 HC RX 258: Performed by: NURSE ANESTHETIST, CERTIFIED REGISTERED

## 2023-12-13 PROCEDURE — 6360000002 HC RX W HCPCS: Performed by: ANESTHESIOLOGY

## 2023-12-13 PROCEDURE — 64415 NJX AA&/STRD BRCH PLXS IMG: CPT | Performed by: ANESTHESIOLOGY

## 2023-12-13 PROCEDURE — 2720000010 HC SURG SUPPLY STERILE: Performed by: ORTHOPAEDIC SURGERY

## 2023-12-13 PROCEDURE — 97162 PT EVAL MOD COMPLEX 30 MIN: CPT

## 2023-12-13 PROCEDURE — 7100000001 HC PACU RECOVERY - ADDTL 15 MIN: Performed by: ORTHOPAEDIC SURGERY

## 2023-12-13 PROCEDURE — 86850 RBC ANTIBODY SCREEN: CPT

## 2023-12-13 PROCEDURE — 6370000000 HC RX 637 (ALT 250 FOR IP): Performed by: ORTHOPAEDIC SURGERY

## 2023-12-13 PROCEDURE — 2709999900 HC NON-CHARGEABLE SUPPLY: Performed by: ORTHOPAEDIC SURGERY

## 2023-12-13 PROCEDURE — 86901 BLOOD TYPING SEROLOGIC RH(D): CPT

## 2023-12-13 PROCEDURE — 7100000000 HC PACU RECOVERY - FIRST 15 MIN: Performed by: ORTHOPAEDIC SURGERY

## 2023-12-13 PROCEDURE — 2580000003 HC RX 258: Performed by: ANESTHESIOLOGY

## 2023-12-13 PROCEDURE — 3600000014 HC SURGERY LEVEL 4 ADDTL 15MIN: Performed by: ORTHOPAEDIC SURGERY

## 2023-12-13 PROCEDURE — 97535 SELF CARE MNGMENT TRAINING: CPT

## 2023-12-13 PROCEDURE — A4217 STERILE WATER/SALINE, 500 ML: HCPCS | Performed by: ORTHOPAEDIC SURGERY

## 2023-12-13 PROCEDURE — 73020 X-RAY EXAM OF SHOULDER: CPT

## 2023-12-13 PROCEDURE — 6360000002 HC RX W HCPCS: Performed by: ORTHOPAEDIC SURGERY

## 2023-12-13 PROCEDURE — 3600000004 HC SURGERY LEVEL 4 BASE: Performed by: ORTHOPAEDIC SURGERY

## 2023-12-13 PROCEDURE — 7100000011 HC PHASE II RECOVERY - ADDTL 15 MIN: Performed by: ORTHOPAEDIC SURGERY

## 2023-12-13 PROCEDURE — C1776 JOINT DEVICE (IMPLANTABLE): HCPCS | Performed by: ORTHOPAEDIC SURGERY

## 2023-12-13 PROCEDURE — C9290 INJ, BUPIVACAINE LIPOSOME: HCPCS | Performed by: ANESTHESIOLOGY

## 2023-12-13 PROCEDURE — 6360000002 HC RX W HCPCS: Performed by: NURSE ANESTHETIST, CERTIFIED REGISTERED

## 2023-12-13 PROCEDURE — 2500000003 HC RX 250 WO HCPCS: Performed by: NURSE ANESTHETIST, CERTIFIED REGISTERED

## 2023-12-13 DEVICE — ALTIVATE REVERSE, HUMERAL STEM, SMALL SHELL, SZ 12X48MM
Type: IMPLANTABLE DEVICE | Site: SHOULDER | Status: FUNCTIONAL
Brand: DJO SURGICAL

## 2023-12-13 DEVICE — RSP BASEPLATE, 30MM, W/P2 COATING
Type: IMPLANTABLE DEVICE | Site: SHOULDER | Status: FUNCTIONAL
Brand: DJO SURGICAL

## 2023-12-13 DEVICE — SCREW, LOCKING BONE, RSP, 5X26
Type: IMPLANTABLE DEVICE | Site: SHOULDER | Status: FUNCTIONAL
Brand: DJO SURGICAL

## 2023-12-13 DEVICE — SCREW, LOCKING BONE, RSP, 5X14
Type: IMPLANTABLE DEVICE | Site: SHOULDER | Status: FUNCTIONAL
Brand: DJO SURGICAL

## 2023-12-13 DEVICE — AR, SMALL SOCKET INSERT, 36MM NEUTRAL EPLUS
Type: IMPLANTABLE DEVICE | Site: SHOULDER | Status: FUNCTIONAL
Brand: DJO SURGICAL

## 2023-12-13 DEVICE — GLENOID, HEAD W/RETAINING SCREW, RSP, 36MM, NEUTRAL
Type: IMPLANTABLE DEVICE | Site: SHOULDER | Status: FUNCTIONAL
Brand: DJO SURGICAL

## 2023-12-13 DEVICE — IMPL CAPPED SHOULDER S3 TOTAL ADV REVERSE DJO: Type: IMPLANTABLE DEVICE | Site: SHOULDER | Status: FUNCTIONAL

## 2023-12-13 RX ORDER — ONDANSETRON 4 MG/1
4 TABLET, FILM COATED ORAL 3 TIMES DAILY PRN
Qty: 15 TABLET | Refills: 0 | Status: SHIPPED | OUTPATIENT
Start: 2023-12-13

## 2023-12-13 RX ORDER — SENNOSIDES A AND B 8.6 MG/1
1 TABLET, FILM COATED ORAL DAILY
Qty: 30 TABLET | Refills: 0 | Status: SHIPPED | OUTPATIENT
Start: 2023-12-13

## 2023-12-13 RX ORDER — ASPIRIN 81 MG/1
81 TABLET ORAL 2 TIMES DAILY
Qty: 28 TABLET | Refills: 0 | Status: SHIPPED | OUTPATIENT
Start: 2023-12-13

## 2023-12-13 RX ORDER — DEXAMETHASONE SODIUM PHOSPHATE 4 MG/ML
INJECTION, SOLUTION INTRA-ARTICULAR; INTRALESIONAL; INTRAMUSCULAR; INTRAVENOUS; SOFT TISSUE PRN
Status: DISCONTINUED | OUTPATIENT
Start: 2023-12-13 | End: 2023-12-13 | Stop reason: SDUPTHER

## 2023-12-13 RX ORDER — OXYCODONE HYDROCHLORIDE AND ACETAMINOPHEN 5; 325 MG/1; MG/1
1 TABLET ORAL EVERY 6 HOURS PRN
Qty: 28 TABLET | Refills: 0 | Status: SHIPPED | OUTPATIENT
Start: 2023-12-13 | End: 2023-12-20

## 2023-12-13 RX ORDER — PREGABALIN 150 MG/1
150 CAPSULE ORAL ONCE
Status: COMPLETED | OUTPATIENT
Start: 2023-12-13 | End: 2023-12-13

## 2023-12-13 RX ORDER — PROCHLORPERAZINE EDISYLATE 5 MG/ML
5 INJECTION INTRAMUSCULAR; INTRAVENOUS
Status: DISCONTINUED | OUTPATIENT
Start: 2023-12-13 | End: 2023-12-13 | Stop reason: HOSPADM

## 2023-12-13 RX ORDER — SODIUM CHLORIDE, SODIUM LACTATE, POTASSIUM CHLORIDE, CALCIUM CHLORIDE 600; 310; 30; 20 MG/100ML; MG/100ML; MG/100ML; MG/100ML
INJECTION, SOLUTION INTRAVENOUS CONTINUOUS
Status: DISCONTINUED | OUTPATIENT
Start: 2023-12-13 | End: 2023-12-13 | Stop reason: HOSPADM

## 2023-12-13 RX ORDER — ACETAMINOPHEN 500 MG
1000 TABLET ORAL ONCE
Status: COMPLETED | OUTPATIENT
Start: 2023-12-13 | End: 2023-12-13

## 2023-12-13 RX ORDER — DEXMEDETOMIDINE HYDROCHLORIDE 100 UG/ML
INJECTION, SOLUTION INTRAVENOUS PRN
Status: DISCONTINUED | OUTPATIENT
Start: 2023-12-13 | End: 2023-12-13 | Stop reason: SDUPTHER

## 2023-12-13 RX ORDER — DOXYCYCLINE HYCLATE 100 MG
100 TABLET ORAL 2 TIMES DAILY
Qty: 10 TABLET | Refills: 0 | Status: SHIPPED | OUTPATIENT
Start: 2023-12-13 | End: 2023-12-18

## 2023-12-13 RX ORDER — SODIUM CHLORIDE 0.9 % (FLUSH) 0.9 %
5-40 SYRINGE (ML) INJECTION PRN
Status: DISCONTINUED | OUTPATIENT
Start: 2023-12-13 | End: 2023-12-13 | Stop reason: HOSPADM

## 2023-12-13 RX ORDER — OXYCODONE HYDROCHLORIDE 5 MG/1
5 TABLET ORAL PRN
Status: DISCONTINUED | OUTPATIENT
Start: 2023-12-13 | End: 2023-12-13 | Stop reason: HOSPADM

## 2023-12-13 RX ORDER — VANCOMYCIN HYDROCHLORIDE 1 G/20ML
INJECTION, POWDER, LYOPHILIZED, FOR SOLUTION INTRAVENOUS PRN
Status: DISCONTINUED | OUTPATIENT
Start: 2023-12-13 | End: 2023-12-13 | Stop reason: HOSPADM

## 2023-12-13 RX ORDER — OXYCODONE HYDROCHLORIDE 5 MG/1
10 TABLET ORAL PRN
Status: DISCONTINUED | OUTPATIENT
Start: 2023-12-13 | End: 2023-12-13 | Stop reason: HOSPADM

## 2023-12-13 RX ORDER — LABETALOL HYDROCHLORIDE 5 MG/ML
10 INJECTION, SOLUTION INTRAVENOUS
Status: DISCONTINUED | OUTPATIENT
Start: 2023-12-13 | End: 2023-12-13 | Stop reason: HOSPADM

## 2023-12-13 RX ORDER — FENTANYL CITRATE 50 UG/ML
25 INJECTION, SOLUTION INTRAMUSCULAR; INTRAVENOUS EVERY 5 MIN PRN
Status: DISCONTINUED | OUTPATIENT
Start: 2023-12-13 | End: 2023-12-13 | Stop reason: HOSPADM

## 2023-12-13 RX ORDER — FENTANYL CITRATE 50 UG/ML
INJECTION, SOLUTION INTRAMUSCULAR; INTRAVENOUS
Status: DISCONTINUED
Start: 2023-12-13 | End: 2023-12-13 | Stop reason: HOSPADM

## 2023-12-13 RX ORDER — BUPIVACAINE HYDROCHLORIDE 5 MG/ML
INJECTION, SOLUTION EPIDURAL; INTRACAUDAL
Status: COMPLETED
Start: 2023-12-13 | End: 2023-12-13

## 2023-12-13 RX ORDER — SODIUM CHLORIDE 0.9 % (FLUSH) 0.9 %
5-40 SYRINGE (ML) INJECTION EVERY 12 HOURS SCHEDULED
Status: DISCONTINUED | OUTPATIENT
Start: 2023-12-13 | End: 2023-12-13 | Stop reason: HOSPADM

## 2023-12-13 RX ORDER — PHENYLEPHRINE HYDROCHLORIDE 10 MG/ML
INJECTION INTRAVENOUS PRN
Status: DISCONTINUED | OUTPATIENT
Start: 2023-12-13 | End: 2023-12-13 | Stop reason: SDUPTHER

## 2023-12-13 RX ORDER — BUPIVACAINE HYDROCHLORIDE 5 MG/ML
INJECTION, SOLUTION EPIDURAL; INTRACAUDAL
Status: COMPLETED | OUTPATIENT
Start: 2023-12-13 | End: 2023-12-13

## 2023-12-13 RX ORDER — LIDOCAINE HYDROCHLORIDE 20 MG/ML
INJECTION, SOLUTION INTRAVENOUS PRN
Status: DISCONTINUED | OUTPATIENT
Start: 2023-12-13 | End: 2023-12-13 | Stop reason: SDUPTHER

## 2023-12-13 RX ORDER — SODIUM CHLORIDE 9 MG/ML
INJECTION, SOLUTION INTRAVENOUS PRN
Status: DISCONTINUED | OUTPATIENT
Start: 2023-12-13 | End: 2023-12-13 | Stop reason: HOSPADM

## 2023-12-13 RX ORDER — CELECOXIB 200 MG/1
400 CAPSULE ORAL ONCE
Status: COMPLETED | OUTPATIENT
Start: 2023-12-13 | End: 2023-12-13

## 2023-12-13 RX ORDER — HYDROMORPHONE HYDROCHLORIDE 1 MG/ML
0.5 INJECTION, SOLUTION INTRAMUSCULAR; INTRAVENOUS; SUBCUTANEOUS EVERY 5 MIN PRN
Status: DISCONTINUED | OUTPATIENT
Start: 2023-12-13 | End: 2023-12-13 | Stop reason: HOSPADM

## 2023-12-13 RX ORDER — PROPOFOL 10 MG/ML
INJECTION, EMULSION INTRAVENOUS PRN
Status: DISCONTINUED | OUTPATIENT
Start: 2023-12-13 | End: 2023-12-13 | Stop reason: SDUPTHER

## 2023-12-13 RX ORDER — ONDANSETRON 2 MG/ML
INJECTION INTRAMUSCULAR; INTRAVENOUS PRN
Status: DISCONTINUED | OUTPATIENT
Start: 2023-12-13 | End: 2023-12-13 | Stop reason: SDUPTHER

## 2023-12-13 RX ORDER — ROCURONIUM BROMIDE 10 MG/ML
INJECTION, SOLUTION INTRAVENOUS PRN
Status: DISCONTINUED | OUTPATIENT
Start: 2023-12-13 | End: 2023-12-13 | Stop reason: SDUPTHER

## 2023-12-13 RX ORDER — MAGNESIUM HYDROXIDE 1200 MG/15ML
LIQUID ORAL CONTINUOUS PRN
Status: DISCONTINUED | OUTPATIENT
Start: 2023-12-13 | End: 2023-12-13 | Stop reason: HOSPADM

## 2023-12-13 RX ADMIN — PHENYLEPHRINE HYDROCHLORIDE 200 MCG: 10 INJECTION INTRAVENOUS at 09:19

## 2023-12-13 RX ADMIN — VANCOMYCIN HYDROCHLORIDE 1000 MG: 10 INJECTION, POWDER, LYOPHILIZED, FOR SOLUTION INTRAVENOUS at 08:38

## 2023-12-13 RX ADMIN — ROCURONIUM BROMIDE 100 MG: 10 INJECTION, SOLUTION INTRAVENOUS at 08:54

## 2023-12-13 RX ADMIN — DEXMEDETOMIDINE HYDROCHLORIDE 4 MCG: 100 INJECTION, SOLUTION INTRAVENOUS at 10:04

## 2023-12-13 RX ADMIN — SUGAMMADEX 200 MG: 100 INJECTION, SOLUTION INTRAVENOUS at 10:40

## 2023-12-13 RX ADMIN — LIDOCAINE HYDROCHLORIDE 80 MG: 20 INJECTION, SOLUTION INTRAVENOUS at 08:54

## 2023-12-13 RX ADMIN — DEXMEDETOMIDINE HYDROCHLORIDE 4 MCG: 100 INJECTION, SOLUTION INTRAVENOUS at 10:17

## 2023-12-13 RX ADMIN — ACETAMINOPHEN 1000 MG: 500 TABLET ORAL at 07:50

## 2023-12-13 RX ADMIN — PHENYLEPHRINE HYDROCHLORIDE 200 MCG: 10 INJECTION INTRAVENOUS at 10:39

## 2023-12-13 RX ADMIN — CEFTRIAXONE SODIUM 2000 MG: 2 INJECTION, POWDER, FOR SOLUTION INTRAMUSCULAR; INTRAVENOUS at 08:52

## 2023-12-13 RX ADMIN — PROPOFOL 120 MG: 10 INJECTION, EMULSION INTRAVENOUS at 08:54

## 2023-12-13 RX ADMIN — DEXAMETHASONE SODIUM PHOSPHATE 10 MG: 4 INJECTION, SOLUTION INTRAMUSCULAR; INTRAVENOUS at 09:05

## 2023-12-13 RX ADMIN — CELECOXIB 400 MG: 200 CAPSULE ORAL at 07:50

## 2023-12-13 RX ADMIN — PREGABALIN 150 MG: 150 CAPSULE ORAL at 07:50

## 2023-12-13 RX ADMIN — BUPIVACAINE 10 ML: 13.3 INJECTION, SUSPENSION, LIPOSOMAL INFILTRATION at 08:21

## 2023-12-13 RX ADMIN — PHENYLEPHRINE HYDROCHLORIDE 200 MCG: 10 INJECTION INTRAVENOUS at 09:41

## 2023-12-13 RX ADMIN — PHENYLEPHRINE HYDROCHLORIDE 25 MCG/MIN: 10 INJECTION INTRAVENOUS at 09:51

## 2023-12-13 RX ADMIN — SODIUM CHLORIDE, POTASSIUM CHLORIDE, SODIUM LACTATE AND CALCIUM CHLORIDE: 600; 310; 30; 20 INJECTION, SOLUTION INTRAVENOUS at 07:53

## 2023-12-13 RX ADMIN — PHENYLEPHRINE HYDROCHLORIDE 200 MCG: 10 INJECTION INTRAVENOUS at 09:02

## 2023-12-13 RX ADMIN — BUPIVACAINE HYDROCHLORIDE 5 ML: 5 INJECTION, SOLUTION EPIDURAL; INTRACAUDAL; PERINEURAL at 08:21

## 2023-12-13 RX ADMIN — ONDANSETRON 4 MG: 2 INJECTION INTRAMUSCULAR; INTRAVENOUS at 09:05

## 2023-12-13 ASSESSMENT — PAIN SCALES - GENERAL
PAINLEVEL_OUTOF10: 0

## 2023-12-13 ASSESSMENT — PAIN DESCRIPTION - ORIENTATION: ORIENTATION: RIGHT

## 2023-12-13 ASSESSMENT — PAIN - FUNCTIONAL ASSESSMENT
PAIN_FUNCTIONAL_ASSESSMENT: 0-10
PAIN_FUNCTIONAL_ASSESSMENT: 0-10

## 2023-12-13 ASSESSMENT — PAIN DESCRIPTION - LOCATION: LOCATION: SHOULDER

## 2023-12-13 NOTE — ANESTHESIA PROCEDURE NOTES
Peripheral Block    Patient location during procedure: pre-op  Reason for block: post-op pain management and at surgeon's request  Start time: 12/13/2023 8:21 AM  End time: 12/13/2023 8:24 AM  Staffing  Performed: anesthesiologist   Anesthesiologist: Madai Corrales DO  Performed by: Madai Corrales DO  Authorized by: Madai Corrales DO    Preanesthetic Checklist  Completed: patient identified, IV checked, site marked, risks and benefits discussed, surgical/procedural consents, equipment checked, pre-op evaluation, timeout performed, anesthesia consent given, oxygen available, monitors applied/VS acknowledged, fire risk safety assessment completed and verbalized and blood product R/B/A discussed and consented  Peripheral Block   Patient position: sitting  Prep: ChloraPrep  Provider prep: mask and sterile gloves  Patient monitoring: cardiac monitor, continuous pulse ox, continuous capnometry, frequent blood pressure checks, IV access, oxygen and responsive to questions  Block type: Brachial plexus  Interscalene  Laterality: right  Injection technique: single-shot  Guidance: ultrasound guided  Local infiltration: bupivacaine  Local infiltration: bupivacaine    Needle   Needle type: insulated echogenic nerve stimulator needle   Needle gauge: 20 G  Needle localization: ultrasound guidance  Needle length: 8 cm  Assessment   Injection assessment: negative aspiration for heme, no paresthesia on injection, local visualized surrounding nerve on ultrasound and no intravascular symptoms  Paresthesia pain: none  Slow fractionated injection: yes  Hemodynamics: stable  Real-time US image taken/store: yes  Outcomes: uncomplicated    Medications Administered  bupivacaine (MARCAINE) PF injection 0.5% - Perineural   5 mL - 12/13/2023 8:21:00 AM  bupivacaine liposome (EXPAREL) injection 1.3% - Perineural   10 mL - 12/13/2023 8:21:00 AM

## 2023-12-13 NOTE — DISCHARGE INSTRUCTIONS
Dr. Mckeon Cough Discharge Instructions    Take pain medication as directed. If taking narcotic pain medication, do not take tylenol with this as there is tylenol in percocet/norco. Do not operate machinery while taking narcotic pain medications  Non weight bearing to effected extremity  Maintain sling until follow up or therapy. OK to remove sling several times a day to move elbow and wrist, no shoulder motion unless directed by therapy or Dr. Rodrigo Mejia. Therapy as instructed. You should receive a call regarding therapy  Resume regular diet  May take down dressing in 72 hours and replace with a clean dressing  May shower after 72 hours. Avoid any submersion of operative extremity. Avoid any hot tubs, tub baths, pools, lakes, ocean ect. Avoid contact with dishwater or dirty water. Call immediately if any increasing redness or drainage, any fevers. Senna for constipation  zofran for nausea  Aspirin 81 mg daily for next 14 days to decrease risk of blood clots    Dr. Pedro Doe and Trent Rowan  Call [phone] for appointment          [facility]    There are potential side effects of anesthesia or sedation you may experience for the first 24 hours. These side effects include:    Confusion or Memory loss, Dizziness, or Delayed Reaction Times   [x]A responsible person should be with you for the next 24 hours. Do not operate any vehicles (automobiles, bicycles, motorcycles) or power tools or machinery for 24 hours. Do not sign any legal documents or make any legal decisions for 24 hours. Do not drink alcohol for 24 hours or while taking narcotic pain medication. Nausea    [x]Start with light diet and progress to your normal diet as you feel like eating. However, if you experience nausea or repeated episodes of vomiting which persist beyond 12-24 hours, notify your physician.   Once nausea has passed, remember to keep drinking

## 2023-12-13 NOTE — INTERVAL H&P NOTE
Update History & Physical    The patient's History and Physical of November 28, 2023 was reviewed with the patient and I examined the patient. There was no change. The surgical site was confirmed by the patient and me. Plan: The risks, benefits, expected outcome, and alternative to the recommended procedure have been discussed with the patient. Patient understands and wants to proceed with the procedure.      Electronically signed by Nano Price MD on 12/13/2023 at 10:25 AM

## 2023-12-13 NOTE — ANESTHESIA POSTPROCEDURE EVALUATION
Department of Anesthesiology  Postprocedure Note    Patient: Michael Hyman  MRN: [id number]  YOB: 1940  Date of evaluation: 12/13/2023    Procedure Summary       Date: 12/13/23 Room / Location: [place] [address] / [address]    Anesthesia Start: 2744 Anesthesia Stop: 8558    Procedure: RIGHT REVERSE TOTAL SHOULDER REPLACEMENT (Right: Shoulder) Diagnosis:       Right shoulder pain, unspecified chronicity      Tear of right rotator cuff, unspecified tear extent, unspecified whether traumatic      (Right shoulder pain, unspecified chronicity [M25.511])      (Tear of right rotator cuff, unspecified tear extent, unspecified whether traumatic [M75.101])    Surgeons: Chrissy Mcneill MD Responsible Provider: Fauzia Taylor DO    Anesthesia Type: general, regional ASA Status: 2            Anesthesia Type: No value filed. Juan Phase I: Juan Score: 8    Juan Phase II:      Anesthesia Post Evaluation    Patient location during evaluation: PACU  Patient participation: complete - patient participated  Level of consciousness: awake and awake and alert  Pain score: 0  Airway patency: patent  Nausea & Vomiting: no nausea and no vomiting  Cardiovascular status: hemodynamically stable  Respiratory status: acceptable  Hydration status: euvolemic  Pain management: adequate and satisfactory to patient    No notable events documented.

## 2023-12-13 NOTE — BRIEF OP NOTE
Brief Postoperative Note      Patient: Norma He  YOB: 1940  MRN: [id number]    Date of Procedure: 12/13/2023    Pre-Op Diagnosis Codes:     * Right shoulder pain, unspecified chronicity [M25.511]     * Tear of right rotator cuff, unspecified tear extent, unspecified whether traumatic [M75.101]    Post-Op Diagnosis: Same       Procedure(s):  RIGHT REVERSE TOTAL SHOULDER REPLACEMENT    Surgeon(s):  Maria L Antunez MD    Assistant:  * No surgical staff found *    Anesthesia: General    Estimated Blood Loss (mL): 248     Complications: None    Specimens:   * No specimens in log *    Implants:  * No implants in log *      Drains: * No LDAs found *    Findings: right end stage glenohumeral arthritis, massive rotator cuff tear       Electronically signed by Hugo Britton MD on 12/13/2023 at 7:24 AM

## 2023-12-14 ENCOUNTER — TELEPHONE (OUTPATIENT)
Dept: ORTHOPEDIC SURGERY | Age: 83
End: 2023-12-14

## 2023-12-14 NOTE — TELEPHONE ENCOUNTER
Spoke with patient. She is doing well. She is complaining of slightly blurry vision since waking up from surgery. She is able to navigate without any issues, she just feels like things aren't clear. Message sent to Dr. Tevin No office via Teams. Incision status: No drainage or redness    Edema/Swelling/Teds: No swelling in her right hand. She is wearing bilateral BAUTISTA hose. Pain level and status: No pain at this time. Her block is still active. We discussed taking her pain medication as she starts to feel her block wear off, or taking a dose before bedtime if it is still active. Use of pain medications: Percocet 5/325, prescribed for q6h    Blood thinner: Aspirin 81 mg BID    Bowels: No BM since surgery. She takes Miralax at night regularly. She was having a hard time urinating last night. She has to sit there for a minute and then is able to go but her urine stream is still a little slower. She denies any belly pain at this time. Home Care Agency active: No    Outpatient therapy: Hasn't started yet    Do you have all of your medications: Yes    Changes in medications: No    Instructed pt to call Nurse Navigator or surgeon's office with any questions or concerns.      Follow up appointments:    Future Appointments   Date Time Provider [address]   12/27/2023 10:30 AM Jm Bernard MD [facility]

## 2023-12-14 NOTE — OP NOTE
[facility], [address] [place] [address]                                OPERATIVE REPORT    PATIENT NAME: Hema Harrison                    :        1940  MED REC NO:   [id number]                          ROOM:  ACCOUNT NO:   [de-identified]                           ADMIT DATE: 2023  PROVIDER:     Arnoldo Omalley MD    DATE OF PROCEDURE:  2023    PREOPERATIVE DIAGNOSIS:  End-stage glenohumeral osteoarthritis, right  shoulder. POSTOPERATIVE DIAGNOSES:  End-stage glenohumeral osteoarthritis, right  shoulder, with biceps instability and a large chronic large rotator cuff  tear with pseudocapsule. PROCEDURES PERFORMED:  Right shoulder arthrotomy with open biceps  tenodesis, resection of pseudocapsule and degenerative rotator cuff  tissue, and reverse total shoulder replacement. SURGEON:  Arnoldo Omalley MD    ASSISTANT:  Sofia Alba MD.  The availability of Dr. Kayleen Cook as a  skilled first assistant was critically important as he had to help with  humeral and glenoid exposure and assist with suture management during  subscapularis repair. ANESTHESIA:  General anesthesia, interscalene block. IV FLUIDS:  700 mL of crystalloid. ESTIMATED BLOOD LOSS:  200 mL. COMPLICATIONS:  None. IMPLANTS:  DJO/Enovis 12 small-shell short-stem Altivate humeral  component with 36 neutral liner, 36 neutral glenosphere, standard  baseplate, four locking screws measuring 26, 26, 14, and 14 mm. FINDINGS:  Examination under anesthesia revealed no obvious motion  deficits. Arthrotomy revealed large pseudocapsule, large effusion. There was significant rotator cuff tear and the biceps was subluxed  medially and was markedly degenerative and partly torn amenable to  tenotomy and then tenodesis. We resected the pseudocapsule and  degenerative tendon.   The posterior rotator cuff spanning the teres  minor and posterior corner of the

## 2023-12-14 NOTE — TELEPHONE ENCOUNTER
General Question     Subject: POSTSX QUESTIONS  Patient and /or Facility Request: BA CHAMORRO  Contact Number: [phone]    PATIENT CALLED TO SPEAK WITH CLINICAL AND HAD POST SURGICAL QUESTIONS.     WHEN IS SHE ABLE TO REMOVE BANDAGING?   HOW LONG IS SHE ABLE TO WEAR COMPRESSION SOCKS?     PLEASE ADVISE

## 2023-12-15 ENCOUNTER — TELEPHONE (OUTPATIENT)
Dept: ORTHOPEDIC SURGERY | Age: 83
End: 2023-12-15

## 2023-12-15 DIAGNOSIS — Z96.611 STATUS POST REVERSE TOTAL SHOULDER REPLACEMENT, RIGHT: Primary | ICD-10-CM

## 2023-12-15 NOTE — TELEPHONE ENCOUNTER
Spoke with patient. She denies chest pain, SOB, numbness tingling in the face, arms etc, headaches, slurred speech. She is having blurred vision and its becoming more intermittent now. She was advised to call her PCP, if it persists.

## 2023-12-22 ENCOUNTER — TELEPHONE (OUTPATIENT)
Dept: ORTHOPEDIC SURGERY | Age: 83
End: 2023-12-22

## 2023-12-22 NOTE — TELEPHONE ENCOUNTER
Home Care was started yesterday and therapist wants to verify orders for this patient.     Negro Young:  [phone]

## 2023-12-27 ENCOUNTER — OFFICE VISIT (OUTPATIENT)
Dept: ORTHOPEDIC SURGERY | Age: 83
End: 2023-12-27

## 2023-12-27 VITALS — WEIGHT: 159 LBS | BODY MASS INDEX: 33.37 KG/M2 | HEIGHT: 58 IN

## 2023-12-27 DIAGNOSIS — Z96.611 STATUS POST REVERSE TOTAL REPLACEMENT OF RIGHT SHOULDER: Primary | ICD-10-CM

## 2023-12-27 PROCEDURE — 99024 POSTOP FOLLOW-UP VISIT: CPT | Performed by: ORTHOPAEDIC SURGERY

## 2023-12-27 NOTE — PROGRESS NOTES
History of Present Illness:  Doug Arriaza is a pleasant 80 y.o. female who presents for a post operative visit. She is 2 weeks s/p a right reverse total shoulder arthroplasty on 12/13/23. Overall She is doing okay and feels that their pain is well controlled with current pain medications. She has been compliant with wearing the UltraSling brace at all times. She plans to do physical therapy at the [facility] office. She lives at the [facility] and has been receiving PT/OT. She denies fevers, chills, numbness, tingling, and shortness of breath. Medical History:  Patient's medications, allergies, past medical, surgical, social and family histories were reviewed and updated as appropriate. No notes on file    Review of Systems  A 14 point review of systems was completed by the patient and is available in the media section of the scanned medical record and was reviewed on 12/27/2023. Vital Signs: There were no vitals filed for this visit. General/Appearance: Alert and oriented and in no apparent distress. Skin:  There are no skin lesions, cellulitis, or extreme edema. The patient has warm and well-perfused Bilateral upper extremities with brisk capillary refill. Right Shoulder Exam:    Inspection: Shoulder incision is clean, dry and intact and well approximated. The Prineo dressing is still in place. Mild ecchymosis and swelling are present as can be expected. There is no erythema, drainage or other signs of infection. There is mild redness about the Prineo dressing, no oozing or drainage. Palpation:  No crepitus to gentle motion    Active Range of Motion:  Forward Elevation 90    Passive Range of Motion:  Tolerates gentle passive motion well    Strength:  Deferred    Special Tests: Negative ER lag,     Neurovascular: Sensation to light touch is intact, no motor deficits, palpable radial pulses 2+    Radiology:     Plain radiographs of the right shoulder comprising 3 views: AP in

## 2024-01-04 ENCOUNTER — HOSPITAL ENCOUNTER (OUTPATIENT)
Dept: PHYSICAL THERAPY | Age: 84
Setting detail: THERAPIES SERIES
Discharge: HOME | End: 2024-01-04
Attending: ORTHOPAEDIC SURGERY
Payer: MEDICARE

## 2024-01-04 DIAGNOSIS — R53.1 WEAKNESS: ICD-10-CM

## 2024-01-04 DIAGNOSIS — M25.511 ACUTE PAIN OF RIGHT SHOULDER: Primary | ICD-10-CM

## 2024-01-04 DIAGNOSIS — M25.611 SHOULDER STIFFNESS, RIGHT: ICD-10-CM

## 2024-01-04 PROCEDURE — 97161 PT EVAL LOW COMPLEX 20 MIN: CPT | Performed by: PHYSICAL THERAPIST

## 2024-01-04 PROCEDURE — 97110 THERAPEUTIC EXERCISES: CPT | Performed by: PHYSICAL THERAPIST

## 2024-01-04 NOTE — PLAN OF CARE
[facility]- Outpatient Rehabilitation and Therapy [address], [address], [place] [address] office: [phone] fax: [phone]     Physical Therapy Certification      Dear Corina Vasquez MD,    We had the pleasure of evaluating the following patient for physical therapy services at [facility].  A summary of our findings can be found in the initial assessment below.  This includes our plan of care.  If you have any questions or concerns regarding these findings, please do not hesitate to contact me at the office phone number listed above.  Thank you for the referral.     Physician Signature:_______________________________Date:__________________  By signing above (or electronic signature), therapist’s plan is approved by physician       Physical Therapy: Initial Evaluation   Patient: Francoise Schilling (83 y.o. female)   Examination Date: 2024   :  1940 MRN: [id number]   Visit #: 1    Insurance: Payor: MEDICARE / Plan: MEDICARE PART A AND B / Product Type: *No Product type* /   Insurance ID: [id number] - (Medicare)  Secondary Insurance (if applicable): [facility]   Treatment Diagnosis:    ICD-10-CM    1. Acute pain of right shoulder  M25.511       2. Weakness  R53.1       3. 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   [] Yes, Patient intake triggered further evaluation      [] C-SSRS Screening completed  [] PCP notified via Plan of Care  [] Emergency services notified     Preferred Language for

## 2024-01-05 NOTE — FLOWSHEET NOTE
[facility]- Outpatient Rehabilitation and Therapy [address], [place] [address] office: [phone] fax: [phone]      Physical Therapy: TREATMENT/PROGRESS NOTE   Patient: Francoise Schilling (83 y.o. female)   Treatment Date: 2024   :  1940 MRN: [id number]   Visit #: 1   Insurance Allowable Auth Needed   [place] medicare []Yes    []No    Insurance: Payor: MEDICARE / Plan: MEDICARE PART A AND B / Product Type: *No Product type* /   Insurance ID: [id number] - (Medicare)  Secondary Insurance (if applicable): [facility]   Treatment Diagnosis:     ICD-10-CM    1. Acute pain of right shoulder  M25.511       2. Weakness  R53.1       3. 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]   Referring Physician: Corina Vasquez MD  PCP: Bev Lay MD                             Plan of care signed (Y/N): N    Date of Patient follow up with Physician: 24     Progress Report/POC: EVAL today  POC update due: (10 visits /OR AUTH LIMITS, whichever is less)  2024     Precautions/ Contra-indications:   Latex allergy:   No  Pacemaker:     No  Other:                    HTN, osteoporosis, anxiety, depression; THR B/L; TKR; hx of spinal surgery  Surgical Date:       R RTSR 23     Red Flags:  None     Preferred Language for Healthcare:   [x]English       []other:    SUBJECTIVE EXAMINATION     Patient Report/Comments: see eval     Test used Initial score  2024   Pain Summary VAS 0-2/10    Functional questionnaire Upper Extremity functional Scale 37% deficit    Other:                OBJECTIVE EXAMINATION     Observation:     Test measurements: see eval  Deferred d/t PO status           ROM Initial Eval   24   AROM Initial Eval  24  PROM PROGRESS   Shoulder L R L R       Flexion       75       Abd               ER       30 deg first rep       IR

## 2024-01-08 ENCOUNTER — TELEPHONE (OUTPATIENT)
Dept: ORTHOPEDIC SURGERY | Age: 84
End: 2024-01-08

## 2024-01-08 NOTE — TELEPHONE ENCOUNTER
Signed off from patient.  She has completed her PO appointment on 12/28/23.  Patient will call RN or Surgeon's office with any issues or concerns.

## 2024-01-09 ENCOUNTER — HOSPITAL ENCOUNTER (OUTPATIENT)
Dept: PHYSICAL THERAPY | Age: 84
Setting detail: THERAPIES SERIES
Discharge: HOME | End: 2024-01-09
Attending: ORTHOPAEDIC SURGERY
Payer: MEDICARE

## 2024-01-09 PROCEDURE — 97110 THERAPEUTIC EXERCISES: CPT | Performed by: PHYSICAL THERAPIST

## 2024-01-09 NOTE — FLOWSHEET NOTE
medical complications  [] Other:     Return to Play: NA    Prognosis for POC: [x] Good [] Fair  [] Poor    Patient requires continued skilled intervention: [x] Yes  [] No      GOALS     Patient stated goal: Return to PLOF without restrictions  Status: [] Progressing: [] Met: [] Not Met: [] Adjusted     Therapist goals for Patient:   Short Term Goals: To be achieved in: 2 weeks  Independent in HEP and progression per patient tolerance, in order to progress toward full function and prevent re-injury.               Status: [] Progressing: [] Met: [] Not Met: [] Adjusted  Patient will have a decrease in pain to 1/10 to help  facilitate improvement in movement, function, and ADLs as indicated by functional deficits.              Status: [] Progressing: [] Met: [] Not Met: [] Adjusted     Long Term Goals: To be achieved in: 8 weeks  Disability index score of 20% or less for the Upper Extremity Functional Scale to assist with return to prior level of function.                      Status: [] Progressing: [] Met: [] Not Met: [] Adjusted  Improve shoulder PROM to flexion of 130 degrees or  better to allow for proper joint functioning as indicated by patients functional deficits.  Status: [] Progressing: [] Met: [] Not Met: [] Adjusted  Pt to improve strength to WFL of scapular retractors to allow for proper muscle and joint use in functional mobility, ADLs and prior level of function   Status: [] Progressing: [] Met: [] Not Met: [] Adjusted  Patient will return to Reaching activities without increased symptoms or restriction to work towards return to prior level of function.                               Status: [] Progressing: [] Met: [] Not Met: [] Adjusted  Patient will perform normal ADLs without symptoms 75% of the time                                                                                                                  Status: [] Progressing: [] Met: [] Not Met: [] Adjusted    Overall Progression Towards

## 2024-01-19 ENCOUNTER — APPOINTMENT (OUTPATIENT)
Dept: PHYSICAL THERAPY | Age: 84
End: 2024-01-19
Attending: ORTHOPAEDIC SURGERY
Payer: MEDICARE

## 2024-01-22 ENCOUNTER — APPOINTMENT (OUTPATIENT)
Dept: PHYSICAL THERAPY | Age: 84
End: 2024-01-22
Attending: ORTHOPAEDIC SURGERY
Payer: MEDICARE

## 2024-01-25 ENCOUNTER — HOSPITAL ENCOUNTER (OUTPATIENT)
Dept: PHYSICAL THERAPY | Age: 84
Setting detail: THERAPIES SERIES
Discharge: HOME | End: 2024-01-25
Attending: ORTHOPAEDIC SURGERY
Payer: MEDICARE

## 2024-01-25 PROCEDURE — 97110 THERAPEUTIC EXERCISES: CPT | Performed by: PHYSICAL THERAPIST

## 2024-01-25 NOTE — FLOWSHEET NOTE
d/t PO status           ROM Initial Eval   1/4/24   AROM Initial Eval  1/4/24  PROM PROGRESS  01/25/24   Shoulder L R L R  L  R   Flexion       75    120   Abd               ER       30 deg first rep    65 deg first rep   IR                  Deferred d/t PO status  Strength Initial Eval  1/5/24 Initial Eval  1/5/24 PROGRESS   Shoulder L R     Flexion         Abd         ER         IR         Supraspinatus         Periscapular L R     Serratus         Rhomboids         UT         MT         LT         Lats         Elbow L R     Biceps         Triceps            Exercises/Interventions:   Exercise/Equipment Resistance/Repetitions Other comments   Stretching/PROM     Wand Held - 60 deg on first rep Seated ER - 40 deg max this week   Table Slides   HEP Flexion - 130 deg max this week   UE [place]     Pulleys     Passive fwd hang HEP        Isometrics     Retraction 30x     Weight shift     Flexion     Abduction     External Rotation     Internal Rotation     Biceps     Triceps          PRE's     Supine AAROM Flexion 10 x 10\" Hands clasped   Flexion 3 x 10 SB rolls standing at table; AAROM - B/L   Abduction     External Rotation     Internal Rotation     Shrugs 30x 2#   EXT     Reverse Flys     Serratus     Horizontal Abd with ER     Biceps 3 x 10 1#   Triceps     Retraction          Cable Column/Theraband     External Rotation     Internal Rotation     Shrugs     Lats     Ext     Flex     Scapular Retraction 3 x 10 Red TB seated; PT hold   BIC     TRIC 3 x 10 Red TB seated; PT hold   PNF          Dynamic Stability          [place]          Manual interventions     PROM - light 5'    Patient Education 5' Current restrictions, HEP     Modalities:    No modalities applied this session    Education/Home Exercise Program: Patient HEP program created electronically.  Refer to Attunity access code:    Access Code: [email or web address]  URL: [email or web address]  Date: 01/25/2024  Prepared by: Ginette Kapoor    Exercises  -

## 2024-01-29 ENCOUNTER — OFFICE VISIT (OUTPATIENT)
Dept: ORTHOPEDIC SURGERY | Age: 84
End: 2024-01-29

## 2024-01-29 VITALS — BODY MASS INDEX: 33.37 KG/M2 | HEIGHT: 58 IN | WEIGHT: 159 LBS

## 2024-01-29 DIAGNOSIS — Z96.611 STATUS POST REVERSE TOTAL REPLACEMENT OF RIGHT SHOULDER: Primary | ICD-10-CM

## 2024-01-29 PROCEDURE — 99024 POSTOP FOLLOW-UP VISIT: CPT | Performed by: PHYSICIAN ASSISTANT

## 2024-01-29 NOTE — PROGRESS NOTES
dislocations.    Impression: Stable postop x-ray.          Assessment :  Ms. Francoise Schilling is a pleasant 84 y.o. patient who is 6 weeks s/p a right reverse total shoulder arthroplasty on 12/13/23.        Impression:  Encounter Diagnosis   Name Primary?    Status post reverse total replacement of right shoulder Yes       Office Procedures:  Orders Placed This Encounter   Procedures    XR SHOULDER RIGHT (MIN 2 VIEWS)     Standing Status:   Future     Number of Occurrences:   1     Standing Expiration Date:   1/29/2025     Order Specific Question:   Reason for exam:     Answer:   6wk       Treatment Plan:    Overall Francoise Schilling is doing well. The pain is well-controlled.  She may discontinue her sling completely.  Will continue to progress her incrementally with her physical therapy orders.  They were updated on printout was handed to the patient today.  We would like to see her back in 4 weeks for follow-up visit.  All of her questions were fully answered today.  Francoise Schilling is in agreement with this plan.     1/29/2024  11:35 AM    Sushma Nagar, PA-C  Orthopaedic Sports Medicine Physician Assistant      This dictation was performed with a verbal recognition program (DRAGON) and it was checked for errors.  It is possible that there are still dictated errors within this office note.  If so, please bring any errors to my attention for an addendum.  All efforts were made to ensure that this office note is accurate.

## 2024-01-30 ENCOUNTER — HOSPITAL ENCOUNTER (OUTPATIENT)
Dept: PHYSICAL THERAPY | Age: 84
Setting detail: THERAPIES SERIES
Discharge: HOME | End: 2024-01-30
Attending: ORTHOPAEDIC SURGERY
Payer: MEDICARE

## 2024-01-30 PROCEDURE — 97140 MANUAL THERAPY 1/> REGIONS: CPT | Performed by: PHYSICAL THERAPIST

## 2024-01-30 PROCEDURE — 97110 THERAPEUTIC EXERCISES: CPT | Performed by: PHYSICAL THERAPIST

## 2024-01-30 NOTE — FLOWSHEET NOTE
[facility]- Outpatient Rehabilitation and Therapy [address], [place] [address] office: [phone] fax: [phone]      Physical Therapy: TREATMENT/PROGRESS NOTE   Patient: Francoise Schilling (84 y.o. female)   Treatment Date: 2024   :  1940 MRN: [id number]   Visit #: 4   Insurance Allowable Auth Needed   [place]    []No    Insurance: Payor: MEDICARE / Plan: MEDICARE PART A AND B / Product Type: *No Product type* /   Insurance ID: [id number] - (Medicare)  Secondary Insurance (if applicable): [facility]   Treatment Diagnosis:     ICD-10-CM    1. Acute pain of right shoulder  M25.511       2. Weakness  R53.1       3. 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]   Referring Physician: Corina Vasquez MD  PCP: Bev Lay MD                             Plan of care signed (Y/N): Y    Date of Patient follow up with Physician: 3/6//24     Progress Report/POC: NO  POC update due: (10 visits /OR AUTH LIMITS, whichever is less)  2024     Precautions/ Contra-indications:   Latex allergy:   No  Pacemaker:     No  Other:                    HTN, osteoporosis, anxiety, depression; THR B/L; TKR; hx of spinal surgery  Surgical Date:       R RTSR 23     Red Flags:  None     Preferred Language for Healthcare:   [x]English       []other:    SUBJECTIVE EXAMINATION     Patient Report/Comments: Pt doing well. Saw PA yesterday. Everything looks good. Some achiness at anterior shoulder and down into bicep but easy to manage.     Test used Initial score  2024   Pain Summary VAS 0-2/10    Functional questionnaire Upper Extremity functional Scale 37% deficit    Other:                OBJECTIVE EXAMINATION     Observation:     Test measurements: see eval  Deferred d/t PO status           ROM Initial Eval   24   AROM Initial Eval  24  PROM

## 2024-02-07 NOTE — PROGRESS NOTES
Subjective:      Patient ID: Francoise Schilling 84 y.o. female. The primary encounter diagnosis was Benign essential hypertension. Diagnoses of Moderate episode of recurrent major depressive disorder (HCC) and Irritable bowel syndrome with constipation were also pertinent to this visit.      HPI    Vaccines: due RSV    S/P reverse total shoulder replacement in December. Is doing very well.  Did not require any pain medication.  Continues to see PT and doing HEP.    Hypertension: Patient here for follow-up of elevated blood pressure. She is not exercising and is adherent to low salt diet.  Blood pressure is not checking at home. Cardiac symptoms  wakes up with heart racing in the AM every few weeks.  Feels fast but not irregular.  Lasts about 2 mintues.  No associated chest pain, dyspnea or light headedness.  X one year . Patient denies chest pressure/discomfort, dyspnea, lower extremity edema, and palpitations.  Cardiovascular risk factors: none. Use of agents associated with hypertension: none. History of target organ damage: none.   Had normal nuclear stress test before her shoulder surgery.  Benign holter in 2017.     Constipation: low muscle tone.  Stools soft with Miralax.  I spoke with her on Monday.  Miralax works for a few months, then she gets backed up and has to take Mag Citrate.  Is effective.  Saw Dr. Mcdonough; had nothing to offer.  Referred her to colorectal surgeon at . Has appt with REBECCA jacobs that office in a few weeks.     She had a small BM today.   She can go months with bowels being a problem.  If she takes 1/2 bottle mag citrate is effective.  She has Amitiza at home - never tried it.  Dr. Mcdonough discussed constipation from calcium and statin.  We already tried off CCB with no improvement.      Depression: lonely since .  Good friends but no family in town.  She states she is doing well; the depression is under control.          Latest Ref Rng & Units 12/1/2023     2:09 PM 10/23/2023

## 2024-02-08 ENCOUNTER — HOSPITAL ENCOUNTER (OUTPATIENT)
Dept: PHYSICAL THERAPY | Age: 84
Setting detail: THERAPIES SERIES
Discharge: HOME | End: 2024-02-08
Attending: ORTHOPAEDIC SURGERY
Payer: MEDICARE

## 2024-02-08 ENCOUNTER — OFFICE VISIT (OUTPATIENT)
Age: 84
End: 2024-02-08

## 2024-02-08 VITALS
BODY MASS INDEX: 31.93 KG/M2 | HEART RATE: 66 BPM | TEMPERATURE: 97.7 F | WEIGHT: 158.4 LBS | OXYGEN SATURATION: 97 % | SYSTOLIC BLOOD PRESSURE: 136 MMHG | DIASTOLIC BLOOD PRESSURE: 80 MMHG | RESPIRATION RATE: 16 BRPM | HEIGHT: 59 IN

## 2024-02-08 DIAGNOSIS — F33.1 MODERATE EPISODE OF RECURRENT MAJOR DEPRESSIVE DISORDER (HCC): ICD-10-CM

## 2024-02-08 DIAGNOSIS — K58.1 IRRITABLE BOWEL SYNDROME WITH CONSTIPATION: ICD-10-CM

## 2024-02-08 DIAGNOSIS — R00.0 TACHYCARDIA: ICD-10-CM

## 2024-02-08 DIAGNOSIS — I10 BENIGN ESSENTIAL HYPERTENSION: Primary | Chronic | ICD-10-CM

## 2024-02-08 PROCEDURE — 97530 THERAPEUTIC ACTIVITIES: CPT | Performed by: PHYSICAL THERAPIST

## 2024-02-08 PROCEDURE — 97110 THERAPEUTIC EXERCISES: CPT | Performed by: PHYSICAL THERAPIST

## 2024-02-08 ASSESSMENT — PATIENT HEALTH QUESTIONNAIRE - PHQ9
SUM OF ALL RESPONSES TO PHQ QUESTIONS 1-9: 2
3. TROUBLE FALLING OR STAYING ASLEEP: 1
SUM OF ALL RESPONSES TO PHQ QUESTIONS 1-9: 2
9. THOUGHTS THAT YOU WOULD BE BETTER OFF DEAD, OR OF HURTING YOURSELF: 0
SUM OF ALL RESPONSES TO PHQ QUESTIONS 1-9: 2
1. LITTLE INTEREST OR PLEASURE IN DOING THINGS: 0
10. IF YOU CHECKED OFF ANY PROBLEMS, HOW DIFFICULT HAVE THESE PROBLEMS MADE IT FOR YOU TO DO YOUR WORK, TAKE CARE OF THINGS AT HOME, OR GET ALONG WITH OTHER PEOPLE: 0
SUM OF ALL RESPONSES TO PHQ9 QUESTIONS 1 & 2: 1
5. POOR APPETITE OR OVEREATING: 0
6. FEELING BAD ABOUT YOURSELF - OR THAT YOU ARE A FAILURE OR HAVE LET YOURSELF OR YOUR FAMILY DOWN: 0
2. FEELING DOWN, DEPRESSED OR HOPELESS: 1
8. MOVING OR SPEAKING SO SLOWLY THAT OTHER PEOPLE COULD HAVE NOTICED. OR THE OPPOSITE, BEING SO FIGETY OR RESTLESS THAT YOU HAVE BEEN MOVING AROUND A LOT MORE THAN USUAL: 0
SUM OF ALL RESPONSES TO PHQ QUESTIONS 1-9: 2
4. FEELING TIRED OR HAVING LITTLE ENERGY: 0

## 2024-02-08 NOTE — FLOWSHEET NOTE
corresponding ICD-10 code that is of complexity and severity that require skilled therapeutic intervention. This has a direct and significant impact on the need for therapy and significantly impacts the rate of recovery.     Treatment/Activity Tolerance:  [x] Patient tolerated treatment well [] Patient limited by fatique  [] Patient limited by pain  [] Patient limited by other medical complications  [] Other:     Return to Play: NA    Prognosis for POC: [x] Good [] Fair  [] Poor    Patient requires continued skilled intervention: [x] Yes  [] No      GOALS     Patient stated goal: Return to PLOF without restrictions  Status: [] Progressing: [] Met: [] Not Met: [] Adjusted     Therapist goals for Patient:   Short Term Goals: To be achieved in: 2 weeks  Independent in HEP and progression per patient tolerance, in order to progress toward full function and prevent re-injury.               Status: [] Progressing: [] Met: [] Not Met: [] Adjusted  Patient will have a decrease in pain to 1/10 to help  facilitate improvement in movement, function, and ADLs as indicated by functional deficits.              Status: [] Progressing: [] Met: [] Not Met: [] Adjusted     Long Term Goals: To be achieved in: 8 weeks  Disability index score of 20% or less for the Upper Extremity Functional Scale to assist with return to prior level of function.                      Status: [] Progressing: [] Met: [] Not Met: [] Adjusted  Improve shoulder PROM to flexion of 130 degrees or  better to allow for proper joint functioning as indicated by patients functional deficits.  Status: [] Progressing: [] Met: [] Not Met: [] Adjusted  Pt to improve strength to WFL of scapular retractors to allow for proper muscle and joint use in functional mobility, ADLs and prior level of function   Status: [] Progressing: [] Met: [] Not Met: [] Adjusted  Patient will return to Reaching activities without increased symptoms or restriction to work towards return to

## 2024-02-13 ENCOUNTER — HOSPITAL ENCOUNTER (OUTPATIENT)
Dept: PHYSICAL THERAPY | Age: 84
Setting detail: THERAPIES SERIES
Discharge: HOME | End: 2024-02-13
Attending: ORTHOPAEDIC SURGERY
Payer: MEDICARE

## 2024-02-13 PROCEDURE — 97110 THERAPEUTIC EXERCISES: CPT | Performed by: PHYSICAL THERAPIST

## 2024-02-13 PROCEDURE — 97530 THERAPEUTIC ACTIVITIES: CPT | Performed by: PHYSICAL THERAPIST

## 2024-02-13 NOTE — PLAN OF CARE
[facility]- Outpatient Rehabilitation and Therapy [address], [place] [address] office: [phone] fax: [phone]    Physical Therapy Re-Certification Plan of Care    Dear Corina Vasquez MD  ,    We had the pleasure of treating the following patient for physical therapy services at [facility]. A summary of our findings can be found in the updated assessment below.  This includes our plan of care.  If you have any questions or concerns regarding these findings, please do not hesitate to contact me at the office phone number checked above.  Thank you for the referral.     Physician Signature:________________________________Date:__________________  By signing above (or electronic signature), therapist's plan is approved by physician      Functional Outcome: UEFI 21% deficit  Francoise Schilling 1940 continues to present with functional deficits in strength symmetry, endurance of strength, eccentric control, and muscle activation  limiting ability with managing bed mobility, reaching overhead, carrying items, lifting items, and pushing or pulling activity .  During therapy this date, patient required visual cueing, verbal cueing, tactile cueing, modification of technique, and progression of exercises and program for exercise progression, improving proper muscle recruitment and activation/motor control patterns, and improving postural awareness. Patient will continue to benefit from ongoing evaluation and advanced clinical decision from a Physical Therapist to improve muscle strength, neuromuscular control, functional mobility, and high level IADLs to safely return to [facility] without symptoms or restrictions.    Overall Response to Treatment:   [x]Patient is responding well to treatment and improvement is noted with regards to goals   []Patient should continue to improve in reasonable time if they continue HEP   []Patient has plateaued and is no longer

## 2024-02-19 ENCOUNTER — APPOINTMENT (OUTPATIENT)
Dept: PHYSICAL THERAPY | Age: 84
End: 2024-02-19
Attending: ORTHOPAEDIC SURGERY
Payer: MEDICARE

## 2024-02-19 ENCOUNTER — PATIENT MESSAGE (OUTPATIENT)
Age: 84
End: 2024-02-19

## 2024-02-19 RX ORDER — VERAPAMIL HYDROCHLORIDE 240 MG/1
240 TABLET, FILM COATED, EXTENDED RELEASE ORAL DAILY
Qty: 90 TABLET | Refills: 1 | Status: SHIPPED | OUTPATIENT
Start: 2024-02-19

## 2024-02-19 RX ORDER — HYDROCHLOROTHIAZIDE 25 MG/1
25 TABLET ORAL DAILY
Qty: 90 TABLET | Refills: 1 | Status: SHIPPED | OUTPATIENT
Start: 2024-02-19

## 2024-02-19 NOTE — TELEPHONE ENCOUNTER
From: Francoise Schilling  To: Dr. Bev Lay  Sent: 2/19/2024 10:55 AM EST  Subject: renew scripts    Renew Verapamil  mg  Renew Hydrochlorizide 25 mg    Thank you, Francoise Schilling

## 2024-02-19 NOTE — TELEPHONE ENCOUNTER
Requested Prescriptions     Pending Prescriptions Disp Refills    verapamil (CALAN SR) 240 MG extended release tablet 90 tablet 1     Sig: Take 1 tablet by mouth nightly    hydroCHLOROthiazide (HYDRODIURIL) 25 MG tablet 90 tablet 1     Sig: Take 1 tablet by mouth daily         Last OV: 2/8/2024    Last labs: 12/1/2023     F/u: Visit date not found

## 2024-02-20 ENCOUNTER — HOSPITAL ENCOUNTER (OUTPATIENT)
Dept: PHYSICAL THERAPY | Age: 84
Setting detail: THERAPIES SERIES
Discharge: HOME | End: 2024-02-20
Attending: ORTHOPAEDIC SURGERY
Payer: MEDICARE

## 2024-02-20 PROCEDURE — 97110 THERAPEUTIC EXERCISES: CPT | Performed by: PHYSICAL THERAPIST

## 2024-02-20 NOTE — FLOWSHEET NOTE
[facility]- Outpatient Rehabilitation and Therapy [address], [place] [address] office: [phone] fax: [phone]      Physical Therapy: TREATMENT/PROGRESS NOTE   Patient: Francoise Schilling (84 y.o. female)   Treatment Date: 2024   :  1940 MRN: [id number]   Visit #: 7   Insurance Allowable Auth Needed   [place]Yes    []No    Insurance: Payor: MEDICARE / Plan: MEDICARE PART A AND B / Product Type: *No Product type* /   Insurance ID: [id number] - (Medicare)  Secondary Insurance (if applicable): [facility]   Treatment Diagnosis:     ICD-10-CM    1. Acute pain of right shoulder  M25.511       2. Weakness  R53.1       3. 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]   Referring Physician: Corina Vasquez MD  PCP: Bev Lay MD                             Plan of care signed (Y/N): Y    Date of Patient follow up with Physician: 3/6//24     Progress Report/POC: NO  POC update due: (10 visits /OR AUTH LIMITS, whichever is less)  3/13/2024     Precautions/ Contra-indications:   Latex allergy:   No  Pacemaker:     No  Other:                    HTN, osteoporosis, anxiety, depression; THR B/L; TKR; hx of spinal surgery  Surgical Date:       R RTSR 23     Red Flags:  None     Preferred Language for Healthcare:   [x]English       []other:    SUBJECTIVE EXAMINATION     Patient Report/Comments: Pt is 10 weeks PO. Doing well. Progressing steadily with functional abilities.     Test used Initial score  24 Progress  2024   Pain Summary VAS 0-2/10 0-1/10   Functional questionnaire Upper Extremity functional Scale 37% deficit    Other:                OBJECTIVE EXAMINATION     Observation:     Test measurements: see eval  Deferred d/t PO status           ROM Initial Eval   24   AROM Initial Eval  24  PROM PROGRESS  24  PROM   Shoulder L R L R  L  R

## 2024-02-22 ENCOUNTER — HOSPITAL ENCOUNTER (OUTPATIENT)
Dept: PHYSICAL THERAPY | Age: 84
Setting detail: THERAPIES SERIES
Discharge: HOME | End: 2024-02-22
Attending: ORTHOPAEDIC SURGERY
Payer: MEDICARE

## 2024-02-22 PROCEDURE — 97110 THERAPEUTIC EXERCISES: CPT | Performed by: PHYSICAL THERAPIST

## 2024-02-22 NOTE — FLOWSHEET NOTE
limitations and would benefit from continued outpatient therapy services to address the deficits outlined in the patients goals  The patient has a musculoskeletal condition(s) with a corresponding ICD-10 code that is of complexity and severity that require skilled therapeutic intervention. This has a direct and significant impact on the need for therapy and significantly impacts the rate of recovery.     Treatment/Activity Tolerance:  [] Patient tolerated treatment well [x] Patient limited by fatique  [] Patient limited by pain  [] Patient limited by other medical complications  [] Other:     Return to Play: NA    Prognosis for POC: [x] Good [] Fair  [] Poor    Patient requires continued skilled intervention: [x] Yes  [] No      GOALS     Patient stated goal: Return to PLOF without restrictions  Status: [x] Progressing: [] Met: [] Not Met: [] Adjusted     Therapist goals for Patient:   Short Term Goals: To be achieved in: 2 weeks  Independent in HEP and progression per patient tolerance, in order to progress toward full function and prevent re-injury.               Status: [] Progressing: [x] Met: [] Not Met: [] Adjusted  Patient will have a decrease in pain to 1/10 to help  facilitate improvement in movement, function, and ADLs as indicated by functional deficits.              Status: [] Progressing: [x] Met: [] Not Met: [] Adjusted     Long Term Goals: To be achieved in: 8 weeks  Disability index score of 20% or less for the Upper Extremity Functional Scale to assist with return to prior level of function.                      Status: [x] Progressing: [] Met: [] Not Met: [] Adjusted  Improve shoulder PROM to flexion of 130 degrees or  better to allow for proper joint functioning as indicated by patients functional deficits.  Status: [] Progressing: [x] Met: [] Not Met: [] Adjusted  Pt to improve strength to WFL of scapular retractors to allow for proper muscle and joint use in functional mobility, ADLs and prior

## 2024-02-26 ENCOUNTER — APPOINTMENT (OUTPATIENT)
Dept: PHYSICAL THERAPY | Age: 84
End: 2024-02-26
Attending: ORTHOPAEDIC SURGERY
Payer: MEDICARE

## 2024-02-27 ENCOUNTER — HOSPITAL ENCOUNTER (OUTPATIENT)
Dept: PHYSICAL THERAPY | Age: 84
Setting detail: THERAPIES SERIES
Discharge: HOME | End: 2024-02-27
Attending: ORTHOPAEDIC SURGERY
Payer: MEDICARE

## 2024-02-27 PROCEDURE — 97110 THERAPEUTIC EXERCISES: CPT | Performed by: PHYSICAL THERAPIST

## 2024-02-27 NOTE — FLOWSHEET NOTE
[facility]- Outpatient Rehabilitation and Therapy [address], [place] [address] office: [phone] fax: [phone]      Physical Therapy: TREATMENT/PROGRESS NOTE   Patient: Francoise Schilling (84 y.o. female)   Treatment Date: 2024   :  1940 MRN: [id number]   Visit #: 9   Insurance Allowable Auth Needed   [place]    []No    Insurance: Payor: MEDICARE / Plan: MEDICARE PART A AND B / Product Type: *No Product type* /   Insurance ID: [id number] - (Medicare)  Secondary Insurance (if applicable): [facility]   Treatment Diagnosis:     ICD-10-CM    1. Acute pain of right shoulder  M25.511       2. Weakness  R53.1       3. 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]   Referring Physician: Corina Vasquez MD  PCP: Bev Lay MD                             Plan of care signed (Y/N): Y    Date of Patient follow up with Physician: 3/6/24     Progress Report/POC: NO  POC update due: (10 visits /OR AUTH LIMITS, whichever is less)  3/13/2024     Precautions/ Contra-indications:   Latex allergy:   No  Pacemaker:     No  Other:                    HTN, osteoporosis, anxiety, depression; THR B/L; TKR; hx of spinal surgery  Surgical Date:       R RTSR 23     Red Flags:  None     Preferred Language for Healthcare:   [x]English       []other:    SUBJECTIVE EXAMINATION     Patient Report/Comments: Pt is 10 weeks PO. Doing well. Nothing new to report today.      Test used Initial score  24 Progress  2024   Pain Summary VAS 0-2/10 0-1/10   Functional questionnaire Upper Extremity functional Scale 37% deficit    Other:                OBJECTIVE EXAMINATION     Observation:     Test measurements: see eval  Deferred d/t PO status           ROM Initial Eval   24   AROM Initial Eval  24  PROM PROGRESS  24  PROM   Shoulder L R L R  L  R   Flexion       75

## 2024-02-29 ENCOUNTER — HOSPITAL ENCOUNTER (OUTPATIENT)
Dept: PHYSICAL THERAPY | Age: 84
Setting detail: THERAPIES SERIES
Discharge: HOME | End: 2024-02-29
Attending: ORTHOPAEDIC SURGERY
Payer: MEDICARE

## 2024-02-29 PROCEDURE — 97110 THERAPEUTIC EXERCISES: CPT | Performed by: PHYSICAL THERAPIST

## 2024-02-29 PROCEDURE — 97530 THERAPEUTIC ACTIVITIES: CPT | Performed by: PHYSICAL THERAPIST

## 2024-02-29 NOTE — FLOWSHEET NOTE
[facility]- Outpatient Rehabilitation and Therapy [address], [place] [address] office: [phone] fax: [phone]      Physical Therapy: TREATMENT/PROGRESS NOTE   Patient: Francoise Schilling (84 y.o. female)   Treatment Date: 2024   :  1940 MRN: [id number]   Visit #: 10   Insurance Allowable Auth Needed   [place]Yes    []No    Insurance: Payor: MEDICARE / Plan: MEDICARE PART A AND B / Product Type: *No Product type* /   Insurance ID: [id number] - (Medicare)  Secondary Insurance (if applicable): [facility]   Treatment Diagnosis:     ICD-10-CM    1. Acute pain of right shoulder  M25.511       2. Weakness  R53.1       3. 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]   Referring Physician: Corina Vasquez MD  PCP: Bev Lay MD                             Plan of care signed (Y/N): Y    Date of Patient follow up with Physician: 3/6/24     Progress Report/POC: NO  POC update due: (10 visits /OR AUTH LIMITS, whichever is less)  3/13/2024     Precautions/ Contra-indications:   Latex allergy:   No  Pacemaker:     No  Other:                    HTN, osteoporosis, anxiety, depression; THR B/L; TKR; hx of spinal surgery  Surgical Date:       R RTSR 23     Red Flags:  None     Preferred Language for Healthcare:   [x]English       []other:    SUBJECTIVE EXAMINATION     Patient Report/Comments: Pt is 11 weeks PO. Doing well. Feeling more functional.     Test used Initial score  24 Progress  2024   Pain Summary VAS 0-2/10 0-1/10   Functional questionnaire Upper Extremity functional Scale 37% deficit    Other:                OBJECTIVE EXAMINATION     Observation:     Test measurements: see eval  Deferred d/t PO status           ROM Initial Eval   24   AROM Initial Eval  24  PROM PROGRESS  24  PROM   Shoulder L R L R  L  R   Flexion       75    140

## 2024-03-05 ENCOUNTER — HOSPITAL ENCOUNTER (OUTPATIENT)
Dept: PHYSICAL THERAPY | Age: 84
Setting detail: THERAPIES SERIES
Discharge: HOME | End: 2024-03-05
Attending: ORTHOPAEDIC SURGERY
Payer: MEDICARE

## 2024-03-05 PROCEDURE — 97110 THERAPEUTIC EXERCISES: CPT | Performed by: PHYSICAL THERAPIST

## 2024-03-05 NOTE — FLOWSHEET NOTE
as indicated by patients functional deficits.  Status: [] Progressing: [x] Met: [] Not Met: [] Adjusted  Pt to improve strength to WFL of scapular retractors to allow for proper muscle and joint use in functional mobility, ADLs and prior level of function   Status: [x] Progressing: [] Met: [] Not Met: [] Adjusted  Patient will return to Reaching activities without increased symptoms or restriction to work towards return to prior level of function.                               Status: [x] Progressing: [] Met: [] Not Met: [] Adjusted  Patient will perform normal ADLs without symptoms 75% of the time                                                                                                                  Status: [x] Progressing: [] Met: [] Not Met: [] Adjusted    Overall Progression Towards Functional goals/ Treatment Progress Update:  [x] Patient is progressing as expected towards functional goals listed.    [] Progression is slowed due to complexities/Impairments listed.  [] Progression has been slowed due to co-morbidities.  [] Plan just implemented, too soon (<30days) to assess goals progression   [] Goals require adjustment due to lack of progress  [] Patient is not progressing as expected and requires additional follow up with physician  [] Other:     CHARGE CAPTURE     PT CHARGE GRID   CPT Code (TIMED) minutes # CPT Code (UNTIMED) #     Therex (84842)  30 2  EVAL:LOW (79796 - Typically 20 minutes face-to-face)     Neuromusc. Re-ed (27156)    Re-Eval (71025)     Manual (75525)    Estim Unattended (86598)     Ther. Act (37965)    Mech. Traction (66011)     Gait (70345)    Dry Needle 1-2 muscle (48935)     Aquatic Therex (68328)    Dry Needle 3+ muscle (20561)     Iontophoresis (03324)    VASO (11382)     Ultrasound (73662)    Group Therapy (82773)     Estim Attended (64841)    Canalith Repositioning (17042)     Other:    Other:    Total Timed Code Tx Minutes 35 2       Total Treatment Minutes 30   2:50 -

## 2024-03-06 ENCOUNTER — OFFICE VISIT (OUTPATIENT)
Dept: ORTHOPEDIC SURGERY | Age: 84
End: 2024-03-06

## 2024-03-06 VITALS — HEIGHT: 59 IN | WEIGHT: 158 LBS | BODY MASS INDEX: 31.85 KG/M2

## 2024-03-06 DIAGNOSIS — Z96.611 STATUS POST REVERSE TOTAL REPLACEMENT OF RIGHT SHOULDER: Primary | ICD-10-CM

## 2024-03-06 PROCEDURE — 99024 POSTOP FOLLOW-UP VISIT: CPT | Performed by: ORTHOPAEDIC SURGERY

## 2024-03-06 NOTE — PROGRESS NOTES
[facility]  History and Physical  Shoulder Pain    Date:  3/6/2024    Name:  Francoise Schilling  Address:  [address]  [facility] [address]    :  1940      Age:   84 y.o.    SSN:  [id number]      Medical Record Number:  [id number]    Reason for Visit:    Post-Op Check (RIGHT SHOULDER)      HPI:   Francoise Schilling is a 84 y.o. female who presents to our office today for follow up of the right shoulder s/p reverse total shoulder arthroplasty on 23.  She is doing excellent, working with PT, and is pleased with her progress.    Pain Assessment  Location of Pain: Shoulder  Location Modifiers: Right  Severity of Pain: 0  Limiting Behavior: No  Relieving Factors: Rest, Exercise  Work-Related Injury: No  Are there other pain locations you wish to document?: No    No notes on file    Review of Systems:  A 14 point review of systems available in the scanned medical record as documented by the patient and reviewed on 3/6/2024.  The review is negative with the exception of those things mentioned in the History of Present Illness and Past Medical History.      Past Medical History:  Patient's medications, allergies, past medical, surgical, social and family histories were reviewed and updated as appropriate.    Allergies:  Allergies   Allergen Reactions    Ace Inhibitors Cough    Latanoprost Other (See Comments)     Unknown reaction    Losartan Cough    Taztia Xt [Diltiazem Hcl] Cough       Physical Exam:  There were no vitals filed for this visit.  General: Francoise Schilling is a healthy and well appearing 84 y.o. female who is sitting comfortably in our office in acute distress.     Skin:  There are no skin lesions, cellulitis, or extreme edema. The patient has warm and well-perfused Bilateral upper extremities with brisk capillary refill.    Eyes: Extra-ocular muscles intact  Mouth: Oral mucosa moist. No perioral lesions  Pulm: Respirations unlabored

## 2024-03-08 ENCOUNTER — APPOINTMENT (OUTPATIENT)
Dept: PHYSICAL THERAPY | Age: 84
End: 2024-03-08
Attending: ORTHOPAEDIC SURGERY
Payer: MEDICARE

## 2024-03-15 ENCOUNTER — HOSPITAL ENCOUNTER (OUTPATIENT)
Dept: PHYSICAL THERAPY | Age: 84
Setting detail: THERAPIES SERIES
Discharge: HOME | End: 2024-03-15
Attending: ORTHOPAEDIC SURGERY
Payer: MEDICARE

## 2024-03-15 PROCEDURE — 97110 THERAPEUTIC EXERCISES: CPT | Performed by: PHYSICAL THERAPIST

## 2024-03-15 NOTE — FLOWSHEET NOTE
[facility]- Outpatient Rehabilitation and Therapy [address], [address], [place] [address] office: [phone] fax: [phone]      Physical Therapy: TREATMENT/PROGRESS NOTE   Patient: Francoise Schilling (84 y.o. female)   Treatment Date: 03/15/2024   :  1940 MRN: [id number]   Visit #: 12   Insurance Allowable Auth Needed   [place] medicare []Yes    []No    Insurance: Payor: MEDICARE / Plan: MEDICARE PART A AND B / Product Type: *No Product type* /   Insurance ID: [id number] - (Medicare)  Secondary Insurance (if applicable): [facility]   Treatment Diagnosis:     ICD-10-CM    1. Acute pain of right shoulder  M25.511       2. Weakness  R53.1       3. 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]   Referring Physician: Corina Vasquez MD  PCP: Bev Lay MD                             Plan of care signed (Y/N): Y    Date of Patient follow up with Physician: 24     Progress Report/POC: NO  POC update due: (10 visits /OR AUTH LIMITS, whichever is less)  3/13/2024     Precautions/ Contra-indications:   Latex allergy:   No  Pacemaker:     No  Other:                    HTN, osteoporosis, anxiety, depression; THR B/L; TKR; hx of spinal surgery  Surgical Date:       R RTSR 23     Red Flags:  None     Preferred Language for Healthcare:   [x]English       []other:    SUBJECTIVE EXAMINATION     Patient Report/Comments: Pt is 3 months PO. Had a good visit with MD, pleased with progress. Minimal symptoms lately and feeling stronger.     Test used Initial score  24 Progress  2024   Pain Summary VAS 0-2/10 0-1/10   Functional questionnaire Upper Extremity functional Scale 37% deficit    Other:                OBJECTIVE EXAMINATION     Observation:     Test measurements: see eval  Deferred d/t PO status           ROM Initial Eval   24   AROM Initial Eval  24  PROM

## 2024-03-17 DIAGNOSIS — E78.01 ESSENTIAL FAMILIAL HYPERCHOLESTEROLEMIA: ICD-10-CM

## 2024-03-18 RX ORDER — PANTOPRAZOLE SODIUM 40 MG/1
40 TABLET, DELAYED RELEASE ORAL DAILY
Qty: 90 TABLET | Refills: 2 | Status: SHIPPED | OUTPATIENT
Start: 2024-03-18

## 2024-03-18 RX ORDER — ROSUVASTATIN CALCIUM 20 MG/1
20 TABLET, COATED ORAL DAILY
Qty: 90 TABLET | Refills: 2 | Status: SHIPPED | OUTPATIENT
Start: 2024-03-18

## 2024-03-18 NOTE — TELEPHONE ENCOUNTER
Requested Prescriptions     Pending Prescriptions Disp Refills    rosuvastatin (CRESTOR) 20 MG tablet 90 tablet 2     Sig: Take 1 tablet by mouth daily         Last OV: 2/8/2024    Last labs: 12/1/2023     F/u: Visit date not found

## 2024-03-18 NOTE — TELEPHONE ENCOUNTER
Requested Prescriptions     Pending Prescriptions Disp Refills    pantoprazole (PROTONIX) 40 MG tablet 90 tablet 2     Sig: Take 1 tablet by mouth daily         Last OV: 2/8/2024    Last labs: 3/4/2023     F/u: 3/17/2024

## 2024-03-19 ENCOUNTER — HOSPITAL ENCOUNTER (OUTPATIENT)
Dept: PHYSICAL THERAPY | Age: 84
Setting detail: THERAPIES SERIES
Discharge: HOME | End: 2024-03-19
Attending: ORTHOPAEDIC SURGERY
Payer: MEDICARE

## 2024-03-19 PROCEDURE — 97110 THERAPEUTIC EXERCISES: CPT | Performed by: PHYSICAL THERAPIST

## 2024-03-19 NOTE — FLOWSHEET NOTE
access code:    Access Code: [email or web address]  URL: [email or web address]  Date: 03/15/2024  Prepared by: Ginette Kapoor    Exercises  - Doorway Pec Stretch at 60 Elevation  - 1 x daily - 7 x weekly - 3 reps - 30 sec hold  - Standing Shoulder Internal Rotation Stretch with Towel  - 1 x daily - 7 x weekly - 10 reps - 10 sec hold  - Reclined Shoulder Flexion with Free Weight  - 1 x daily - 3 x weekly - 3 sets - 10 reps  - Supine Chest Press with Dumbbells  - 1 x daily - 3 x weekly - 3 sets - 10 reps  - Sidelying Shoulder External Rotation (Mirrored)  - 1 x daily - 3 x weekly - 3 sets - 10 reps  - Sidelying Shoulder Abduction  - 1 x daily - 3 x weekly - 3 sets - 10 reps  - Standing Row with Anchored Resistance  - 1 x daily - 3 x weekly - 3 sets - 10 reps  - Standing Elbow Extension with Anchored Resistance at Wall  - 1 x daily - 3 x weekly - 3 sets - 10 reps  - Shoulder Internal Rotation with Resistance  - 1 x daily - 3 x weekly - 3 sets - 10 reps  - Standing Elbow Flexion Extension AROM  - 1 x daily - 3 x weekly - 3 sets - 10 reps    ASSESSMENT     Today's Assessment: Pt continues to be doing quite well. Improving mechanics and functional strength. Pt has good recall of exercises within HEP. Feels ready to be d/c to iHEP following NV. During therapy this date, patient required verbal cueing, modification of technique, and progression of exercises and program for exercise progression, improving proper muscle recruitment and activation/motor control patterns, allowing for proper ROM, and improving postural awareness.Patient will continue to benefit from ongoing evaluation and advanced clinical decision from a Physical Therapist to address and improve muscle strength, neuromuscular control, proper body mechanics, and high level IADLs to safely return to OF without symptoms or restrictions. and Patient had good tolerance to today's session, reporting appropriate fatigue with program completed. Able to progress  resistance

## 2024-03-25 SDOH — ECONOMIC STABILITY: FOOD INSECURITY: WITHIN THE PAST 12 MONTHS, YOU WORRIED THAT YOUR FOOD WOULD RUN OUT BEFORE YOU GOT MONEY TO BUY MORE.: NEVER TRUE

## 2024-03-25 SDOH — ECONOMIC STABILITY: INCOME INSECURITY: HOW HARD IS IT FOR YOU TO PAY FOR THE VERY BASICS LIKE FOOD, HOUSING, MEDICAL CARE, AND HEATING?: NOT HARD AT ALL

## 2024-03-25 SDOH — ECONOMIC STABILITY: FOOD INSECURITY: WITHIN THE PAST 12 MONTHS, THE FOOD YOU BOUGHT JUST DIDN'T LAST AND YOU DIDN'T HAVE MONEY TO GET MORE.: NEVER TRUE

## 2024-03-26 ENCOUNTER — HOSPITAL ENCOUNTER (OUTPATIENT)
Dept: PHYSICAL THERAPY | Age: 84
Setting detail: THERAPIES SERIES
Discharge: HOME | End: 2024-03-26
Attending: ORTHOPAEDIC SURGERY
Payer: MEDICARE

## 2024-03-26 PROCEDURE — 97110 THERAPEUTIC EXERCISES: CPT | Performed by: PHYSICAL THERAPIST

## 2024-03-26 NOTE — PROGRESS NOTES
Subjective:      Patient ID: Francoise Schilling 84 y.o. female. The primary encounter diagnosis was Benign essential hypertension. Diagnoses of Moderate episode of recurrent major depressive disorder (HCC), Irritable bowel syndrome with constipation, and Renal angiolipoma were also pertinent to this visit.      HPI    Renal angiolipoma:  saw Dr. aZzueta,  interventional radiology, 3/12/24.  He recommend continue annual CT monitoring.  CT done at that visit showed decrease in one angiolipoma and stability in the other.     Constipation: saw Marina Lowry NP at .  Was started on Citrucel and Miralax dose was decreased.  Was not effective, a month later Citrucel was increased.  She was referred to Madelin Hidalgo PT.    She saw Madelin Hidalgo yesterday and she was happy with the appointment.  She gave her HEP and has follow up in 2 weeks.  She is off Miralax and taking Citrucel.  Having some BM every day; better control but still has some leaking.  Has appt with Marina Lowry next week.  She is not taking meds and Citrucel at the same time.      HTN; is not checking BP at home. Patient denies any exertional chest pain, dyspnea, palpitations, syncope, orthopnea, edema or paroxysmal nocturnal dyspnea.     Depression:  Sertraline and Wellbutrin have been effective.  Uses Atarax PRN anxiety.   Today notes she is doing well.  One day last week she woke up and the depression was gone.  She is more optimistic, feels more pleasant.  Still not sleeping well; trouble falling to sleep.  Can lie awake for a few hours a few times a week.  A few times a week she wakes up at 4 AM and cannot get back to sleep  sleeps through the night a few times a week.  Has good bedtime routine.  Has not daytime sleepiness. Melatonin.     Outpatient Medications Marked as Taking for the 3/28/24 encounter (Office Visit) with Bev Lay MD   Medication Sig Dispense Refill    pantoprazole (PROTONIX) 40 MG tablet Take 1 tablet by mouth

## 2024-03-26 NOTE — DISCHARGE SUMMARY
Patient stated goal: Return to PLOF without restrictions  Status: [] Progressing: [x] Met: [] Not Met: [] Adjusted     Therapist goals for Patient:   Short Term Goals: To be achieved in: 2 weeks  Independent in HEP and progression per patient tolerance, in order to progress toward full function and prevent re-injury.               Status: [] Progressing: [x] Met: [] Not Met: [] Adjusted  Patient will have a decrease in pain to 1/10 to help  facilitate improvement in movement, function, and ADLs as indicated by functional deficits.              Status: [] Progressing: [x] Met: [] Not Met: [] Adjusted     Long Term Goals: To be achieved in: 8 weeks  Disability index score of 20% or less for the Upper Extremity Functional Scale to assist with return to prior level of function.                      Status: [x] Progressing: [] Met: [] Not Met: [] Adjusted  Improve shoulder PROM to flexion of 130 degrees or  better to allow for proper joint functioning as indicated by patients functional deficits.  Status: [] Progressing: [x] Met: [] Not Met: [] Adjusted  Pt to improve strength to WFL of scapular retractors to allow for proper muscle and joint use in functional mobility, ADLs and prior level of function   Status: [] Progressing: [x] Met: [] Not Met: [] Adjusted  Patient will return to Reaching activities without increased symptoms or restriction to work towards return to prior level of function.                               Status: [] Progressing: [x] Met: [] Not Met: [] Adjusted  Patient will perform normal ADLs without symptoms 75% of the time                                                                                                                  Status: [] Progressing: [x] Met: [] Not Met: [] Adjusted    Overall Progression Towards Functional goals/ Treatment Progress Update:  [x] Patient is progressing as expected towards functional goals listed.    [] Progression is slowed due to complexities/Impairments

## 2024-03-28 ENCOUNTER — OFFICE VISIT (OUTPATIENT)
Age: 84
End: 2024-03-28
Payer: MEDICARE

## 2024-03-28 VITALS
DIASTOLIC BLOOD PRESSURE: 78 MMHG | HEART RATE: 61 BPM | TEMPERATURE: 97 F | RESPIRATION RATE: 16 BRPM | SYSTOLIC BLOOD PRESSURE: 124 MMHG | OXYGEN SATURATION: 98 %

## 2024-03-28 DIAGNOSIS — F33.1 MODERATE EPISODE OF RECURRENT MAJOR DEPRESSIVE DISORDER (HCC): ICD-10-CM

## 2024-03-28 DIAGNOSIS — I10 BENIGN ESSENTIAL HYPERTENSION: Primary | Chronic | ICD-10-CM

## 2024-03-28 DIAGNOSIS — D17.71 RENAL ANGIOLIPOMA: ICD-10-CM

## 2024-03-28 DIAGNOSIS — K58.1 IRRITABLE BOWEL SYNDROME WITH CONSTIPATION: ICD-10-CM

## 2024-03-28 PROCEDURE — 99214 OFFICE O/P EST MOD 30 MIN: CPT | Performed by: FAMILY MEDICINE

## 2024-03-28 PROCEDURE — 1123F ACP DISCUSS/DSCN MKR DOCD: CPT | Performed by: FAMILY MEDICINE

## 2024-03-28 PROCEDURE — G8399 PT W/DXA RESULTS DOCUMENT: HCPCS | Performed by: FAMILY MEDICINE

## 2024-03-28 PROCEDURE — 1090F PRES/ABSN URINE INCON ASSESS: CPT | Performed by: FAMILY MEDICINE

## 2024-03-28 PROCEDURE — G8484 FLU IMMUNIZE NO ADMIN: HCPCS | Performed by: FAMILY MEDICINE

## 2024-03-28 PROCEDURE — 3074F SYST BP LT 130 MM HG: CPT | Performed by: FAMILY MEDICINE

## 2024-03-28 PROCEDURE — 1036F TOBACCO NON-USER: CPT | Performed by: FAMILY MEDICINE

## 2024-03-28 PROCEDURE — G8417 CALC BMI ABV UP PARAM F/U: HCPCS | Performed by: FAMILY MEDICINE

## 2024-03-28 PROCEDURE — 3078F DIAST BP <80 MM HG: CPT | Performed by: FAMILY MEDICINE

## 2024-03-28 PROCEDURE — G8427 DOCREV CUR MEDS BY ELIG CLIN: HCPCS | Performed by: FAMILY MEDICINE

## 2024-04-22 ENCOUNTER — OFFICE VISIT (OUTPATIENT)
Age: 84
End: 2024-04-22
Payer: MEDICARE

## 2024-04-22 VITALS
RESPIRATION RATE: 16 BRPM | DIASTOLIC BLOOD PRESSURE: 86 MMHG | HEART RATE: 89 BPM | OXYGEN SATURATION: 94 % | TEMPERATURE: 97.3 F | SYSTOLIC BLOOD PRESSURE: 138 MMHG

## 2024-04-22 DIAGNOSIS — J40 BRONCHITIS: Primary | ICD-10-CM

## 2024-04-22 DIAGNOSIS — R05.9 COUGH, UNSPECIFIED TYPE: ICD-10-CM

## 2024-04-22 DIAGNOSIS — R76.8 POSITIVE ANA (ANTINUCLEAR ANTIBODY): ICD-10-CM

## 2024-04-22 DIAGNOSIS — R21 BUTTERFLY RASH: ICD-10-CM

## 2024-04-22 PROCEDURE — 1090F PRES/ABSN URINE INCON ASSESS: CPT | Performed by: FAMILY MEDICINE

## 2024-04-22 PROCEDURE — 1036F TOBACCO NON-USER: CPT | Performed by: FAMILY MEDICINE

## 2024-04-22 PROCEDURE — G8417 CALC BMI ABV UP PARAM F/U: HCPCS | Performed by: FAMILY MEDICINE

## 2024-04-22 PROCEDURE — G8427 DOCREV CUR MEDS BY ELIG CLIN: HCPCS | Performed by: FAMILY MEDICINE

## 2024-04-22 PROCEDURE — 3075F SYST BP GE 130 - 139MM HG: CPT | Performed by: FAMILY MEDICINE

## 2024-04-22 PROCEDURE — G8399 PT W/DXA RESULTS DOCUMENT: HCPCS | Performed by: FAMILY MEDICINE

## 2024-04-22 PROCEDURE — 99213 OFFICE O/P EST LOW 20 MIN: CPT | Performed by: FAMILY MEDICINE

## 2024-04-22 PROCEDURE — 3079F DIAST BP 80-89 MM HG: CPT | Performed by: FAMILY MEDICINE

## 2024-04-22 PROCEDURE — 1123F ACP DISCUSS/DSCN MKR DOCD: CPT | Performed by: FAMILY MEDICINE

## 2024-04-22 RX ORDER — BENZONATATE 200 MG/1
200 CAPSULE ORAL 3 TIMES DAILY PRN
Qty: 21 CAPSULE | Refills: 0 | Status: SHIPPED | OUTPATIENT
Start: 2024-04-22 | End: 2024-04-29

## 2024-04-22 NOTE — PROGRESS NOTES
Subjective:      Patient ID: Francoise Schilling 84 y.o. female. is here for evaluation for congestion.      HPI    2 to 3 days rhinorrhea and chest congestion.  Has a dry cough. No fever, ST, ear pain, dyspnea, nausea, vomiting or diarrhea.    No known exposure.   Rx; Robitussin did not help.     Dr Adler did labs for a butterfly rash.  ABUNDIO was positive.  Rheumatology was recommended but no referral provided.  She has appt with retina specialist ( anticipating may need Plaquenil).  No joint pain.      Outpatient Medications Marked as Taking for the 4/22/24 encounter (Office Visit) with Bev Lay MD   Medication Sig Dispense Refill    pantoprazole (PROTONIX) 40 MG tablet Take 1 tablet by mouth daily 90 tablet 2    rosuvastatin (CRESTOR) 20 MG tablet Take 1 tablet by mouth daily 90 tablet 2    verapamil (CALAN SR) 240 MG extended release tablet Take 1 tablet by mouth daily 90 tablet 1    hydroCHLOROthiazide (HYDRODIURIL) 25 MG tablet Take 1 tablet by mouth daily 90 tablet 1    potassium chloride (KLOR-CON M) 10 MEQ extended release tablet Take 1 tablet by mouth daily 90 tablet 1    Cholecalciferol (VITAMIN D3) 50 MCG (2000 UT) CAPS Take by mouth daily      valsartan (DIOVAN) 80 MG tablet TAKE 1 TABLET BY MOUTH DAILY (Patient taking differently: Take 0.5 tablets by mouth daily) 90 tablet 1    Magnesium Gluconate 550 MG TABS Take 250 mg by mouth 2 times daily      sertraline (ZOLOFT) 50 MG tablet Take 1.5 tablets by mouth daily 135 tablet 3    buPROPion (WELLBUTRIN XL) 150 MG extended release tablet TAKE ONE TABLET BY MOUTH EVERY MORNING 90 tablet 3    hydrOXYzine HCl (ATARAX) 10 MG tablet Take 1-2 tablets by mouth every 6 hours as needed for Anxiety 60 tablet 1    calcium citrate-vitamin D (CALCIUM CITRATE + D3) 315-250 MG-UNIT TABS per tablet Take 1 tablet by mouth daily (with breakfast) 120 tablet         Allergies   Allergen Reactions    Ace Inhibitors Cough    Latanoprost Other (See Comments)     Unknown

## 2024-04-23 LAB — SARS-COV-2 RNA RESP QL NAA+PROBE: NOT DETECTED

## 2024-04-25 ENCOUNTER — PATIENT MESSAGE (OUTPATIENT)
Age: 84
End: 2024-04-25

## 2024-04-25 NOTE — TELEPHONE ENCOUNTER
Spoke with pt:    Bump is beneath the skin not on top    Not significantly warmer than surrounding area    Cannot tell if it is red because she can't really see it    Pt says its about the size of a large cashew    Pain: 3/10

## 2024-04-25 NOTE — TELEPHONE ENCOUNTER
From: Francoise Schilling  To: Dr. Bev Lay  Sent: 4/25/2024 9:38 AM EDT  Subject: Pain in groin    I have developed a hard area in my groin where the thigh meets the pubic area. I found a round hard spot 2 days ago. Now it is oval, larger, harder and hurts when I touch it or I walk. What could it be and who should I see?  Please respond. Francoise Schilling

## 2024-04-26 NOTE — PROGRESS NOTES
(avascular necrosis of bone) (HCC) 01/23/2014    Hip      Benign essential hypertension     Cervical stenosis of spine     Colon polyps     Depressive disorder, not elsewhere classified     Essential familial hypercholesterolemia     Fatigue     Fracture of cuboid, right ankle, closed 05/2013    Hypercalcemia 07/27/2018    Lumbar herniated disc     Lumbar radiculopathy 06/09/2015    AILYN (obstructive sleep apnea)     USES BIPAP    Osteopenia     Osteoporosis adult       Past Surgical History:   Procedure Laterality Date    APPENDECTOMY      BACK SURGERY  1994    BREAST BIOPSY      BREAST REDUCTION SURGERY      BREAST SURGERY      reduction    HIP SURGERY  2014    HYSTERECTOMY (CERVIX STATUS UNKNOWN)      IR EMBOLIZATION TUMOR / ORGAN  07/27/2022    IR EMBOLIZATION TUMOR / ORGAN 7/27/2022 [facility]    JOINT REPLACEMENT Left 04/2014    hip    KNEE SURGERY Bilateral     TKA    LAMINECTOMY      decompressive more than 2 lumbar segments    SHOULDER SURGERY Right 12/13/2023    RIGHT REVERSE TOTAL SHOULDER REPLACEMENT performed by Corina Vasquez MD at [facility] OR    TONSILLECTOMY          Family History   Problem Relation Age of Onset    Other Mother         lung disease    Heart Disease Father        Social History     Tobacco Use    Smoking status: Never    Smokeless tobacco: Never    Tobacco comments:     never   Vaping Use    Vaping Use: Never used   Substance Use Topics    Alcohol use: Not Currently     Comment: once in a while a glass of wine. Less than 2/month    Drug use: No            Review of Systems  Review of Systems    Objective:   Physical Exam  Vitals:    04/29/24 0830   BP: 136/80   Site: Right Upper Arm   Position: Sitting   Pulse: 76   Resp: 16   Temp: 97.3 °F (36.3 °C)   TempSrc: Temporal   SpO2: 95%       Physical Exam  NAD  No respiratory distress.   Skin is warm and dry.   + erythema nose and cheeks.   No cervical, supraclavicular or submandibular LNS.    Lungs few scattered rhonchi

## 2024-04-29 ENCOUNTER — TELEPHONE (OUTPATIENT)
Age: 84
End: 2024-04-29

## 2024-04-29 ENCOUNTER — OFFICE VISIT (OUTPATIENT)
Age: 84
End: 2024-04-29
Payer: MEDICARE

## 2024-04-29 ENCOUNTER — HOSPITAL ENCOUNTER (OUTPATIENT)
Dept: GENERAL RADIOLOGY | Age: 84
Discharge: HOME | End: 2024-04-29
Payer: MEDICARE

## 2024-04-29 VITALS
OXYGEN SATURATION: 95 % | RESPIRATION RATE: 16 BRPM | DIASTOLIC BLOOD PRESSURE: 80 MMHG | SYSTOLIC BLOOD PRESSURE: 136 MMHG | TEMPERATURE: 97.3 F | HEART RATE: 76 BPM

## 2024-04-29 DIAGNOSIS — R06.09 DYSPNEA ON EXERTION: ICD-10-CM

## 2024-04-29 DIAGNOSIS — L72.3 INFECTED SEBACEOUS CYST: Primary | ICD-10-CM

## 2024-04-29 DIAGNOSIS — L93.0 DISCOID LUPUS: ICD-10-CM

## 2024-04-29 DIAGNOSIS — M48.061 SPINAL STENOSIS OF LUMBAR REGION WITHOUT NEUROGENIC CLAUDICATION: Chronic | ICD-10-CM

## 2024-04-29 DIAGNOSIS — L08.9 INFECTED SEBACEOUS CYST: Primary | ICD-10-CM

## 2024-04-29 DIAGNOSIS — J40 BRONCHITIS: ICD-10-CM

## 2024-04-29 PROCEDURE — 99214 OFFICE O/P EST MOD 30 MIN: CPT | Performed by: FAMILY MEDICINE

## 2024-04-29 PROCEDURE — 71046 X-RAY EXAM CHEST 2 VIEWS: CPT

## 2024-04-29 PROCEDURE — 1036F TOBACCO NON-USER: CPT | Performed by: FAMILY MEDICINE

## 2024-04-29 PROCEDURE — G8417 CALC BMI ABV UP PARAM F/U: HCPCS | Performed by: FAMILY MEDICINE

## 2024-04-29 PROCEDURE — G8399 PT W/DXA RESULTS DOCUMENT: HCPCS | Performed by: FAMILY MEDICINE

## 2024-04-29 PROCEDURE — 1090F PRES/ABSN URINE INCON ASSESS: CPT | Performed by: FAMILY MEDICINE

## 2024-04-29 PROCEDURE — 3079F DIAST BP 80-89 MM HG: CPT | Performed by: FAMILY MEDICINE

## 2024-04-29 PROCEDURE — G8427 DOCREV CUR MEDS BY ELIG CLIN: HCPCS | Performed by: FAMILY MEDICINE

## 2024-04-29 PROCEDURE — 3075F SYST BP GE 130 - 139MM HG: CPT | Performed by: FAMILY MEDICINE

## 2024-04-29 PROCEDURE — 1123F ACP DISCUSS/DSCN MKR DOCD: CPT | Performed by: FAMILY MEDICINE

## 2024-04-29 RX ORDER — DOXYCYCLINE HYCLATE 100 MG
100 TABLET ORAL 2 TIMES DAILY
Qty: 20 TABLET | Refills: 0 | Status: SHIPPED | OUTPATIENT
Start: 2024-04-29 | End: 2024-05-09

## 2024-05-01 ENCOUNTER — OFFICE VISIT (OUTPATIENT)
Dept: ORTHOPEDIC SURGERY | Age: 84
End: 2024-05-01
Payer: MEDICARE

## 2024-05-01 VITALS — WEIGHT: 158 LBS | BODY MASS INDEX: 31.85 KG/M2 | HEIGHT: 59 IN

## 2024-05-01 DIAGNOSIS — Z96.611 STATUS POST REVERSE TOTAL REPLACEMENT OF RIGHT SHOULDER: Primary | ICD-10-CM

## 2024-05-01 PROCEDURE — 1036F TOBACCO NON-USER: CPT | Performed by: STUDENT IN AN ORGANIZED HEALTH CARE EDUCATION/TRAINING PROGRAM

## 2024-05-01 PROCEDURE — G8417 CALC BMI ABV UP PARAM F/U: HCPCS | Performed by: STUDENT IN AN ORGANIZED HEALTH CARE EDUCATION/TRAINING PROGRAM

## 2024-05-01 PROCEDURE — 1090F PRES/ABSN URINE INCON ASSESS: CPT | Performed by: STUDENT IN AN ORGANIZED HEALTH CARE EDUCATION/TRAINING PROGRAM

## 2024-05-01 PROCEDURE — G8399 PT W/DXA RESULTS DOCUMENT: HCPCS | Performed by: STUDENT IN AN ORGANIZED HEALTH CARE EDUCATION/TRAINING PROGRAM

## 2024-05-01 PROCEDURE — 1123F ACP DISCUSS/DSCN MKR DOCD: CPT | Performed by: STUDENT IN AN ORGANIZED HEALTH CARE EDUCATION/TRAINING PROGRAM

## 2024-05-01 PROCEDURE — 99213 OFFICE O/P EST LOW 20 MIN: CPT | Performed by: STUDENT IN AN ORGANIZED HEALTH CARE EDUCATION/TRAINING PROGRAM

## 2024-05-01 PROCEDURE — G8427 DOCREV CUR MEDS BY ELIG CLIN: HCPCS | Performed by: STUDENT IN AN ORGANIZED HEALTH CARE EDUCATION/TRAINING PROGRAM

## 2024-05-01 NOTE — PROGRESS NOTES
[facility]  History and Physical  Shoulder Pain    Date:  2024    Name:  Francoise Schilling  Address:  [address] [place] [id number]    :  1940      Age:   84 y.o.    SSN:  [id number]      Medical Record Number:  [id number]    Reason for Visit:    Shoulder Pain (F/U RIGHT SHOULDER)      HPI:   Francoise Schilling is a 84 y.o. female who presents to our office today for follow up of the right shoulder.  Patient is a little over 4 months status post right reverse shoulder arthroplasty performed on 2023.  Overall she is doing great.  She has completed formal physical therapy and has now progressed to a home exercise program.  She is very pleased with her overall progress and is pain-free in regards to her right shoulder.  She has been able to resume all of her activities of daily living. She is very happy with her decision to move forward with surgery.      Pain Assessment  Location of Pain: Shoulder  Location Modifiers: Right  Severity of Pain: 0  Limiting Behavior: No  Relieving Factors: Rest  Work-Related Injury: No  Are there other pain locations you wish to document?: No    No notes on file    Review of Systems:  A 14 point review of systems available in the scanned medical record as documented by the patient and reviewed on 2024.  The review is negative with the exception of those things mentioned in the History of Present Illness and Past Medical History.      Past Medical History:  Patient's medications, allergies, past medical, surgical, social and family histories were reviewed and updated as appropriate.    Allergies:  Allergies   Allergen Reactions    Ace Inhibitors Cough    Latanoprost Other (See Comments)     Unknown reaction    Losartan Cough    Taztia Xt [Diltiazem Hcl] Cough       Physical Exam:  There were no vitals filed for this visit.  General: Francoise Schilling is a healthy and well appearing 84 y.o. female who is

## 2024-05-08 ENCOUNTER — TELEPHONE (OUTPATIENT)
Age: 84
End: 2024-05-08

## 2024-05-08 NOTE — TELEPHONE ENCOUNTER
Patient called - Referral for Rheumatology wasn't received at Dr Cano office. Can it be resent to the following     [phone] attn: Rebecca

## 2024-05-22 ENCOUNTER — OFFICE VISIT (OUTPATIENT)
Age: 84
End: 2024-05-22

## 2024-05-22 VITALS
RESPIRATION RATE: 16 BRPM | TEMPERATURE: 97.9 F | HEART RATE: 70 BPM | SYSTOLIC BLOOD PRESSURE: 138 MMHG | DIASTOLIC BLOOD PRESSURE: 68 MMHG | OXYGEN SATURATION: 96 %

## 2024-05-22 DIAGNOSIS — R61 DIAPHORESIS: ICD-10-CM

## 2024-05-22 DIAGNOSIS — I44.4 LEFT ANTERIOR FASCICULAR BLOCK: ICD-10-CM

## 2024-05-22 DIAGNOSIS — R42 DIZZINESS: ICD-10-CM

## 2024-05-22 DIAGNOSIS — R61 DIAPHORESIS: Primary | ICD-10-CM

## 2024-05-22 DIAGNOSIS — F41.1 GAD (GENERALIZED ANXIETY DISORDER): ICD-10-CM

## 2024-05-22 DIAGNOSIS — I45.10 RIGHT BUNDLE BRANCH BLOCK: ICD-10-CM

## 2024-05-22 LAB
BASOPHILS # BLD: 0 K/UL (ref 0–0.2)
BASOPHILS NFR BLD: 0.3 %
DEPRECATED RDW RBC AUTO: 18 % (ref 12.4–15.4)
EOSINOPHIL # BLD: 0.1 K/UL (ref 0–0.6)
EOSINOPHIL NFR BLD: 1.5 %
HCT VFR BLD AUTO: 38.4 % (ref 36–48)
HGB BLD-MCNC: 12.7 G/DL (ref 12–16)
LYMPHOCYTES # BLD: 0.8 K/UL (ref 1–5.1)
LYMPHOCYTES NFR BLD: 12.3 %
MCH RBC QN AUTO: 27 PG (ref 26–34)
MCHC RBC AUTO-ENTMCNC: 33.1 G/DL (ref 31–36)
MCV RBC AUTO: 81.5 FL (ref 80–100)
MONOCYTES # BLD: 1.1 K/UL (ref 0–1.3)
MONOCYTES NFR BLD: 15.4 %
NEUTROPHILS # BLD: 4.9 K/UL (ref 1.7–7.7)
NEUTROPHILS NFR BLD: 70.5 %
PLATELET # BLD AUTO: 214 K/UL (ref 135–450)
PMV BLD AUTO: 9.8 FL (ref 5–10.5)
RBC # BLD AUTO: 4.71 M/UL (ref 4–5.2)
WBC # BLD AUTO: 6.9 K/UL (ref 4–11)

## 2024-05-22 RX ORDER — HYDROXYCHLOROQUINE SULFATE 200 MG/1
200 TABLET, FILM COATED ORAL 2 TIMES DAILY
COMMUNITY

## 2024-05-22 RX ORDER — ALPRAZOLAM 0.5 MG/1
0.5 TABLET ORAL 3 TIMES DAILY PRN
Qty: 15 TABLET | Refills: 1 | Status: SHIPPED | OUTPATIENT
Start: 2024-05-22 | End: 2024-06-21

## 2024-05-22 NOTE — PROGRESS NOTES
LAFB, RBBB  unchanged from previous    Assessment:       Diagnosis Orders   1. Diaphoresis  CBC with Auto Differential    Comprehensive Metabolic Panel    Unclear etiology.  Check labs.  May be coming down with a viral illness.  Consider cardiac work up; had normal NM perfusion test 12/2023.      2. Dizziness  EKG 12 Lead - Clinic Performed    CBC with Auto Differential    Comprehensive Metabolic Panel      3. Right bundle branch block        4. Left anterior fascicular block        5. DEBBIE (generalized anxiety disorder)  ALPRAZolam (XANAX) 0.5 MG tablet  Stop Hydroxyzine due to potential interaction with Plaquenil.   PDMP reviewed and no concerns noted.   Risk addiction/abuse with Xanax addressed.  Fall risk discussed.  Advised no alcohol or driving if takes xanax             Plan:      Side effects of current medications reviewed and questions answered.   Call or return to clinic prn if these symptoms worsen or fail to improve while labs pending.

## 2024-05-23 LAB
ALBUMIN SERPL-MCNC: 4.5 G/DL (ref 3.4–5)
ALBUMIN/GLOB SERPL: 1.7 {RATIO} (ref 1.1–2.2)
ALP SERPL-CCNC: 70 U/L (ref 40–129)
ALT SERPL-CCNC: 24 U/L (ref 10–40)
ANION GAP SERPL CALCULATED.3IONS-SCNC: 11 MMOL/L (ref 3–16)
AST SERPL-CCNC: 24 U/L (ref 15–37)
BILIRUB SERPL-MCNC: 0.3 MG/DL (ref 0–1)
BUN SERPL-MCNC: 26 MG/DL (ref 7–20)
CALCIUM SERPL-MCNC: 9.9 MG/DL (ref 8.3–10.6)
CHLORIDE SERPL-SCNC: 103 MMOL/L (ref 99–110)
CO2 SERPL-SCNC: 28 MMOL/L (ref 21–32)
CREAT SERPL-MCNC: 1 MG/DL (ref 0.6–1.2)
GFR SERPLBLD CREATININE-BSD FMLA CKD-EPI: 55 ML/MIN/{1.73_M2}
GLUCOSE SERPL-MCNC: 78 MG/DL (ref 70–99)
POTASSIUM SERPL-SCNC: 4.5 MMOL/L (ref 3.5–5.1)
PROT SERPL-MCNC: 7.1 G/DL (ref 6.4–8.2)
SODIUM SERPL-SCNC: 142 MMOL/L (ref 136–145)

## 2024-05-28 ENCOUNTER — TELEPHONE (OUTPATIENT)
Age: 84
End: 2024-05-28

## 2024-05-28 DIAGNOSIS — I10 BENIGN ESSENTIAL HYPERTENSION: Primary | Chronic | ICD-10-CM

## 2024-05-28 RX ORDER — VALSARTAN 40 MG/1
40 TABLET ORAL DAILY
Qty: 90 TABLET | Refills: 3 | Status: SHIPPED | OUTPATIENT
Start: 2024-05-28

## 2024-05-28 RX ORDER — VALSARTAN 80 MG/1
80 TABLET ORAL DAILY
Qty: 90 TABLET | Refills: 1 | OUTPATIENT
Start: 2024-05-28

## 2024-05-28 NOTE — TELEPHONE ENCOUNTER
Please call and ask how she is feeling.  Any more episodes like the one that brought her in to the office last week?   I ordered Diovan 40 mg so she can stop cutting in 1/2   Thanks

## 2024-05-28 NOTE — TELEPHONE ENCOUNTER
Requested Prescriptions     Pending Prescriptions Disp Refills    valsartan (DIOVAN) 80 MG tablet 90 tablet 1     Sig: Take 1 tablet by mouth daily         Last OV: 5/22/2024    Last labs: 5/22/2024     F/u: 6/28/2024

## 2024-06-11 DIAGNOSIS — I10 BENIGN ESSENTIAL HYPERTENSION: ICD-10-CM

## 2024-06-11 RX ORDER — POTASSIUM CHLORIDE 750 MG/1
10 TABLET, EXTENDED RELEASE ORAL DAILY
Qty: 90 TABLET | Refills: 1 | Status: SHIPPED | OUTPATIENT
Start: 2024-06-11

## 2024-06-11 NOTE — TELEPHONE ENCOUNTER
Requested Prescriptions     Pending Prescriptions Disp Refills    potassium chloride (KLOR-CON M) 10 MEQ extended release tablet 90 tablet 1     Sig: Take 1 tablet by mouth daily        Last OV: 5/24/24    Last labs: 5/22/24     F/u: 6/28/24

## 2024-06-21 PROBLEM — F33.1 MODERATE EPISODE OF RECURRENT MAJOR DEPRESSIVE DISORDER (HCC): Status: RESOLVED | Noted: 2021-11-16 | Resolved: 2024-06-21

## 2024-06-21 PROBLEM — M70.41 PREPATELLAR BURSITIS OF RIGHT KNEE: Status: RESOLVED | Noted: 2023-09-28 | Resolved: 2024-06-21

## 2024-06-21 NOTE — PROGRESS NOTES
Subjective:      Patient ID: Francoise Schilling 84 y.o. female. The primary encounter diagnosis was Benign essential hypertension. A diagnosis of Recurrent major depressive disorder, in full remission (HCC) was also pertinent to this visit.      HPI    Hypertension: Patient here for follow-up of elevated blood pressure. She stopped taking HCTZ - forgot it for a few days and BP was no different. She is exercising and is adherent to low salt diet.  Blood pressure is well controlled at home. Cardiac symptoms none. Patient denies chest pressure/discomfort, dyspnea, exertional chest pressure/discomfort, and lower extremity edema.  Cardiovascular risk factors: advanced age (older than 55 for men, 65 for women), dyslipidemia, and hypertension. Use of agents associated with hypertension: none. History of target organ damage: none.       Depression:  started post death of  and improved recently.  Takes Wellbutrin and Zoloft.  Continues to feel well.  Is manageable.  No bad days.  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  Seeing eye doctor regularly.  Dr. Cano told her she could decrease Plaquenil to once a day.     Incontinence: urge and stress.  Gradual onset.  Constipation - on and off.  Taking only fiber most days, has to Miralax on and off.  Hs mushy stool with Miralx.  Had pelvic floor PT in the past. Myrbetriq helped in the past    Outpatient Medications Marked as Taking for the 6/28/24 encounter (Office Visit) with Bev Lay MD   Medication Sig Dispense Refill    pantoprazole (PROTONIX) 40 MG tablet Take 1 tablet by mouth daily 90 tablet 3    potassium chloride (KLOR-CON M) 10 MEQ extended release tablet Take 1 tablet by mouth daily 90 tablet 1    valsartan (DIOVAN) 40 MG tablet Take 1 tablet by mouth daily 90 tablet 3    hydroxychloroquine (PLAQUENIL) 200 MG tablet Take 1 tablet by mouth 2 times daily      rosuvastatin (CRESTOR) 20 MG tablet Take 1 tablet by mouth daily 90 tablet 2

## 2024-06-24 RX ORDER — PANTOPRAZOLE SODIUM 40 MG/1
40 TABLET, DELAYED RELEASE ORAL DAILY
Qty: 90 TABLET | Refills: 3 | Status: SHIPPED | OUTPATIENT
Start: 2024-06-24

## 2024-06-24 NOTE — TELEPHONE ENCOUNTER
Requested Prescriptions     Pending Prescriptions Disp Refills    pantoprazole (PROTONIX) 40 MG tablet 90 tablet 2     Sig: Take 1 tablet by mouth daily         Last OV: 5/22/2024    Last labs: 5/22/2024     F/u: 6/28/2024

## 2024-06-28 ENCOUNTER — TELEPHONE (OUTPATIENT)
Age: 84
End: 2024-06-28

## 2024-06-28 ENCOUNTER — OFFICE VISIT (OUTPATIENT)
Age: 84
End: 2024-06-28

## 2024-06-28 VITALS
HEART RATE: 64 BPM | RESPIRATION RATE: 16 BRPM | OXYGEN SATURATION: 98 % | TEMPERATURE: 97 F | DIASTOLIC BLOOD PRESSURE: 76 MMHG | SYSTOLIC BLOOD PRESSURE: 132 MMHG

## 2024-06-28 DIAGNOSIS — L93.0 DISCOID LUPUS: ICD-10-CM

## 2024-06-28 DIAGNOSIS — K58.1 IRRITABLE BOWEL SYNDROME WITH CONSTIPATION: ICD-10-CM

## 2024-06-28 DIAGNOSIS — I10 BENIGN ESSENTIAL HYPERTENSION: Primary | Chronic | ICD-10-CM

## 2024-06-28 DIAGNOSIS — N32.81 OVERACTIVE BLADDER: ICD-10-CM

## 2024-06-28 DIAGNOSIS — F33.42 RECURRENT MAJOR DEPRESSIVE DISORDER, IN FULL REMISSION (HCC): Chronic | ICD-10-CM

## 2024-06-28 RX ORDER — MIRABEGRON 50 MG/1
50 TABLET, EXTENDED RELEASE ORAL DAILY
Qty: 90 TABLET | Refills: 1 | Status: SHIPPED | OUTPATIENT
Start: 2024-06-28

## 2024-06-28 NOTE — TELEPHONE ENCOUNTER
Please do PA for Myrbetriq.  She cannot take the anti-cholinergics for her overactive bladder due to severe constipation.

## 2024-06-28 NOTE — TELEPHONE ENCOUNTER
Submitted PA for MYRBETRIQ  [facility]ia [facility] Key: [id number]  STATUS: NO PA REQUIRED.    Follow up done daily; if no decision with in three days we will refax.  If another three days goes by with no decision will call the insurance for status.

## 2024-07-15 ENCOUNTER — OFFICE VISIT (OUTPATIENT)
Age: 84
End: 2024-07-15
Payer: MEDICARE

## 2024-07-15 ENCOUNTER — TELEPHONE (OUTPATIENT)
Age: 84
End: 2024-07-15

## 2024-07-15 VITALS
RESPIRATION RATE: 16 BRPM | BODY MASS INDEX: 31.83 KG/M2 | DIASTOLIC BLOOD PRESSURE: 76 MMHG | SYSTOLIC BLOOD PRESSURE: 140 MMHG | HEART RATE: 78 BPM | TEMPERATURE: 97.8 F | WEIGHT: 157.6 LBS | OXYGEN SATURATION: 95 %

## 2024-07-15 DIAGNOSIS — F41.1 GAD (GENERALIZED ANXIETY DISORDER): ICD-10-CM

## 2024-07-15 DIAGNOSIS — F33.1 MODERATE EPISODE OF RECURRENT MAJOR DEPRESSIVE DISORDER (HCC): ICD-10-CM

## 2024-07-15 DIAGNOSIS — K59.04 CHRONIC IDIOPATHIC CONSTIPATION: Primary | ICD-10-CM

## 2024-07-15 PROCEDURE — G8427 DOCREV CUR MEDS BY ELIG CLIN: HCPCS | Performed by: FAMILY MEDICINE

## 2024-07-15 PROCEDURE — 1123F ACP DISCUSS/DSCN MKR DOCD: CPT | Performed by: FAMILY MEDICINE

## 2024-07-15 PROCEDURE — 1036F TOBACCO NON-USER: CPT | Performed by: FAMILY MEDICINE

## 2024-07-15 PROCEDURE — 3078F DIAST BP <80 MM HG: CPT | Performed by: FAMILY MEDICINE

## 2024-07-15 PROCEDURE — G8417 CALC BMI ABV UP PARAM F/U: HCPCS | Performed by: FAMILY MEDICINE

## 2024-07-15 PROCEDURE — 1090F PRES/ABSN URINE INCON ASSESS: CPT | Performed by: FAMILY MEDICINE

## 2024-07-15 PROCEDURE — 99214 OFFICE O/P EST MOD 30 MIN: CPT | Performed by: FAMILY MEDICINE

## 2024-07-15 PROCEDURE — G8399 PT W/DXA RESULTS DOCUMENT: HCPCS | Performed by: FAMILY MEDICINE

## 2024-07-15 PROCEDURE — 3077F SYST BP >= 140 MM HG: CPT | Performed by: FAMILY MEDICINE

## 2024-07-15 NOTE — PROGRESS NOTES
Inhibitors Cough    Latanoprost Other (See Comments)     Unknown reaction    Losartan Cough    Taztia Xt [Diltiazem Hcl] Cough       Patient Active Problem List   Diagnosis    Recurrent major depressive disorder, in full remission ([facility])    Osteopenia    Cervical stenosis of spine    Benign essential hypertension    Colon polyps    Essential familial hypercholesterolemia    IBS (irritable bowel syndrome)    Elevated lipoprotein(a)    Renal angiolipoma    Lumbar spondylosis    Lumbar stenosis    Trochanteric bursitis of right hip    Glucose intolerance (impaired glucose tolerance)    Memory loss    AILYN (obstructive sleep apnea)    Class 2 obesity without serious comorbidity with body mass index (BMI) of 36.0 to 36.9 in adult    DEBBIE (generalized anxiety disorder)    Panic attacks    DNR (do not resuscitate)    Rotator cuff arthropathy of right shoulder    Right bundle branch block    Left anterior fascicular block    Overactive bladder       Past Medical History:   Diagnosis Date    Acute esophagitis     h/o    Acute hearing loss of right ear 07/12/2016    Arthritis recent    AVN (avascular necrosis of bone) ([facility]) 01/23/2014    Hip      Benign essential hypertension     Cervical stenosis of spine     Colon polyps     Depressive disorder, not elsewhere classified     Essential familial hypercholesterolemia     Fatigue     Fracture of cuboid, right ankle, closed 05/2013    Hypercalcemia 07/27/2018    Lumbar herniated disc     Lumbar radiculopathy 06/09/2015    Moderate episode of recurrent major depressive disorder ([facility]) 11/16/2021    AILYN (obstructive sleep apnea)     USES BIPAP    Osteopenia     Osteoporosis adult       Past Surgical History:   Procedure Laterality Date    APPENDECTOMY      BACK SURGERY  1994    BREAST BIOPSY      BREAST REDUCTION SURGERY      BREAST SURGERY      reduction    HIP SURGERY  2014    HYSTERECTOMY (CERVIX STATUS UNKNOWN)      IR EMBOLIZATION TUMOR / ORGAN  07/27/2022    IR EMBOLIZATION TUMOR

## 2024-07-15 NOTE — TELEPHONE ENCOUNTER
Appt made with Dr. Doyle on 8/27/24 at 2:40 pm [place] office    Spoke with patient. Would like to see if she can get seen sooner by someone else in the office.     Rescheduled to 7/17/24 8:20 am dr jarrell gutierres [place] office

## 2024-07-23 ENCOUNTER — PATIENT MESSAGE (OUTPATIENT)
Age: 84
End: 2024-07-23

## 2024-07-23 NOTE — TELEPHONE ENCOUNTER
Called and discussed.  She has samples of Linzess 145 mg and 290 mg but has not tried it.   She does not want to start it until after her family visits this weekend.  Will try after colonoscopy.  Start 145 mg and increase to 280 in a few days if needed.

## 2024-07-23 NOTE — TELEPHONE ENCOUNTER
From: Francoise Schilling  To: Dr. Bev Lay  Sent: 7/23/2024 2:39 PM EDT  Subject: Not feeling well    I am not feeling well at all. Cannot move my bowels, took 1/4 bottle mag citrate Monday at 10pm. Worked about 9 AM today, and still going periodically.  I have an appt for a colonoscopy next Tues with Dr. Brunson at [facility]. In the meanwhile I feel weak, sweaty, scared, nervous. Taking hydroxyzine 2-3 time a day for nerves. I am sleeping most of the night.  Have grandchildren coming for the weekend and will take another dose of mag cit to get me through.  Dr Butler thought my pushing muscles are too weak to  push the stool down and out. Told me about  Inter-stem. I'm so scared and nervous.  Francoise Schilling

## 2024-07-25 ENCOUNTER — HOSPITAL ENCOUNTER (EMERGENCY)
Age: 84
Discharge: HOME | End: 2024-07-25
Attending: STUDENT IN AN ORGANIZED HEALTH CARE EDUCATION/TRAINING PROGRAM
Payer: MEDICARE

## 2024-07-25 ENCOUNTER — APPOINTMENT (OUTPATIENT)
Dept: GENERAL RADIOLOGY | Age: 84
End: 2024-07-25
Payer: MEDICARE

## 2024-07-25 VITALS
RESPIRATION RATE: 18 BRPM | SYSTOLIC BLOOD PRESSURE: 172 MMHG | DIASTOLIC BLOOD PRESSURE: 100 MMHG | TEMPERATURE: 97.8 F | BODY MASS INDEX: 32.25 KG/M2 | WEIGHT: 160 LBS | HEIGHT: 59 IN | HEART RATE: 74 BPM | OXYGEN SATURATION: 96 %

## 2024-07-25 DIAGNOSIS — K59.00 CONSTIPATION, UNSPECIFIED CONSTIPATION TYPE: Primary | ICD-10-CM

## 2024-07-25 LAB
ANION GAP SERPL CALCULATED.3IONS-SCNC: 11 MMOL/L (ref 3–16)
BASOPHILS # BLD: 0 K/UL (ref 0–0.2)
BASOPHILS NFR BLD: 0.5 %
BILIRUB UR QL STRIP.AUTO: NEGATIVE
BUN SERPL-MCNC: 24 MG/DL (ref 7–20)
CALCIUM SERPL-MCNC: 9.8 MG/DL (ref 8.3–10.6)
CHLORIDE SERPL-SCNC: 103 MMOL/L (ref 99–110)
CLARITY UR: CLEAR
CO2 SERPL-SCNC: 26 MMOL/L (ref 21–32)
COLOR UR: YELLOW
CREAT SERPL-MCNC: 1.1 MG/DL (ref 0.6–1.2)
DEPRECATED RDW RBC AUTO: 16.6 % (ref 12.4–15.4)
EOSINOPHIL # BLD: 0 K/UL (ref 0–0.6)
EOSINOPHIL NFR BLD: 0.4 %
EPI CELLS #/AREA URNS HPF: NORMAL /HPF (ref 0–5)
GFR SERPLBLD CREATININE-BSD FMLA CKD-EPI: 49 ML/MIN/{1.73_M2}
GLUCOSE SERPL-MCNC: 100 MG/DL (ref 70–99)
GLUCOSE UR STRIP.AUTO-MCNC: NEGATIVE MG/DL
HCT VFR BLD AUTO: 40.1 % (ref 36–48)
HGB BLD-MCNC: 13 G/DL (ref 12–16)
HGB UR QL STRIP.AUTO: NEGATIVE
KETONES UR STRIP.AUTO-MCNC: NEGATIVE MG/DL
LEUKOCYTE ESTERASE UR QL STRIP.AUTO: ABNORMAL
LYMPHOCYTES # BLD: 0.6 K/UL (ref 1–5.1)
LYMPHOCYTES NFR BLD: 8.2 %
MCH RBC QN AUTO: 28.3 PG (ref 26–34)
MCHC RBC AUTO-ENTMCNC: 32.6 G/DL (ref 31–36)
MCV RBC AUTO: 87 FL (ref 80–100)
MONOCYTES # BLD: 1 K/UL (ref 0–1.3)
MONOCYTES NFR BLD: 12.7 %
NEUTROPHILS # BLD: 6 K/UL (ref 1.7–7.7)
NEUTROPHILS NFR BLD: 78.2 %
NITRITE UR QL STRIP.AUTO: NEGATIVE
PH UR STRIP.AUTO: 7 [PH] (ref 5–8)
PLATELET # BLD AUTO: 193 K/UL (ref 135–450)
PMV BLD AUTO: 9.4 FL (ref 5–10.5)
POTASSIUM SERPL-SCNC: 4.5 MMOL/L (ref 3.5–5.1)
PROT UR STRIP.AUTO-MCNC: NEGATIVE MG/DL
RBC # BLD AUTO: 4.61 M/UL (ref 4–5.2)
RBC #/AREA URNS HPF: NORMAL /HPF (ref 0–4)
SODIUM SERPL-SCNC: 140 MMOL/L (ref 136–145)
SP GR UR STRIP.AUTO: 1.01 (ref 1–1.03)
UA COMPLETE W REFLEX CULTURE PNL UR: ABNORMAL
UA DIPSTICK W REFLEX MICRO PNL UR: YES
URN SPEC COLLECT METH UR: ABNORMAL
UROBILINOGEN UR STRIP-ACNC: 0.2 E.U./DL
WBC # BLD AUTO: 7.6 K/UL (ref 4–11)
WBC #/AREA URNS HPF: NORMAL /HPF (ref 0–5)

## 2024-07-25 PROCEDURE — 85025 COMPLETE CBC W/AUTO DIFF WBC: CPT

## 2024-07-25 PROCEDURE — 81001 URINALYSIS AUTO W/SCOPE: CPT

## 2024-07-25 PROCEDURE — 74018 RADEX ABDOMEN 1 VIEW: CPT

## 2024-07-25 PROCEDURE — 99284 EMERGENCY DEPT VISIT MOD MDM: CPT

## 2024-07-25 PROCEDURE — 6370000000 HC RX 637 (ALT 250 FOR IP): Performed by: NURSE PRACTITIONER

## 2024-07-25 PROCEDURE — 80048 BASIC METABOLIC PNL TOTAL CA: CPT

## 2024-07-25 RX ORDER — SENNA AND DOCUSATE SODIUM 50; 8.6 MG/1; MG/1
1 TABLET, FILM COATED ORAL DAILY
Qty: 30 TABLET | Refills: 0 | Status: SHIPPED | OUTPATIENT
Start: 2024-07-25

## 2024-07-25 RX ADMIN — MINERAL OIL 330 ML: 1000 LIQUID ORAL at 20:59

## 2024-07-25 ASSESSMENT — ENCOUNTER SYMPTOMS
RESPIRATORY NEGATIVE: 1
NAUSEA: 1
ABDOMINAL PAIN: 1
CONSTIPATION: 1
VOMITING: 0

## 2024-07-25 NOTE — ED PROVIDER NOTES
ED Attending Attestation Note     Date of evaluation: 7/25/2024    This patient was seen by the advance practice provider.  I have seen and examined the patient, agree with the workup, evaluation, management and diagnosis. The care plan has been discussed.  My assessment reveals an 84-year-old female with history of recurrent constipation who presents with exacerbation of constipation.  She has not had a bowel movement for 4 days.  She has been using stool softeners and used mag citrate last night with no bowel movement.    Gen: Alert, awake, non toxic appearing   Head: NCAT  Eyes: PERRL, EOMI  Neck: Supple, full ROM  Cardiac: RRR, no murmurs, rubs or gallops  Lungs: No respiratory distress, lungs CTAB  Abd: Soft, non distended, non tender to palpation  Extremities: No deformities, warm and well perfused, strong peripheral pulses  Neuro: A&Ox3, moving all extremities equally, no focal deficits  Psych: Appropriate mood and affect       Jorge Alberto Hitchcock MD  07/25/24 2038    
MEDICATIONS:  New Prescriptions    SENNOSIDES-DOCUSATE SODIUM (SENOKOT-S) 8.6-50 MG TABLET    Take 1 tablet by mouth daily       DISPOSITION Decision To Discharge 07/25/2024 11:06:14 PM        Diagnostic Results And Other Data         RADIOLOGY:  XR ABDOMEN (KUB) (SINGLE AP VIEW)   Final Result       Nonobstructive bowel gas pattern with moderate colonic stool burden.      Electronically signed by Ramy Pandey MD          LABS:   Results for orders placed or performed during the hospital encounter of 07/25/24   CBC with Auto Differential   Result Value Ref Range    WBC 7.6 4.0 - 11.0 K/uL    RBC 4.61 4.00 - 5.20 M/uL    Hemoglobin 13.0 12.0 - 16.0 g/dL    Hematocrit 40.1 36.0 - 48.0 %    MCV 87.0 80.0 - 100.0 fL    MCH 28.3 26.0 - 34.0 pg    MCHC 32.6 31.0 - 36.0 g/dL    RDW 16.6 (H) 12.4 - 15.4 %    Platelets 193 135 - 450 K/uL    MPV 9.4 5.0 - 10.5 fL    Neutrophils % 78.2 %    Lymphocytes % 8.2 %    Monocytes % 12.7 %    Eosinophils % 0.4 %    Basophils % 0.5 %    Neutrophils Absolute 6.0 1.7 - 7.7 K/uL    Lymphocytes Absolute 0.6 (L) 1.0 - 5.1 K/uL    Monocytes Absolute 1.0 0.0 - 1.3 K/uL    Eosinophils Absolute 0.0 0.0 - 0.6 K/uL    Basophils Absolute 0.0 0.0 - 0.2 K/uL   BMP w/ Reflex to MG   Result Value Ref Range    Sodium 140 136 - 145 mmol/L    Potassium reflex Magnesium 4.5 3.5 - 5.1 mmol/L    Chloride 103 99 - 110 mmol/L    CO2 26 21 - 32 mmol/L    Anion Gap 11 3 - 16    Glucose 100 (H) 70 - 99 mg/dL    BUN 24 (H) 7 - 20 mg/dL    Creatinine 1.1 0.6 - 1.2 mg/dL    Est, Glom Filt Rate 49 (A) >60    Calcium 9.8 8.3 - 10.6 mg/dL   Urinalysis with Reflex to Culture    Specimen: Urine   Result Value Ref Range    Color, UA Yellow Straw/Yellow    Clarity, UA Clear Clear    Glucose, Ur Negative Negative mg/dL    Bilirubin, Urine Negative Negative    Ketones, Urine Negative Negative mg/dL    Specific Gravity, UA 1.015 1.005 - 1.030    Blood, Urine Negative Negative    pH, Urine 7.0 5.0 - 8.0    Protein, UA Negative

## 2024-07-26 ENCOUNTER — TELEMEDICINE (OUTPATIENT)
Age: 84
End: 2024-07-26

## 2024-07-26 ENCOUNTER — TELEPHONE (OUTPATIENT)
Age: 84
End: 2024-07-26

## 2024-07-26 DIAGNOSIS — K59.04 CHRONIC IDIOPATHIC CONSTIPATION: Primary | ICD-10-CM

## 2024-07-26 PROBLEM — N18.31 CHRONIC RENAL IMPAIRMENT, STAGE 3A (HCC): Status: ACTIVE | Noted: 2024-07-26

## 2024-07-26 NOTE — ED NOTES
Pt able to tolerate Enema over 3 attempts. Still not able to produce a BM. NP aware.      Mary Castle, RN  07/25/24 9775

## 2024-07-26 NOTE — TELEPHONE ENCOUNTER
Spoke with Dr. Esparza's office they do not do virtual visits. Would require a new patient appointment because she has not been seen in a while. Earliest appt would be with Dr. Frankel (sometime potentially next week or the week after) as Dr. Esparza is booked out for at least a month. They would also need a referral sent in

## 2024-07-26 NOTE — PROGRESS NOTES
2024    TELEHEALTH EVALUATION -- Audio/Visual    HPI:    Francoise Schilling (:  1940) has requested an audio/video evaluation for the following concern(s):    The encounter diagnosis was Chronic idiopathic constipation.     Was in ER last night with sweats, clammy feeling and no BM x 5-6 days.  Struggled with constipation last week; Mag citrate was effective then.  Took 1/4 bottle mag citrate Wednesday PM without relief.   Enema in the ER was not effective.  Xray abdomen showed moderate stool burden.  She took 1/2 bottle mag citrate when she got home last night.  Did have an \"explosive BM\" this AM.   Clamminess is gone.  On and off gas pains.  No nausea.        Review of Systems    Outpatient Medications Marked as Taking for the 24 encounter (Telemedicine) with Bev Lay MD   Medication Sig Dispense Refill    sertraline (ZOLOFT) 50 MG tablet Take 1.5 tablets by mouth daily 135 tablet 3    pantoprazole (PROTONIX) 40 MG tablet Take 1 tablet by mouth daily 90 tablet 3    potassium chloride (KLOR-CON M) 10 MEQ extended release tablet Take 1 tablet by mouth daily 90 tablet 1    valsartan (DIOVAN) 40 MG tablet Take 1 tablet by mouth daily 90 tablet 3    hydroxychloroquine (PLAQUENIL) 200 MG tablet Take 1 tablet by mouth 2 times daily      rosuvastatin (CRESTOR) 20 MG tablet Take 1 tablet by mouth daily 90 tablet 2    verapamil (CALAN SR) 240 MG extended release tablet Take 1 tablet by mouth daily 90 tablet 1    Cholecalciferol (VITAMIN D3) 50 MCG (2000 UT) CAPS Take by mouth daily      buPROPion (WELLBUTRIN XL) 150 MG extended release tablet TAKE ONE TABLET BY MOUTH EVERY MORNING 90 tablet 3        Social History     Tobacco Use    Smoking status: Never    Smokeless tobacco: Never    Tobacco comments:     never   Vaping Use    Vaping Use: Never used   Substance Use Topics    Alcohol use: Not Currently     Comment: once in a while a glass of wine. Less than 2/month    Drug use: No

## 2024-07-26 NOTE — ED NOTES
Pt discharged to home, alert and oriented. Denies any questions about discharge instructions. Will follow up as directed. encouraged to return for any worsening symptoms.        Mary Castle RN  07/25/24 2889

## 2024-07-26 NOTE — TELEPHONE ENCOUNTER
Referral faxed     Would like to wait until after colonoscopy to make appt with surgery- wants to see what it will show. She will call the office back and let us know how she would like to proceed.    Is starting to feel better- wanted to let you know.

## 2024-07-26 NOTE — TELEPHONE ENCOUNTER
Please call Dr. Esparza's office and see if her NP Marina Napier can see Francoise on a VV today for worsening constipation.  She would like to see what she suggests.  Thanks   [phone] Marina Napier.

## 2024-07-30 ENCOUNTER — ANESTHESIA EVENT (OUTPATIENT)
Dept: ENDOSCOPY | Age: 84
End: 2024-07-30
Payer: MEDICARE

## 2024-07-30 ENCOUNTER — ANESTHESIA (OUTPATIENT)
Dept: ENDOSCOPY | Age: 84
End: 2024-07-30
Payer: MEDICARE

## 2024-07-30 ENCOUNTER — HOSPITAL ENCOUNTER (OUTPATIENT)
Age: 84
Setting detail: OUTPATIENT SURGERY
Discharge: HOME | End: 2024-07-30
Attending: INTERNAL MEDICINE | Admitting: INTERNAL MEDICINE
Payer: MEDICARE

## 2024-07-30 ENCOUNTER — PATIENT MESSAGE (OUTPATIENT)
Age: 84
End: 2024-07-30

## 2024-07-30 VITALS
WEIGHT: 155 LBS | SYSTOLIC BLOOD PRESSURE: 163 MMHG | TEMPERATURE: 97.3 F | HEIGHT: 59 IN | OXYGEN SATURATION: 96 % | HEART RATE: 63 BPM | RESPIRATION RATE: 20 BRPM | DIASTOLIC BLOOD PRESSURE: 61 MMHG | BODY MASS INDEX: 31.25 KG/M2

## 2024-07-30 DIAGNOSIS — Z12.11 SCREEN FOR COLON CANCER: ICD-10-CM

## 2024-07-30 PROCEDURE — 2709999900 HC NON-CHARGEABLE SUPPLY: Performed by: INTERNAL MEDICINE

## 2024-07-30 PROCEDURE — 3609010600 HC COLONOSCOPY POLYPECTOMY SNARE/COLD BIOPSY: Performed by: INTERNAL MEDICINE

## 2024-07-30 PROCEDURE — 7100000010 HC PHASE II RECOVERY - FIRST 15 MIN: Performed by: INTERNAL MEDICINE

## 2024-07-30 PROCEDURE — 88305 TISSUE EXAM BY PATHOLOGIST: CPT

## 2024-07-30 PROCEDURE — 3700000001 HC ADD 15 MINUTES (ANESTHESIA): Performed by: INTERNAL MEDICINE

## 2024-07-30 PROCEDURE — 7100000011 HC PHASE II RECOVERY - ADDTL 15 MIN: Performed by: INTERNAL MEDICINE

## 2024-07-30 PROCEDURE — 2580000003 HC RX 258: Performed by: ANESTHESIOLOGY

## 2024-07-30 PROCEDURE — 6360000002 HC RX W HCPCS

## 2024-07-30 PROCEDURE — 3700000000 HC ANESTHESIA ATTENDED CARE: Performed by: INTERNAL MEDICINE

## 2024-07-30 RX ORDER — LIDOCAINE HYDROCHLORIDE 20 MG/ML
INJECTION, SOLUTION INTRAVENOUS PRN
Status: DISCONTINUED | OUTPATIENT
Start: 2024-07-30 | End: 2024-07-30 | Stop reason: SDUPTHER

## 2024-07-30 RX ORDER — LIDOCAINE HYDROCHLORIDE 10 MG/ML
1 INJECTION, SOLUTION EPIDURAL; INFILTRATION; INTRACAUDAL; PERINEURAL
Status: DISCONTINUED | OUTPATIENT
Start: 2024-07-30 | End: 2024-07-30 | Stop reason: HOSPADM

## 2024-07-30 RX ORDER — SODIUM CHLORIDE 0.9 % (FLUSH) 0.9 %
5-40 SYRINGE (ML) INJECTION EVERY 12 HOURS SCHEDULED
Status: DISCONTINUED | OUTPATIENT
Start: 2024-07-30 | End: 2024-07-30 | Stop reason: HOSPADM

## 2024-07-30 RX ORDER — ASCORBIC ACID 500 MG
500 TABLET ORAL DAILY
COMMUNITY

## 2024-07-30 RX ORDER — HYDROXYZINE HYDROCHLORIDE 25 MG/1
10 TABLET, FILM COATED ORAL EVERY 6 HOURS PRN
COMMUNITY

## 2024-07-30 RX ORDER — PROPOFOL 10 MG/ML
INJECTION, EMULSION INTRAVENOUS PRN
Status: DISCONTINUED | OUTPATIENT
Start: 2024-07-30 | End: 2024-07-30 | Stop reason: SDUPTHER

## 2024-07-30 RX ORDER — SODIUM CHLORIDE, SODIUM LACTATE, POTASSIUM CHLORIDE, CALCIUM CHLORIDE 600; 310; 30; 20 MG/100ML; MG/100ML; MG/100ML; MG/100ML
INJECTION, SOLUTION INTRAVENOUS CONTINUOUS
Status: DISCONTINUED | OUTPATIENT
Start: 2024-07-30 | End: 2024-07-30 | Stop reason: HOSPADM

## 2024-07-30 RX ORDER — POLYETHYLENE GLYCOL 3350 17 G/17G
17 POWDER, FOR SOLUTION ORAL DAILY
COMMUNITY
End: 2024-08-02

## 2024-07-30 RX ORDER — SODIUM CHLORIDE 0.9 % (FLUSH) 0.9 %
5-40 SYRINGE (ML) INJECTION PRN
Status: DISCONTINUED | OUTPATIENT
Start: 2024-07-30 | End: 2024-07-30 | Stop reason: HOSPADM

## 2024-07-30 RX ADMIN — PROPOFOL 120 MCG/KG/MIN: 10 INJECTION, EMULSION INTRAVENOUS at 12:19

## 2024-07-30 RX ADMIN — LIDOCAINE HYDROCHLORIDE 100 MG: 20 INJECTION, SOLUTION INTRAVENOUS at 12:18

## 2024-07-30 RX ADMIN — SODIUM CHLORIDE, POTASSIUM CHLORIDE, SODIUM LACTATE AND CALCIUM CHLORIDE: 600; 310; 30; 20 INJECTION, SOLUTION INTRAVENOUS at 11:07

## 2024-07-30 RX ADMIN — PROPOFOL 100 MG: 10 INJECTION, EMULSION INTRAVENOUS at 12:18

## 2024-07-30 ASSESSMENT — PAIN - FUNCTIONAL ASSESSMENT: PAIN_FUNCTIONAL_ASSESSMENT: 0-10

## 2024-07-30 NOTE — H&P
Pre-operative History and Physical    Patient: Francoise Schilling  : 1940     History Obtained From:  patient, electronic medical record    HISTORY OF PRESENT ILLNESS:    The patient is a 84 y.o. female who presents for a colonoscopy for history of polyps and change in bowel habits.   Past Medical History:        Diagnosis Date    Acute esophagitis     h/o    Acute hearing loss of right ear 2016    Arthritis recent    AVN (avascular necrosis of bone) (HCC) 2014    Hip      Benign essential hypertension     Cervical stenosis of spine     Colon polyps     Depressive disorder, not elsewhere classified     Essential familial hypercholesterolemia     Fatigue     Fracture of cuboid, right ankle, closed 2013    Hypercalcemia 2018    Lumbar herniated disc     Lumbar radiculopathy 2015    Moderate episode of recurrent major depressive disorder (HCC) 2021    AILYN (obstructive sleep apnea)     USES BIPAP    Osteopenia     Osteoporosis adult     Past Surgical History:        Procedure Laterality Date    APPENDECTOMY      BACK SURGERY      BREAST BIOPSY      BREAST REDUCTION SURGERY      BREAST SURGERY      reduction    HIP SURGERY      HYSTERECTOMY (CERVIX STATUS UNKNOWN)      IR EMBOLIZATION TUMOR / ORGAN  2022    IR EMBOLIZATION TUMOR / ORGAN 2022 [facility]    JOINT REPLACEMENT Left 2014    hip    KNEE SURGERY Bilateral     TKA    LAMINECTOMY      decompressive more than 2 lumbar segments    SHOULDER SURGERY Right 2023    RIGHT REVERSE TOTAL SHOULDER REPLACEMENT performed by Corina Vasquez MD at [facility] OR    TONSILLECTOMY       Medications Prior to Admission:   No current facility-administered medications on file prior to encounter.     Current Outpatient Medications on File Prior to Encounter   Medication Sig Dispense Refill    vitamin C (ASCORBIC ACID) 500 MG tablet Take 1 tablet by mouth daily      polyethylene glycol (MIRALAX) 17 g PACK

## 2024-07-30 NOTE — PROCEDURES
anatomy and a picture was obtained.      The scope was then withdrawn (>6minutes) with close inspection of the mucosa in a circumferential manor.     Retroflexed views under the ICV and of the right colon obtained.poor prep throughout and scope repeatedly clogged. At least 4 polyps 5 to 8mm removed with cold snare from cecum, ascending, transverse colon. Severe left sided diverticulosis.   360 degrees retroflexed views of the rectum show hemorrhoids. Second look evaluation of the rectum in anteflexed position done.     No immediate complications.   The preparation was poor.   Estimated blood loss none    Impression:  4 polyps  Poor prep  Diverticulosis  hemorrhoids  Plan:  Consider repeating colonoscopy with a 2 day prep soon

## 2024-07-30 NOTE — ANESTHESIA POSTPROCEDURE EVALUATION
Department of Anesthesiology  Postprocedure Note    Patient: Francoise Schilling  MRN: [id number]  YOB: 1940  Date of evaluation: 7/30/2024    Procedure Summary       Date: 07/30/24 Room / Location: [place] [address] / [facility]    Anesthesia Start: 1215 Anesthesia Stop: 1242    Procedure: COLONOSCOPY POLYPECTOMY SNARE Diagnosis:       Screen for colon cancer      (Screen for colon cancer [Z12.11])    Surgeons: Hong Brunson MD Responsible Provider: Balwinder Grant MD    Anesthesia Type: general ASA Status: 3            Anesthesia Type: No value filed.    Juan Phase I: Juan Score: 10    Juan Phase II: Juan Score: 10    Anesthesia Post Evaluation    Patient location during evaluation: PACU  Patient participation: complete - patient participated  Level of consciousness: awake and alert  Pain score: 0  Airway patency: patent  Nausea & Vomiting: no nausea and no vomiting  Cardiovascular status: hemodynamically stable  Respiratory status: acceptable  Hydration status: euvolemic  Pain management: adequate    No notable events documented.

## 2024-07-30 NOTE — ANESTHESIA PRE PROCEDURE
Department of Anesthesiology  Preprocedure Note       Name:  Francoise Schilling   Age:  84 y.o.  :  1940                                          MRN:  [id number]         Date:  2024      Surgeon: Surgeon(s):  Hong Brunson MD    Procedure: Procedure(s):  COLONOSCOPY    Medications prior to admission:   Prior to Admission medications    Medication Sig Start Date End Date Taking? Authorizing Provider   sennosides-docusate sodium (SENOKOT-S) 8.6-50 MG tablet Take 1 tablet by mouth daily  Patient not taking: Reported on 2024   Jane Hoskins, APRN - CNP   sertraline (ZOLOFT) 50 MG tablet Take 1.5 tablets by mouth daily 7/15/24   Bev Lay MD   pantoprazole (PROTONIX) 40 MG tablet Take 1 tablet by mouth daily 24   Bev Lay MD   potassium chloride (KLOR-CON M) 10 MEQ extended release tablet Take 1 tablet by mouth daily 24   Bev Lay MD   valsartan (DIOVAN) 40 MG tablet Take 1 tablet by mouth daily 24   Bev Lay MD   hydroxychloroquine (PLAQUENIL) 200 MG tablet Take 1 tablet by mouth 2 times daily    ProviderRia MD   rosuvastatin (CRESTOR) 20 MG tablet Take 1 tablet by mouth daily 3/18/24   Bev Lay MD   verapamil (CALAN SR) 240 MG extended release tablet Take 1 tablet by mouth daily 24   Bev Lay MD   Cholecalciferol (VITAMIN D3) 50 MCG (2000) CAPS Take by mouth daily    ProviderRia MD   buPROPion (WELLBUTRIN XL) 150 MG extended release tablet TAKE ONE TABLET BY MOUTH EVERY MORNING 23   Bev Lay MD       Current medications:    Current Facility-Administered Medications   Medication Dose Route Frequency Provider Last Rate Last Admin   • lidocaine PF 1 % injection 1 mL  1 mL IntraDERmal Once PRN Balwinder Grant MD       • lactated ringers IV soln infusion   IntraVENous Continuous Balwinder Grant MD       • sodium chloride flush 0.9 % injection 5-40 mL  5-40 mL

## 2024-07-30 NOTE — PROGRESS NOTES
Ambulatory Surgery/Procedure Discharge Note    Vitals:    07/30/24 1245   BP: (!) 141/65   Pulse: 72   Resp: 18   Temp: 97.3 °F (36.3 °C)   SpO2: 99%     Phase 2 endo. Pt awake talking with  , family member here  Spoke with her, and he called her son spoke with her  In: 300 [I.V.:300]  Out: -     Restroom use offered before discharge.  Yes    Pain assessment:  none  Pain Level: 0  /10  1300 reviewed d/c instructions with pt and visitor/family both verbalized their understanding  1305 d/c iv   Up to get dressed  1320  Patient discharged to home/self care. Patient discharged via wheel chair by transporter to waiting family/S.O.       7/30/2024

## 2024-07-30 NOTE — DISCHARGE INSTRUCTIONS
ENDOSCOPY DISCHARGE INSTRUCTIONS:    Call the physician that did your procedure for any questions or concern:    [facility]: [phone]  DR. TED COHN      ACTIVITY:    There are potential side effects to the medications used for sedation and anesthesia during your procedure.  These include:  Dizziness or light-headedness, confusion or memory loss, delayed reaction times, loss of coordination, nausea and vomiting.  Because of your increased risk for injury, we ask that you observe the following precautions:  For the next 24 hours,  DO NOT operate an automobile, bicycle, motorcycle, , power tools or large equipment of any kind.  Do not drink alcohol, sign any legal documents or make any legal decisions for 24 hours.  Do not bend your head over lower than your heart.  DO sit on the side of bed/couch awhile before getting up.  Plan on bedrest or quiet relaxation today.  You may resume normal activities in 24 hours.    DIET:    Your first meal today should be light, avoiding spicy and fatty foods.  If you tolerate this first meal, then you may advance to your regular diet unless otherwise advised by your physician.    NORMAL SYMPTOMS:  -Mild sore throat if you’ve had an EGD   -Gaseous discomfort    NOTIFY YOUR PHYSICIAN IF THESE SYMPTOMS OCCUR:  1. Fever (greater than 100)  5. Increased abdominal bloating  2. Severe pain    6. Excessive bleeding  3. Nausea and vomiting  7. Chest pain                                                                    4. Chills    8. Shortness of breath    ADDITIONAL INSTRUCTIONS:    Biopsy results: Call [facility] for biopsy results in 1 week    Educational Information:            Impression:  4 polyps  Poor prep  Diverticulosis  hemorrhoids  Plan:  Consider repeating colonoscopy with a 2 day prep soon             Please review these discharge instructions this evening or tomorrow for  information you may have forgotten.            We want to thank you for

## 2024-07-31 NOTE — TELEPHONE ENCOUNTER
Called and discussed.  Recommend try Linzess and avoiding repeat colonoscopy if it is effective.  She is comfortable with this approach.

## 2024-07-31 NOTE — TELEPHONE ENCOUNTER
From: Francoise Schilling  To: Dr. Bev Lay  Sent: 7/30/2024 5:28 PM EDT  Subject: Colonoscopy    The doc could not see all the way up because there was still stool up there even though I took the prep as told.  He wants me to take Linzess to hopefully get rid of it so I will tonight. He removed some polyps and said there was no blockage he could see.  Then maybe another colonoscopy.  Francoise

## 2024-08-01 ENCOUNTER — PATIENT MESSAGE (OUTPATIENT)
Age: 84
End: 2024-08-01

## 2024-08-01 NOTE — TELEPHONE ENCOUNTER
Called and discussed.  She has had several BMs this am and feels better.  Did not take Linzess last night.  Has Linzess 145 mg and 290 mg.  Recommend take 145 mg this AM and again tonight.  Then 145 mg QPM.  If no BM x 2 days increase Linzess to 290 mg.

## 2024-08-01 NOTE — TELEPHONE ENCOUNTER
From: Francoise Schilling  To: Dr. Bev Lay  Sent: 8/1/2024 6:54 AM EDT  Subject: Mag Citrate    The mag citrate did NOTHING. I took it at 10 last night and nothing yet. Cancelling colonoscopy appt for Tues.  don't know where to go from here.  Francoise

## 2024-08-01 NOTE — TELEPHONE ENCOUNTER
Additional TOPSEC message that was sent:        As I was sending previous message I had a mini explosion.  I am cancelling the procedure for next week until I am told what to do. Please call me when you can.  Francoise

## 2024-08-02 ENCOUNTER — TELEPHONE (OUTPATIENT)
Age: 84
End: 2024-08-02

## 2024-08-02 ENCOUNTER — OFFICE VISIT (OUTPATIENT)
Age: 84
End: 2024-08-02

## 2024-08-02 VITALS
HEART RATE: 76 BPM | RESPIRATION RATE: 16 BRPM | WEIGHT: 154.4 LBS | DIASTOLIC BLOOD PRESSURE: 86 MMHG | TEMPERATURE: 97.2 F | SYSTOLIC BLOOD PRESSURE: 140 MMHG | HEIGHT: 59 IN | OXYGEN SATURATION: 96 % | BODY MASS INDEX: 31.12 KG/M2

## 2024-08-02 DIAGNOSIS — L91.8 SKIN TAG: ICD-10-CM

## 2024-08-02 DIAGNOSIS — M79.662 PAIN IN LEFT LOWER LEG: ICD-10-CM

## 2024-08-02 DIAGNOSIS — K59.04 CHRONIC IDIOPATHIC CONSTIPATION: Primary | ICD-10-CM

## 2024-08-02 NOTE — PROGRESS NOTES
Subjective:      Patient ID: Francoise Schilling 84 y.o. female. The encounter diagnosis was Chronic idiopathic constipation.      HPI    Chronic constipation: diagnosed with weak pelvic floor muscles.  PT did not help.  Miralax helped for a long time but stopped working.  Had colonoscopy on Tuesday; 4 polyps removed.  Poor prep.  Was scheduled next week for repeat after 2 day prep.  She cancelled it after we talked.  She rescheduled it for 10 days from today.   Started Linzess after the colonoscopy.    Today she is not doing well.    She is seen with her son on the phone.   She took mag citrate a few days ago and had diarrhea.  Has a little loose stool and leaking stool today and yesterday.  Is taking Linzess 72 mg, not 145 as previously reported.  She has 72 mg and 145 mg at home,   Has appt with Dr Esparza's NP to discuss Interstim on 8/6.  Colonoscopy 8/13.      Has skin tag on the left upper shin that is irritated after she picked at it.     Lab Results   Component Value Date     07/25/2024    K 4.5 07/25/2024     07/25/2024    CO2 26 07/25/2024    BUN 24 (H) 07/25/2024    CREATININE 1.1 07/25/2024    GLUCOSE 100 (H) 07/25/2024    CALCIUM 9.8 07/25/2024    BILITOT 0.3 05/22/2024    ALKPHOS 70 05/22/2024    AST 24 05/22/2024    ALT 24 05/22/2024    LABGLOM 49 (A) 07/25/2024    GFRAA >60 10/03/2022    AGRATIO 1.7 05/22/2024    GLOB 2.7 07/19/2021        Lab Results   Component Value Date    WBC 7.6 07/25/2024    HGB 13.0 07/25/2024    HCT 40.1 07/25/2024    MCV 87.0 07/25/2024     07/25/2024     TSH   Date Value Ref Range Status   10/23/2023 2.69 0.27 - 4.20 uIU/mL Final   05/04/2022 5.45 (H) 0.27 - 4.20 uIU/mL Final   07/19/2021 2.64 0.27 - 4.20 uIU/mL Final        Outpatient Medications Marked as Taking for the 8/2/24 encounter (Office Visit) with Bev Lay MD   Medication Sig Dispense Refill    Calcium Carb-Cholecalciferol (CALCIUM 500 + D PO) Take 1 tablet by mouth at bedtime

## 2024-08-02 NOTE — TELEPHONE ENCOUNTER
Searching, we cannot find rep but are working through sample websites/Linzess. Will also consult with Aminah.

## 2024-08-05 NOTE — TELEPHONE ENCOUNTER
Our rep, Dana Hale, is not our rep anymore or is at least not at this number. Called in to manager, Ginette ([phone]) to request new rep or how to get ahold of old one for samples. Left message/number for return call.

## 2024-08-07 NOTE — TELEPHONE ENCOUNTER
Called patient and she drank magnesium citrate last night and believes she has cleaned herself out now.       Called [employer] and and they are still trying to figure out who our rep is. Will be sending another rep out this week to talk with us and try to get us samples for Francoise. Advised Francoise we would be continuing to try and get those samples for her.

## 2024-08-07 NOTE — TELEPHONE ENCOUNTER
I spoke to her last night and had her take mag citrate.  Please call and see how she is doing this AM.   Please follow up with drug rep re: sample Linzess.    Thanks

## 2024-08-12 ENCOUNTER — TELEPHONE (OUTPATIENT)
Age: 84
End: 2024-08-12

## 2024-08-12 ENCOUNTER — OFFICE VISIT (OUTPATIENT)
Age: 84
End: 2024-08-12

## 2024-08-12 VITALS
OXYGEN SATURATION: 97 % | HEART RATE: 76 BPM | DIASTOLIC BLOOD PRESSURE: 78 MMHG | SYSTOLIC BLOOD PRESSURE: 142 MMHG | BODY MASS INDEX: 31.6 KG/M2 | TEMPERATURE: 97.9 F | RESPIRATION RATE: 16 BRPM | WEIGHT: 153.8 LBS

## 2024-08-12 DIAGNOSIS — K59.04 CHRONIC IDIOPATHIC CONSTIPATION: Primary | ICD-10-CM

## 2024-08-12 DIAGNOSIS — I10 BENIGN ESSENTIAL HYPERTENSION: Chronic | ICD-10-CM

## 2024-08-12 DIAGNOSIS — N18.31 CHRONIC RENAL IMPAIRMENT, STAGE 3A (HCC): ICD-10-CM

## 2024-08-12 NOTE — PROGRESS NOTES
Subjective:      Patient ID: Franocise Schilling 84 y.o. female. The primary encounter diagnosis was Chronic idiopathic constipation. A diagnosis of Chronic renal impairment, stage 3a (HCC) was also pertinent to this visit.      HPI    Constipation for many years.  Miralax was effective for a few years and then stopped working. She had colonoscopy 2 weeks ago; prep was not adequate.  Rescheduled for tomorrow with 2 day prep.  Was started on Linzess and dose increased to 290 mg on 8/5/24.   Continues to struggle.  Took Mag Citrate on Saturday after no BM x 3 days.  Today she felt she had to have a BM this am but was unable to go.    She feels miserable.  Feels depressed about decreased quality of life.     Cancelled appt with Mary Khanna with Dr. Esparza.      HTN: not checking BP at  home.  Compliant with meds.         Latest Ref Rng & Units 7/25/2024     7:30 PM 5/22/2024     2:52 PM 3/4/2024     3:40 PM 12/1/2023     2:09 PM 10/23/2023    11:41 AM   Labs Renal   BUN 7 - 20 mg/dL 24  26  29  26  29    Cr 0.6 - 1.2 mg/dL 1.1  1.0  1.2  0.9  1.0    K 3.5 - 5.1 mmol/L 4.5  4.5  4.2  4.4  3.9    Na 136 - 145 mmol/L 140  142  139  141  143       Lab Results   Component Value Date     07/25/2024    K 4.5 07/25/2024     07/25/2024    CO2 26 07/25/2024    BUN 24 (H) 07/25/2024    CREATININE 1.1 07/25/2024    GLUCOSE 100 (H) 07/25/2024    CALCIUM 9.8 07/25/2024    BILITOT 0.3 05/22/2024    ALKPHOS 70 05/22/2024    AST 24 05/22/2024    ALT 24 05/22/2024    LABGLOM 49 (A) 07/25/2024    GFRAA >60 10/03/2022    AGRATIO 1.7 05/22/2024    GLOB 2.7 07/19/2021          Outpatient Medications Marked as Taking for the 8/12/24 encounter (Office Visit) with Bev Lay MD   Medication Sig Dispense Refill    Calcium Carb-Cholecalciferol (CALCIUM 500 + D PO) Take 1 tablet by mouth at bedtime      linaCLOtide (LINZESS) 72 MCG CAPS capsule Take 290 mcg by mouth at bedtime      vitamin C (ASCORBIC ACID) 500 MG tablet

## 2024-08-13 ENCOUNTER — PATIENT MESSAGE (OUTPATIENT)
Age: 84
End: 2024-08-13

## 2024-08-13 NOTE — TELEPHONE ENCOUNTER
Spoke with Radha with Dr. Kay Esparza's office, said  JED Khanna, NP is seeing patients all day but will give her the message /number and she will call back either this evening or tomorrow. Gave after hours number.

## 2024-08-14 RX ORDER — VERAPAMIL HYDROCHLORIDE 240 MG/1
240 TABLET, FILM COATED, EXTENDED RELEASE ORAL DAILY
Qty: 90 TABLET | Refills: 1 | Status: SHIPPED | OUTPATIENT
Start: 2024-08-14

## 2024-08-14 NOTE — TELEPHONE ENCOUNTER
Requested Prescriptions     Pending Prescriptions Disp Refills    verapamil (CALAN SR) 240 MG extended release tablet 90 tablet 1     Sig: Take 1 tablet by mouth daily         Last OV: 8/12/2024    Last labs: 7/25/2024     F/u: 9/25/2024

## 2024-09-21 DIAGNOSIS — F33.1 MODERATE EPISODE OF RECURRENT MAJOR DEPRESSIVE DISORDER (HCC): ICD-10-CM

## 2024-09-23 RX ORDER — BUPROPION HYDROCHLORIDE 150 MG/1
TABLET ORAL
Qty: 90 TABLET | Refills: 3 | Status: SHIPPED | OUTPATIENT
Start: 2024-09-23

## 2024-09-25 ENCOUNTER — OFFICE VISIT (OUTPATIENT)
Age: 84
End: 2024-09-25

## 2024-09-25 VITALS
DIASTOLIC BLOOD PRESSURE: 78 MMHG | OXYGEN SATURATION: 95 % | HEART RATE: 63 BPM | RESPIRATION RATE: 16 BRPM | TEMPERATURE: 97.2 F | SYSTOLIC BLOOD PRESSURE: 132 MMHG

## 2024-09-25 DIAGNOSIS — L30.9 ECZEMA, UNSPECIFIED TYPE: ICD-10-CM

## 2024-09-25 DIAGNOSIS — I10 BENIGN ESSENTIAL HYPERTENSION: Chronic | ICD-10-CM

## 2024-09-25 DIAGNOSIS — F33.42 RECURRENT MAJOR DEPRESSIVE DISORDER, IN FULL REMISSION (HCC): Chronic | ICD-10-CM

## 2024-09-25 DIAGNOSIS — K58.1 IRRITABLE BOWEL SYNDROME WITH CONSTIPATION: Primary | ICD-10-CM

## 2024-09-25 DIAGNOSIS — Z23 NEED FOR INFLUENZA VACCINATION: ICD-10-CM

## 2024-09-25 RX ORDER — TRIAMCINOLONE ACETONIDE 1 MG/G
CREAM TOPICAL 3 TIMES DAILY
Qty: 45 G | Refills: 1 | Status: SHIPPED | OUTPATIENT
Start: 2024-09-25

## 2024-10-18 LAB
ALT SERPL-CCNC: 30 U/L
AST SERPL-CCNC: 26 U/L
BASOPHILS ABSOLUTE: NORMAL
BASOPHILS RELATIVE PERCENT: NORMAL
C-REACTIVE PROTEIN: 3.2
EOSINOPHILS ABSOLUTE: NORMAL
EOSINOPHILS RELATIVE PERCENT: NORMAL
HCT VFR BLD CALC: 39.2 % (ref 36–46)
HEMOGLOBIN: 12.5 G/DL (ref 12–16)
LYMPHOCYTES ABSOLUTE: NORMAL
LYMPHOCYTES RELATIVE PERCENT: NORMAL
MCH RBC QN AUTO: 28.3 PG
MCHC RBC AUTO-ENTMCNC: 31.9 G/DL
MCV RBC AUTO: 88.7 FL
MONOCYTES ABSOLUTE: NORMAL
MONOCYTES RELATIVE PERCENT: NORMAL
NEUTROPHILS ABSOLUTE: NORMAL
NEUTROPHILS RELATIVE PERCENT: NORMAL
PLATELET # BLD: 197 K/ΜL
PMV BLD AUTO: 12.1 FL
RBC # BLD: 4.42 10^6/ΜL
VITAMIN D 25-HYDROXY: 57
VITAMIN D2, 25 HYDROXY: NORMAL
VITAMIN D3,25 HYDROXY: NORMAL
WBC # BLD: 5.9 10^3/ML

## 2024-11-04 ENCOUNTER — HOSPITAL ENCOUNTER (OUTPATIENT)
Dept: MAMMOGRAPHY | Age: 84
Discharge: HOME | End: 2024-11-04
Payer: MEDICARE

## 2024-11-04 VITALS — HEIGHT: 59 IN | WEIGHT: 153 LBS | BODY MASS INDEX: 30.84 KG/M2

## 2024-11-04 DIAGNOSIS — Z12.31 VISIT FOR SCREENING MAMMOGRAM: ICD-10-CM

## 2024-11-04 PROCEDURE — 77063 BREAST TOMOSYNTHESIS BI: CPT

## 2024-11-06 ENCOUNTER — OFFICE VISIT (OUTPATIENT)
Dept: ORTHOPEDIC SURGERY | Age: 84
End: 2024-11-06
Payer: MEDICARE

## 2024-11-06 VITALS — BODY MASS INDEX: 30.84 KG/M2 | WEIGHT: 153 LBS | HEIGHT: 59 IN

## 2024-11-06 DIAGNOSIS — Z96.611 STATUS POST REVERSE TOTAL REPLACEMENT OF RIGHT SHOULDER: Primary | ICD-10-CM

## 2024-11-06 PROCEDURE — G8427 DOCREV CUR MEDS BY ELIG CLIN: HCPCS | Performed by: ORTHOPAEDIC SURGERY

## 2024-11-06 PROCEDURE — 1123F ACP DISCUSS/DSCN MKR DOCD: CPT | Performed by: ORTHOPAEDIC SURGERY

## 2024-11-06 PROCEDURE — G8482 FLU IMMUNIZE ORDER/ADMIN: HCPCS | Performed by: ORTHOPAEDIC SURGERY

## 2024-11-06 PROCEDURE — 1036F TOBACCO NON-USER: CPT | Performed by: ORTHOPAEDIC SURGERY

## 2024-11-06 PROCEDURE — G8417 CALC BMI ABV UP PARAM F/U: HCPCS | Performed by: ORTHOPAEDIC SURGERY

## 2024-11-06 PROCEDURE — 99213 OFFICE O/P EST LOW 20 MIN: CPT | Performed by: ORTHOPAEDIC SURGERY

## 2024-11-06 PROCEDURE — 1090F PRES/ABSN URINE INCON ASSESS: CPT | Performed by: ORTHOPAEDIC SURGERY

## 2024-11-06 PROCEDURE — 1126F AMNT PAIN NOTED NONE PRSNT: CPT | Performed by: ORTHOPAEDIC SURGERY

## 2024-11-06 PROCEDURE — 1159F MED LIST DOCD IN RCRD: CPT | Performed by: ORTHOPAEDIC SURGERY

## 2024-11-06 PROCEDURE — G8399 PT W/DXA RESULTS DOCUMENT: HCPCS | Performed by: ORTHOPAEDIC SURGERY

## 2024-11-08 ENCOUNTER — HOSPITAL ENCOUNTER (OUTPATIENT)
Dept: PULMONOLOGY | Age: 84
Discharge: HOME | End: 2024-11-08
Attending: INTERNAL MEDICINE
Payer: MEDICARE

## 2024-11-08 ENCOUNTER — HOSPITAL ENCOUNTER (OUTPATIENT)
Dept: CT IMAGING | Age: 84
Discharge: HOME | End: 2024-11-08
Attending: INTERNAL MEDICINE
Payer: MEDICARE

## 2024-11-08 DIAGNOSIS — D86.9 SARCOIDOSIS: ICD-10-CM

## 2024-11-08 PROCEDURE — 94729 DIFFUSING CAPACITY: CPT

## 2024-11-08 PROCEDURE — 94060 EVALUATION OF WHEEZING: CPT

## 2024-11-08 PROCEDURE — 94760 N-INVAS EAR/PLS OXIMETRY 1: CPT

## 2024-11-08 PROCEDURE — 94726 PLETHYSMOGRAPHY LUNG VOLUMES: CPT

## 2024-11-08 PROCEDURE — 71250 CT THORAX DX C-: CPT

## 2024-11-08 PROCEDURE — 6360000002 HC RX W HCPCS: Performed by: INTERNAL MEDICINE

## 2024-11-08 PROCEDURE — 94664 DEMO&/EVAL PT USE INHALER: CPT

## 2024-11-08 RX ORDER — ALBUTEROL SULFATE 0.83 MG/ML
2.5 SOLUTION RESPIRATORY (INHALATION) ONCE
Status: COMPLETED | OUTPATIENT
Start: 2024-11-08 | End: 2024-11-08

## 2024-11-08 RX ADMIN — ALBUTEROL SULFATE 2.5 MG: 2.5 SOLUTION RESPIRATORY (INHALATION) at 12:21

## 2024-12-09 ENCOUNTER — PATIENT MESSAGE (OUTPATIENT)
Age: 84
End: 2024-12-09

## 2024-12-09 DIAGNOSIS — E78.01 ESSENTIAL FAMILIAL HYPERCHOLESTEROLEMIA: ICD-10-CM

## 2024-12-09 DIAGNOSIS — I10 BENIGN ESSENTIAL HYPERTENSION: ICD-10-CM

## 2024-12-09 RX ORDER — POTASSIUM CHLORIDE 750 MG/1
10 TABLET, EXTENDED RELEASE ORAL DAILY
Qty: 90 TABLET | Refills: 1 | Status: SHIPPED | OUTPATIENT
Start: 2024-12-09

## 2024-12-09 RX ORDER — ROSUVASTATIN CALCIUM 20 MG/1
20 TABLET, COATED ORAL DAILY
Qty: 90 TABLET | Refills: 2 | Status: SHIPPED | OUTPATIENT
Start: 2024-12-09

## 2024-12-09 NOTE — TELEPHONE ENCOUNTER
Requested Prescriptions     Pending Prescriptions Disp Refills    potassium chloride (KLOR-CON M) 10 MEQ extended release tablet 90 tablet 1     Sig: Take 1 tablet by mouth daily    rosuvastatin (CRESTOR) 20 MG tablet 90 tablet 2     Sig: Take 1 tablet by mouth daily         Last OV: 9/25/2024    Last labs: 7/25/2024     F/u: 1/8/2025

## 2024-12-19 ENCOUNTER — OFFICE VISIT (OUTPATIENT)
Age: 84
End: 2024-12-19

## 2024-12-19 VITALS
TEMPERATURE: 97.3 F | RESPIRATION RATE: 16 BRPM | OXYGEN SATURATION: 98 % | DIASTOLIC BLOOD PRESSURE: 74 MMHG | SYSTOLIC BLOOD PRESSURE: 142 MMHG | HEART RATE: 79 BPM

## 2024-12-19 DIAGNOSIS — J34.89 RHINORRHEA: ICD-10-CM

## 2024-12-19 DIAGNOSIS — R07.81 RIB PAIN ON LEFT SIDE: Primary | ICD-10-CM

## 2024-12-19 DIAGNOSIS — E28.39 ESTROGEN DEFICIENCY: ICD-10-CM

## 2024-12-19 DIAGNOSIS — D17.71 RENAL ANGIOLIPOMA: ICD-10-CM

## 2024-12-19 DIAGNOSIS — E78.01 ESSENTIAL FAMILIAL HYPERCHOLESTEROLEMIA: ICD-10-CM

## 2024-12-19 LAB
BILIRUBIN, POC: NORMAL
BLOOD URINE, POC: NORMAL
CLARITY, POC: CLEAR
COLOR, POC: YELLOW
GLUCOSE URINE, POC: NORMAL MG/DL
KETONES, POC: NORMAL MG/DL
LEUKOCYTE EST, POC: NORMAL
NITRITE, POC: NORMAL
PH, POC: 6.5
PROTEIN, POC: NORMAL MG/DL
SPECIFIC GRAVITY, POC: 1.01
UROBILINOGEN, POC: 0.2 MG/DL

## 2024-12-19 RX ORDER — HYDROXYZINE HYDROCHLORIDE 10 MG/1
10-20 TABLET, FILM COATED ORAL EVERY 6 HOURS PRN
Qty: 60 TABLET | Refills: 1 | Status: SHIPPED | OUTPATIENT
Start: 2024-12-19

## 2024-12-19 RX ORDER — LIDOCAINE 4 G/G
1 PATCH TOPICAL DAILY
COMMUNITY
Start: 2024-12-19 | End: 2025-01-18

## 2024-12-19 NOTE — PROGRESS NOTES
Subjective:      Patient ID: Francoise Schilling 84 y.o. female. is here for evaluation for left side pain      HPI    Pain in the left lateral chest wall x 3 to 4 days.  Hurts to take a deep breath or lie on her left side.  She denies  no dysuria, frequency or hematuria. The patient denies abdominal or flank pain, anorexia, nausea or vomiting, dysphagia, change in bowel habits or black or bloody stools or weight loss.   Denies trauma or injury.   No fever.  Rx: none.     URI x 1 month.  Clear nasal discharge, mild cough.  No chest pain or dyspnea.  Denies ST or ear pain.  Not getting better or worse.  Mild sxs.  Rx: Airborne initially.  Allegra, Delsym are not helping.     Occasionally wakes up with her heart racing.  Does not feel irregular and she has not associated with dyspnea or chest pain.  Lasts a few minutes.  No symptoms for few weeks.     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 CT of the abdomen and pelvis was performed following the administration of intravenous contrast. Axial images were obtained with coronal and sagittal reconstructions.     CONTRAST: 125 mL of IOHEXOL 350 MG IODINE/ML INTRAVENOUS SOLUTION administered intravenously   FIELD OF VIEW: 36 cm   COMPARISON: 9/12/2023.   FINDINGS:   Lower chest: Mild bibasilar atelectasis.   Liver: Normal in contour and homogeneous in enhancement   Biliary tree/Gallbladder: Distended gallbladder without stones, wall thickening, or pericholecystic fluid. No biliary ductal dilatation.   Spleen: Normal   Pancreas: Normal   Adrenal glands: Normal   Kidneys/Ureters/Bladder: Redemonstration of heterogeneous mass at the superior pole of the right kidney with macroscopic measuring 3.4 x 3.1 x 3.3 cm (series 3, image 62 and series 603, image 82),

## 2024-12-30 ENCOUNTER — HOSPITAL ENCOUNTER (OUTPATIENT)
Dept: GENERAL RADIOLOGY | Age: 84
Discharge: HOME | End: 2024-12-30
Payer: MEDICARE

## 2024-12-30 DIAGNOSIS — E28.39 ESTROGEN DEFICIENCY: ICD-10-CM

## 2024-12-30 PROCEDURE — 77080 DXA BONE DENSITY AXIAL: CPT

## 2025-01-03 DIAGNOSIS — E78.01 ESSENTIAL FAMILIAL HYPERCHOLESTEROLEMIA: ICD-10-CM

## 2025-01-03 DIAGNOSIS — I10 BENIGN ESSENTIAL HYPERTENSION: Chronic | ICD-10-CM

## 2025-01-03 LAB
ALBUMIN SERPL-MCNC: 4.4 G/DL (ref 3.4–5)
ALBUMIN/GLOB SERPL: 1.9 {RATIO} (ref 1.1–2.2)
ALP SERPL-CCNC: 78 U/L (ref 40–129)
ALT SERPL-CCNC: 21 U/L (ref 10–40)
ANION GAP SERPL CALCULATED.3IONS-SCNC: 12 MMOL/L (ref 3–16)
AST SERPL-CCNC: 26 U/L (ref 15–37)
BILIRUB SERPL-MCNC: 0.3 MG/DL (ref 0–1)
BUN SERPL-MCNC: 29 MG/DL (ref 7–20)
CALCIUM SERPL-MCNC: 10.1 MG/DL (ref 8.3–10.6)
CHLORIDE SERPL-SCNC: 103 MMOL/L (ref 99–110)
CHOLEST SERPL-MCNC: 154 MG/DL (ref 0–199)
CO2 SERPL-SCNC: 26 MMOL/L (ref 21–32)
CREAT SERPL-MCNC: 0.8 MG/DL (ref 0.6–1.2)
GFR SERPLBLD CREATININE-BSD FMLA CKD-EPI: 72 ML/MIN/{1.73_M2}
GLUCOSE SERPL-MCNC: 79 MG/DL (ref 70–99)
HDLC SERPL-MCNC: 65 MG/DL (ref 40–60)
LDLC SERPL CALC-MCNC: 73 MG/DL
POTASSIUM SERPL-SCNC: 4.8 MMOL/L (ref 3.5–5.1)
PROT SERPL-MCNC: 6.7 G/DL (ref 6.4–8.2)
SODIUM SERPL-SCNC: 141 MMOL/L (ref 136–145)
TRIGL SERPL-MCNC: 81 MG/DL (ref 0–150)
TSH SERPL DL<=0.005 MIU/L-ACNC: 3.48 UIU/ML (ref 0.27–4.2)
VLDLC SERPL CALC-MCNC: 16 MG/DL

## 2025-01-05 SDOH — HEALTH STABILITY: PHYSICAL HEALTH: ON AVERAGE, HOW MANY DAYS PER WEEK DO YOU ENGAGE IN MODERATE TO STRENUOUS EXERCISE (LIKE A BRISK WALK)?: 0 DAYS

## 2025-01-05 ASSESSMENT — PATIENT HEALTH QUESTIONNAIRE - PHQ9
SUM OF ALL RESPONSES TO PHQ QUESTIONS 1-9: 2
8. MOVING OR SPEAKING SO SLOWLY THAT OTHER PEOPLE COULD HAVE NOTICED. OR THE OPPOSITE, BEING SO FIGETY OR RESTLESS THAT YOU HAVE BEEN MOVING AROUND A LOT MORE THAN USUAL: SEVERAL DAYS
3. TROUBLE FALLING OR STAYING ASLEEP: SEVERAL DAYS
4. FEELING TIRED OR HAVING LITTLE ENERGY: NOT AT ALL
10. IF YOU CHECKED OFF ANY PROBLEMS, HOW DIFFICULT HAVE THESE PROBLEMS MADE IT FOR YOU TO DO YOUR WORK, TAKE CARE OF THINGS AT HOME, OR GET ALONG WITH OTHER PEOPLE: NOT DIFFICULT AT ALL
6. FEELING BAD ABOUT YOURSELF - OR THAT YOU ARE A FAILURE OR HAVE LET YOURSELF OR YOUR FAMILY DOWN: NOT AT ALL
2. FEELING DOWN, DEPRESSED OR HOPELESS: NOT AT ALL
5. POOR APPETITE OR OVEREATING: NOT AT ALL
SUM OF ALL RESPONSES TO PHQ QUESTIONS 1-9: 2
1. LITTLE INTEREST OR PLEASURE IN DOING THINGS: NOT AT ALL
7. TROUBLE CONCENTRATING ON THINGS, SUCH AS READING THE NEWSPAPER OR WATCHING TELEVISION: NOT AT ALL
SUM OF ALL RESPONSES TO PHQ9 QUESTIONS 1 & 2: 0
9. THOUGHTS THAT YOU WOULD BE BETTER OFF DEAD, OR OF HURTING YOURSELF: NOT AT ALL

## 2025-01-05 ASSESSMENT — LIFESTYLE VARIABLES
HOW OFTEN DO YOU HAVE SIX OR MORE DRINKS ON ONE OCCASION: 1
HOW MANY STANDARD DRINKS CONTAINING ALCOHOL DO YOU HAVE ON A TYPICAL DAY: 1
HOW OFTEN DO YOU HAVE A DRINK CONTAINING ALCOHOL: 2
HOW OFTEN DO YOU HAVE A DRINK CONTAINING ALCOHOL: MONTHLY OR LESS
HOW MANY STANDARD DRINKS CONTAINING ALCOHOL DO YOU HAVE ON A TYPICAL DAY: 1 OR 2

## 2025-01-08 ENCOUNTER — OFFICE VISIT (OUTPATIENT)
Age: 85
End: 2025-01-08

## 2025-01-08 ENCOUNTER — TELEPHONE (OUTPATIENT)
Age: 85
End: 2025-01-08

## 2025-01-08 VITALS
TEMPERATURE: 98.1 F | BODY MASS INDEX: 33.99 KG/M2 | WEIGHT: 168.6 LBS | HEART RATE: 77 BPM | OXYGEN SATURATION: 94 % | HEIGHT: 59 IN | RESPIRATION RATE: 16 BRPM | SYSTOLIC BLOOD PRESSURE: 130 MMHG | DIASTOLIC BLOOD PRESSURE: 78 MMHG

## 2025-01-08 DIAGNOSIS — M85.80 OSTEOPENIA WITH HIGH RISK OF FRACTURE: ICD-10-CM

## 2025-01-08 DIAGNOSIS — F33.1 MODERATE EPISODE OF RECURRENT MAJOR DEPRESSIVE DISORDER (HCC): ICD-10-CM

## 2025-01-08 DIAGNOSIS — N18.31 CHRONIC RENAL IMPAIRMENT, STAGE 3A (HCC): ICD-10-CM

## 2025-01-08 DIAGNOSIS — Z00.00 MEDICARE ANNUAL WELLNESS VISIT, SUBSEQUENT: Primary | ICD-10-CM

## 2025-01-08 DIAGNOSIS — D86.9 SARCOIDOSIS: ICD-10-CM

## 2025-01-08 RX ORDER — ALENDRONATE SODIUM 70 MG/1
70 TABLET ORAL
Qty: 4 TABLET | Refills: 0 | Status: SHIPPED | OUTPATIENT
Start: 2025-01-08

## 2025-01-08 SDOH — ECONOMIC STABILITY: FOOD INSECURITY: WITHIN THE PAST 12 MONTHS, YOU WORRIED THAT YOUR FOOD WOULD RUN OUT BEFORE YOU GOT MONEY TO BUY MORE.: NEVER TRUE

## 2025-01-08 SDOH — ECONOMIC STABILITY: FOOD INSECURITY: WITHIN THE PAST 12 MONTHS, THE FOOD YOU BOUGHT JUST DIDN'T LAST AND YOU DIDN'T HAVE MONEY TO GET MORE.: NEVER TRUE

## 2025-01-08 NOTE — PROGRESS NOTES
Medicare Annual Wellness Visit    Farncoise Schilling is here for Medicare AWV    Assessment & Plan   Medicare annual wellness visit, subsequent  Encouraged participation in the exercise classes at [employer] [facility] once she gets settled on MTX and hopefully starts to be less winded.    Osteopenia with high risk of fracture  -     alendronate (FOSAMAX) 70 MG tablet; Take 1 tablet by mouth every 7 days, Disp-4 tablet, R-0Normal  Options including Reclast, Prolia, Forteo addressed  Side effects of current medications reviewed and questions answered.   Continue Ca and D.  Weight bearing exercise.   Chronic renal impairment, stage 3a (HCC)  Labs back to normal on recent labs. continue to avoid nephrotoxins and maintain hydration   Moderate episode of recurrent major depressive disorder (HCC)  Doing well.  Continue Zoloft  Sarcoidosis:   Per Dr. Cano.  PFT and CT reviewed.      Return in about 3 months (around 4/8/2025).     Subjective     Osteopenia with high fx risk: is taking and D.      Sarcoid: is going to start MTX in a few weeks with Dr. Cano.  Has limiting BRITO and the dyspnea sometimes makes her afraid.  [facility] had a panic attack that made her afraid to go back to her apartment.  Had a friend go back with her, took her to her apartment.  She took a Atarax and helped a lot.  She is having a mild panic attack every month or so.  This was the worse in years.    She saw derm for facial sarcoid - skin feels tight.  Feels like sand paper.  Derm recommended Cortaid 2.5 %.  Uses CereVe and Aquafor     Patient's complete Health Risk Assessment and screening values have been reviewed and are found in Flowsheets. The following problems were reviewed today and where indicated follow up appointments were made and/or referrals ordered.    Positive Risk Factor Screenings with Interventions:      Interventions:  Normal cognitive screen           General HRA Questions:  Select all that apply: (!) Stress    Interventions

## 2025-01-08 NOTE — PATIENT INSTRUCTIONS

## 2025-01-08 NOTE — TELEPHONE ENCOUNTER
Fax was sent to Dr. Cano 2 weeks ago requesting copy labs and we don't have them yet.  Please follow up with her office.     Please fax last CT chest and PFTs 11/19/24 ( under notes tab) to Yanet Mendez NP with Dr. Alexandria Madera's office.  Thanks

## 2025-01-13 ENCOUNTER — TELEPHONE (OUTPATIENT)
Age: 85
End: 2025-01-13

## 2025-01-13 NOTE — TELEPHONE ENCOUNTER
I called Francoise.    Fell in a parking lot on Thursday.  Landed on her knees.  Has pain on the left side about her bra line.  She did not strike that area.  She thinks it started when she fell.   She feels short of breath - started before the fall.  Occasionally she hears soft wheezing.  The dyspnea is worse today.  She is not sure if this is anxiety. She has no palpitations or chest pain.   She does not feel particularly anxious about the trip.  No fever, cough, swelling in legs, PND or orthopnea.  Slept fine last night.    Is supposed to start MTX for sarcoidosis in 2 weeks.   She took a hydroxyzine 10 minutes ago. Is not helping yet.      I am going to call her back in 15 minutes.  She does not sound dyspneic to me on the phone.  If dyspnea is better with the hydroxyzine I think she is fine to go to [place].  She will have WC transportation through the airport on both ends.      Lab Results   Component Value Date    WBC 5.9 10/04/2024    HGB 12.5 10/04/2024    HCT 39.2 10/04/2024    MCV 88.7 10/04/2024     10/04/2024     Lab Results   Component Value Date/Time     01/03/2025 10:16 AM    K 4.8 01/03/2025 10:16 AM    K 4.5 07/25/2024 07:30 PM     01/03/2025 10:16 AM    CO2 26 01/03/2025 10:16 AM    BUN 29 01/03/2025 10:16 AM    CREATININE 0.8 01/03/2025 10:16 AM    GLUCOSE 79 01/03/2025 10:16 AM    GLUCOSE 93 02/06/2012 08:55 AM    CALCIUM 10.1 01/03/2025 10:16 AM    LABGLOM 72 01/03/2025 10:16 AM    LABGLOM 45 03/04/2024 03:40 PM

## 2025-01-13 NOTE — TELEPHONE ENCOUNTER
Pt called: She is supposed to be leaving for [place] tomorrow and has to decide in about one hour whether or not she should still go. Pt says she does not know if she is having a panic attack or if she cannot breathe right. Pt did not sound winded while speaking. Pt also says that she fell last Thursday night, she went down on her knees and rolled on her left side. She has pain in that left side and she was wondering if she had broken a rib. She gets a little pain at the top of the breath about 3/10. Pt has no other symptoms. She is anxious because she does not know whether she should go on this trip or not ([place], to her son's place). She is wondering what you think about it, says you two discussed panic attacks at her recent visit.

## 2025-01-20 ENCOUNTER — OFFICE VISIT (OUTPATIENT)
Age: 85
End: 2025-01-20

## 2025-01-20 VITALS
RESPIRATION RATE: 18 BRPM | HEART RATE: 78 BPM | SYSTOLIC BLOOD PRESSURE: 128 MMHG | OXYGEN SATURATION: 98 % | HEIGHT: 58 IN | DIASTOLIC BLOOD PRESSURE: 80 MMHG | TEMPERATURE: 97.3 F | BODY MASS INDEX: 34.85 KG/M2 | WEIGHT: 166 LBS

## 2025-01-20 DIAGNOSIS — Z82.49 FAMILY HISTORY OF CORONARY ARTERY DISEASE IN FATHER: ICD-10-CM

## 2025-01-20 DIAGNOSIS — S80.11XA CONTUSION OF BOTH TIBIAS: ICD-10-CM

## 2025-01-20 DIAGNOSIS — S80.12XA CONTUSION OF BOTH TIBIAS: ICD-10-CM

## 2025-01-20 DIAGNOSIS — R07.89 OTHER CHEST PAIN: ICD-10-CM

## 2025-01-20 DIAGNOSIS — R06.02 SHORTNESS OF BREATH: ICD-10-CM

## 2025-01-20 DIAGNOSIS — M79.89 LEG SWELLING: Primary | ICD-10-CM

## 2025-01-20 DIAGNOSIS — I45.10 RIGHT BUNDLE BRANCH BLOCK: ICD-10-CM

## 2025-01-20 NOTE — PROGRESS NOTES
comfortably  Abdomen: positive bowel sounds, soft , non tender, non distended. No hepatosplenomegaly. No CVA tenderness.  lower extremities:  contusion to upper tibia/ shins with ecchymosis - left > right. Mild tenderness.  No calf tenderness.  negative Robert's.    Skin: no rashes. Non tender.     EKG is unchanged- sinus bradycardia with RBBB.      ASSESSMENT/  PLAN:  1. Leg swelling  - Likely related to recent trauma with venous insufficiency .  - Follow low sodium diet.    - Place compression stockings first thing in the am - knee hi.  - EKG 12 lead  - Compression Stockings MISC; by Does not apply route Compression : 15-20 mm  Dispense: 1 each; Refill: 0  - CTA CARDIAC W C STRC MORP W CONTRAST; Future  - No evidence of DVT.     2. Contusion of both tibias  - Monitor , but likely contributing to leg swelling.      3. Shortness of breath  - Reviewed prior stress test in 2021 and echo.    - EKG 12 lead  - CTA CARDIAC W C STRC MORP W CONTRAST; Future and follow up after completed/ prn.     4. Right bundle branch block  - CTA CARDIAC W C STRC MORP W CONTRAST; Future and follow up after completed/ prn.     5. Other chest pain  - CTA CARDIAC W C STRC MORP W CONTRAST; Future and follow up after completed/ prn.     6. Family history of coronary artery disease in father  - CTA CARDIAC W C STRC MORP W CONTRAST; Future and follow up after completed/ prn.

## 2025-01-27 ENCOUNTER — HOSPITAL ENCOUNTER (OUTPATIENT)
Dept: CT IMAGING | Age: 85
Discharge: HOME | End: 2025-01-27
Payer: MEDICARE

## 2025-01-27 VITALS — DIASTOLIC BLOOD PRESSURE: 76 MMHG | SYSTOLIC BLOOD PRESSURE: 138 MMHG | HEART RATE: 65 BPM

## 2025-01-27 DIAGNOSIS — R07.89 OTHER CHEST PAIN: ICD-10-CM

## 2025-01-27 DIAGNOSIS — M79.89 LEG SWELLING: ICD-10-CM

## 2025-01-27 DIAGNOSIS — I45.10 RIGHT BUNDLE BRANCH BLOCK: ICD-10-CM

## 2025-01-27 DIAGNOSIS — Z82.49 FAMILY HISTORY OF CORONARY ARTERY DISEASE IN FATHER: ICD-10-CM

## 2025-01-27 DIAGNOSIS — R06.02 SHORTNESS OF BREATH: ICD-10-CM

## 2025-01-27 PROCEDURE — 6370000000 HC RX 637 (ALT 250 FOR IP): Performed by: FAMILY MEDICINE

## 2025-01-27 PROCEDURE — 2500000003 HC RX 250 WO HCPCS: Performed by: FAMILY MEDICINE

## 2025-01-27 PROCEDURE — 6360000004 HC RX CONTRAST MEDICATION: Performed by: FAMILY MEDICINE

## 2025-01-27 PROCEDURE — 75574 CT ANGIO HRT W/3D IMAGE: CPT

## 2025-01-27 PROCEDURE — 75580 N-INVAS EST C FFR SW ALY CTA: CPT

## 2025-01-27 RX ORDER — SODIUM CHLORIDE 0.9 % (FLUSH) 0.9 %
5-40 SYRINGE (ML) INJECTION EVERY 12 HOURS SCHEDULED
Status: DISCONTINUED | OUTPATIENT
Start: 2025-01-27 | End: 2025-01-28 | Stop reason: HOSPADM

## 2025-01-27 RX ORDER — IOPAMIDOL 755 MG/ML
100 INJECTION, SOLUTION INTRAVASCULAR
Status: COMPLETED | OUTPATIENT
Start: 2025-01-27 | End: 2025-01-27

## 2025-01-27 RX ORDER — METOPROLOL TARTRATE 1 MG/ML
5 INJECTION, SOLUTION INTRAVENOUS EVERY 5 MIN PRN
Status: DISCONTINUED | OUTPATIENT
Start: 2025-01-27 | End: 2025-01-28 | Stop reason: HOSPADM

## 2025-01-27 RX ORDER — NITROGLYCERIN 0.4 MG/1
0.8 TABLET SUBLINGUAL PRN
Status: COMPLETED | OUTPATIENT
Start: 2025-01-27 | End: 2025-01-27

## 2025-01-27 RX ORDER — SODIUM CHLORIDE 0.9 % (FLUSH) 0.9 %
5-40 SYRINGE (ML) INJECTION PRN
Status: DISCONTINUED | OUTPATIENT
Start: 2025-01-27 | End: 2025-01-28 | Stop reason: HOSPADM

## 2025-01-27 RX ORDER — SODIUM CHLORIDE 9 MG/ML
INJECTION, SOLUTION INTRAVENOUS PRN
Status: DISCONTINUED | OUTPATIENT
Start: 2025-01-27 | End: 2025-01-28 | Stop reason: HOSPADM

## 2025-01-27 RX ORDER — NITROGLYCERIN 0.4 MG/1
0.4 TABLET SUBLINGUAL PRN
Status: COMPLETED | OUTPATIENT
Start: 2025-01-27 | End: 2025-01-27

## 2025-01-27 RX ADMIN — IOPAMIDOL 100 ML: 755 INJECTION, SOLUTION INTRAVENOUS at 12:42

## 2025-01-27 RX ADMIN — METOPROLOL TARTRATE 5 MG: 5 INJECTION INTRAVENOUS at 12:36

## 2025-01-27 RX ADMIN — NITROGLYCERIN 0.8 MG: 0.4 TABLET SUBLINGUAL at 12:42

## 2025-01-27 NOTE — DISCHARGE INSTRUCTIONS
Thank You for choosing [facility] for your medical care.    During your examination, you were given a Beta Blocker called Metopropol or Lopressor to help slow down your heart rate. The dose of the medication you were given was _____5 mg____ and 2 sublingual nitroglycerin.    Although there are few side effects from this medication when given in small amounts (doses) it is important you follow a few important instructions:    Notify the doctor that ordered your test or go to the nearest emergency room if you experience any of the following:  difficulty breathing  dizziness  extreme fatigue  pounding or irregular heart beat    Continue your usual diet, increase fluids today.  (Four 8 ounce glasses of water).                    4.You may return back to your normal activity and routine tomorrow.                Test results will be sent to your Physician.    If you take Actoplus Met, Avandamet, Glucophage, Glucophage XR, Glucovance, Metformin, Metaglip, Riomet, or Fortmet hold medication for 48 hours after procedure and resum

## 2025-01-27 NOTE — PROGRESS NOTES
Pt here for Cardiac CTA test.  Administered 5 mg of metoprolol to achieve desired heart rate of 60 BPM.  Administered 0.8mg nitroglycerin sublingual for exam.  Pt tolerated well.  VSS. See flow sheet.  Reviewed Cardiac CTA discharge instructions with pt - verbalized understanding, no further questions. Pt discharged to home.

## 2025-01-30 ENCOUNTER — TELEPHONE (OUTPATIENT)
Age: 85
End: 2025-01-30

## 2025-01-30 NOTE — TELEPHONE ENCOUNTER
Spoke with pt:    She is not feeling any pain at all, just pressure.    She feels SOB, pt was able to speak well without becoming winded.     When asked if any new symptoms at all, pt replies: \"I don't have any interest in anything; I don't have any energy\".    She also wanted you to know that she has not gone to learn how to inject the methotrexate because she has some doctors who have differing opinions about its use.    Please see note below

## 2025-01-30 NOTE — TELEPHONE ENCOUNTER
Patient called in office     States she has pressure in/ on chest,     Described as something \"sitting\" on chest     Pt states this is an ongoing issue that  is aware of     Pt also mentioned she is SOB     She would like to come in office tomorrow - OK TO SCHEDULE?     I suggested ED b/c of SOB    Pt would like a response back today...

## 2025-01-31 ENCOUNTER — OFFICE VISIT (OUTPATIENT)
Age: 85
End: 2025-01-31

## 2025-01-31 ENCOUNTER — TELEPHONE (OUTPATIENT)
Age: 85
End: 2025-01-31

## 2025-01-31 VITALS
DIASTOLIC BLOOD PRESSURE: 76 MMHG | BODY MASS INDEX: 34.69 KG/M2 | HEART RATE: 66 BPM | RESPIRATION RATE: 18 BRPM | HEIGHT: 58 IN | TEMPERATURE: 97.6 F | OXYGEN SATURATION: 96 % | SYSTOLIC BLOOD PRESSURE: 126 MMHG

## 2025-01-31 DIAGNOSIS — F33.1 MODERATE EPISODE OF RECURRENT MAJOR DEPRESSIVE DISORDER (HCC): ICD-10-CM

## 2025-01-31 DIAGNOSIS — R06.02 SHORTNESS OF BREATH: Primary | ICD-10-CM

## 2025-01-31 DIAGNOSIS — D86.9 SARCOID: ICD-10-CM

## 2025-01-31 DIAGNOSIS — F41.1 GAD (GENERALIZED ANXIETY DISORDER): ICD-10-CM

## 2025-01-31 RX ORDER — BUDESONIDE AND FORMOTEROL FUMARATE DIHYDRATE 80; 4.5 UG/1; UG/1
2 AEROSOL RESPIRATORY (INHALATION) 2 TIMES DAILY
Qty: 10.2 G | Refills: 3 | Status: SHIPPED | OUTPATIENT
Start: 2025-01-31

## 2025-01-31 NOTE — TELEPHONE ENCOUNTER
Please fax referral and schedule appt for her with DR. Angelo Hale pulmonology at  for sarcoidosis.  She can do any time 2/12 or 2/13 or 2/26  but will make any time work.  Can [facility]t info on [facility].  Thanks

## 2025-01-31 NOTE — TELEPHONE ENCOUNTER
Once faxed to [phone] will take 3-5 business day to process.   Once referral in system- it will flag any provider who is able to treat condition

## 2025-01-31 NOTE — TELEPHONE ENCOUNTER
Dr. Angelo Hale has retired. Patient has been informed. Would like to know if you have any other recommendations?

## 2025-02-05 DIAGNOSIS — M85.80 OSTEOPENIA WITH HIGH RISK OF FRACTURE: ICD-10-CM

## 2025-02-05 RX ORDER — ALENDRONATE SODIUM 70 MG/1
TABLET ORAL
Qty: 12 TABLET | Refills: 3 | Status: SHIPPED | OUTPATIENT
Start: 2025-02-05

## 2025-02-05 NOTE — TELEPHONE ENCOUNTER
Requested Prescriptions     Pending Prescriptions Disp Refills    alendronate (FOSAMAX) 70 MG tablet [Pharmacy Med Name: ALENDRONATE SODIUM 70 MG TAB] 4 tablet 0     Sig: TAKE 1 TABLET BY MOUTH ONCE WEEKLY ON AN EMPTY STOMACH BEFORE BREAKFAST. REMAIN UPRIGHT FOR 30 MINUTES AND TAKE WITH 8 OUNCES OF WATER         Last OV: 1/31/2025    Last labs: 1/3/2025     F/u: 4/7/2025

## 2025-02-18 RX ORDER — VERAPAMIL HYDROCHLORIDE 240 MG/1
240 TABLET, FILM COATED, EXTENDED RELEASE ORAL DAILY
Qty: 90 TABLET | Refills: 1 | Status: SHIPPED | OUTPATIENT
Start: 2025-02-18

## 2025-02-18 NOTE — TELEPHONE ENCOUNTER
Requested Prescriptions     Pending Prescriptions Disp Refills    verapamil (CALAN SR) 240 MG extended release tablet 90 tablet 1     Sig: Take 1 tablet by mouth daily         Last OV: 1/31/2025    Last labs: 1/3/2025     F/u: 4/7/2025

## 2025-02-27 ENCOUNTER — OFFICE VISIT (OUTPATIENT)
Dept: PULMONOLOGY | Age: 85
End: 2025-02-27

## 2025-02-27 VITALS
HEIGHT: 58 IN | DIASTOLIC BLOOD PRESSURE: 84 MMHG | RESPIRATION RATE: 16 BRPM | WEIGHT: 155 LBS | OXYGEN SATURATION: 96 % | SYSTOLIC BLOOD PRESSURE: 162 MMHG | HEART RATE: 88 BPM | BODY MASS INDEX: 32.54 KG/M2

## 2025-02-27 DIAGNOSIS — R06.09 DOE (DYSPNEA ON EXERTION): Primary | ICD-10-CM

## 2025-02-27 NOTE — ASSESSMENT & PLAN NOTE
Patient presents today for evaluation of dyspnea on exertion that reportedly started about 4 months ago, she has a history of unclear diagnosis of lupus that was done by a dermatologist and for which she received Plaquenil at some point, was then referred to rheumatology who mentioned that her ABUNDIO was likely to be a false positive however she had an elevated ACE level with which she was diagnosed with sarcoidosis, was recommended to be started on methotrexate but she never started.  Her PFT is abnormal and shows borderline restriction with mildly impaired diffusion capacity, her CT of the chest failed to show any evidence of interstitial lung disease however it did presented significantly elevated right hemidiaphragm which could account to some of the findings.  Finally she mentioned that her symptoms have slowly improved recently and it is unclear whether it improved because of the Plaquenil or because of self resolved viral syndrome or something in those lines.  Based on the information available I do not believe that she has sarcoidosis but her dyspnea seems to be quite troublesome, will order an echo to evaluate if there is any cardiac component to it, I did explain her that there is always a chance she has a decent degree of deconditioning.

## 2025-02-27 NOTE — PROGRESS NOTES
[facility]   PULMONARY AND CRITICAL CARE MEDICINE    OUTPATIENT NOTE     SUBJECTIVE:   Referring Physician: Alireza MERRITT    CHIEF COMPLAINT / HPI:    Francoise is a 85 y.o. female that initially presented to clinic for evaluation of SOB.   BRITO with exercise limitation to probably  ft, less than 1 flight of stairs.   Symptoms reported present for at least 4 months.   States she was diagnosed with SLE at Dermatology office, then PCP sent her to Rheumatology who told her she has sarcoidosis. Then she got a biopsy of her skin done showing neither.  She took Plaquenil for about 3-4 months when she was initially diagnosed with SLE, then it was stopped by Derm as well.   Relevant Social History  Tobacco: Never smoker, second hand exposure most of her life.   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   Pets: None  Family history of pulmonary problems: Mother with emphysema.     Past Medical History:    Past Medical History:   Diagnosis Date    Acute esophagitis     h/o    Acute hearing loss of right ear 07/12/2016    Arthritis recent    AVN (avascular necrosis of bone) ([facility]) 01/23/2014    Hip      Benign essential hypertension     Cervical stenosis of spine     Colon polyps     Depressive disorder, not elsewhere classified     Essential familial hypercholesterolemia     Fatigue     Fracture of cuboid, right ankle, closed 05/2013    Hypercalcemia 07/27/2018    Lumbar herniated disc     Lumbar radiculopathy 06/09/2015    Moderate episode of recurrent major depressive disorder ([facility]) 11/16/2021    AILYN (obstructive sleep apnea)     USES BIPAP    Osteopenia     Osteoporosis adult       Social History:    Social History     Tobacco Use   Smoking Status Never   Smokeless Tobacco Never   Tobacco Comments    never       Family History:  Family History   Problem Relation Age of Onset    Other Mother         lung disease    Heart Disease Father      Current

## 2025-03-10 ENCOUNTER — HOSPITAL ENCOUNTER (OUTPATIENT)
Age: 85
Discharge: HOME | End: 2025-03-12
Attending: INTERNAL MEDICINE
Payer: MEDICARE

## 2025-03-10 VITALS — SYSTOLIC BLOOD PRESSURE: 162 MMHG | DIASTOLIC BLOOD PRESSURE: 84 MMHG

## 2025-03-10 DIAGNOSIS — R06.09 DOE (DYSPNEA ON EXERTION): ICD-10-CM

## 2025-03-10 LAB
ECHO AO ASC DIAM: 3.5 CM
ECHO AO ROOT DIAM: 3 CM
ECHO AV AREA PEAK VELOCITY: 2.4 CM2
ECHO AV AREA VTI: 2.5 CM2
ECHO AV MEAN GRADIENT: 5 MMHG
ECHO AV MEAN VELOCITY: 1 M/S
ECHO AV PEAK GRADIENT: 8 MMHG
ECHO AV PEAK VELOCITY: 1.5 M/S
ECHO AV VELOCITY RATIO: 0.8
ECHO AV VTI: 29.6 CM
ECHO IVC PROX: 1.7 CM
ECHO LA AREA 2C: 20.3 CM2
ECHO LA AREA 4C: 20.2 CM2
ECHO LA DIAMETER: 3.4 CM
ECHO LA MAJOR AXIS: 4.9 CM
ECHO LA MINOR AXIS: 5.2 CM
ECHO LA TO AORTIC ROOT RATIO: 1.13
ECHO LA VOL BP: 67 ML (ref 22–52)
ECHO LA VOL MOD A2C: 63 ML (ref 22–52)
ECHO LA VOL MOD A4C: 69 ML (ref 22–52)
ECHO LV E' LATERAL VELOCITY: 7.94 CM/S
ECHO LV E' SEPTAL VELOCITY: 5.11 CM/S
ECHO LV EDV A2C: 98 ML
ECHO LV EDV A4C: 104 ML
ECHO LV EF PHYSICIAN: 62 %
ECHO LV EJECTION FRACTION A2C: 57 %
ECHO LV EJECTION FRACTION A4C: 64 %
ECHO LV EJECTION FRACTION BIPLANE: 62 % (ref 55–100)
ECHO LV ESV A2C: 42 ML
ECHO LV ESV A4C: 38 ML
ECHO LV FRACTIONAL SHORTENING: 30 % (ref 28–44)
ECHO LV INTERNAL DIMENSION DIASTOLIC: 4 CM (ref 3.9–5.3)
ECHO LV INTERNAL DIMENSION SYSTOLIC: 2.8 CM
ECHO LV IVSD: 1.8 CM (ref 0.6–0.9)
ECHO LV MASS 2D: 312.6 G (ref 67–162)
ECHO LV POSTERIOR WALL DIASTOLIC: 1.8 CM (ref 0.6–0.9)
ECHO LV RELATIVE WALL THICKNESS RATIO: 0.9
ECHO LVOT AREA: 2.8 CM2
ECHO LVOT AV VTI INDEX: 0.87
ECHO LVOT DIAM: 1.9 CM
ECHO LVOT MEAN GRADIENT: 3 MMHG
ECHO LVOT PEAK GRADIENT: 6 MMHG
ECHO LVOT PEAK VELOCITY: 1.2 M/S
ECHO LVOT SV: 72.8 ML
ECHO LVOT VTI: 25.7 CM
ECHO MV A VELOCITY: 1.18 M/S
ECHO MV E DECELERATION TIME (DT): 206 MS
ECHO MV E VELOCITY: 0.94 M/S
ECHO MV E/A RATIO: 0.8
ECHO MV E/E' LATERAL: 11.84
ECHO MV E/E' RATIO (AVERAGED): 15.12
ECHO MV E/E' SEPTAL: 18.4
ECHO PV MAX VELOCITY: 1 M/S
ECHO PV MEAN GRADIENT: 2 MMHG
ECHO PV MEAN VELOCITY: 0.7 M/S
ECHO PV PEAK GRADIENT: 4 MMHG
ECHO PV VTI: 21.1 CM
ECHO RA AREA 4C: 14.2 CM2
ECHO RA VOLUME: 35 ML
ECHO RV BASAL DIMENSION: 3.3 CM
ECHO RV FREE WALL PEAK S': 16 CM/S
ECHO RV LONGITUDINAL DIMENSION: 6.1 CM
ECHO RV MID DIMENSION: 3 CM
ECHO RV TAPSE: 1.8 CM (ref 1.7–?)

## 2025-03-10 PROCEDURE — 93306 TTE W/DOPPLER COMPLETE: CPT | Performed by: INTERNAL MEDICINE

## 2025-03-10 PROCEDURE — 93306 TTE W/DOPPLER COMPLETE: CPT

## 2025-03-18 DIAGNOSIS — F33.1 MODERATE EPISODE OF RECURRENT MAJOR DEPRESSIVE DISORDER (HCC): ICD-10-CM

## 2025-03-18 DIAGNOSIS — F41.1 GAD (GENERALIZED ANXIETY DISORDER): ICD-10-CM

## 2025-03-18 NOTE — TELEPHONE ENCOUNTER
Requested Prescriptions     Pending Prescriptions Disp Refills    sertraline (ZOLOFT) 50 MG tablet [Pharmacy Med Name: SERTRALINE HCL 50 MG TABLET] 135 tablet 3     Sig: TAKE 1 AND 1/2 TABLET BY MOUTH DAILY         Last OV: 1/31/2025    Last labs: 1/3/2025     F/u: 4/7/2025

## 2025-04-01 ENCOUNTER — OFFICE VISIT (OUTPATIENT)
Dept: PULMONOLOGY | Age: 85
End: 2025-04-01
Payer: MEDICARE

## 2025-04-01 VITALS
BODY MASS INDEX: 33.58 KG/M2 | DIASTOLIC BLOOD PRESSURE: 84 MMHG | SYSTOLIC BLOOD PRESSURE: 136 MMHG | OXYGEN SATURATION: 96 % | HEIGHT: 58 IN | HEART RATE: 83 BPM | WEIGHT: 160 LBS

## 2025-04-01 DIAGNOSIS — R06.09 DOE (DYSPNEA ON EXERTION): Primary | ICD-10-CM

## 2025-04-01 DIAGNOSIS — M32.8 OTHER FORMS OF SYSTEMIC LUPUS ERYTHEMATOSUS, UNSPECIFIED ORGAN INVOLVEMENT STATUS (HCC): ICD-10-CM

## 2025-04-01 PROCEDURE — 1036F TOBACCO NON-USER: CPT | Performed by: INTERNAL MEDICINE

## 2025-04-01 PROCEDURE — 99213 OFFICE O/P EST LOW 20 MIN: CPT | Performed by: INTERNAL MEDICINE

## 2025-04-01 PROCEDURE — 1123F ACP DISCUSS/DSCN MKR DOCD: CPT | Performed by: INTERNAL MEDICINE

## 2025-04-01 PROCEDURE — 3079F DIAST BP 80-89 MM HG: CPT | Performed by: INTERNAL MEDICINE

## 2025-04-01 PROCEDURE — G2211 COMPLEX E/M VISIT ADD ON: HCPCS | Performed by: INTERNAL MEDICINE

## 2025-04-01 PROCEDURE — 1090F PRES/ABSN URINE INCON ASSESS: CPT | Performed by: INTERNAL MEDICINE

## 2025-04-01 PROCEDURE — G8427 DOCREV CUR MEDS BY ELIG CLIN: HCPCS | Performed by: INTERNAL MEDICINE

## 2025-04-01 PROCEDURE — G8417 CALC BMI ABV UP PARAM F/U: HCPCS | Performed by: INTERNAL MEDICINE

## 2025-04-01 PROCEDURE — G8399 PT W/DXA RESULTS DOCUMENT: HCPCS | Performed by: INTERNAL MEDICINE

## 2025-04-01 PROCEDURE — 3075F SYST BP GE 130 - 139MM HG: CPT | Performed by: INTERNAL MEDICINE

## 2025-04-01 PROCEDURE — 1159F MED LIST DOCD IN RCRD: CPT | Performed by: INTERNAL MEDICINE

## 2025-04-01 NOTE — PROGRESS NOTES
[facility]   PULMONARY AND CRITICAL CARE MEDICINE    OUTPATIENT NOTE     SUBJECTIVE:   Referring Physician: Alireza MERRITT    CHIEF COMPLAINT / HPI:    Francoise is a 85 y.o. female that initially presented to clinic for evaluation of SOB.   BRITO with exercise limitation to probably  ft, less than 1 flight of stairs.   Symptoms reported present for at least 4 months.   States she was diagnosed with SLE at Dermatology office, then PCP sent her to Rheumatology who told her she has sarcoidosis. Then she got a biopsy of her skin done showing neither.  She took Plaquenil for about 3-4 months when she was initially diagnosed with SLE, then it was stopped by Derm as well.   Relevant Social History  Tobacco: Never smoker, second hand exposure most of her life.   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   Pets: None  Family history of pulmonary problems: Mother with emphysema.     INTERVAL HISTORY:  04/01 - States she is doing well.  No issues reported, her SOB has improved since she last saw me.     Past Medical History:    Past Medical History:   Diagnosis Date    Acute esophagitis     h/o    Acute hearing loss of right ear 07/12/2016    Arthritis recent    AVN (avascular necrosis of bone) (HCC) 01/23/2014    Hip      Benign essential hypertension     Cervical stenosis of spine     Colon polyps     Depressive disorder, not elsewhere classified     Essential familial hypercholesterolemia     Fatigue     Fracture of cuboid, right ankle, closed 05/2013    Hypercalcemia 07/27/2018    Lumbar herniated disc     Lumbar radiculopathy 06/09/2015    Moderate episode of recurrent major depressive disorder (HCC) 11/16/2021    AILYN (obstructive sleep apnea)     USES BIPAP    Osteopenia     Osteoporosis adult       Social History:    Social History     Tobacco Use   Smoking Status Never   Smokeless Tobacco Never   Tobacco Comments    never       Family History:  Family

## 2025-04-01 NOTE — ASSESSMENT & PLAN NOTE
Patient presents today for evaluation of dyspnea on exertion that reportedly started about 4 months ago, she has a history of unclear diagnosis of lupus that was done by a dermatologist and for which she received Plaquenil at some point, was then referred to rheumatology who mentioned that her ABUNDIO was likely to be a false positive however she had a minimally elevated ACE level with which she was diagnosed with sarcoidosis, was recommended to be started on methotrexate but she never started.  Her PFT is abnormal and shows borderline restriction with mildly impaired diffusion capacity, her CT of the chest failed to show any evidence of interstitial lung disease however it did presented significantly elevated right hemidiaphragm which could account to some of the findings.  Finally she mentioned that her symptoms have slowly improved recently and it is unclear whether it improved because of the Plaquenil or because of self resolved viral syndrome or something in those lines.  Based on the information available it is difficult for me to make a diagnosis of sarcoidosis based solely on minimally elevated ACE level, there is no significant pulmonary involvement.   Her Echo showed LV hypertrophy with elevated LV filling pressures, I recommended her to re-establish care with cardiology, I believe this might be due to chronic hypertension rather than granulomatous heart infiltration since her symptoms appear to have improved since I last saw her.   Will see her in a year or earlier if her symptoms recur earlier than that.   Referred her to Dr. Sandhu for requested second Rheumatological opinion

## 2025-04-09 ENCOUNTER — OFFICE VISIT (OUTPATIENT)
Age: 85
End: 2025-04-09

## 2025-04-09 VITALS
SYSTOLIC BLOOD PRESSURE: 134 MMHG | TEMPERATURE: 97.1 F | HEART RATE: 96 BPM | DIASTOLIC BLOOD PRESSURE: 70 MMHG | RESPIRATION RATE: 16 BRPM | OXYGEN SATURATION: 99 %

## 2025-04-09 DIAGNOSIS — F51.01 PRIMARY INSOMNIA: Primary | ICD-10-CM

## 2025-04-09 DIAGNOSIS — F33.42 RECURRENT MAJOR DEPRESSIVE DISORDER, IN FULL REMISSION: Chronic | ICD-10-CM

## 2025-04-09 DIAGNOSIS — K58.1 IRRITABLE BOWEL SYNDROME WITH CONSTIPATION: ICD-10-CM

## 2025-04-09 DIAGNOSIS — R06.09 DOE (DYSPNEA ON EXERTION): ICD-10-CM

## 2025-04-09 RX ORDER — TRAZODONE HYDROCHLORIDE 50 MG/1
25-50 TABLET ORAL NIGHTLY
Qty: 30 TABLET | Refills: 1 | Status: SHIPPED | OUTPATIENT
Start: 2025-04-09

## 2025-04-09 NOTE — PROGRESS NOTES
of Onset    Other Mother         lung disease    Heart Disease Father        Social History     Tobacco Use    Smoking status: Never    Smokeless tobacco: Never    Tobacco comments:     never   Vaping Use    Vaping status: Never Used   Substance Use Topics    Alcohol use: Not Currently     Comment: once in a while a glass of wine. Less than 2/month    Drug use: No            Review of Systems  Review of Systems    Objective:   Physical Exam  Vitals:    04/09/25 1526   BP: 134/70   BP Site: Right Upper Arm   Patient Position: Sitting   BP Cuff Size: Large Adult   Pulse: 96   Resp: 16   Temp: 97.1 °F (36.2 °C)   TempSrc: Temporal   SpO2: 99%       Physical Exam  NAD  Skin is warm and dry.   Mood and affect are normal.  No agitation or psychomotor retardation. No pressured speech, grandiosity or tangential thoughts.  Insight and judgement are intact. Not suicidal.   The neck is supple and free of adenopathy or masses, the thyroid is normal without enlargement or nodules.  Chest is clear, no wheezing or rales. Normal symmetric air entry throughout both lung fields.  Heart regular with normal rate, no murmer or gallop       Assessment:       Diagnosis Orders   1. Primary insomnia  traZODone (DESYREL) 50 MG tablet  Discussed non-pharmacologic tx including meditation, sleep hygiene, exercise, white noise, breathing exercises and tapping.   Also discussed CBD and medical marijuana although non-pharmacologic tx preferred.       2. Recurrent major depressive disorder, in full remission  Doing well; continue Sertraline      3. Irritable bowel syndrome with constipation  Well managed.       4. BRITO (dyspnea on exertion)  Improved.  Thorough work up benign.    Exercise encouraged             Plan:      Side effects of current medications reviewed and questions answered.   Follow up in 3 months or prn.

## 2025-06-09 DIAGNOSIS — I10 BENIGN ESSENTIAL HYPERTENSION: ICD-10-CM

## 2025-06-09 DIAGNOSIS — E78.01 ESSENTIAL FAMILIAL HYPERCHOLESTEROLEMIA: ICD-10-CM

## 2025-06-09 RX ORDER — POTASSIUM CHLORIDE 750 MG/1
10 TABLET, EXTENDED RELEASE ORAL DAILY
Qty: 90 TABLET | Refills: 1 | Status: SHIPPED | OUTPATIENT
Start: 2025-06-09

## 2025-06-09 RX ORDER — PANTOPRAZOLE SODIUM 40 MG/1
40 TABLET, DELAYED RELEASE ORAL DAILY
Qty: 90 TABLET | Refills: 3 | Status: SHIPPED | OUTPATIENT
Start: 2025-06-09

## 2025-06-09 RX ORDER — ROSUVASTATIN CALCIUM 20 MG/1
20 TABLET, COATED ORAL DAILY
Qty: 90 TABLET | Refills: 1 | Status: SHIPPED | OUTPATIENT
Start: 2025-06-09

## 2025-06-09 RX ORDER — VALSARTAN 40 MG/1
40 TABLET ORAL DAILY
Qty: 90 TABLET | Refills: 1 | Status: SHIPPED | OUTPATIENT
Start: 2025-06-09

## 2025-06-09 NOTE — TELEPHONE ENCOUNTER
Requested Prescriptions     Pending Prescriptions Disp Refills    valsartan (DIOVAN) 40 MG tablet 90 tablet 3     Sig: Take 1 tablet by mouth daily    potassium chloride (KLOR-CON M) 10 MEQ extended release tablet 90 tablet 1     Sig: Take 1 tablet by mouth daily    rosuvastatin (CRESTOR) 20 MG tablet 90 tablet 2     Sig: Take 1 tablet by mouth daily         Last OV: 4/9/2025    Last labs: 1/3/2025     F/u: 7/9/2025

## 2025-06-20 ENCOUNTER — OFFICE VISIT (OUTPATIENT)
Age: 85
End: 2025-06-20

## 2025-06-20 VITALS
TEMPERATURE: 97.3 F | OXYGEN SATURATION: 95 % | DIASTOLIC BLOOD PRESSURE: 76 MMHG | HEART RATE: 73 BPM | SYSTOLIC BLOOD PRESSURE: 130 MMHG | RESPIRATION RATE: 16 BRPM

## 2025-06-20 DIAGNOSIS — R44.8 FEELS COLD: ICD-10-CM

## 2025-06-20 DIAGNOSIS — F33.42 RECURRENT MAJOR DEPRESSIVE DISORDER, IN FULL REMISSION: Chronic | ICD-10-CM

## 2025-06-20 DIAGNOSIS — R27.0 ATAXIA: Primary | ICD-10-CM

## 2025-06-20 DIAGNOSIS — M48.061 SPINAL STENOSIS OF LUMBAR REGION WITHOUT NEUROGENIC CLAUDICATION: Chronic | ICD-10-CM

## 2025-06-20 DIAGNOSIS — D64.9 ANEMIA, UNSPECIFIED TYPE: ICD-10-CM

## 2025-06-20 DIAGNOSIS — R49.0 HOARSE VOICE QUALITY: ICD-10-CM

## 2025-06-20 LAB
ALBUMIN SERPL-MCNC: 4.5 G/DL (ref 3.4–5)
ALBUMIN/GLOB SERPL: 1.9 {RATIO} (ref 1.1–2.2)
ALP SERPL-CCNC: 58 U/L (ref 40–129)
ALT SERPL-CCNC: 16 U/L (ref 10–40)
ANION GAP SERPL CALCULATED.3IONS-SCNC: 12 MMOL/L (ref 3–16)
AST SERPL-CCNC: 21 U/L (ref 15–37)
BASOPHILS # BLD: 0 K/UL (ref 0–0.2)
BASOPHILS NFR BLD: 0.5 %
BILIRUB SERPL-MCNC: 0.3 MG/DL (ref 0–1)
BUN SERPL-MCNC: 27 MG/DL (ref 7–20)
CALCIUM SERPL-MCNC: 10.5 MG/DL (ref 8.3–10.6)
CHLORIDE SERPL-SCNC: 103 MMOL/L (ref 99–110)
CO2 SERPL-SCNC: 27 MMOL/L (ref 21–32)
CREAT SERPL-MCNC: 0.8 MG/DL (ref 0.6–1.2)
DEPRECATED RDW RBC AUTO: 16.1 % (ref 12.4–15.4)
EOSINOPHIL # BLD: 0.1 K/UL (ref 0–0.6)
EOSINOPHIL NFR BLD: 1.2 %
FERRITIN SERPL IA-MCNC: 24.1 NG/ML (ref 15–150)
GFR SERPLBLD CREATININE-BSD FMLA CKD-EPI: 72 ML/MIN/{1.73_M2}
GLUCOSE SERPL-MCNC: 87 MG/DL (ref 70–99)
HCT VFR BLD AUTO: 36.7 % (ref 36–48)
HGB BLD-MCNC: 12.2 G/DL (ref 12–16)
LYMPHOCYTES # BLD: 0.9 K/UL (ref 1–5.1)
LYMPHOCYTES NFR BLD: 11.3 %
MCH RBC QN AUTO: 27.2 PG (ref 26–34)
MCHC RBC AUTO-ENTMCNC: 33.2 G/DL (ref 31–36)
MCV RBC AUTO: 81.9 FL (ref 80–100)
MONOCYTES # BLD: 0.9 K/UL (ref 0–1.3)
MONOCYTES NFR BLD: 12.1 %
NEUTROPHILS # BLD: 5.7 K/UL (ref 1.7–7.7)
NEUTROPHILS NFR BLD: 74.9 %
PLATELET # BLD AUTO: 179 K/UL (ref 135–450)
PMV BLD AUTO: 10 FL (ref 5–10.5)
POTASSIUM SERPL-SCNC: 4.9 MMOL/L (ref 3.5–5.1)
PROT SERPL-MCNC: 6.9 G/DL (ref 6.4–8.2)
RBC # BLD AUTO: 4.48 M/UL (ref 4–5.2)
SODIUM SERPL-SCNC: 142 MMOL/L (ref 136–145)
TSH SERPL DL<=0.005 MIU/L-ACNC: 2.43 UIU/ML (ref 0.27–4.2)
WBC # BLD AUTO: 7.6 K/UL (ref 4–11)

## 2025-06-20 NOTE — PROGRESS NOTES
lung disease    Heart Disease Father        Social History     Tobacco Use    Smoking status: Never    Smokeless tobacco: Never    Tobacco comments:     never   Vaping Use    Vaping status: Never Used   Substance Use Topics    Alcohol use: Not Currently     Comment: once in a while a glass of wine. Less than 2/month    Drug use: No            Review of Systems  Review of Systems    Objective:   Physical Exam  Vitals:    06/20/25 1334   BP: 130/76   BP Site: Right Upper Arm   Patient Position: Sitting   BP Cuff Size: Medium Adult   Pulse: 73   Resp: 16   Temp: 97.3 °F (36.3 °C)   TempSrc: Temporal   SpO2: 95%       Physical Exam  NAD   Mood and affect are mildly anxious.  No agitation or psychomotor retardation.   Not suicidal.   Skin is warm and dry. Well hydrated, no rash.   Chest is clear, no wheezing or rales. Normal symmetric air entry throughout both lung fields.   Heart regular with normal rate, no murmer or gallop   Gait mildly ataxic.  + Romberg.     Motor 4/5 hip flexors, flexion and extension knees, dorsiflexion and plantar flexion feet.  DTR 2/4 patella and Achilles tendons.   Normal finger to nose.       Assessment:       Diagnosis Orders   1. Ataxia  MRI BRAIN W WO CONTRAST  I suspect multifactorial but rule out stroke.  Consider PT       2. Spinal stenosis of lumbar region without neurogenic claudication  Non [facility] - External Referral To Pain Medicine      3. Feels cold  CBC with Auto Differential    TSH reflex to FT4    Comprehensive Metabolic Panel    Ferritin  Likely age related.       4. Anemia, unspecified type  Ferritin      5. Recurrent major depressive disorder, in full remission  TSH reflex to FT4  Counseling as planned.  Continue Sertraline.        6.  Raspy voice -  refer to ENT.  Has seen Dr. Diaz in the past; she will make appt with him.       Plan:      Side effects of current medications reviewed and questions answered.   Keep appt in July or prn.

## 2025-06-23 ENCOUNTER — RESULTS FOLLOW-UP (OUTPATIENT)
Age: 85
End: 2025-06-23

## 2025-06-25 NOTE — PROGRESS NOTES
Subjective:      Patient ID: Francoise Schilling 85 y.o. female. The primary encounter diagnosis was Gait disturbance. Diagnoses of Essential familial hypercholesterolemia, Benign essential hypertension, and Moderate episode of recurrent major depressive disorder (HCC) were also pertinent to this visit.      HPI    Depression: largely due to loss of local family.  Overall doing ok.  Sleeps well.  Wishes her older son was more communicative and that she could see family more.     Back pain: saw Dr. Lake.  Had MRI today; has appt early in August for injection.  If is effective will have ablation.  Has order for PT.    SOB persists.  Has follow up with Dr. Alvarez next month. Not exercising.     Forgetfulness: trouble remembering words.  Pays all of her own bills without error.   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  She has not trouble driving.  She did get lost going to an appointment - went [place]  [address].     Hypertension: Patient here for follow-up of elevated blood pressure. She is not exercising and is adherent to low salt diet.  Blood pressure is not testing at home. Cardiac symptoms dyspnea. Patient denies chest pressure/discomfort, exertional chest pressure/discomfort, lower extremity edema, and palpitations.  Cardiovascular risk factors: advanced age (older than 55 for men, 65 for women), hypertension, obesity (BMI >= 30 kg/m2), and smoking/ tobacco exposure. Use of agents associated with hypertension: none. History of target organ damage: none.    .Hyperlipidemia:  No new myalgias or GI upset on rosuvastatin (Crestor). Medication compliance: compliant most of the time. Patient is  following a low fat, low cholesterol diet.  She is not exercising regularly.       Meningioma: she saw her eye doctor who did not see anything concerning.    Lab Results   Component Value Date    CHOL 154 01/03/2025    TRIG 81 01/03/2025    HDL 65 (H) 01/03/2025    LDLDIRECT 118 (H)

## 2025-06-28 ENCOUNTER — HOSPITAL ENCOUNTER (OUTPATIENT)
Dept: MRI IMAGING | Age: 85
Discharge: HOME | End: 2025-06-28
Payer: MEDICARE

## 2025-06-28 DIAGNOSIS — R27.0 ATAXIA: ICD-10-CM

## 2025-06-28 PROCEDURE — A9579 GAD-BASE MR CONTRAST NOS,1ML: HCPCS | Performed by: FAMILY MEDICINE

## 2025-06-28 PROCEDURE — 6360000004 HC RX CONTRAST MEDICATION: Performed by: FAMILY MEDICINE

## 2025-06-28 PROCEDURE — 70553 MRI BRAIN STEM W/O & W/DYE: CPT

## 2025-06-28 RX ORDER — GADOTERIDOL 279.3 MG/ML
16 INJECTION INTRAVENOUS
Status: COMPLETED | OUTPATIENT
Start: 2025-06-28 | End: 2025-06-28

## 2025-06-28 RX ADMIN — GADOTERIDOL 16 ML: 279.3 INJECTION, SOLUTION INTRAVENOUS at 10:10

## 2025-06-30 PROBLEM — D32.9 MENINGIOMA (HCC): Status: ACTIVE | Noted: 2025-06-30

## 2025-06-30 PROBLEM — D86.9 SARCOIDOSIS: Status: RESOLVED | Noted: 2025-01-08 | Resolved: 2025-06-30

## 2025-07-09 ENCOUNTER — OFFICE VISIT (OUTPATIENT)
Age: 85
End: 2025-07-09

## 2025-07-09 VITALS
HEART RATE: 65 BPM | TEMPERATURE: 97.9 F | OXYGEN SATURATION: 94 % | SYSTOLIC BLOOD PRESSURE: 134 MMHG | DIASTOLIC BLOOD PRESSURE: 68 MMHG | RESPIRATION RATE: 16 BRPM

## 2025-07-09 DIAGNOSIS — F33.1 MODERATE EPISODE OF RECURRENT MAJOR DEPRESSIVE DISORDER (HCC): ICD-10-CM

## 2025-07-09 DIAGNOSIS — D32.9 MENINGIOMA (HCC): ICD-10-CM

## 2025-07-09 DIAGNOSIS — R41.3 MEMORY LOSS: ICD-10-CM

## 2025-07-09 DIAGNOSIS — I10 BENIGN ESSENTIAL HYPERTENSION: Primary | Chronic | ICD-10-CM

## 2025-07-10 ENCOUNTER — TELEPHONE (OUTPATIENT)
Age: 85
End: 2025-07-10

## 2025-07-10 NOTE — TELEPHONE ENCOUNTER
Spoke with pt:     Pt feels much better. She had a panic attack. She took an ATARAX and took a nap and is much better now. She had plans for this evening but she cancelled them to just relax tonight. Said I would check on her tomorrow.

## 2025-07-11 NOTE — TELEPHONE ENCOUNTER
Called and discussed.  Feels fine today.  Nothing triggered her panic attack.  Was typical panic for her.

## 2025-07-16 ENCOUNTER — OFFICE VISIT (OUTPATIENT)
Dept: PULMONOLOGY | Age: 85
End: 2025-07-16
Payer: MEDICARE

## 2025-07-16 VITALS
DIASTOLIC BLOOD PRESSURE: 90 MMHG | OXYGEN SATURATION: 95 % | BODY MASS INDEX: 34 KG/M2 | HEIGHT: 58 IN | HEART RATE: 89 BPM | SYSTOLIC BLOOD PRESSURE: 148 MMHG | WEIGHT: 162 LBS

## 2025-07-16 DIAGNOSIS — R06.09 DOE (DYSPNEA ON EXERTION): Primary | ICD-10-CM

## 2025-07-16 PROCEDURE — 1123F ACP DISCUSS/DSCN MKR DOCD: CPT | Performed by: INTERNAL MEDICINE

## 2025-07-16 PROCEDURE — 3080F DIAST BP >= 90 MM HG: CPT | Performed by: INTERNAL MEDICINE

## 2025-07-16 PROCEDURE — 1090F PRES/ABSN URINE INCON ASSESS: CPT | Performed by: INTERNAL MEDICINE

## 2025-07-16 PROCEDURE — G8428 CUR MEDS NOT DOCUMENT: HCPCS | Performed by: INTERNAL MEDICINE

## 2025-07-16 PROCEDURE — 3077F SYST BP >= 140 MM HG: CPT | Performed by: INTERNAL MEDICINE

## 2025-07-16 PROCEDURE — G8399 PT W/DXA RESULTS DOCUMENT: HCPCS | Performed by: INTERNAL MEDICINE

## 2025-07-16 PROCEDURE — 99213 OFFICE O/P EST LOW 20 MIN: CPT | Performed by: INTERNAL MEDICINE

## 2025-07-16 PROCEDURE — 1036F TOBACCO NON-USER: CPT | Performed by: INTERNAL MEDICINE

## 2025-07-16 PROCEDURE — G2211 COMPLEX E/M VISIT ADD ON: HCPCS | Performed by: INTERNAL MEDICINE

## 2025-07-16 PROCEDURE — G8417 CALC BMI ABV UP PARAM F/U: HCPCS | Performed by: INTERNAL MEDICINE

## 2025-07-16 NOTE — PROGRESS NOTES
[facility]   PULMONARY AND CRITICAL CARE MEDICINE    OUTPATIENT NOTE     SUBJECTIVE:   Referring Physician: Alireza MERRITT    CHIEF COMPLAINT / HPI:    Francoise is a 85 y.o. female that initially presented to clinic for evaluation of SOB.   BRITO with exercise limitation to probably  ft, less than 1 flight of stairs.   Symptoms reported present for at least 4 months.   States she was diagnosed with SLE at Dermatology office, then PCP sent her to Rheumatology who told her she has sarcoidosis. Then she got a biopsy of her skin done showing neither.  She took Plaquenil for about 3-4 months when she was initially diagnosed with SLE, then it was stopped by Derm as well.   Relevant Social History  Tobacco: Never smoker, second hand exposure most of her life.   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   Pets: None  Family history of pulmonary problems: Mother with emphysema.     INTERVAL HISTORY:  04/01 - States she is doing well.  No issues reported, her SOB has improved since she last saw me.   07/16 - Nothing has changed since the last time she saw me. States she is still quite limited.    Past Medical History:    Past Medical History:   Diagnosis Date    Acute esophagitis     h/o    Acute hearing loss of right ear 07/12/2016    Arthritis recent    AVN (avascular necrosis of bone) (HCC) 01/23/2014    Hip      Benign essential hypertension     Cervical stenosis of spine     Colon polyps     Depressive disorder, not elsewhere classified     Essential familial hypercholesterolemia     Fatigue     Fracture of cuboid, right ankle, closed 05/2013    Hypercalcemia 07/27/2018    Lumbar herniated disc     Lumbar radiculopathy 06/09/2015    Moderate episode of recurrent major depressive disorder (HCC) 11/16/2021    AILYN (obstructive sleep apnea)     USES BIPAP    Osteopenia     Osteoporosis adult     Social History:    Social History     Tobacco Use   Smoking

## 2025-07-16 NOTE — ASSESSMENT & PLAN NOTE
Patient presents today for evaluation of dyspnea on exertion that reportedly started about 4 months ago, she has a history of unclear diagnosis of lupus that was done by a dermatologist and for which she received Plaquenil at some point, was then referred to rheumatology who mentioned that her ABUNDIO was likely to be a false positive however she had a minimally elevated ACE level with which she was diagnosed with sarcoidosis, was recommended to be started on methotrexate but she never started.  Her PFT is abnormal and shows borderline restriction with mildly impaired diffusion capacity, her CT of the chest failed to show any evidence of interstitial lung disease however it did presented significantly elevated right hemidiaphragm which could account to some of the findings.  Finally she mentioned that her symptoms have slowly improved recently and it is unclear whether it improved because of the Plaquenil or because of self resolved viral syndrome or something in those lines.  Based on the information available it is difficult for me to make a diagnosis of sarcoidosis based solely on minimally elevated ACE level, there is no significant pulmonary involvement.   Her Echo showed LV hypertrophy with elevated LV filling pressures, I recommended her to re-establish care with cardiology, I believe this might be due to chronic hypertension rather than granulomatous heart infiltration since her symptoms appear to have improved since I last saw her.   On most recent visit she mentioned she is still quite limited and this is affecting her QoL, discussed possibility of doing a CPET but she refused stating she wouldn't be able to complete the test, she did a 6MWT today in office and had no significant desaturation. I believe her main issue might be deconditioning, her R hemidiaphragm elevation is contributing but not the sole reason for her limitation.  -Pulmonary/Cardiac rehab referral placed.

## 2025-07-31 ENCOUNTER — TELEPHONE (OUTPATIENT)
Age: 85
End: 2025-07-31

## 2025-08-11 RX ORDER — VERAPAMIL HYDROCHLORIDE 240 MG/1
240 TABLET, FILM COATED, EXTENDED RELEASE ORAL DAILY
Qty: 90 TABLET | Refills: 2 | Status: SHIPPED | OUTPATIENT
Start: 2025-08-11

## 2025-08-28 ENCOUNTER — OFFICE VISIT (OUTPATIENT)
Age: 85
End: 2025-08-28

## 2025-08-28 VITALS
HEIGHT: 58 IN | RESPIRATION RATE: 18 BRPM | BODY MASS INDEX: 33.86 KG/M2 | HEART RATE: 76 BPM | OXYGEN SATURATION: 94 % | SYSTOLIC BLOOD PRESSURE: 138 MMHG | TEMPERATURE: 98.1 F | DIASTOLIC BLOOD PRESSURE: 88 MMHG

## 2025-08-28 DIAGNOSIS — F41.1 GAD (GENERALIZED ANXIETY DISORDER): ICD-10-CM

## 2025-08-28 DIAGNOSIS — R07.89 ATYPICAL CHEST PAIN: Primary | ICD-10-CM

## 2025-08-28 DIAGNOSIS — F41.0 PANIC ATTACKS: ICD-10-CM

## 2025-08-28 RX ORDER — SERTRALINE HYDROCHLORIDE 100 MG/1
100 TABLET, FILM COATED ORAL DAILY
Qty: 30 TABLET | Refills: 1 | Status: SHIPPED | OUTPATIENT
Start: 2025-08-28

## (undated) DEVICE — ADHESIVE SKIN CLOSURE WND 8.661X1.5 IN 22 CM LIQUIBAND SECUR

## (undated) DEVICE — SOLUTION IV 1000ML 0.9% SOD CHL

## (undated) DEVICE — GOWN,REINFRCE,POLY,ECLIPSE,SLV,3XLG: Brand: MEDLINE

## (undated) DEVICE — TOWEL,STOP FLAG GOLD N-W: Brand: MEDLINE

## (undated) DEVICE — SNARES COLD OVAL 10MM THIN

## (undated) DEVICE — Device

## (undated) DEVICE — SUTURE ETHBND EXCEL SZ 2 L30IN NONABSORBABLE GRN L40MM V-37 MX69G

## (undated) DEVICE — CANNULA SAMP CO2 AD GRN 7FT CO2 AND 7FT O2 TBNG UNIV CONN

## (undated) DEVICE — SOLUTION IRRIG 3000ML 0.9% SOD CHL USP UROMATIC PLAS CONT

## (undated) DEVICE — Z DUP USE 2605542 BUR SURG 2 FLUT MIC 1.5X19 MM MICROPOWER MIC 100

## (undated) DEVICE — TRAP SPEC RETRV CLR PLAS POLYP IN LN SUCT QUIK CTCH

## (undated) DEVICE — SUTURE MCRYL + SZ 4-0 L27IN ABSRB UD L19MM PS-2 3/8 CIR MCP426H

## (undated) DEVICE — GOWN,SIRUS,POLYRNF,BRTHSLV,XLN/XXL,18/CS: Brand: MEDLINE

## (undated) DEVICE — SOLUTION IRRIG 1000ML STRL H2O USP PLAS POUR BTL

## (undated) DEVICE — SUTURE VCRL + SZ 2-0 L18IN ABSRB UD CT1 L36MM 1/2 CIR VCP839D

## (undated) DEVICE — DRAPE ADAPTABLE ARM POSITIONER

## (undated) DEVICE — GLOVE ORANGE PI 8   MSG9080

## (undated) DEVICE — TOTAL SHOULDER: Brand: MEDLINE INDUSTRIES, INC.

## (undated) DEVICE — GOWN,SIRUS,POLYRNF,BRTHSLV,XL,30/CS: Brand: MEDLINE

## (undated) DEVICE — UNDERGLOVE SURG SZ 8 BLU LTX FREE SYN POLYISOPRENE POLYMER

## (undated) DEVICE — SUTURE VCRL + SZ 0 L18IN ABSRB UD L36MM CT-1 1/2 CIR VCP840D

## (undated) DEVICE — SUTURE VCRL SZ 2-0 L18IN ABSRB UD CT-1 L36MM 1/2 CIR J839D

## (undated) DEVICE — MAT FLR W32XL58IN

## (undated) DEVICE — HANDPIECE SET WITH SUCTION TUBING: Brand: INTERPULSE

## (undated) DEVICE — BIT DRL L230MM DIA2.5MM ST 3 FLUT QUIK CPL NONRADIOPAQUE

## (undated) DEVICE — GLOVE ORTHO 8   MSG9480

## (undated) DEVICE — 3M™ COBAN™ NL STERILE NON-LATEX SELF-ADHERENT WRAP, 2086S, 6 IN X 5 YD (15 CM X 4,5 M), 12 ROLLS/CASE: Brand: 3M™ COBAN™